# Patient Record
Sex: FEMALE | Race: WHITE | NOT HISPANIC OR LATINO | Employment: FULL TIME | URBAN - METROPOLITAN AREA
[De-identification: names, ages, dates, MRNs, and addresses within clinical notes are randomized per-mention and may not be internally consistent; named-entity substitution may affect disease eponyms.]

---

## 2017-03-24 ENCOUNTER — OFFICE VISIT (OUTPATIENT)
Dept: OPTOMETRY | Facility: CLINIC | Age: 27
End: 2017-03-24
Payer: COMMERCIAL

## 2017-03-24 DIAGNOSIS — H52.13 MYOPIA, BILATERAL: Primary | ICD-10-CM

## 2017-03-24 DIAGNOSIS — Z04.9 DISEASE RULED OUT AFTER EXAMINATION: ICD-10-CM

## 2017-03-24 DIAGNOSIS — Z46.0 FITTING AND ADJUSTMENT OF SPECTACLES AND CONTACT LENSES: Primary | ICD-10-CM

## 2017-03-24 PROCEDURE — 99999 PR PBB SHADOW E&M-EST. PATIENT-LVL II: CPT | Mod: PBBFAC,,, | Performed by: OPTOMETRIST

## 2017-03-24 PROCEDURE — 92004 COMPRE OPH EXAM NEW PT 1/>: CPT | Mod: S$GLB,,, | Performed by: OPTOMETRIST

## 2017-03-24 PROCEDURE — 92015 DETERMINE REFRACTIVE STATE: CPT | Mod: ,,, | Performed by: OPTOMETRIST

## 2017-03-24 PROCEDURE — 99999 PR PBB SHADOW E&M-EST. PATIENT-LVL I: CPT | Mod: PBBFAC,,, | Performed by: OPTOMETRIST

## 2017-03-24 PROCEDURE — 92310 CONTACT LENS FITTING OU: CPT | Mod: ,,, | Performed by: OPTOMETRIST

## 2017-03-24 PROCEDURE — 99211 OFF/OP EST MAY X REQ PHY/QHP: CPT | Mod: PBBFAC | Performed by: OPTOMETRIST

## 2017-03-24 NOTE — PROGRESS NOTES
HPI     Patient's age: 26 y.o.      Approximate date of last eye examination:  3-4 yrs ago  Name of last eye doctor seen:     Pt states that she is here to get exam foer gl;asses and contacts, also   been having trouble with night driving.    Wears glasses? yes     Wears CLs?:  yes           If yes:                Wears full-time or part-time:  Part-time               Sleeps with contact lenses:  no                 Headaches?  no  Eye pain/discomfort?  no                                                                                     Flashes?  no  Floaters?  yes  Diplopia/Double vision?  no    Patient's Ocular History:         Any eye surgeries? no         Any eye injury?  no         Any treatment for eye disease?  no  Family history of eye disease?  no    Significant patient medical history:         1. Diabetes?  no       If yes, IDDM or NIDDM? no   2. HBP?  no                   ! OTC eyedrops currently using:  none   ! Prescription eye meds currently using:  none            Last edited by Court Mojica MA on 3/24/2017 11:41 AM.         Assessment /Plan     For exam results, see Encounter Report.    Myopia, bilateral    Disease ruled out after examination      Rx specs    Dispensed new contact lens trials today   Remove nightly, replace monthly   OK to order if happy     Good overall ocular health, monitor yearly    RTC 1 year

## 2017-04-03 ENCOUNTER — OFFICE VISIT (OUTPATIENT)
Dept: SPORTS MEDICINE | Facility: CLINIC | Age: 27
End: 2017-04-03
Payer: COMMERCIAL

## 2017-04-03 DIAGNOSIS — G89.29 CHRONIC RIGHT SHOULDER PAIN: Primary | ICD-10-CM

## 2017-04-03 DIAGNOSIS — M25.511 CHRONIC RIGHT SHOULDER PAIN: Primary | ICD-10-CM

## 2017-04-03 PROCEDURE — 99999 PR PBB SHADOW E&M-EST. PATIENT-LVL III: CPT | Mod: PBBFAC,,, | Performed by: ORTHOPAEDIC SURGERY

## 2017-04-03 PROCEDURE — 99214 OFFICE O/P EST MOD 30 MIN: CPT | Mod: S$GLB,,, | Performed by: ORTHOPAEDIC SURGERY

## 2017-04-03 NOTE — PROGRESS NOTES
CC right shoulder pain    26 y.o. Female reports that the right shoulder pain right hand dominant.  She present for persistent right shoulder pain and weakness with much improved ROM and pain with PT and injection but persistent night pain and weakness.   She reports pain with ADLS ans weakness. No numbness.    She is a nurse at [employer].    PAST MEDICAL HISTORY:   Past Medical History   Diagnosis Date    Allergy     Anemia      hx of HOSEA on fergon in past    Anxiety     Arthritis      right shoulder    Depression     Fatigue     History of psychiatric care     Keloid cicatrix     Psychiatric problem     Therapy      PAST SURGICAL HISTORY:   Past Surgical History   Procedure Laterality Date    Tonsillectomy      [place] teeth removal  2010     FAMILY HISTORY:   Family History   Problem Relation Age of Onset    Arthritis Mother      back surgery    Fibromyalgia Mother     Hyperlipidemia Father     Arthritis Maternal Grandmother     Heart disease Paternal Grandfather     Amblyopia Neg Hx     Blindness Neg Hx     Cataracts Neg Hx     Glaucoma Neg Hx     Macular degeneration Neg Hx     Retinal detachment Neg Hx     Strabismus Neg Hx     Melanoma Neg Hx      SOCIAL HISTORY:   Social History     Social History    Marital status: Single     Spouse name: N/A    Number of children: N/A    Years of education: N/A     Occupational History    [employer]     Social History Main Topics    Smoking status: Never Smoker    Smokeless tobacco: Never Used    Alcohol use 2.0 oz/week     4 Standard drinks or equivalent per week      Comment: occ    Drug use: No    Sexual activity: Yes     Birth control/ protection: Condom     Other Topics Concern    Are You Pregnant Or Think You May Be? 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    Lives with roommates in apartment in [facility].     "       MEDICATIONS:   Current Outpatient Prescriptions:     buPROPion (WELLBUTRIN XL) 300 MG 24 hr tablet, TAKE ONE TABLET BY MOUTH ONCE DAILY, Disp: 30 tablet, Rfl: 5    escitalopram oxalate (LEXAPRO) 10 MG tablet, Take 1 tablet (10 mg total) by mouth once daily., Disp: 30 tablet, Rfl: 5    meloxicam (MOBIC) 15 MG tablet, Take 1 tablet (15 mg total) by mouth once daily. Take a Prilosec 20 mg tablet (over the counter) once a day every day while taking Mobic, Disp: 60 tablet, Rfl: 2  ALLERGIES:   Allergies   Allergen Reactions    Sulfa (Sulfonamide Antibiotics) Hives and Itching    Sulfamethoxazole-Trimethoprim      Other reaction(s): Itching       VITAL SIGNS:   Visit Vitals    /75    Pulse 66    Ht 5' 4" (1.626 m)    Wt 44.5 kg (98 lb)    BMI 16.82 kg/m2        Review of Systems   Constitution: Negative. Negative for chills, fever and night sweats.   HENT: Negative for congestion and headaches.    Eyes: Negative for blurred vision, left vision loss and right vision loss.   Cardiovascular: Negative for chest pain and syncope.   Respiratory: Negative for cough and shortness of breath.    Endocrine: Negative for polydipsia, polyphagia and polyuria.   Hematologic/Lymphatic: Negative for bleeding problem. Does not bruise/bleed easily.   Skin: Negative for dry skin, itching and rash.   Musculoskeletal: Negative for falls and muscle weakness.   Gastrointestinal: Negative for abdominal pain and bowel incontinence.   Genitourinary: Negative for bladder incontinence and nocturia.   Neurological: Negative for disturbances in coordination, loss of balance and seizures.   Psychiatric/Behavioral: Negative for depression. The patient does not have insomnia.    Allergic/Immunologic: Negative for hives and persistent infections.       PHYSICAL EXAMINATION:  General:  The patient is alert and oriented x 3.  Mood is pleasant.  Observation of ears, eyes and nose reveal no gross abnormalities.  No labored breathing " observed.  Gait is coordinated. Patient can toe walk and heel walk without difficulty.  Cardiovascular: There are no swelling or varicosities present.   Lymphatic: Negative for adenopathy       rightShoulder / Upper Extremity Exam      Very thin female    OBSERVATION:     Swelling  none  Deformity  none   Discoloration  none   Scapular winging none   Scars   none  Atrophy  + supra and infraspinatus wasting    Mild scoliosis    TENDERNESS / CREPITUS (T/C):          T/C      T/C   Clavicle   -/-  SUPRAspinatus    +/-   AC Jt.    +/-  INFRAspinatus  +/-   SC Jt.    -/-  Deltoid    -/-   G. Tuberosity  -/-  LH BICEP groove  +/-   Acromion:  -/-  Midline Neck   -/-   Scapular Spine -/-  Trapezium   -/-   SMA Scapula  -/-  GH jt. line - post  -/-   Scapulothoracic  -/-         ROM: (* = with pain)  Right shoulder   Left shoulder        AROM (PROM)   AROM (PROM)   FE    170° (175°)     170° (175°)     ER at 0°    80°  (85°)    80°  (85°)   ER at 90° ABD  90°  (90°)    90°  (90°)   IR at 90°  ABD   20  (40°)     20  (40°)      IR (spine level)   T10     T10    STRENGTH: (* = with pain) RIGHT SHOULDER  LEFT SHOULDER   SCAPTION   5/5    4/5    IR    5/5    4/5   ER    5/5    4/5   BICEPS   5/5    4/5   Deltoid    5/5    4/5     SIGNS:  Painful side       NEER   +    OCLIFTONS  +   BURKETT   +    SPEEDS  Neg   DROP ARM   neg   BELLY PRESS +   Superior escape none    LIFT-OFF  Neg   X-Body ADD    neg    MOVING VALGUS Neg      STABILITY TESTING    RIGHT SHOULDER   LEFT SHOULDER       Translation    Anterior  up face     up face    Posterior  up face    up face    Sulcus   < 10mm    < 10 mm    Signs    Apprehension   neg      Neg    Relocation   no change     no change    Jerk test  neg     Neg    +scapular dyskinisia    EXTREMITY NEURO-VASCULAR EXAM    Sensation grossly intact to light touch all dermatomal regions.    DTR 2+ Biceps, Triceps, BR and Negative Sangitas sign   Grossly intact motor function at Elbow, Wrist and  Hand   Distal pulses radial and ulnar 2+, brisk cap refill, symmetric.      NECK:  Painless FROM and spinous processes non-tender. Negative Spurlings sign.        1. Shoulder pain, right     Plan:       ASSESSMENT:   Scapular dyskinisia  Rotator cuff tendonitis x greater than 1 year with persistent pain and marked supra and infraspinatus wasting    Plan:    EMG RUE to r/o denervation as this is concerning for suprascapular notch cyst

## 2017-04-03 NOTE — MR AVS SNAPSHOT
[facility]  [address]  [place] [address]  Phone: [phone]                  Angela Lyle   4/3/2017 9:30 AM   Appointment    Description:  Female : 1990   Provider:  Hanna Brown MD   Department:  [facility]                To Do List           Goals (5 Years of Data)              11/28/16    10/13/16    10/11/16    Blood Pressure < 140/90   117/72  117/72  117/72    HDL > 40           LDL CHOLESTEROL < 160           COMPLETED: Maintain calorie intake of at least 1800 calories per day           COMPLETED: Plan meals and snacks:  eat at least 5 times/day             [facility]     [facility] On Call Nurse Care Line -  Assistance  Unless otherwise directed by your provider, please contact [facility], our nurse care line that is available for  assistance.     Registered nurses in the [facility] On Call Center provide: appointment scheduling, clinical advisement, health education, and other advisory services.  Call: [phone] (toll free)               Medications           Message regarding Medications     Verify the changes and/or additions to your medication regime listed below are the same as discussed with your clinician today.  If any of these changes or additions are incorrect, please notify your healthcare provider.             Verify that the below list of medications is an accurate representation of the medications you are currently taking.  If none reported, the list may be blank. If incorrect, please contact your healthcare provider. Carry this list with you in case of emergency.           Current Medications     buPROPion (WELLBUTRIN XL) 150 MG TB24 tablet Take 1 tablet (150 mg total) by mouth once daily.    escitalopram oxalate (LEXAPRO) 20 MG tablet Take 1 tablet (20 mg total) by mouth once daily.           Clinical Reference Information           Allergies as of 4/3/2017     Sulfa (Sulfonamide Antibiotics)     Sulfamethoxazole-trimethoprim      Immunizations Administered on Date of Encounter - 4/3/2017     None      Language Assistance Services     ATTENTION: Language assistance services are available, free of charge. Please call [phone].      ATENCIÓN: Si heber quinn, tiene a camacho disposición servicios gratuitos de asistencia lingüística. Llame al [phone].     [facility] Ý: N?u b?n nói Ti?ng Vi?t, có các d?ch v? h? tr? ngôn ng? mi?n phí dành cho b?n. G?i s? [phone].         [facility] complies with applicable Federal civil rights laws and does not discriminate on the basis of race, color, national origin, age, disability, or sex.

## 2017-04-05 ENCOUNTER — PATIENT MESSAGE (OUTPATIENT)
Dept: SPORTS MEDICINE | Facility: CLINIC | Age: 27
End: 2017-04-05

## 2017-04-24 VITALS — WEIGHT: 102 LBS | BODY MASS INDEX: 17.42 KG/M2 | HEIGHT: 64 IN | HEART RATE: 98 BPM

## 2017-05-01 ENCOUNTER — PROCEDURE VISIT (OUTPATIENT)
Dept: NEUROLOGY | Facility: CLINIC | Age: 27
End: 2017-05-01
Payer: COMMERCIAL

## 2017-05-01 DIAGNOSIS — G89.29 CHRONIC RIGHT SHOULDER PAIN: ICD-10-CM

## 2017-05-01 DIAGNOSIS — M25.511 CHRONIC RIGHT SHOULDER PAIN: ICD-10-CM

## 2017-05-01 PROCEDURE — 95913 NRV CNDJ TEST 13/> STUDIES: CPT | Mod: S$GLB,,, | Performed by: PSYCHIATRY & NEUROLOGY

## 2017-05-01 PROCEDURE — 95886 MUSC TEST DONE W/N TEST COMP: CPT | Mod: S$GLB,,, | Performed by: PSYCHIATRY & NEUROLOGY

## 2017-05-02 ENCOUNTER — PATIENT MESSAGE (OUTPATIENT)
Dept: SPORTS MEDICINE | Facility: CLINIC | Age: 27
End: 2017-05-02

## 2017-05-03 ENCOUNTER — OFFICE VISIT (OUTPATIENT)
Dept: SPORTS MEDICINE | Facility: CLINIC | Age: 27
End: 2017-05-03
Payer: COMMERCIAL

## 2017-05-03 VITALS
HEIGHT: 64 IN | BODY MASS INDEX: 17.93 KG/M2 | HEART RATE: 92 BPM | DIASTOLIC BLOOD PRESSURE: 66 MMHG | WEIGHT: 105 LBS | SYSTOLIC BLOOD PRESSURE: 98 MMHG

## 2017-05-03 DIAGNOSIS — M25.511 CHRONIC RIGHT SHOULDER PAIN: Primary | ICD-10-CM

## 2017-05-03 DIAGNOSIS — G89.29 CHRONIC RIGHT SHOULDER PAIN: Primary | ICD-10-CM

## 2017-05-03 PROCEDURE — 99999 PR PBB SHADOW E&M-EST. PATIENT-LVL III: CPT | Mod: PBBFAC,,, | Performed by: ORTHOPAEDIC SURGERY

## 2017-05-03 PROCEDURE — 99214 OFFICE O/P EST MOD 30 MIN: CPT | Mod: S$GLB,,, | Performed by: ORTHOPAEDIC SURGERY

## 2017-05-03 NOTE — MR AVS SNAPSHOT
[facility]  [address]  [facility] [place] [facility] [address]  Phone: [phone]                  Angela Lyle   5/3/2017 1:00 PM   Appointment    Description:  Female : 1990   Provider:  Hanna Brown MD   Department:  [facility]                To Do List           Future Appointments        Provider Department Dept Phone    5/3/2017 1:00 PM Hanna Brown MD [facility] [phone]      Goals (5 Years of Data)              11/28/16    10/13/16    10/11/16    Blood Pressure < 140/90   117/72  117/72  117/72    HDL > 40           LDL CHOLESTEROL < 160           COMPLETED: Maintain calorie intake of at least 1800 calories per day           COMPLETED: Plan meals and snacks:  eat at least 5 times/day             [facility]     [facility] On Call Nurse Care Line - 24 Assistance  Unless otherwise directed by your provider, please contact [facility], our nurse care line that is available for  assistance.     Registered nurses in the [facility] On Call Center provide: appointment scheduling, clinical advisement, health education, and other advisory services.  Call: [phone] (toll free)               Medications           Message regarding Medications     Verify the changes and/or additions to your medication regime listed below are the same as discussed with your clinician today.  If any of these changes or additions are incorrect, please notify your healthcare provider.             Verify that the below list of medications is an accurate representation of the medications you are currently taking.  If none reported, the list may be blank. If incorrect, please contact your healthcare provider. Carry this list with you in case of emergency.           Current Medications     buPROPion (WELLBUTRIN XL) 150 MG TB24 tablet Take 1 tablet (150 mg total) by mouth once daily.    escitalopram oxalate (LEXAPRO) 20 MG tablet Take 1 tablet (20 mg total) by  mouth once daily.           Clinical Reference Information           Your Vitals Were     Last Period                   03/18/2017           Allergies as of 5/3/2017     Sulfa (Sulfonamide Antibiotics)    Sulfamethoxazole-trimethoprim      Immunizations Administered on Date of Encounter - 5/3/2017     None      Language Assistance Services     ATTENTION: Language assistance services are available, free of charge. Please call [phone].      ATENCIÓN: Si habla español, tiene a camacho disposición servicios gratuitos de asistencia lingüística. Llame al [phone].     CHÚ Ý: N?u b?n nói Ti?ng Vi?t, có các d?ch v? h? tr? ngôn ng? mi?n phí dành cho b?n. G?i s? [phone].         [facility] complies with applicable Federal civil rights laws and does not discriminate on the basis of race, color, national origin, age, disability, or sex.

## 2017-05-03 NOTE — PROGRESS NOTES
CC right shoulder pain    26 y.o. Female reports that the right shoulder pain right hand dominant.  She present for persistent right shoulder pain and weakness with much improved ROM and pain with PT and injection but persistent night pain and weakness.   She reports pain with ADLS ans weakness. No numbness. She states that this started after a Flu shot injection into the right arm.  The patient states that after the injection she was unable to move the shoulder at all.  She has had a total of 2 cortisone injections. After the last injection she reports 50% improvement in pain prior to the injection that has continued since the injection. She still however reports weakness.     She is a nurse at [employer].    PAST MEDICAL HISTORY:   Past Medical History   Diagnosis Date    Allergy     Anemia      hx of HOSEA on fergon in past    Anxiety     Arthritis      right shoulder    Depression     Fatigue     History of psychiatric care     Keloid cicatrix     Psychiatric problem     Therapy      PAST SURGICAL HISTORY:   Past Surgical History   Procedure Laterality Date    Tonsillectomy      [place] teeth removal  2010     FAMILY HISTORY:   Family History   Problem Relation Age of Onset    Arthritis Mother      back surgery    Fibromyalgia Mother     Hyperlipidemia Father     Arthritis Maternal Grandmother     Heart disease Paternal Grandfather     Amblyopia Neg Hx     Blindness Neg Hx     Cataracts Neg Hx     Glaucoma Neg Hx     Macular degeneration Neg Hx     Retinal detachment Neg Hx     Strabismus Neg Hx     Melanoma Neg Hx      SOCIAL HISTORY:   Social History     Social History    Marital status: Single     Spouse name: N/A    Number of children: N/A    Years of education: N/A     Occupational History    [employer]     Social History Main Topics    Smoking status: Never Smoker    Smokeless tobacco: Never Used    Alcohol use 2.0 oz/week     4 Standard drinks or  "equivalent per week      Comment: occ    Drug use: No    Sexual activity: Yes     Birth control/ protection: Condom     Other Topics Concern    Are You Pregnant Or Think You May Be? 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    Lives with roommates in apartment in [place].           MEDICATIONS:   Current Outpatient Prescriptions:     buPROPion (WELLBUTRIN XL) 300 MG 24 hr tablet, TAKE ONE TABLET BY MOUTH ONCE DAILY, Disp: 30 tablet, Rfl: 5    escitalopram oxalate (LEXAPRO) 10 MG tablet, Take 1 tablet (10 mg total) by mouth once daily., Disp: 30 tablet, Rfl: 5    meloxicam (MOBIC) 15 MG tablet, Take 1 tablet (15 mg total) by mouth once daily. Take a Prilosec 20 mg tablet (over the counter) once a day every day while taking Mobic, Disp: 60 tablet, Rfl: 2  ALLERGIES:   Allergies   Allergen Reactions    Sulfa (Sulfonamide Antibiotics) Hives and Itching    Sulfamethoxazole-Trimethoprim      Other reaction(s): Itching       VITAL SIGNS:   Visit Vitals    /75    Pulse 66    Ht 5' 4" (1.626 m)    Wt 44.5 kg (98 lb)    BMI 16.82 kg/m2        Review of Systems   Constitution: Negative. Negative for chills, fever and night sweats.   HENT: Negative for congestion and headaches.    Eyes: Negative for blurred vision, left vision loss and right vision loss.   Cardiovascular: Negative for chest pain and syncope.   Respiratory: Negative for cough and shortness of breath.    Endocrine: Negative for polydipsia, polyphagia and polyuria.   Hematologic/Lymphatic: Negative for bleeding problem. Does not bruise/bleed easily.   Skin: Negative for dry skin, itching and rash.   Musculoskeletal: Negative for falls and muscle weakness.   Gastrointestinal: Negative for abdominal pain and bowel incontinence.   Genitourinary: Negative for bladder incontinence and nocturia.   Neurological: Negative for disturbances in " coordination, loss of balance and seizures.   Psychiatric/Behavioral: Negative for depression. The patient does not have insomnia.    Allergic/Immunologic: Negative for hives and persistent infections.       PHYSICAL EXAMINATION:  General:  The patient is alert and oriented x 3.  Mood is pleasant.  Observation of ears, eyes and nose reveal no gross abnormalities.  No labored breathing observed.  Gait is coordinated. Patient can toe walk and heel walk without difficulty.  Cardiovascular: There are no swelling or varicosities present.   Lymphatic: Negative for adenopathy       rightShoulder / Upper Extremity Exam      Very thin female    OBSERVATION:     Swelling  none  Deformity  none   Discoloration  none   Scapular winging none   Scars   none  Atrophy  + supra and infraspinatus wasting    Mild scoliosis    TENDERNESS / CREPITUS (T/C):          T/C      T/C   Clavicle   -/-  SUPRAspinatus    +/-   AC Jt.    +/-  INFRAspinatus  +/-   SC Jt.    -/-  Deltoid    -/-   G. Tuberosity  -/-  LH BICEP groove  +/-   Acromion:  -/-  Midline Neck   -/-   Scapular Spine -/-  Trapezium   -/-   SMA Scapula  -/-  GH jt. line - post  -/-   Scapulothoracic  -/-         ROM: (* = with pain)  Right shoulder   Left shoulder        AROM (PROM)   AROM (PROM)   FE    170° (175°)     170° (175°)     ER at 0°    80°  (85°)    80°  (85°)   ER at 90° ABD  90°  (90°)    90°  (90°)   IR at 90°  ABD   20  (40°)     20  (40°)      IR (spine level)   T10     T10    STRENGTH: (* = with pain) RIGHT SHOULDER  LEFT SHOULDER   SCAPTION   5/5    4/5    IR    5/5    4/5   ER    5/5    4/5   BICEPS   5/5    4/5   Deltoid    5/5    4/5     SIGNS:  Painful side       NEER   +    OCLIFTONS  +   BURKETT   +    SPEEDS  Neg   DROP ARM   neg   BELLY PRESS +   Superior escape none    LIFT-OFF  Neg   X-Body ADD    neg    MOVING VALGUS Neg      STABILITY TESTING    RIGHT SHOULDER   LEFT SHOULDER       Translation    Anterior  up face     up  face    Posterior  up face    up face    Sulcus   < 10mm    < 10 mm    Signs    Apprehension   neg      Neg    Relocation   no change     no change    Jerk test  neg     Neg    +scapular dyskinisia    EXTREMITY NEURO-VASCULAR EXAM    Sensation grossly intact to light touch all dermatomal regions.    DTR 2+ Biceps, Triceps, BR and Negative Sangitas sign   Grossly intact motor function at Elbow, Wrist and Hand   Distal pulses radial and ulnar 2+, brisk cap refill, symmetric.      NECK:  Painless FROM and spinous processes non-tender. Negative Spurlings sign.      POSITIVE scapular Assist test       1. Shoulder pain, right     Plan:       ASSESSMENT:   Scapular dyskinisia  Rotator cuff tendonitis x greater than 1 year with persistent pain and marked supra and infraspinatus wasting    Plan:  1. Pain control  2. MRI; will call with results; assessing for muscle atrophy compared to previous MRI  3. Will consider shoulder arthroscopy depending on results of MRI.

## 2017-05-08 ENCOUNTER — HOSPITAL ENCOUNTER (OUTPATIENT)
Dept: RADIOLOGY | Facility: OTHER | Age: 27
Discharge: HOME | End: 2017-05-08
Attending: ORTHOPAEDIC SURGERY
Payer: COMMERCIAL

## 2017-05-08 DIAGNOSIS — G89.29 CHRONIC RIGHT SHOULDER PAIN: ICD-10-CM

## 2017-05-08 DIAGNOSIS — M25.511 CHRONIC RIGHT SHOULDER PAIN: ICD-10-CM

## 2017-05-08 PROCEDURE — 73221 MRI JOINT UPR EXTREM W/O DYE: CPT | Mod: 26,RT,, | Performed by: RADIOLOGY

## 2017-05-08 PROCEDURE — 73221 MRI JOINT UPR EXTREM W/O DYE: CPT | Mod: TC,RT

## 2017-05-09 ENCOUNTER — PATIENT MESSAGE (OUTPATIENT)
Dept: SPORTS MEDICINE | Facility: CLINIC | Age: 27
End: 2017-05-09

## 2017-05-12 ENCOUNTER — PATIENT MESSAGE (OUTPATIENT)
Dept: SPORTS MEDICINE | Facility: CLINIC | Age: 27
End: 2017-05-12

## 2017-05-18 ENCOUNTER — TELEPHONE (OUTPATIENT)
Dept: SPORTS MEDICINE | Facility: CLINIC | Age: 27
End: 2017-05-18

## 2017-05-18 NOTE — TELEPHONE ENCOUNTER
Patient was  Called and a message was left for her to call the office concerning her MRI results and her treatment going foward

## 2017-05-22 ENCOUNTER — TELEPHONE (OUTPATIENT)
Dept: SPORTS MEDICINE | Facility: CLINIC | Age: 27
End: 2017-05-22

## 2017-05-22 NOTE — TELEPHONE ENCOUNTER
Patient was called and results of MRI where discusssed with the patient. All of the patients questions where answered to the patients satisfaction.       We reviewed with Angela today, the pathology and natural history of her diagnosis. We have discussed a variety of treatment options including medications, physical therapy and other alternative treatments. I also explained the indications, risks and benefits of surgery. After discussion, Angela decided to proceed with surgery. The decision was made to go forward with     A. Right shoulder arthroscopic suprascapular nerve decompression  B. Right shoulder arthroscopic rotator cuff debridement  C. Right shoulder subpec biceps tenodesis  D. Right shoulder arthroscopic labral debridement      The details of the surgical procedure were explained, including the location of probable incisions and a description of likely hardware and/or grafts to be used.  The patient understands the likely convalescence after surgery.  Also, we have thoroughly discussed the risks, benefits and alternatives to surgery, including, but not limited to, the risk of infection, joint stiffness, blood clot (including DVT and/or pulmonary embolus), neurologic and vascular injury.  It was explained that, if tissue has been repaired or reconstructed, there is a chance of failure, which may require further management.      All of the patient's questions were answered and informed consent was obtained. The patient will contact us if they have any questions or concerns in the interim.

## 2017-05-29 ENCOUNTER — PATIENT MESSAGE (OUTPATIENT)
Dept: ADMINISTRATIVE | Facility: OTHER | Age: 27
End: 2017-05-29

## 2017-05-30 ENCOUNTER — PATIENT MESSAGE (OUTPATIENT)
Dept: INTERNAL MEDICINE | Facility: CLINIC | Age: 27
End: 2017-05-30

## 2017-05-30 DIAGNOSIS — M75.20 BICIPITAL TENOSYNOVITIS, UNSPECIFIED LATERALITY: Primary | ICD-10-CM

## 2017-05-30 DIAGNOSIS — M25.319 SHOULDER INSTABILITY, UNSPECIFIED LATERALITY: ICD-10-CM

## 2017-06-01 ENCOUNTER — HOSPITAL ENCOUNTER (OUTPATIENT)
Dept: PREADMISSION TESTING | Facility: OTHER | Age: 27
Discharge: HOME | End: 2017-06-01
Attending: ORTHOPAEDIC SURGERY
Payer: COMMERCIAL

## 2017-06-01 ENCOUNTER — OFFICE VISIT (OUTPATIENT)
Dept: SPORTS MEDICINE | Facility: CLINIC | Age: 27
End: 2017-06-01
Payer: COMMERCIAL

## 2017-06-01 ENCOUNTER — ANESTHESIA EVENT (OUTPATIENT)
Dept: SURGERY | Facility: OTHER | Age: 27
End: 2017-06-01
Payer: COMMERCIAL

## 2017-06-01 ENCOUNTER — TELEPHONE (OUTPATIENT)
Dept: SPORTS MEDICINE | Facility: CLINIC | Age: 27
End: 2017-06-01

## 2017-06-01 VITALS
WEIGHT: 105 LBS | BODY MASS INDEX: 17.93 KG/M2 | HEIGHT: 64 IN | TEMPERATURE: 98 F | DIASTOLIC BLOOD PRESSURE: 68 MMHG | OXYGEN SATURATION: 100 % | HEART RATE: 71 BPM | SYSTOLIC BLOOD PRESSURE: 103 MMHG

## 2017-06-01 VITALS
BODY MASS INDEX: 17.93 KG/M2 | SYSTOLIC BLOOD PRESSURE: 107 MMHG | HEART RATE: 74 BPM | DIASTOLIC BLOOD PRESSURE: 71 MMHG | HEIGHT: 64 IN | WEIGHT: 105 LBS

## 2017-06-01 DIAGNOSIS — M75.21 BICEPS TENDONITIS, RIGHT: ICD-10-CM

## 2017-06-01 DIAGNOSIS — M25.311 SHOULDER INSTABILITY, RIGHT: Primary | ICD-10-CM

## 2017-06-01 PROCEDURE — 99499 UNLISTED E&M SERVICE: CPT | Mod: S$GLB,,, | Performed by: PHYSICIAN ASSISTANT

## 2017-06-01 PROCEDURE — 99999 PR PBB SHADOW E&M-EST. PATIENT-LVL III: CPT | Mod: PBBFAC,,, | Performed by: PHYSICIAN ASSISTANT

## 2017-06-01 RX ORDER — LIDOCAINE HYDROCHLORIDE 10 MG/ML
1 INJECTION, SOLUTION EPIDURAL; INFILTRATION; INTRACAUDAL; PERINEURAL ONCE
Status: CANCELLED | OUTPATIENT
Start: 2017-06-01 | End: 2017-06-01

## 2017-06-01 RX ORDER — SODIUM CHLORIDE, SODIUM LACTATE, POTASSIUM CHLORIDE, CALCIUM CHLORIDE 600; 310; 30; 20 MG/100ML; MG/100ML; MG/100ML; MG/100ML
INJECTION, SOLUTION INTRAVENOUS CONTINUOUS
Status: CANCELLED | OUTPATIENT
Start: 2017-06-01

## 2017-06-01 RX ORDER — PREGABALIN 25 MG/1
75 CAPSULE ORAL ONCE
Status: CANCELLED | OUTPATIENT
Start: 2017-06-01 | End: 2017-06-01

## 2017-06-01 RX ORDER — TRAMADOL HYDROCHLORIDE 50 MG/1
50 TABLET ORAL
Qty: 40 TABLET | Refills: 0 | Status: SHIPPED | OUTPATIENT
Start: 2017-06-01 | End: 2017-11-20 | Stop reason: SDUPTHER

## 2017-06-01 RX ORDER — OXYCODONE HCL 10 MG/1
10 TABLET, FILM COATED, EXTENDED RELEASE ORAL ONCE
Status: CANCELLED | OUTPATIENT
Start: 2017-06-01 | End: 2017-06-01

## 2017-06-01 RX ORDER — OXYCODONE AND ACETAMINOPHEN 10; 325 MG/1; MG/1
1 TABLET ORAL EVERY 6 HOURS PRN
Qty: 60 TABLET | Refills: 0 | Status: SHIPPED | OUTPATIENT
Start: 2017-06-01 | End: 2017-11-20 | Stop reason: SDUPTHER

## 2017-06-01 RX ORDER — PREGABALIN 75 MG/1
150 CAPSULE ORAL
Status: ACTIVE | OUTPATIENT
Start: 2017-06-01 | End: 2017-06-01

## 2017-06-01 RX ORDER — MIDAZOLAM HYDROCHLORIDE 5 MG/ML
2 INJECTION INTRAMUSCULAR; INTRAVENOUS
Status: CANCELLED | OUTPATIENT
Start: 2017-06-01 | End: 2017-06-01

## 2017-06-01 RX ORDER — PROMETHAZINE HYDROCHLORIDE 25 MG/1
25 TABLET ORAL EVERY 6 HOURS PRN
Qty: 30 TABLET | Refills: 0 | Status: SHIPPED | OUTPATIENT
Start: 2017-06-01 | End: 2017-07-01

## 2017-06-01 RX ORDER — FAMOTIDINE 20 MG/1
20 TABLET, FILM COATED ORAL
Status: CANCELLED | OUTPATIENT
Start: 2017-06-01 | End: 2017-06-01

## 2017-06-01 NOTE — H&P
Angela Lyle  is here for a completion of her perioperative paperwork. she  Is scheduled to undergo A. Right shoulder arthroscopic suprascapular nerve decompression  B. Right shoulder arthroscopic rotator cuff debridement  C. Right shoulder subpec biceps tenodesis  D. Right shoulder arthroscopic labral debridement on 6/6/2017.  She is a healthy individual and does not need clearance for this procedure.     Risks, indications and benefits of the surgical procedure were discussed with the patient. All questions with regard to surgery, rehab, expected return to functional activities, activities of daily living and recreational endeavors were answered to her satisfaction.    Once no other questions were asked, a brief history and physical exam was then performed.      PHYSICAL EXAM:  GEN: A&Ox3, WD WN NAD  HEENT: WNL  CHEST: CTAB, no W/R/R  HEART: RRR, no M/R/G  ABD: Soft, NT ND, BS x4 QUADS  MS; See Epic  NEURO: CN II-XII intact       The surgical consent was then reviewed with the patient, who agreed with all the contents of the consent form and it was signed. she was then given the [facility] surgery packet to bring with her to [facility] for the anesthesia portion of her perioperative paperwork.     PHYSICAL THERAPY:  She was also instructed regarding physical therapy and will begin at [facility].  POST OP CARE:instructions were reviewed including care of the wound and dressing after surgery and when she can shower.     PAIN MANAGEMENT: Angela Lyle was also given her pain management regime, which includes the TENS unit given to her by [employer] medical equipment along with the education required for its use. She was also instructed regarding the Polar ice unit that will be in place after surgery and her postoperative pain medications.     PAIN MEDICATION:  Percocet 10/325mg 1 po q 4-6 hours prn pain  Ultram 50 mg one p.o. q.4-6 hours p.r.n. breakthrough pain,   Phenergan 25 mg one p.o. q.4-6  hours p.r.n. nausea and vomiting.    As there were no other questions to be asked, she was given my business card along with Hanna Brown MD business card if she has any questions or concerns prior to surgery or in the postop period.

## 2017-06-01 NOTE — DISCHARGE INSTRUCTIONS
PRE-ADMIT TESTING -  [phone]    [address]  [facility]        OUTPATIENT SURGERY UNIT - [phone]    Your surgery has been scheduled at [facility]. We are pleased to have the opportunity to serve you. For Further Information please call [phone].    On the day of surgery please report to the Information Desk on the 1st floor.    · CONTACT YOUR PHYSICIAN'S OFFICE THE DAY PRIOR TO YOUR SURGERY TO OBTAIN YOUR ARRIVAL TIME.     · The evening before surgery do not eat anything after 9 p.m. ( this includes hard candy, chewing gum and mints).  You may only have GATORADE, POWERADE AND WATER  from 9 p.m. until you leave your home.   DO NOT DRINK ANY LIQUIDS ON THE WAY TO THE HOSPITAL.      SPECIAL MEDICATION INSTRUCTIONS: TAKE medications checked off by the Anesthesiologist on your Medication List.    Angiogram Patients: Take medications as instructed by your physician, including aspirin.     Surgery Patients:    If you take ASPIRIN - Your PHYSICIAN/SURGEON will need to inform you IF/OR when you need to stop taking aspirin prior to your surgery.     Do Not take any medications containing IBUPROFEN.  Do Not Wear any make-up or dark nail polish   (especially eye make-up) to surgery. If you come to surgery with makeup on you will be required to remove the makeup or nail polish.    Do not shave your surgical area at least 5 days prior to your surgery. The surgical prep will be performed at the hospital according to Infection Control regulations.    Leave all valuables at home.   Do Not wear any jewelry or watches, including any metal in body piercings.  Contact Lens must be removed before surgery. Either do not wear the contact lens or bring a case and solution for storage.  Please bring a container for eyeglasses or dentures as required.  Bring any paperwork your physician has provided, such as consent forms,  history and physicals, doctor's orders, etc.   Bring comfortable clothes  that are loose fitting to wear upon discharge. Take into consideration the type of surgery being performed.  Maintain your diet as advised per your physician the day prior to surgery.      Adequate rest the night before surgery is advised.   Park in the Parking lot behind the hospital or in the [facility] across the street from the parking lot. Parking is complimentary.  If you will be discharged the same day as your procedure, please arrange for a responsible adult to drive you home or to accompany you if traveling by taxi.   YOU WILL NOT BE PERMITTED TO DRIVE OR TO LEAVE THE HOSPITAL ALONE AFTER SURGERY.   It is strongly recommended that you arrange for someone to remain with you for the first 24 hrs following your surgery.       Thank you for your cooperation.  The Staff of [facility].        Bathing Instructions                                                                 Please shower the evening before and morning of your procedure with    ANTIBACTERIAL SOAP. ( DIAL, etc )  Concentrate on the surgical area   for at least 3 minutes and rinse completely. Dry off as usual.   Do not use any deodorant, powder, body lotions, perfume, after shave or    cologne.

## 2017-06-01 NOTE — TELEPHONE ENCOUNTER
I received a fax from [employer]. It was a request for medical records and a signed authorization for TING. Both were faxed to our medical record department by me.    Brittani Parra MA  [facility]

## 2017-06-01 NOTE — ANESTHESIA PREPROCEDURE EVALUATION
06/01/2017  Angela Lyle is a 27 y.o., female.    Anesthesia Evaluation    I have reviewed the Patient Summary Reports.    I have reviewed the Nursing Notes.   I have reviewed the Medications.     Review of Systems  Anesthesia Hx:  No problems with previous Anesthesia  History of prior surgery of interest to airway management or planning: Previous anesthesia: General Tonsils as child with general anesthesia.    Social:  Non-Smoker    Hematology/Oncology:  Hematology Normal   Oncology Normal     EENT/Dental:EENT/Dental Normal   Cardiovascular:   Exercise tolerance: good    Pulmonary:  Pulmonary Normal    Renal/:  Renal/ Normal     Hepatic/GI:  Hepatic/GI Normal    Musculoskeletal:  Musculoskeletal Normal    Neurological:  Neurology Normal    Endocrine:  Endocrine Normal    Dermatological:  Skin Normal        Physical Exam  General:  Well nourished                 Anesthesia Plan  Type of Anesthesia, risks & benefits discussed:  Anesthesia Type:  general  Patient's Preference:   Intra-op Monitoring Plan:   Intra-op Monitoring Plan Comments:   Post Op Pain Control Plan: peripheral nerve block  Post Op Pain Control Plan Comments:   Induction:   IV  Beta Blocker:         Informed Consent: Patient understands risks and agrees with Anesthesia plan.  Questions answered. Anesthesia consent signed with patient.  ASA Score: 1     Day of Surgery Review of History & Physical:    H&P update referred to the surgeon.     Anesthesia Plan Notes: No labs,plan ISB        Ready For Surgery From Anesthesia Perspective.

## 2017-06-02 ENCOUNTER — TELEPHONE (OUTPATIENT)
Dept: INTERNAL MEDICINE | Facility: CLINIC | Age: 27
End: 2017-06-02

## 2017-06-02 DIAGNOSIS — E55.9 VITAMIN D DEFICIENCY: Primary | ICD-10-CM

## 2017-06-02 RX ORDER — ERGOCALCIFEROL 1.25 MG/1
50000 CAPSULE ORAL
Qty: 12 CAPSULE | Refills: 1 | Status: SHIPPED | OUTPATIENT
Start: 2017-06-02 | End: 2017-06-05 | Stop reason: SDUPTHER

## 2017-06-03 ENCOUNTER — PATIENT MESSAGE (OUTPATIENT)
Dept: DERMATOLOGY | Facility: CLINIC | Age: 27
End: 2017-06-03

## 2017-06-03 ENCOUNTER — PATIENT MESSAGE (OUTPATIENT)
Dept: INTERNAL MEDICINE | Facility: CLINIC | Age: 27
End: 2017-06-03

## 2017-06-03 DIAGNOSIS — E55.9 VITAMIN D DEFICIENCY: ICD-10-CM

## 2017-06-03 NOTE — TELEPHONE ENCOUNTER
Message sent to pt via my chart with lab results and updates to plan.     Please schedule vit d lab in 6 months

## 2017-06-05 RX ORDER — ERGOCALCIFEROL 1.25 MG/1
50000 CAPSULE ORAL
Qty: 12 CAPSULE | Refills: 0 | Status: SHIPPED | OUTPATIENT
Start: 2017-06-05 | End: 2017-08-23

## 2017-06-05 NOTE — TELEPHONE ENCOUNTER
Called pt and left kimani stating that lab results have been sent via my [facility] along w/ her updates plan.  Also I left office number on Angela harman to call and schedule Vit D lab in 6 months.

## 2017-06-06 ENCOUNTER — HOSPITAL ENCOUNTER (OUTPATIENT)
Facility: OTHER | Age: 27
Discharge: HOME | End: 2017-06-06
Attending: ORTHOPAEDIC SURGERY | Admitting: ORTHOPAEDIC SURGERY
Payer: COMMERCIAL

## 2017-06-06 ENCOUNTER — ANESTHESIA (OUTPATIENT)
Dept: SURGERY | Facility: OTHER | Age: 27
End: 2017-06-06
Payer: COMMERCIAL

## 2017-06-06 ENCOUNTER — TELEPHONE (OUTPATIENT)
Dept: SPORTS MEDICINE | Facility: CLINIC | Age: 27
End: 2017-06-06

## 2017-06-06 VITALS
BODY MASS INDEX: 17.93 KG/M2 | DIASTOLIC BLOOD PRESSURE: 70 MMHG | RESPIRATION RATE: 16 BRPM | WEIGHT: 105 LBS | OXYGEN SATURATION: 99 % | HEIGHT: 64 IN | HEART RATE: 72 BPM | TEMPERATURE: 98 F | SYSTOLIC BLOOD PRESSURE: 114 MMHG

## 2017-06-06 DIAGNOSIS — Z98.890 STATUS POST ARTHROSCOPY OF SHOULDER: Primary | ICD-10-CM

## 2017-06-06 DIAGNOSIS — M25.311 SHOULDER INSTABILITY, RIGHT: ICD-10-CM

## 2017-06-06 DIAGNOSIS — M75.21 BICEPS TENDONITIS, RIGHT: ICD-10-CM

## 2017-06-06 PROBLEM — M25.319 SHOULDER INSTABILITY: Status: ACTIVE | Noted: 2017-06-06

## 2017-06-06 LAB
B-HCG UR QL: NEGATIVE
CTP QC/QA: YES

## 2017-06-06 PROCEDURE — 36000711: Performed by: ORTHOPAEDIC SURGERY

## 2017-06-06 PROCEDURE — 25000003 PHARM REV CODE 250: Performed by: PHYSICIAN ASSISTANT

## 2017-06-06 PROCEDURE — 25000003 PHARM REV CODE 250: Performed by: ANESTHESIOLOGY

## 2017-06-06 PROCEDURE — 63600175 PHARM REV CODE 636 W HCPCS: Performed by: ORTHOPAEDIC SURGERY

## 2017-06-06 PROCEDURE — 71000015 HC POSTOP RECOV 1ST HR: Performed by: ORTHOPAEDIC SURGERY

## 2017-06-06 PROCEDURE — 27201423 OPTIME MED/SURG SUP & DEVICES STERILE SUPPLY: Performed by: ORTHOPAEDIC SURGERY

## 2017-06-06 PROCEDURE — 63600175 PHARM REV CODE 636 W HCPCS: Performed by: ANESTHESIOLOGY

## 2017-06-06 PROCEDURE — 36000710: Performed by: ORTHOPAEDIC SURGERY

## 2017-06-06 PROCEDURE — 37000008 HC ANESTHESIA 1ST 15 MINUTES: Performed by: ORTHOPAEDIC SURGERY

## 2017-06-06 PROCEDURE — 71000016 HC POSTOP RECOV ADDL HR: Performed by: ORTHOPAEDIC SURGERY

## 2017-06-06 PROCEDURE — 71000033 HC RECOVERY, INTIAL HOUR: Performed by: ORTHOPAEDIC SURGERY

## 2017-06-06 PROCEDURE — 63600175 PHARM REV CODE 636 W HCPCS: Performed by: NURSE ANESTHETIST, CERTIFIED REGISTERED

## 2017-06-06 PROCEDURE — 29823 SHO ARTHRS SRG XTNSV DBRDMT: CPT | Mod: 51,RT,, | Performed by: ORTHOPAEDIC SURGERY

## 2017-06-06 PROCEDURE — 25000003 PHARM REV CODE 250: Performed by: ORTHOPAEDIC SURGERY

## 2017-06-06 PROCEDURE — 29999 UNLISTED PX ARTHROSCOPY: CPT | Mod: ,,, | Performed by: ORTHOPAEDIC SURGERY

## 2017-06-06 PROCEDURE — 23430 REPAIR BICEPS TENDON: CPT | Mod: RT,,, | Performed by: ORTHOPAEDIC SURGERY

## 2017-06-06 PROCEDURE — 29826 SHO ARTHRS SRG DECOMPRESSION: CPT | Mod: RT,,, | Performed by: ORTHOPAEDIC SURGERY

## 2017-06-06 PROCEDURE — 25000003 PHARM REV CODE 250: Performed by: NURSE ANESTHETIST, CERTIFIED REGISTERED

## 2017-06-06 PROCEDURE — C1713 ANCHOR/SCREW BN/BN,TIS/BN: HCPCS | Performed by: ORTHOPAEDIC SURGERY

## 2017-06-06 PROCEDURE — 37000009 HC ANESTHESIA EA ADD 15 MINS: Performed by: ORTHOPAEDIC SURGERY

## 2017-06-06 PROCEDURE — 81025 URINE PREGNANCY TEST: CPT | Performed by: ANESTHESIOLOGY

## 2017-06-06 DEVICE — ANCHOR FORKED TIP 7MM PEEI: Type: IMPLANTABLE DEVICE | Site: SHOULDER | Status: FUNCTIONAL

## 2017-06-06 RX ORDER — OXYCODONE HCL 10 MG/1
10 TABLET, FILM COATED, EXTENDED RELEASE ORAL ONCE
Status: COMPLETED | OUTPATIENT
Start: 2017-06-06 | End: 2017-06-06

## 2017-06-06 RX ORDER — HYDROMORPHONE HYDROCHLORIDE 2 MG/ML
0.4 INJECTION, SOLUTION INTRAMUSCULAR; INTRAVENOUS; SUBCUTANEOUS EVERY 5 MIN PRN
Status: DISCONTINUED | OUTPATIENT
Start: 2017-06-06 | End: 2017-06-07 | Stop reason: HOSPADM

## 2017-06-06 RX ORDER — PROMETHAZINE HYDROCHLORIDE 25 MG/1
25 TABLET ORAL ONCE AS NEEDED
Status: COMPLETED | OUTPATIENT
Start: 2017-06-06 | End: 2017-06-06

## 2017-06-06 RX ORDER — SODIUM CHLORIDE 0.9 % (FLUSH) 0.9 %
3 SYRINGE (ML) INJECTION
Status: DISCONTINUED | OUTPATIENT
Start: 2017-06-06 | End: 2017-06-07 | Stop reason: HOSPADM

## 2017-06-06 RX ORDER — PREGABALIN 75 MG/1
75 CAPSULE ORAL ONCE
Status: DISCONTINUED | OUTPATIENT
Start: 2017-06-06 | End: 2017-06-07 | Stop reason: HOSPADM

## 2017-06-06 RX ORDER — EPINEPHRINE 1 MG/ML
INJECTION, SOLUTION INTRACARDIAC; INTRAMUSCULAR; INTRAVENOUS; SUBCUTANEOUS
Status: DISCONTINUED | OUTPATIENT
Start: 2017-06-06 | End: 2017-06-06 | Stop reason: HOSPADM

## 2017-06-06 RX ORDER — CLINDAMYCIN PHOSPHATE 900 MG/50ML
900 INJECTION, SOLUTION INTRAVENOUS
Status: COMPLETED | OUTPATIENT
Start: 2017-06-06 | End: 2017-06-06

## 2017-06-06 RX ORDER — FENTANYL CITRATE 50 UG/ML
25 INJECTION, SOLUTION INTRAMUSCULAR; INTRAVENOUS EVERY 5 MIN PRN
Status: DISCONTINUED | OUTPATIENT
Start: 2017-06-06 | End: 2017-06-07 | Stop reason: HOSPADM

## 2017-06-06 RX ORDER — PROPOFOL 10 MG/ML
VIAL (ML) INTRAVENOUS
Status: DISCONTINUED | OUTPATIENT
Start: 2017-06-06 | End: 2017-06-06

## 2017-06-06 RX ORDER — NEOSTIGMINE METHYLSULFATE 1 MG/ML
INJECTION, SOLUTION INTRAVENOUS
Status: DISCONTINUED | OUTPATIENT
Start: 2017-06-06 | End: 2017-06-06

## 2017-06-06 RX ORDER — SODIUM CHLORIDE 0.9 % (FLUSH) 0.9 %
3 SYRINGE (ML) INJECTION EVERY 8 HOURS
Status: DISCONTINUED | OUTPATIENT
Start: 2017-06-06 | End: 2017-06-07 | Stop reason: HOSPADM

## 2017-06-06 RX ORDER — ROPIVACAINE HYDROCHLORIDE 5 MG/ML
INJECTION, SOLUTION EPIDURAL; INFILTRATION; PERINEURAL
Status: DISCONTINUED | OUTPATIENT
Start: 2017-06-06 | End: 2017-06-06

## 2017-06-06 RX ORDER — MIDAZOLAM HYDROCHLORIDE 5 MG/ML
2 INJECTION INTRAMUSCULAR; INTRAVENOUS
Status: COMPLETED | OUTPATIENT
Start: 2017-06-06 | End: 2017-06-06

## 2017-06-06 RX ORDER — OXYCODONE HYDROCHLORIDE 5 MG/1
5 TABLET ORAL
Status: DISCONTINUED | OUTPATIENT
Start: 2017-06-06 | End: 2017-06-07 | Stop reason: HOSPADM

## 2017-06-06 RX ORDER — GLYCOPYRROLATE 0.2 MG/ML
INJECTION INTRAMUSCULAR; INTRAVENOUS
Status: DISCONTINUED | OUTPATIENT
Start: 2017-06-06 | End: 2017-06-06

## 2017-06-06 RX ORDER — FENTANYL CITRATE 50 UG/ML
100 INJECTION, SOLUTION INTRAMUSCULAR; INTRAVENOUS EVERY 5 MIN PRN
Status: DISCONTINUED | OUTPATIENT
Start: 2017-06-06 | End: 2017-06-07 | Stop reason: HOSPADM

## 2017-06-06 RX ORDER — ROCURONIUM BROMIDE 10 MG/ML
INJECTION, SOLUTION INTRAVENOUS
Status: DISCONTINUED | OUTPATIENT
Start: 2017-06-06 | End: 2017-06-06

## 2017-06-06 RX ORDER — MIDAZOLAM HYDROCHLORIDE 1 MG/ML
2 INJECTION INTRAMUSCULAR; INTRAVENOUS ONCE
Status: DISCONTINUED | OUTPATIENT
Start: 2017-06-06 | End: 2017-06-07 | Stop reason: HOSPADM

## 2017-06-06 RX ORDER — ONDANSETRON 2 MG/ML
4 INJECTION INTRAMUSCULAR; INTRAVENOUS EVERY 4 HOURS PRN
Status: DISCONTINUED | OUTPATIENT
Start: 2017-06-06 | End: 2017-06-07 | Stop reason: HOSPADM

## 2017-06-06 RX ORDER — SODIUM CHLORIDE, SODIUM LACTATE, POTASSIUM CHLORIDE, CALCIUM CHLORIDE 600; 310; 30; 20 MG/100ML; MG/100ML; MG/100ML; MG/100ML
INJECTION, SOLUTION INTRAVENOUS CONTINUOUS
Status: DISCONTINUED | OUTPATIENT
Start: 2017-06-06 | End: 2017-06-07 | Stop reason: HOSPADM

## 2017-06-06 RX ORDER — FENTANYL CITRATE 50 UG/ML
INJECTION, SOLUTION INTRAMUSCULAR; INTRAVENOUS
Status: DISCONTINUED | OUTPATIENT
Start: 2017-06-06 | End: 2017-06-06

## 2017-06-06 RX ORDER — LIDOCAINE HYDROCHLORIDE 10 MG/ML
1 INJECTION, SOLUTION EPIDURAL; INFILTRATION; INTRACAUDAL; PERINEURAL ONCE
Status: DISCONTINUED | OUTPATIENT
Start: 2017-06-06 | End: 2017-06-07 | Stop reason: HOSPADM

## 2017-06-06 RX ORDER — FAMOTIDINE 20 MG/1
20 TABLET, FILM COATED ORAL
Status: DISPENSED | OUTPATIENT
Start: 2017-06-06 | End: 2017-06-06

## 2017-06-06 RX ORDER — DEXAMETHASONE SODIUM PHOSPHATE 4 MG/ML
INJECTION, SOLUTION INTRA-ARTICULAR; INTRALESIONAL; INTRAMUSCULAR; INTRAVENOUS; SOFT TISSUE
Status: DISCONTINUED | OUTPATIENT
Start: 2017-06-06 | End: 2017-06-06

## 2017-06-06 RX ORDER — LIDOCAINE HCL/PF 100 MG/5ML
SYRINGE (ML) INTRAVENOUS
Status: DISCONTINUED | OUTPATIENT
Start: 2017-06-06 | End: 2017-06-06

## 2017-06-06 RX ORDER — ONDANSETRON 2 MG/ML
INJECTION INTRAMUSCULAR; INTRAVENOUS
Status: DISCONTINUED | OUTPATIENT
Start: 2017-06-06 | End: 2017-06-06

## 2017-06-06 RX ORDER — MEPERIDINE HYDROCHLORIDE 50 MG/ML
12.5 INJECTION INTRAMUSCULAR; INTRAVENOUS; SUBCUTANEOUS ONCE AS NEEDED
Status: DISCONTINUED | OUTPATIENT
Start: 2017-06-06 | End: 2017-06-07 | Stop reason: HOSPADM

## 2017-06-06 RX ADMIN — GLYCOPYRROLATE 0.2 MG: 0.2 INJECTION, SOLUTION INTRAMUSCULAR; INTRAVENOUS at 12:06

## 2017-06-06 RX ADMIN — OXYCODONE HYDROCHLORIDE 5 MG: 5 TABLET ORAL at 05:06

## 2017-06-06 RX ADMIN — MIDAZOLAM 2 MG: 5 INJECTION INTRAMUSCULAR; INTRAVENOUS at 11:06

## 2017-06-06 RX ADMIN — PROMETHAZINE HYDROCHLORIDE 25 MG: 25 TABLET ORAL at 09:06

## 2017-06-06 RX ADMIN — OXYCODONE HYDROCHLORIDE 10 MG: 10 TABLET, FILM COATED, EXTENDED RELEASE ORAL at 09:06

## 2017-06-06 RX ADMIN — NEOSTIGMINE METHYLSULFATE 3 MG: 1 INJECTION INTRAVENOUS at 02:06

## 2017-06-06 RX ADMIN — CARBOXYMETHYLCELLULOSE SODIUM 4 DROP: 2.5 SOLUTION/ DROPS OPHTHALMIC at 12:06

## 2017-06-06 RX ADMIN — ONDANSETRON 4 MG: 2 INJECTION INTRAMUSCULAR; INTRAVENOUS at 01:06

## 2017-06-06 RX ADMIN — FENTANYL CITRATE 100 MCG: 50 INJECTION, SOLUTION INTRAMUSCULAR; INTRAVENOUS at 12:06

## 2017-06-06 RX ADMIN — SODIUM CHLORIDE, SODIUM LACTATE, POTASSIUM CHLORIDE, AND CALCIUM CHLORIDE: 600; 310; 30; 20 INJECTION, SOLUTION INTRAVENOUS at 11:06

## 2017-06-06 RX ADMIN — ONDANSETRON 4 MG: 2 INJECTION INTRAMUSCULAR; INTRAVENOUS at 07:06

## 2017-06-06 RX ADMIN — LIGHT MINERAL OIL, WHITE PETROLATUM 1 TUBE: 150; 850 OINTMENT OPHTHALMIC at 12:06

## 2017-06-06 RX ADMIN — PROPOFOL 200 MG: 10 INJECTION, EMULSION INTRAVENOUS at 12:06

## 2017-06-06 RX ADMIN — GLYCOPYRROLATE 0.4 MG: 0.2 INJECTION, SOLUTION INTRAMUSCULAR; INTRAVENOUS at 02:06

## 2017-06-06 RX ADMIN — FENTANYL CITRATE 100 MCG: 50 INJECTION, SOLUTION INTRAMUSCULAR; INTRAVENOUS at 11:06

## 2017-06-06 RX ADMIN — CLINDAMYCIN PHOSPHATE 900 MG: 18 INJECTION, SOLUTION INTRAVENOUS at 12:06

## 2017-06-06 RX ADMIN — DEXAMETHASONE SODIUM PHOSPHATE 8 MG: 4 INJECTION, SOLUTION INTRAMUSCULAR; INTRAVENOUS at 12:06

## 2017-06-06 RX ADMIN — SODIUM CHLORIDE, SODIUM LACTATE, POTASSIUM CHLORIDE, AND CALCIUM CHLORIDE: 600; 310; 30; 20 INJECTION, SOLUTION INTRAVENOUS at 01:06

## 2017-06-06 RX ADMIN — ROPIVACAINE HYDROCHLORIDE 25 ML: 5 INJECTION, SOLUTION EPIDURAL; INFILTRATION; PERINEURAL at 11:06

## 2017-06-06 RX ADMIN — LIDOCAINE HYDROCHLORIDE 100 MG: 20 INJECTION, SOLUTION INTRAVENOUS at 12:06

## 2017-06-06 RX ADMIN — ROCURONIUM BROMIDE 30 MG: 10 INJECTION, SOLUTION INTRAVENOUS at 12:06

## 2017-06-06 NOTE — DISCHARGE SUMMARY
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]  Bicipital tenosynovitis, unspecified laterality [M75.20]    Post-op Diagnosis:  Post-Op Diagnosis Codes:     * Shoulder instability, unspecified laterality [M25.319]     * Bicipital tenosynovitis, unspecified laterality [M75.20]    Procedure: Procedure(s) (LRB):  ARTHROSCOPY-SHOULDER WITH SUBACROMIAL DECOMPRESSION (Right)  DEBRIDEMENT-SHOULDER-ARTHROSCOPIC (Right)  ARTHROSCOPY-SHOULDER (Right)  OPEN BICEPS SUB PECTORALIS TENODESIS (TENDON FIXATION) (Right)    Anesthesia: General    Description of the findings of the procedure: See Dictation    Findings/Key Components: see op note    Estimated Blood Loss: * No values recorded between 6/6/2017  1:07 PM and 6/6/2017  2:46 PM *         Specimens:   Specimen (12h ago through future)    None          Disposition: Patient tolerated the procedure well and was transferred to PACU in stable condition.      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]  Bicipital tenosynovitis, unspecified laterality [M75.20]    Post-op Diagnosis:  Post-Op Diagnosis Codes:     * Shoulder instability, unspecified laterality [M25.319]     * Bicipital tenosynovitis, unspecified laterality [M75.20]    Procedure: Procedure(s) (LRB):  ARTHROSCOPY-SHOULDER WITH SUBACROMIAL DECOMPRESSION (Right)  DEBRIDEMENT-SHOULDER-ARTHROSCOPIC (Right)  ARTHROSCOPY-SHOULDER (Right)  OPEN BICEPS SUB PECTORALIS TENODESIS (TENDON FIXATION) (Right)    Hospital Course (synopsis of major diagnoses, care, treatment, and services provided during the course of the hospital stay): Patient underwent outpatient shoulder surgery and was transferred to PACU in  stable condition.  In PACU, patient received appropriate post-operative care and discharged home with plans for physical therapy and follow-up with the operative surgeon.    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  Qty: 30 tablet, Refills: 2    Associated Diagnoses: Major depressive disorder, single episode in full remission      ergocalciferol (ERGOCALCIFEROL) 50,000 unit Cap Take 1 capsule (50,000 Units total) by mouth every 7 days.  Qty: 12 capsule, Refills: 0    Associated Diagnoses: Vitamin D deficiency      escitalopram oxalate (LEXAPRO) 20 MG tablet Take 1 tablet (20 mg total) by mouth once daily.  Qty: 30 tablet, Refills: 2    Associated Diagnoses: Major depressive disorder, single episode in full remission      oxycodone-acetaminophen (PERCOCET)  mg per tablet Take 1 tablet by mouth every 6 (six) hours as needed for Pain.  Qty: 60 tablet, Refills: 0      promethazine (PHENERGAN) 25 MG tablet Take 1 tablet (25 mg total) by mouth every 6 (six) hours as needed for Nausea.  Qty: 30 tablet, Refills: 0      tramadol (ULTRAM) 50 mg tablet Take 1 tablet (50 mg total) by mouth every 4 to 6 hours as needed.  Qty: 40 tablet, Refills: 0             Discharge Procedure Orders  Diet general     Other restrictions (specify):   Order Comments: Maintain sling. Activity As per Dr. Brown discharge sheet     Call MD for:  temperature >100.4     Call MD for:  persistent nausea and vomiting or diarrhea     Call MD for:  severe uncontrolled pain     Call MD for:  redness,  tenderness, or signs of infection (pain, swelling, redness, odor or green/yellow discharge around incision site)     Remove dressing in 72 hours       Follow-up Information     Hanna Brown MD.    Specialties:  Sports Medicine, Orthopedic Surgery  Why:  as scheduled preop  Contact information:  [address] [place] [address]  [phone]

## 2017-06-06 NOTE — H&P (VIEW-ONLY)
Angela Lyle  is here for a completion of her perioperative paperwork. she  Is scheduled to undergo A. Right shoulder arthroscopic suprascapular nerve decompression  B. Right shoulder arthroscopic rotator cuff debridement  C. Right shoulder subpec biceps tenodesis  D. Right shoulder arthroscopic labral debridement on 6/6/2017.  She is a healthy individual and does not need clearance for this procedure.     Risks, indications and benefits of the surgical procedure were discussed with the patient. All questions with regard to surgery, rehab, expected return to functional activities, activities of daily living and recreational endeavors were answered to her satisfaction.    Once no other questions were asked, a brief history and physical exam was then performed.      PHYSICAL EXAM:  GEN: A&Ox3, WD WN NAD  HEENT: WNL  CHEST: CTAB, no W/R/R  HEART: RRR, no M/R/G  ABD: Soft, NT ND, BS x4 QUADS  MS; See Epic  NEURO: CN II-XII intact       The surgical consent was then reviewed with the patient, who agreed with all the contents of the consent form and it was signed. she was then given the [facility] surgery packet to bring with her to [facility] for the anesthesia portion of her perioperative paperwork.     PHYSICAL THERAPY:  She was also instructed regarding physical therapy and will begin at [facility].  POST OP CARE:instructions were reviewed including care of the wound and dressing after surgery and when she can shower.     PAIN MANAGEMENT: Angela Lyle was also given her pain management regime, which includes the TENS unit given to her by [employer] medical equipment along with the education required for its use. She was also instructed regarding the Polar ice unit that will be in place after surgery and her postoperative pain medications.     PAIN MEDICATION:  Percocet 10/325mg 1 po q 4-6 hours prn pain  Ultram 50 mg one p.o. q.4-6 hours p.r.n. breakthrough pain,   Phenergan 25 mg one p.o. q.4-6  hours p.r.n. nausea and vomiting.    As there were no other questions to be asked, she was given my business card along with Hanna Brown MD business card if she has any questions or concerns prior to surgery or in the postop period.

## 2017-06-06 NOTE — DISCHARGE INSTRUCTIONS
Anesthesia: After Your Surgery  Youve just had surgery. During surgery, you received medication called anesthesia to keep you comfortable and pain-free. After surgery, you may experience some pain or nausea. This is common. Here are some tips for feeling better and recovering after surgery.    Going home  Your doctor or nurse will show you how to take care of yourself when you go home. He or she will also answer your questions. Have an adult family member or friend drive you home. For the first 24 hours after your surgery:  · Do not drive or use heavy equipment.  · Do not make important decisions or sign legal documents.  · Avoid alcohol.  · Have someone stay with you, if needed. He or she can watch for problems and help keep you safe.  Be sure to keep all follow-up appointments with your doctor. And rest after your procedure for as long as your doctor tells you to.    Coping with pain  If you have pain after surgery, pain medication will help you feel better. Take it as directed, before pain becomes severe. Also, ask your doctor or pharmacist about other ways to control pain, such as with heat, ice, and relaxation. And follow any other instructions your surgeon or nurse gives you.    Tips for taking pain medication  To get the best relief possible, remember these points:  · Pain medications can upset your stomach. Taking them with a little food may help.  · Most pain relievers taken by mouth need at least 20 to 30 minutes to take effect.  · Taking medication on a schedule can help you remember to take it. Try to time your medication so that you can take it before beginning an activity, such as dressing, walking, or sitting down for dinner.  · Constipation is a common side effect of pain medications. Contact your doctor before taking any medications like laxatives or stool softeners to help relieve constipation. Also ask about any dietary restrictions, because drinking lots of fluids and eating foods like fruits  and vegetables that are high in fiber can also help. Remember, dont take laxatives unless your surgeon has prescribed them.  · Mixing alcohol and pain medication can cause dizziness and slow your breathing. It can even be fatal. Dont drink alcohol while taking pain medication.  · Pain medication can slow your reflexes. Dont drive or operate machinery while taking pain medication.  If your health care provider tells you to take acetaminophen to help relieve your pain, ask him or her how much you are supposed to take each day. (Acetaminophen is the generic name for Tylenol and other brand-name pain relievers.) Acetaminophen or other pain relievers may interact with your prescription medicines or other over-the-counter (OTC) drugs. Some prescription medications contain acetaminophen along with other active ingredients. Using both prescription and OTC acetaminophen for pain can cause you to overdose. The FDA recommends that you read the labels on your OTC medications carefully. This will help you to clearly understand the list of active ingredients, dosing instructions, and any warnings. It may also help you avoid taking too much acetaminophen. If you have questions or don't understand the information, ask your pharmacist or health care provider to explain it to you before you take the OTC medication.    Managing nausea  Some people have an upset stomach after surgery. This is often due to anesthesia, pain, pain medications, or the stress of surgery. The following tips will help you manage nausea and get good nutrition as you recover. If you were on a special diet before surgery, ask your doctor if you should follow it during recovery. These tips may help:  · Dont push yourself to eat. Your body will tell you when to eat and how much.  · Start off with clear liquids and soup. They are easier to digest.  · Progress to semi-solid foods (mashed potatoes, applesauce, and gelatin) as you feel ready.  · Slowly move to  solid foods. Dont eat fatty, rich, or spicy foods at first.  · Dont force yourself to have three large meals a day. Instead, eat smaller amounts more often.  · Take pain medications with a small amount of solid food, such as crackers or toast to avoid nausea.      Call your surgeon if  · You still have pain an hour after taking medication (it may not be strong enough).  · You feel too sleepy, dizzy, or groggy (medication may be too strong).  · You have side effects like nausea, vomiting, or skin changes (rash, itching, or hives).   © 3440-2292 [employer]. [address] [place] [address], [place] [address]. All rights reserved. This information is not intended as a substitute for professional medical care. Always follow your healthcare professional's instructions.

## 2017-06-06 NOTE — TRANSFER OF CARE
"Anesthesia Transfer of Care Note    Patient: Angela Lyle    Procedure(s) Performed: Procedure(s) (LRB):  ARTHROSCOPY-SHOULDER WITH SUBACROMIAL DECOMPRESSION (Right)  DEBRIDEMENT-SHOULDER-ARTHROSCOPIC (Right)  ARTHROSCOPY-SHOULDER (Right)  OPEN BICEPS SUB PECTORALIS TENODESIS (TENDON FIXATION) (Right)    Patient location: PACU    Anesthesia Type: general    Transport from OR: Transported from OR on room air with adequate spontaneous ventilation    Post pain: adequate analgesia    Post assessment: no apparent anesthetic complications and tolerated procedure well    Post vital signs: stable    Level of consciousness: awake, alert and oriented    Nausea/Vomiting: no nausea/vomiting    Complications: none    Transfer of care protocol was followed      Last vitals:   Visit Vitals  /67 (BP Location: Left arm, Patient Position: Lying, BP Method: Automatic)   Pulse (!) 112   Temp 36.4 °C (97.6 °F) (Oral)   Resp 20   Ht 5' 4" (1.626 m)   Wt 47.6 kg (105 lb)   LMP 05/31/2017   SpO2 100%   BMI 18.02 kg/m²     "

## 2017-06-06 NOTE — OP NOTE
DATE OF PROCEDURE: 06/06/2017    SURGEON: Hanna Brown M.D.    ASSISTANT: ALPHONSO Blanco MD PGY6    PREOPERATIVE DIAGNOSES:   right  1. Shoulder biceps tendonitis  2. Shoulder suprascapular neuropathy  3. Shoulder partial thickness cuff tear  4. Shoulder synovitis  5. Shoulder SLAP  6. Shoulder bursitis    POSTOPERATIVE DIAGNOSES:   right   1. Shoulder biceps tendonitis  2. Shoulder suprascapular neuropathy  3. Shoulder partial thickness cuff tear  4. Shoulder synovitis  5. Shoulder SLAP  6. Shoulder bursitis    OPERATION:   right   1. Shoulder open subpectoral biceps tenodesis (CPT 15504)  2. Shoulder arthroscopic suprascapular nerve decompression (complex, -22 modifier)  3. Shoulder arthroscopic partial thickness cuff debridement  4. Shoulder subacromial buresectomy  5. Shoulder arthroscopic extensive debridement (anterior, posterior glenohumeral joint, subacromial space) (CPT 74704)    ANESTHESIA: General with interscalene block.     BLOOD LOSS: Minimal.     DRAINS: None.     TOURNIQUET TIME: None.     COMPLICATIONS: None. The patient was moved to the recovery room in stable condition with compartments soft and cap refill less than a second in all digits.     COMPLEX PROCEDURE:  There was an altered surgical field due to the scarring adhesions around the nerve and the bony compression to the area.  There was abnormal anatomy. There was major scarring to the area. Complexity of the service was much greater than the normative procedure.  There was increased time, intensity and technical difficulty of the procedure, severity of the patient's condition and mental effort required.  This was a highly complicated procedure that required advanced arthroscopic skill in order to safely and technically perform it.  Nevertheless the result achieved was excellent.  Shoulder:  Arthroscopic lysis of adhesions:  With the arthroscope in the subacromial space, an extensive lysis of adhesions needed to be performed with lysis of  adhesions around the nerve where there was extensive scarring from the chronic nature of the nerve compression.  The rotator cuff was retracted and there was a massive tear noted.  After the lysis of adhesions, the mobility was restored to the rotator cuff tissue and shoulder.    BRIEF INDICATION OF MEDICAL NECESSITY: The patient is a 27 y.o. year-old female who has history and physical examination findings consistent with the above. Nonoperative versus operative options were discussed. The risks and benefits were discussed with the patient. The patient acknowledged understanding and wished to proceed with operative intervention. Informed consent was obtained prior to the procedure. Reasonable expectations and potential complications were discussed and acknowledged, including but not limited to infection, bleeding, blood clots, (DVT and/or PE), nerve injury, re-tear, instability, continued pain and stiffness. They agreed and   understood and wished to proceed.     EXAMINATION UNDER ANESTHESIA OF THE right SHOULDER: Forward elevation 130 degrees, External rotation at 0-15 degrees, External rotation at 90-70 degrees, Internal rotation at 90-20 degrees. Translation testing: anterior grade 0, posterior grade 0.    EXAMINATION UNDER ANESTHESIA OF THE NONOPERATED left SHOULDER: Forward elevation 175 degrees, External rotation at 0-60 degrees, External rotation at 0-90 degrees, Internal rotation at 90-60 degrees. Translation testing: anterior grade 1, posterior grade 1, sulcus sign grade 1 corrects to 0 on external rotation.     PROCEDURE IN DETAIL: After the correct operative site was marked by the operating surgeon, an interscalene block was administered by the anesthesia team. The patient was then taken to the operating room and placed supine on the operating room table, where the patient underwent general anesthesia by the anesthesia team.  Manipulation was performed gently with a palpable release of adhesions.  ROM  post-manipulation as listed above.  The patient was then rolled into the lateral decubitus position with the operative side up. A well-padded axillary roll, beanbag and pillows were placed. All pressure points were carefully padded and checked. The upper extremities and both lower extremities were placed in comfortable positions and were also well-padded. The left upper extremity was then prepped and draped in the usual sterile fashion.     The Spider arm positioner was implemented with balanced suspension and appropriate landmarks were noted on the skin. A posterior followed by ct-superior portals were created and systematic examination of the joint revealed the following:     There was no evidence of any significant chondral lesions to the glenoid or humeral head.     There was some synovitis in the labrum that was debrided as needed, both anteriorly and posteriorly in the glenohumeral joint .     Biceps was sectioned at level of just above labrum and edges smoothed for later tenodesis..  Attention was then turned to the rotator cuff.  The rotator cuff undersurface was then visualized and debrided as needed using a shaver.      Next, attention was then turned to the  suprascapular nerve decompression.    COMPLEX PROCEDURE:  There was an altered surgical field due to the scarring adhesions around the nerve and the bony compression to the area.  There was abnormal anatomy. There was major scarring to the area. Complexity of the service was much greater than the normative procedure.  There was increased time, intensity and technical difficulty of the procedure, severity of the patient's condition and mental effort required.  This was a highly complicated procedure that required advanced arthroscopic skill in order to safely and technically perform it.  Nevertheless the result achieved was excellent.  Shoulder:  Arthroscopic lysis of adhesions:  With the arthroscope in the subacromial space, an extensive lysis of  "adhesions needed to be performed with lysis of adhesions around the nerve where there was extensive scarring from the chronic nature of the nerve compression.  The rotator cuff was retracted and there was a massive tear noted.  After the lysis of adhesions, the mobility was restored to the rotator cuff tissue and shoulder.    The overlying soft tissue and bursa were  freed up.  Suprascapular nerve was isolated and transverse scapular ligament was  isolated and the suprascapular artery was isolated in the usual fashion.  Care was taken not to damage any neurovascular structures.  Hemostasis was achieved and transverse scapular ligament was visualized and retracted with a Neviaser portal and "G portal." Suprascapular nerve and artery were retracted gently and  transverse scapular ligament was released using arthroscopic scissors.  Stump of the transverse scapular ligament was carefully trimmed with avoidance of any damage to the neurovascular structures.  After the decompression of the suprascapular nerve, the nerve had good mobility and no restriction or tenting.    Attention was then turned to the subacromial space where a significant hypertrophic bursa was encountered.  The bursa itself was thickened and hypertrophic.  A lateral portal was created to assist with the bursectomy.  Bursal side of the rotator cuff was intact.  Subacromial bursectomy was performed in the standard fashion.    Debridement was performed with the shaver and thermal device in the anterior glenohumeral joint, posterior glenohumeral joint and subacromial space.    Attention was then turned to the open subpectoral biceps tenodesis.  A 2.5 cm longitudinal incision was made at the junction of pectoralis tendon extending distally.  Blunt dissection proceeded and the biceps tendon was localized.  The biceps tendon was localized next to the humerus.  Care was taken and gentle retraction was utilized.  The proximal 3 cm of biceps tendon was removed " and the biceps tendon was whipped stitched at the appropriate tension level.    Guidepin was placed and a 7 mm reamer was placed to ream the anterior cortex anteriorly.  Arthrex biceps tenodesis, 7x19.5 mm SwiveLock PEEK screw, was placed with the captured biceps tendon.  It had good purchase and was inserted flush with the humeral cortex.  Fixation was solid and tension was appropriate.  The incision site was then irrigated copiously and gently and the skin was closed using 3-0 Vicryl subcutaneous suture in an interrupted fashion.  The skin was closed using a running 4-0 Monocryl suture, reapproximating the skin edges nicely.         The shoulder was then irrigated and fluid was extravasated using suction. All portals were reapproximated using inverted 4-0 Monocryl suture in the subcutaneous tissue of the portals. Mastisol and Steri-strips were placed with Xeroform, 4x4s, ABD pad, and Medi-pore tape. An Iceman was secured in the shoulder paula. A shoulder paula was secured. The patient was then moved to supine, extubated and taken to the recovery room where the patient arrived in stable condition with the compartments of the arm and forearm soft and cap refill less than a second in all digits.     POST OPERATIVE PLAN: We will follow the arthroscopic biceps tenodesis guidelines. We discussed with the patient's family after surgery. The patient will remain in a sling for 4 weeks. No ROM restrictions.  No lifting > 1 pound x 6 weeks.

## 2017-06-06 NOTE — ANESTHESIA POSTPROCEDURE EVALUATION
"Anesthesia Post Evaluation    Patient: Angela Lyle    Procedure(s) Performed: Procedure(s) (LRB):  ARTHROSCOPY-SHOULDER WITH SUBACROMIAL DECOMPRESSION (Right)  DEBRIDEMENT-SHOULDER-ARTHROSCOPIC (Right)  ARTHROSCOPY-SHOULDER (Right)  OPEN BICEPS SUB PECTORALIS TENODESIS (TENDON FIXATION) (Right)    Final Anesthesia Type: general  Patient location during evaluation: PACU  Patient participation: Yes- Able to Participate  Level of consciousness: awake and alert  Post-procedure vital signs: reviewed and stable  Pain management: adequate  Airway patency: patent  PONV status at discharge: No PONV  Anesthetic complications: no      Cardiovascular status: hemodynamically stable  Respiratory status: unassisted and spontaneous ventilation  Hydration status: euvolemic  Follow-up not needed.        Visit Vitals  /67 (BP Location: Left arm, Patient Position: Lying, BP Method: Automatic)   Pulse 65   Temp 36.4 °C (97.6 °F) (Oral)   Resp 16   Ht 5' 4" (1.626 m)   Wt 47.6 kg (105 lb)   LMP 05/31/2017   SpO2 100%   BMI 18.02 kg/m²       Pain/Priya Score: Pain Assessment Performed: Yes (6/6/2017  3:57 PM)  Presence of Pain: denies (6/6/2017  3:57 PM)  Pain Rating Prior to Med Admin: 3 (6/6/2017  9:00 AM)  Priya Score: 10 (6/6/2017  3:57 PM)      "

## 2017-06-06 NOTE — INTERVAL H&P NOTE
The patient has been examined and the H&P has been reviewed:    I concur with the findings and no changes have occurred since H&P was written.    Anesthesia/Surgery risks, benefits and alternative options discussed and understood by patient/family.          Active Hospital Problems    Diagnosis  POA    Shoulder instability [M25.319]  Yes      Resolved Hospital Problems    Diagnosis Date Resolved POA   No resolved problems to display.

## 2017-06-06 NOTE — ANESTHESIA PROCEDURE NOTES
Peripheral    Patient location during procedure: holding area   Block not for primary anesthetic.  Reason for block: at surgeon's request and post-op pain management   Post-op Pain Location: [facility] pain  Start time: 6/6/2017 11:41 AM  Timeout: 6/6/2017 11:38 AM   End time: 6/6/2017 11:46 AM  Surgery related to: shoulder pain  Staffing  Anesthesiologist: LUIS MORA  Performed: anesthesiologist   Preanesthetic Checklist  Completed: patient identified, site marked, surgical consent, pre-op evaluation, timeout performed, IV checked, risks and benefits discussed and monitors and equipment checked  Peripheral Block  Patient position: supine  Prep: ChloraPrep and site prepped and draped  Patient monitoring: heart rate, cardiac monitor, continuous pulse ox and frequent blood pressure checks  Block type: interscalene  Laterality: right  Injection technique: single shot  Needle  Needle type: Stimuplex   Needle gauge: 22 G  Needle length: 3.5 in  Needle localization: anatomical landmarks, nerve stimulator and ultrasound guidance   -ultrasound image captured on disc.  Assessment  Injection assessment: negative aspiration, negative parasthesia and local visualized surrounding nerve  Paresthesia pain: none  Heart rate change: no  Slow fractionated injection: yes  Medications:  Bolus administered: 25 mL of 0.5 ropivacaine  Epinephrine added: none

## 2017-06-07 NOTE — PLAN OF CARE
Angela Lyle has met all discharge criteria from Phase II. Vital Signs are stable, ambulating  without difficulty. Discharge instructions given, patient verbalized understanding. Discharged from facility via wheelchair in stable condition.

## 2017-06-08 ENCOUNTER — PATIENT MESSAGE (OUTPATIENT)
Dept: SPORTS MEDICINE | Facility: CLINIC | Age: 27
End: 2017-06-08

## 2017-06-09 ENCOUNTER — CLINICAL SUPPORT (OUTPATIENT)
Dept: REHABILITATION | Facility: HOSPITAL | Age: 27
End: 2017-06-09
Attending: ORTHOPAEDIC SURGERY
Payer: COMMERCIAL

## 2017-06-09 DIAGNOSIS — M25.511 ACUTE PAIN OF RIGHT SHOULDER: Primary | ICD-10-CM

## 2017-06-09 PROCEDURE — 97161 PT EVAL LOW COMPLEX 20 MIN: CPT

## 2017-06-09 PROCEDURE — 97110 THERAPEUTIC EXERCISES: CPT

## 2017-06-13 ENCOUNTER — CLINICAL SUPPORT (OUTPATIENT)
Dept: REHABILITATION | Facility: HOSPITAL | Age: 27
End: 2017-06-13
Attending: ORTHOPAEDIC SURGERY
Payer: COMMERCIAL

## 2017-06-13 DIAGNOSIS — M25.511 ACUTE PAIN OF RIGHT SHOULDER: Primary | ICD-10-CM

## 2017-06-13 PROCEDURE — 97110 THERAPEUTIC EXERCISES: CPT

## 2017-06-13 NOTE — PROGRESS NOTES
Physical Therapy Evaluation    Name: Angela Lyle  Clinic Number: [id number]      Diagnosis:   Encounter Diagnosis   Name Primary?    Acute pain of right shoulder Yes     Physician: Hanna Brown MD  Treatment Orders: PT Eval and Treat    Past Medical History:   Diagnosis Date    Allergy     Anemia     hx of HOSEA on fergon in past    Anxiety     Arthritis     right shoulder    Depression     Fatigue     History of psychiatric care     Keloid cicatrix     Psychiatric problem     Therapy      Current Outpatient Prescriptions   Medication Sig    buPROPion (WELLBUTRIN XL) 150 MG TB24 tablet Take 1 tablet (150 mg total) by mouth once daily.    ergocalciferol (ERGOCALCIFEROL) 50,000 unit Cap Take 1 capsule (50,000 Units total) by mouth every 7 days.    escitalopram oxalate (LEXAPRO) 20 MG tablet Take 1 tablet (20 mg total) by mouth once daily.    oxycodone-acetaminophen (PERCOCET)  mg per tablet Take 1 tablet by mouth every 6 (six) hours as needed for Pain.    promethazine (PHENERGAN) 25 MG tablet Take 1 tablet (25 mg total) by mouth every 6 (six) hours as needed for Nausea.    tramadol (ULTRAM) 50 mg tablet Take 1 tablet (50 mg total) by mouth every 4 to 6 hours as needed.     No current facility-administered medications for this visit.      Review of patient's allergies indicates:   Allergen Reactions    Sulfa (sulfonamide antibiotics) Hives and Itching    Sulfamethoxazole-trimethoprim      Other reaction(s): Itching     Precautions: S/P Right Biceps Tenodesis and SAD, SND 6/6/2017    Evaluation Date: 6/9/2017  Visit # authorized: 1/15  Authorization period: 12/31/2017    Subjective     Onset/RAMON: Post-op    Primary concern/ Chief complaints:    Angela is a 27 y.o. female that presents to [facility] secondary to  S/P Right Biceps Tenodesis and SAD, SND 6/6/2017.  Pt states her pain is well controlled since surgery and it is going okay.  She has had shoulder pain for  several years and tried PT and nothing really seemed to help long-term.  Pt uses pain medication and ice as needed to control symptoms. Pt has a decreased ability to perform ADLs, work activities and fitness activities as desired. 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 Pt denies numbness and tingling in R UE since surgery. No cultural, environmental, or spiritual barriers identified to treatment or learning.    Pain Scale: Angela rates pain on a scale of 0-10 to be 6 at worst; 2 currently; 2 at best .    Patient Goals: Return to full ADLs, functional mobility and work without pain or difficulty    Objective     Observation: Patient entered therapy with sling in place.    Posture: Noted anterior, abducted scapula on R UE.    Passive Range of Motion:   Shoulder Right   Flexion 95   Abduction 90   ER at 0 25   ER at 90 Not attempted   IR 45      Active Range of Motion: Not tested 2/2 POD#3.    Strength:  Not tested 2/2 POD#3.    Special Tests:  Not tested 2/2 POD#3.    Joint Mobility: Grossly hypomobile 2/2 post-op.    Palpation: Point tenderness noted surrounding entire joint as expected post-op.    Sensation: Intact.    Flexibility: Moderately restricted pec minor on right UE.    Functional Limitations Reports  Category: Lifting/Carrying  Tool: FOTO Shoulder  7%    PT Evaluation Completed? Yes  Discussed Plan of Care with patient: Yes    TREATMENT:  Angela received therapeutic exercises to develop strength and endurance, flexibility for 25 minutes including:   STM to UT and biceps as tolerated  Pendulums 4-way 2' ea.  Scapular Retraction 5sh x 20  PROM in all directions as tolerated    Pt. Received cold pack x 10 min. To right shoulder. Following treatment.    Instructed pt. regarding: Proper technique with all exercises. Pt demonstrated good understanding of the education provided. Angela demonstrated good return demonstration of activities.    Assessment     This is a 27 y.o. female referred to outpatient  physical therapy and presents with a medical diagnosis of S/P Right Biceps Tenodesis and SAD, SND 6/6/2017.  Patient presents with signs and symptoms consistent with post-op changes.  Patient's chief complaint is pain and difficulty sleeping as well as inability to use UE.  Patient will benefit from skilled physical therapy services, specifically normalized range of motion and flexibility, gross postural and upper extremity strengthening, progressing to functional mobility as tolerated. The following goals were discussed with the patient and patient is in agreement with them as to be addressed in the treatment plan. Patient was given a HEP consisting of exercises listed above. Patient verbally understood the instructions as they were given and demonstrated proper form and technique during therapy. Pt was advised to perform these exercises free of pain, and to stop performing them if pain occurs.     Medical necessity is demonstrated by the following IMPAIRMENTS/PROBLEM LIST:   1)Increase in pain level limiting function   2)Decreased ROM   3)Decreased strength   4)Decreased functional mobility   5)Lack of HEP    GOALS: Short Term Goals: 6 weeks  1.  Report decreased right shoulder pain < / =  4/10  to increase tolerance for ADLs and AROM.  2.  Increase AROM to WNL in all directions for independence with ADLs.   3.  Demonstrate normalized scapular and thoracic posture in order to increase tolerance for ADL and work activities.  4.  Pt to tolerate HEP to improve ROM and independence with ADL's.    Long Term Goals: 12-16 weeks  1.  Report decreased right shoulder pain  < / =  3/10  to increase tolerance for full return to work duties as needed.  2.  Patient will report ability to perform work duties as needed without pain or difficulty.  3.  Increase strength to >/= 4+/5 in all directions right UE to increase tolerance for ADL and work activities.    Plan     Pt will be treated by physical therapy 1-2 times a week for  Pt Education, HEP, therapeutic exercises, neuromuscular re-education, manual therapy, joint mobilizations, modalities prn to achieve established goals. Angela may at times be seen by a PTA as part of the Rehab Team.     Cont PT for 12-16 weeks.     Carlota Mc, PT, DPT        I certify the need for these services furnished under this plan of treatment and while under my care.______________________________ Physician/Referring Practitioner  Date of Signature

## 2017-06-15 ENCOUNTER — CLINICAL SUPPORT (OUTPATIENT)
Dept: REHABILITATION | Facility: HOSPITAL | Age: 27
End: 2017-06-15
Attending: ORTHOPAEDIC SURGERY
Payer: COMMERCIAL

## 2017-06-15 DIAGNOSIS — M25.511 ACUTE PAIN OF RIGHT SHOULDER: Primary | ICD-10-CM

## 2017-06-15 PROCEDURE — 97110 THERAPEUTIC EXERCISES: CPT

## 2017-06-15 NOTE — PROGRESS NOTES
Physical Therapy Visit Note    Name: Angela Lyle  Clinic Number: [id number]      Diagnosis:   Encounter Diagnosis   Name Primary?    Acute pain of right shoulder Yes     Physician: Hanna Brown MD  Treatment Orders: PT Eval and Treat    Past Medical History:   Diagnosis Date    Allergy     Anemia     hx of HOSEA on fergon in past    Anxiety     Arthritis     right shoulder    Depression     Fatigue     History of psychiatric care     Keloid cicatrix     Psychiatric problem     Therapy      Current Outpatient Prescriptions   Medication Sig    buPROPion (WELLBUTRIN XL) 150 MG TB24 tablet Take 1 tablet (150 mg total) by mouth once daily.    ergocalciferol (ERGOCALCIFEROL) 50,000 unit Cap Take 1 capsule (50,000 Units total) by mouth every 7 days.    escitalopram oxalate (LEXAPRO) 20 MG tablet Take 1 tablet (20 mg total) by mouth once daily.    oxycodone-acetaminophen (PERCOCET)  mg per tablet Take 1 tablet by mouth every 6 (six) hours as needed for Pain.    promethazine (PHENERGAN) 25 MG tablet Take 1 tablet (25 mg total) by mouth every 6 (six) hours as needed for Nausea.    tramadol (ULTRAM) 50 mg tablet Take 1 tablet (50 mg total) by mouth every 4 to 6 hours as needed.     No current facility-administered medications for this visit.      Review of patient's allergies indicates:   Allergen Reactions    Sulfa (sulfonamide antibiotics) Hives and Itching    Sulfamethoxazole-trimethoprim      Other reaction(s): Itching     Precautions: S/P Right Biceps Tenodesis and SAD, SND 6/6/2017    Today's Date:  6/13/2017  Evaluation Date: 6/9/2017  Visit # authorized: 2/15  Authorization period: 12/31/2017  Time In:  910  Time Out:  1000    Subjective     Patient states her pain is getting better but she still has difficulty sleeping.    Patient Goals: Return to full ADLs, functional mobility and work without pain or difficulty    Objective     Observation: Patient entered therapy with sling in  place.    Posture: Noted anterior, abducted scapula on R UE.    Passive Range of Motion:   Shoulder Right   Flexion 95   Abduction 90   ER at 0 25   ER at 90 Not attempted   IR 45      Functional Limitations Reports  Category: Lifting/Carrying  Tool: FOTO Shoulder  7%    TREATMENT:  Angela received therapeutic exercises to develop strength and endurance, flexibility for 40 minutes including:   STM to UT and biceps as tolerated  Supine Pec Stretch 3'   Prone Scapular Retraction 5sh x 20  PROM in all directions as tolerated  AAROM w/ Stick Supine Flx/Abd as tolerated (HEP)    Pt. Received cold pack x 10 min. To right shoulder. Following treatment.    Instructed pt. regarding: Proper technique with all exercises. Pt demonstrated good understanding of the education provided. Angela demonstrated good return demonstration of activities.    Assessment     Today's Assessment:  Patient tolerated treatment well and ROM is improving appropriately.  Progress HEP as able.    This is a 27 y.o. female referred to outpatient physical therapy and presents with a medical diagnosis of S/P Right Biceps Tenodesis and SAD, SND 6/6/2017.  Patient presents with signs and symptoms consistent with post-op changes.  Patient's chief complaint is pain and difficulty sleeping as well as inability to use UE.  Patient will benefit from skilled physical therapy services, specifically normalized range of motion and flexibility, gross postural and upper extremity strengthening, progressing to functional mobility as tolerated.     Medical necessity is demonstrated by the following IMPAIRMENTS/PROBLEM LIST:   1)Increase in pain level limiting function   2)Decreased ROM   3)Decreased strength   4)Decreased functional mobility   5)Lack of HEP    GOALS: Short Term Goals: 6 weeks  1.  Report decreased right shoulder pain < / =  4/10  to increase tolerance for ADLs and AROM.  2.  Increase AROM to WNL in all directions for independence with ADLs.   3.   Demonstrate normalized scapular and thoracic posture in order to increase tolerance for ADL and work activities.  4.  Pt to tolerate HEP to improve ROM and independence with ADL's.    Long Term Goals: 12-16 weeks  1.  Report decreased right shoulder pain  < / =  3/10  to increase tolerance for full return to work duties as needed.  2.  Patient will report ability to perform work duties as needed without pain or difficulty.  3.  Increase strength to >/= 4+/5 in all directions right UE to increase tolerance for ADL and work activities.    Plan     Pt will be treated by physical therapy 1-2 times a week for Pt Education, HEP, therapeutic exercises, neuromuscular re-education, manual therapy, joint mobilizations, modalities prn to achieve established goals. Angela may at times be seen by a PTA as part of the Rehab Team.     Cont PT for 12-16 weeks.     Carlota Mc, PT, DPT        I certify the need for these services furnished under this plan of treatment and while under my care.______________________________ Physician/Referring Practitioner  Date of Signature

## 2017-06-15 NOTE — PROGRESS NOTES
Physical Therapy Visit Note    Name: Angela Lyle  Clinic Number: [id number]      Diagnosis:   Encounter Diagnosis   Name Primary?    Acute pain of right shoulder Yes     Physician: Hanna Brown MD  Treatment Orders: PT Eval and Treat    Past Medical History:   Diagnosis Date    Allergy     Anemia     hx of HOSEA on fergon in past    Anxiety     Arthritis     right shoulder    Depression     Fatigue     History of psychiatric care     Keloid cicatrix     Psychiatric problem     Therapy      Current Outpatient Prescriptions   Medication Sig    buPROPion (WELLBUTRIN XL) 150 MG TB24 tablet Take 1 tablet (150 mg total) by mouth once daily.    ergocalciferol (ERGOCALCIFEROL) 50,000 unit Cap Take 1 capsule (50,000 Units total) by mouth every 7 days.    escitalopram oxalate (LEXAPRO) 20 MG tablet Take 1 tablet (20 mg total) by mouth once daily.    oxycodone-acetaminophen (PERCOCET)  mg per tablet Take 1 tablet by mouth every 6 (six) hours as needed for Pain.    promethazine (PHENERGAN) 25 MG tablet Take 1 tablet (25 mg total) by mouth every 6 (six) hours as needed for Nausea.    tramadol (ULTRAM) 50 mg tablet Take 1 tablet (50 mg total) by mouth every 4 to 6 hours as needed.     No current facility-administered medications for this visit.      Review of patient's allergies indicates:   Allergen Reactions    Sulfa (sulfonamide antibiotics) Hives and Itching    Sulfamethoxazole-trimethoprim      Other reaction(s): Itching     Precautions: S/P Right Biceps Tenodesis and SAD, SND 6/6/2017    Today's Date:  6/15/2017  Evaluation Date: 6/9/2017  Visit # authorized: 3/15  Authorization period: 12/31/2017  Time In:  1005  Time Out:  1055    Subjective     Patient states her shoulder is feeling better.  Her chief complaint is sleep.    Patient Goals: Return to full ADLs, functional mobility and work without pain or difficulty    Objective     Observation: Patient entered therapy with sling in  place.    Posture: Noted anterior, abducted scapula on R UE.    Passive Range of Motion:   Shoulder Right   Flexion 95   Abduction 90   ER at 0 25   ER at 90 Not attempted   IR 45      Functional Limitations Reports  Category: Lifting/Carrying  Tool: FOTO Shoulder  7%    TREATMENT:  Angela received therapeutic exercises to develop strength and endurance, flexibility for 40 minutes including:   MHP propped 90 deg 10'  PROM in all directions as tolerated (much improved today)  Pulleys 3-way 3' ea.    Pt. Received cold pack x 10 min. To right shoulder. Following treatment.    Instructed pt. regarding: Proper technique with all exercises. Pt demonstrated good understanding of the education provided. Angela demonstrated good return demonstration of activities.    Assessment     Today's Assessment:  Patient's ROM was near end range today.  She is progressing well.  Begin isometrics next visit.    This is a 27 y.o. female referred to outpatient physical therapy and presents with a medical diagnosis of S/P Right Biceps Tenodesis and SAD, SND 6/6/2017.  Patient presents with signs and symptoms consistent with post-op changes.  Patient's chief complaint is pain and difficulty sleeping as well as inability to use UE.  Patient will benefit from skilled physical therapy services, specifically normalized range of motion and flexibility, gross postural and upper extremity strengthening, progressing to functional mobility as tolerated.     Medical necessity is demonstrated by the following IMPAIRMENTS/PROBLEM LIST:   1)Increase in pain level limiting function   2)Decreased ROM   3)Decreased strength   4)Decreased functional mobility   5)Lack of HEP    GOALS: Short Term Goals: 6 weeks  1.  Report decreased right shoulder pain < / =  4/10  to increase tolerance for ADLs and AROM.  2.  Increase AROM to WNL in all directions for independence with ADLs.   3.  Demonstrate normalized scapular and thoracic posture in order to increase  tolerance for ADL and work activities.  4.  Pt to tolerate HEP to improve ROM and independence with ADL's.    Long Term Goals: 12-16 weeks  1.  Report decreased right shoulder pain  < / =  3/10  to increase tolerance for full return to work duties as needed.  2.  Patient will report ability to perform work duties as needed without pain or difficulty.  3.  Increase strength to >/= 4+/5 in all directions right UE to increase tolerance for ADL and work activities.    Plan     Pt will be treated by physical therapy 1-2 times a week for Pt Education, HEP, therapeutic exercises, neuromuscular re-education, manual therapy, joint mobilizations, modalities prn to achieve established goals. Angela may at times be seen by a PTA as part of the Rehab Team.     Cont PT for 12-16 weeks.     Carlota Mc, PT, DPT        I certify the need for these services furnished under this plan of treatment and while under my care.______________________________ Physician/Referring Practitioner  Date of Signature

## 2017-06-18 ENCOUNTER — PATIENT MESSAGE (OUTPATIENT)
Dept: OPTOMETRY | Facility: CLINIC | Age: 27
End: 2017-06-18

## 2017-06-19 ENCOUNTER — OFFICE VISIT (OUTPATIENT)
Dept: SPORTS MEDICINE | Facility: CLINIC | Age: 27
End: 2017-06-19
Payer: COMMERCIAL

## 2017-06-19 VITALS
DIASTOLIC BLOOD PRESSURE: 74 MMHG | HEIGHT: 64 IN | WEIGHT: 105 LBS | HEART RATE: 92 BPM | BODY MASS INDEX: 17.93 KG/M2 | SYSTOLIC BLOOD PRESSURE: 118 MMHG

## 2017-06-19 DIAGNOSIS — M25.511 PAIN IN JOINT OF RIGHT SHOULDER: Primary | ICD-10-CM

## 2017-06-19 PROCEDURE — 99999 PR PBB SHADOW E&M-EST. PATIENT-LVL III: CPT | Mod: PBBFAC,,, | Performed by: ORTHOPAEDIC SURGERY

## 2017-06-19 PROCEDURE — 99024 POSTOP FOLLOW-UP VISIT: CPT | Mod: S$GLB,,, | Performed by: ORTHOPAEDIC SURGERY

## 2017-06-19 NOTE — PROGRESS NOTES
CC right shoulder pain    HISTORY OF PRESENT ILLNESS:   Pt is here today for first post-operative followup of his shoulder arthroscopy.  he is doing well.  We have reviewed his findings and discussed plan of care and future treatment options.      DATE OF PROCEDURE: 06/06/2017  OPERATION:   right   1. Shoulder open subpectoral biceps tenodesis (CPT 77441)  2. Shoulder arthroscopic suprascapular nerve decompression (complex, -22 modifier)  3. Shoulder arthroscopic partial thickness cuff debridement  4. Shoulder subacromial buresectomy  5. Shoulder arthroscopic extensive debridement (anterior, posterior glenohumeral joint, subacromial space) (CPT 44954)                                                                                PHYSICAL EXAMINATION:     Incision sites healed well  No evidence of any erythema, infection or induration  elbow Range of motion full   Minimal effusion  2+ DP pulse  No swelling                                                                               ASSESSMENT:                                                                                                                                               1. Status post above, doing well.                                                                                                                               PLAN:                                                                                                                                                     1. PT  2. Emphasized scapular function.  3. I have discussed return to activity in detail.  4. he will see us back in 6 weeks.                                      5. All questions were answered and he should contact us if he  has any questions or concerns in the interim.

## 2017-06-20 ENCOUNTER — CLINICAL SUPPORT (OUTPATIENT)
Dept: REHABILITATION | Facility: HOSPITAL | Age: 27
End: 2017-06-20
Attending: ORTHOPAEDIC SURGERY
Payer: COMMERCIAL

## 2017-06-20 DIAGNOSIS — M25.511 ACUTE PAIN OF RIGHT SHOULDER: Primary | ICD-10-CM

## 2017-06-20 PROCEDURE — 97110 THERAPEUTIC EXERCISES: CPT

## 2017-06-20 NOTE — PROGRESS NOTES
Physical Therapy Visit Note    Name: Angela Lyle  Clinic Number: [id number]      Diagnosis:   Encounter Diagnosis   Name Primary?    Acute pain of right shoulder Yes     Physician: Hanna Brown MD  Treatment Orders: PT Eval and Treat    Past Medical History:   Diagnosis Date    Allergy     Anemia     hx of HOSEA on fergon in past    Anxiety     Arthritis     right shoulder    Depression     Fatigue     History of psychiatric care     Keloid cicatrix     Psychiatric problem     Therapy      Current Outpatient Prescriptions   Medication Sig    buPROPion (WELLBUTRIN XL) 150 MG TB24 tablet Take 1 tablet (150 mg total) by mouth once daily.    ergocalciferol (ERGOCALCIFEROL) 50,000 unit Cap Take 1 capsule (50,000 Units total) by mouth every 7 days.    escitalopram oxalate (LEXAPRO) 20 MG tablet Take 1 tablet (20 mg total) by mouth once daily.    oxycodone-acetaminophen (PERCOCET)  mg per tablet Take 1 tablet by mouth every 6 (six) hours as needed for Pain.    promethazine (PHENERGAN) 25 MG tablet Take 1 tablet (25 mg total) by mouth every 6 (six) hours as needed for Nausea.    tramadol (ULTRAM) 50 mg tablet Take 1 tablet (50 mg total) by mouth every 4 to 6 hours as needed.     No current facility-administered medications for this visit.      Review of patient's allergies indicates:   Allergen Reactions    Sulfa (sulfonamide antibiotics) Hives and Itching    Sulfamethoxazole-trimethoprim      Other reaction(s): Itching     Precautions: S/P Right Biceps Tenodesis and SAD, SND 6/6/2017    Today's Date:  6/20/2017  Evaluation Date: 6/9/2017  Visit # authorized: 4/15  Authorization period: 12/31/2017  Time In:  1410  Time Out:  1500    Subjective     Patient states her shoulder is feeling okay.    Patient Goals: Return to full ADLs, functional mobility and work without pain or difficulty    Objective     Observation: Patient entered therapy with sling in place.    Posture: Noted anterior,  abducted scapula on R UE.    Passive Range of Motion:   Shoulder Right   Flexion 95   Abduction 90   ER at 0 25   ER at 90 Not attempted   IR 45      Functional Limitations Reports  Category: Lifting/Carrying  Tool: FOTO Shoulder  7%    TREATMENT:  Angela received therapeutic exercises to develop strength and endurance, flexibility for 40 minutes including:   MHP propped 90 deg 10'  PROM in all directions as tolerated (much improved today)  Isometrics 6-way 5sh x 15 ea.    Pulleys 3-way 3' ea.    Pt. Received cold pack x 10 min. To right shoulder. Following treatment.    Instructed pt. regarding: Proper technique with all exercises. Pt demonstrated good understanding of the education provided. Angela demonstrated good return demonstration of activities.    Assessment     Today's Assessment:  Patient's ROM was near end range today.  She is progressing well.  Add more AROM next visit.    This is a 27 y.o. female referred to outpatient physical therapy and presents with a medical diagnosis of S/P Right Biceps Tenodesis and SAD, SND 6/6/2017.  Patient presents with signs and symptoms consistent with post-op changes.  Patient's chief complaint is pain and difficulty sleeping as well as inability to use UE.  Patient will benefit from skilled physical therapy services, specifically normalized range of motion and flexibility, gross postural and upper extremity strengthening, progressing to functional mobility as tolerated.     Medical necessity is demonstrated by the following IMPAIRMENTS/PROBLEM LIST:   1)Increase in pain level limiting function   2)Decreased ROM   3)Decreased strength   4)Decreased functional mobility   5)Lack of HEP    GOALS: Short Term Goals: 6 weeks  1.  Report decreased right shoulder pain < / =  4/10  to increase tolerance for ADLs and AROM.  2.  Increase AROM to WNL in all directions for independence with ADLs.   3.  Demonstrate normalized scapular and thoracic posture in order to increase tolerance  for ADL and work activities.  4.  Pt to tolerate HEP to improve ROM and independence with ADL's.    Long Term Goals: 12-16 weeks  1.  Report decreased right shoulder pain  < / =  3/10  to increase tolerance for full return to work duties as needed.  2.  Patient will report ability to perform work duties as needed without pain or difficulty.  3.  Increase strength to >/= 4+/5 in all directions right UE to increase tolerance for ADL and work activities.    Plan     Pt will be treated by physical therapy 1-2 times a week for Pt Education, HEP, therapeutic exercises, neuromuscular re-education, manual therapy, joint mobilizations, modalities prn to achieve established goals. Angela may at times be seen by a PTA as part of the Rehab Team.     Cont PT for 12-16 weeks.     Carlota Mc, PT, DPT        I certify the need for these services furnished under this plan of treatment and while under my care.______________________________ Physician/Referring Practitioner  Date of Signature

## 2017-06-22 ENCOUNTER — CLINICAL SUPPORT (OUTPATIENT)
Dept: REHABILITATION | Facility: HOSPITAL | Age: 27
End: 2017-06-22
Attending: ORTHOPAEDIC SURGERY
Payer: COMMERCIAL

## 2017-06-22 DIAGNOSIS — M25.511 ACUTE PAIN OF RIGHT SHOULDER: Primary | ICD-10-CM

## 2017-06-22 PROCEDURE — 97110 THERAPEUTIC EXERCISES: CPT

## 2017-06-22 NOTE — PROGRESS NOTES
Physical Therapy Visit Note    Name: Angela Lyle  Clinic Number: [id number]      Diagnosis:   Encounter Diagnosis   Name Primary?    Acute pain of right shoulder Yes     Physician: Hanna Brown MD  Treatment Orders: PT Eval and Treat    Past Medical History:   Diagnosis Date    Allergy     Anemia     hx of HOSEA on fergon in past    Anxiety     Arthritis     right shoulder    Depression     Fatigue     History of psychiatric care     Keloid cicatrix     Psychiatric problem     Therapy      Current Outpatient Prescriptions   Medication Sig    buPROPion (WELLBUTRIN XL) 150 MG TB24 tablet Take 1 tablet (150 mg total) by mouth once daily.    ergocalciferol (ERGOCALCIFEROL) 50,000 unit Cap Take 1 capsule (50,000 Units total) by mouth every 7 days.    escitalopram oxalate (LEXAPRO) 20 MG tablet Take 1 tablet (20 mg total) by mouth once daily.    oxycodone-acetaminophen (PERCOCET)  mg per tablet Take 1 tablet by mouth every 6 (six) hours as needed for Pain.    promethazine (PHENERGAN) 25 MG tablet Take 1 tablet (25 mg total) by mouth every 6 (six) hours as needed for Nausea.    tramadol (ULTRAM) 50 mg tablet Take 1 tablet (50 mg total) by mouth every 4 to 6 hours as needed.     No current facility-administered medications for this visit.      Review of patient's allergies indicates:   Allergen Reactions    Sulfa (sulfonamide antibiotics) Hives and Itching    Sulfamethoxazole-trimethoprim      Other reaction(s): Itching     Precautions: S/P Right Biceps Tenodesis and SAD, SND 6/6/2017    Today's Date:  6/22/2017  Evaluation Date: 6/9/2017  Visit # authorized: 5/15  Authorization period: 12/31/2017  Time In:  1500  Time Out:  1600    Subjective     Patient states she is pretty sore lately but thinks it is because she has been trying to use her arm more and more.    Patient Goals: Return to full ADLs, functional mobility and work without pain or difficulty    Objective     Observation: Patient  entered therapy with sling in place.    Posture: Noted anterior, abducted scapula on R UE.    Passive Range of Motion:   Shoulder Right   Flexion 95   Abduction 90   ER at 0 25   ER at 90 Not attempted   IR 45      Functional Limitations Reports  Category: Lifting/Carrying  Tool: FOTO Shoulder  7%    TREATMENT:  Angela received therapeutic exercises to develop strength and endurance, flexibility for 50 minutes including:   MHP propped 90 deg 10'  PROM in all directions as tolerated (much improved today)  Pulleys 3-way 3' ea.  Supine Serratus Punches 20x  Supine Flx Rhythmic Stab to fatigue   Prone Scap Retraction w/ Ext 5sh x 20    Pt. Received cold pack x 10 min. To right shoulder. Following treatment.    Instructed pt. regarding: Proper technique with all exercises. Pt demonstrated good understanding of the education provided. Angela demonstrated good return demonstration of activities.    Assessment     Today's Assessment:  Patient's ROM was near end range today.  She is progressing well.  She was very fatigued with exercises and progressed HEP.    This is a 27 y.o. female referred to outpatient physical therapy and presents with a medical diagnosis of S/P Right Biceps Tenodesis and SAD, SND 6/6/2017.  Patient presents with signs and symptoms consistent with post-op changes.  Patient's chief complaint is pain and difficulty sleeping as well as inability to use UE.  Patient will benefit from skilled physical therapy services, specifically normalized range of motion and flexibility, gross postural and upper extremity strengthening, progressing to functional mobility as tolerated.     Medical necessity is demonstrated by the following IMPAIRMENTS/PROBLEM LIST:   1)Increase in pain level limiting function   2)Decreased ROM   3)Decreased strength   4)Decreased functional mobility   5)Lack of HEP    GOALS: Short Term Goals: 6 weeks  1.  Report decreased right shoulder pain < / =  4/10  to increase tolerance for ADLs  and AROM.  2.  Increase AROM to WNL in all directions for independence with ADLs.   3.  Demonstrate normalized scapular and thoracic posture in order to increase tolerance for ADL and work activities.  4.  Pt to tolerate HEP to improve ROM and independence with ADL's.    Long Term Goals: 12-16 weeks  1.  Report decreased right shoulder pain  < / =  3/10  to increase tolerance for full return to work duties as needed.  2.  Patient will report ability to perform work duties as needed without pain or difficulty.  3.  Increase strength to >/= 4+/5 in all directions right UE to increase tolerance for ADL and work activities.    Plan     Pt will be treated by physical therapy 1-2 times a week for Pt Education, HEP, therapeutic exercises, neuromuscular re-education, manual therapy, joint mobilizations, modalities prn to achieve established goals. Angela may at times be seen by a PTA as part of the Rehab Team.     Cont PT for 12-16 weeks.     Carlota Mc, PT, DPT        I certify the need for these services furnished under this plan of treatment and while under my care.______________________________ Physician/Referring Practitioner  Date of Signature

## 2017-06-27 ENCOUNTER — CLINICAL SUPPORT (OUTPATIENT)
Dept: REHABILITATION | Facility: HOSPITAL | Age: 27
End: 2017-06-27
Attending: ORTHOPAEDIC SURGERY
Payer: COMMERCIAL

## 2017-06-27 DIAGNOSIS — M25.511 ACUTE PAIN OF RIGHT SHOULDER: Primary | ICD-10-CM

## 2017-06-27 PROCEDURE — 97110 THERAPEUTIC EXERCISES: CPT

## 2017-06-27 NOTE — PROGRESS NOTES
"Physical Therapy Visit Note    Name: Angela Lyle  Clinic Number: [id number]      Diagnosis:   No diagnosis found.  Physician: Hanna Brown MD  Treatment Orders: PT Eval and Treat    Past Medical History:   Diagnosis Date    Allergy     Anemia     hx of HOSEA on fergon in past    Anxiety     Arthritis     right shoulder    Depression     Fatigue     History of psychiatric care     Keloid cicatrix     Psychiatric problem     Therapy      Current Outpatient Prescriptions   Medication Sig    buPROPion (WELLBUTRIN XL) 150 MG TB24 tablet Take 1 tablet (150 mg total) by mouth once daily.    ergocalciferol (ERGOCALCIFEROL) 50,000 unit Cap Take 1 capsule (50,000 Units total) by mouth every 7 days.    escitalopram oxalate (LEXAPRO) 20 MG tablet Take 1 tablet (20 mg total) by mouth once daily.    oxycodone-acetaminophen (PERCOCET)  mg per tablet Take 1 tablet by mouth every 6 (six) hours as needed for Pain.    promethazine (PHENERGAN) 25 MG tablet Take 1 tablet (25 mg total) by mouth every 6 (six) hours as needed for Nausea.    tramadol (ULTRAM) 50 mg tablet Take 1 tablet (50 mg total) by mouth every 4 to 6 hours as needed.     No current facility-administered medications for this visit.      Review of patient's allergies indicates:   Allergen Reactions    Sulfa (sulfonamide antibiotics) Hives and Itching    Sulfamethoxazole-trimethoprim      Other reaction(s): Itching     Precautions: S/P Right Biceps Tenodesis and SAD, SND 6/6/2017    Today's Date:  6/22/2017  Evaluation Date: 6/9/2017  Visit # authorized: 6/15  Authorization period: 12/31/2017  Time In:  4:07  Time Out:  5:05    Subjective     Pt reports w/ "2/10 stretching pn in front of R shld".  Pt mentioned feeling a pop and having some pn posteriorly in same shld last night.      Patient Goals: Return to full ADLs, functional mobility and work without pain or difficulty    Objective     Observation: Patient entered therapy with sling in " place.    Posture: Noted anterior, abducted scapula on R UE.     Functional Limitations Reports  Category: Lifting/Carrying  Tool: FOTO Shoulder  35%  6/27/17    TREATMENT:  Angela received therapeutic exercises to develop strength and endurance, flexibility for 45 minutes including:   MHP propped 90 deg 10'  PROM in all directions as tolerated (much improved today)  Pulleys 3-way 3' ea. ( slight posterior shld pn)  Sleeper stretch 5 x 30 sec  Supine Serratus Punches 30x Np (2* posterior pn)  Supine Flx Rhythmic Stab to fatigue NP ( 2* increased posterior pn in flexion)  Prone Scap Retraction w/ Ext 5sh x 20  Seated scap retraction 30 x 5 sec hold  Pt. Received cold pack x 10 min. To right shoulder. Following treatment.    Instructed pt. regarding: Proper technique with all exercises. Pt demonstrated good understanding of the education provided. Angela demonstrated good return demonstration of activities.    Assessment     Pt leisa tx well.  Pn level remained same prior to and after tx session.  Pt displayed increased good effort during therex w/ VCs for technique.  Therex not advanced 2* increased pn.  Pt edu on and instructed to cont HEP as leisa and add sleeper stretch.  Cont to progress as leisa.      This is a 27 y.o. female referred to outpatient physical therapy and presents with a medical diagnosis of S/P Right Biceps Tenodesis and SAD, SND 6/6/2017.  Patient presents with signs and symptoms consistent with post-op changes.  Patient's chief complaint is pain and difficulty sleeping as well as inability to use UE.  Patient will benefit from skilled physical therapy services, specifically normalized range of motion and flexibility, gross postural and upper extremity strengthening, progressing to functional mobility as tolerated.     Medical necessity is demonstrated by the following IMPAIRMENTS/PROBLEM LIST:   1)Increase in pain level limiting function   2)Decreased ROM   3)Decreased strength   4)Decreased  functional mobility   5)Lack of HEP    GOALS: Short Term Goals: 6 weeks  1.  Report decreased right shoulder pain < / =  4/10  to increase tolerance for ADLs and AROM.  2.  Increase AROM to WNL in all directions for independence with ADLs.   3.  Demonstrate normalized scapular and thoracic posture in order to increase tolerance for ADL and work activities.  4.  Pt to tolerate HEP to improve ROM and independence with ADL's.    Long Term Goals: 12-16 weeks  1.  Report decreased right shoulder pain  < / =  3/10  to increase tolerance for full return to work duties as needed.  2.  Patient will report ability to perform work duties as needed without pain or difficulty.  3.  Increase strength to >/= 4+/5 in all directions right UE to increase tolerance for ADL and work activities.    Plan     Pt will be treated by physical therapy 1-2 times a week for Pt Education, HEP, therapeutic exercises, neuromuscular re-education, manual therapy, joint mobilizations, modalities prn to achieve established goals. Angela may at times be seen by a PTA as part of the Rehab Team.     Cont PT for 12-16 weeks.     Junior Lynne, RAMIREZ

## 2017-06-29 ENCOUNTER — CLINICAL SUPPORT (OUTPATIENT)
Dept: REHABILITATION | Facility: HOSPITAL | Age: 27
End: 2017-06-29
Attending: ORTHOPAEDIC SURGERY
Payer: COMMERCIAL

## 2017-06-29 DIAGNOSIS — M25.511 ACUTE PAIN OF RIGHT SHOULDER: Primary | ICD-10-CM

## 2017-06-29 PROCEDURE — 97110 THERAPEUTIC EXERCISES: CPT

## 2017-06-29 NOTE — PROGRESS NOTES
Physical Therapy Visit Note    Name: Angela Lyle  Clinic Number: [id number]      Diagnosis:   Encounter Diagnosis   Name Primary?    Acute pain of right shoulder Yes     Physician: Hanna Brown MD  Treatment Orders: PT Eval and Treat    Past Medical History:   Diagnosis Date    Allergy     Anemia     hx of HOSEA on fergon in past    Anxiety     Arthritis     right shoulder    Depression     Fatigue     History of psychiatric care     Keloid cicatrix     Psychiatric problem     Therapy      Current Outpatient Prescriptions   Medication Sig    buPROPion (WELLBUTRIN XL) 150 MG TB24 tablet Take 1 tablet (150 mg total) by mouth once daily.    ergocalciferol (ERGOCALCIFEROL) 50,000 unit Cap Take 1 capsule (50,000 Units total) by mouth every 7 days.    escitalopram oxalate (LEXAPRO) 20 MG tablet Take 1 tablet (20 mg total) by mouth once daily.    oxycodone-acetaminophen (PERCOCET)  mg per tablet Take 1 tablet by mouth every 6 (six) hours as needed for Pain.    promethazine (PHENERGAN) 25 MG tablet Take 1 tablet (25 mg total) by mouth every 6 (six) hours as needed for Nausea.    tramadol (ULTRAM) 50 mg tablet Take 1 tablet (50 mg total) by mouth every 4 to 6 hours as needed.     No current facility-administered medications for this visit.      Review of patient's allergies indicates:   Allergen Reactions    Sulfa (sulfonamide antibiotics) Hives and Itching    Sulfamethoxazole-trimethoprim      Other reaction(s): Itching     Precautions: S/P Right Biceps Tenodesis and SAD, SND 6/6/2017    Today's Date:  6/29/2017  Evaluation Date: 6/9/2017  Visit # authorized: 7/15  Authorization period: 12/31/2017  Time In:  1700  Time Out:  1800    Subjective     Patient states she has been feeling okay.  The front of her shoulder was really throbbing yesterday but the back has felt better.    Patient Goals: Return to full ADLs, functional mobility and work without pain or difficulty    Objective      Observation: Patient entered therapy with sling in place.    Posture: Noted anterior, abducted scapula on R UE.     Functional Limitations Reports  Category: Lifting/Carrying  Tool: FOTO Shoulder  35%  6/27/17    TREATMENT:  Angela received therapeutic exercises to develop strength and endurance, flexibility for 45 minutes including:   MHP propped 90 deg 10'  PROM in all directions as tolerated (much improved today)  Pulleys 3-way 3' ea.   Supine Serratus Punches 30x  90 Flx ABCs 2x  90 Abd ABCs 2x (very fatigued)  Sidelying ER 10x2 (very fatigued)  Sleeper stretch 5 x 30 sec     Supine Flx Rhythmic Stab to fatigue NP ( 2* increased posterior pn in flexion)  Prone Scap Retraction w/ Ext 5sh x 20  Seated scap retraction 30 x 5 sec hold  Pt. Received cold pack x 10 min. To right shoulder. Following treatment.    Instructed pt. regarding: Proper technique with all exercises. Pt demonstrated good understanding of the education provided. Angela demonstrated good return demonstration of activities.    Assessment     Patient fatigued with new exercises but no pain throughout.  Progressing AROM as able.    This is a 27 y.o. female referred to outpatient physical therapy and presents with a medical diagnosis of S/P Right Biceps Tenodesis and SAD, SND 6/6/2017.  Patient presents with signs and symptoms consistent with post-op changes.  Patient's chief complaint is pain and difficulty sleeping as well as inability to use UE.  Patient will benefit from skilled physical therapy services, specifically normalized range of motion and flexibility, gross postural and upper extremity strengthening, progressing to functional mobility as tolerated.     Medical necessity is demonstrated by the following IMPAIRMENTS/PROBLEM LIST:   1)Increase in pain level limiting function   2)Decreased ROM   3)Decreased strength   4)Decreased functional mobility   5)Lack of HEP    GOALS: Short Term Goals: 6 weeks  1.  Report decreased right shoulder  pain < / =  4/10  to increase tolerance for ADLs and AROM.  2.  Increase AROM to WNL in all directions for independence with ADLs.   3.  Demonstrate normalized scapular and thoracic posture in order to increase tolerance for ADL and work activities.  4.  Pt to tolerate HEP to improve ROM and independence with ADL's.    Long Term Goals: 12-16 weeks  1.  Report decreased right shoulder pain  < / =  3/10  to increase tolerance for full return to work duties as needed.  2.  Patient will report ability to perform work duties as needed without pain or difficulty.  3.  Increase strength to >/= 4+/5 in all directions right UE to increase tolerance for ADL and work activities.    Plan     Pt will be treated by physical therapy 1-2 times a week for Pt Education, HEP, therapeutic exercises, neuromuscular re-education, manual therapy, joint mobilizations, modalities prn to achieve established goals. Angela may at times be seen by a PTA as part of the Rehab Team.     Cont PT for 12-16 weeks.     Carlota Mc, PT, DPT, SCS

## 2017-07-03 ENCOUNTER — CLINICAL SUPPORT (OUTPATIENT)
Dept: REHABILITATION | Facility: HOSPITAL | Age: 27
End: 2017-07-03
Attending: ORTHOPAEDIC SURGERY
Payer: COMMERCIAL

## 2017-07-03 DIAGNOSIS — M25.511 ACUTE PAIN OF RIGHT SHOULDER: Primary | ICD-10-CM

## 2017-07-03 PROCEDURE — 97110 THERAPEUTIC EXERCISES: CPT

## 2017-07-03 NOTE — PROGRESS NOTES
Physical Therapy Visit Note    Name: Angela Lyle  Clinic Number: [id number]      Diagnosis:   Encounter Diagnosis   Name Primary?    Acute pain of right shoulder Yes     Physician: Hanna Brown MD  Treatment Orders: PT Eval and Treat    Past Medical History:   Diagnosis Date    Allergy     Anemia     hx of HOSEA on fergon in past    Anxiety     Arthritis     right shoulder    Depression     Fatigue     History of psychiatric care     Keloid cicatrix     Psychiatric problem     Therapy      Current Outpatient Prescriptions   Medication Sig    buPROPion (WELLBUTRIN XL) 150 MG TB24 tablet Take 1 tablet (150 mg total) by mouth once daily.    ergocalciferol (ERGOCALCIFEROL) 50,000 unit Cap Take 1 capsule (50,000 Units total) by mouth every 7 days.    escitalopram oxalate (LEXAPRO) 20 MG tablet Take 1 tablet (20 mg total) by mouth once daily.    oxycodone-acetaminophen (PERCOCET)  mg per tablet Take 1 tablet by mouth every 6 (six) hours as needed for Pain.    tramadol (ULTRAM) 50 mg tablet Take 1 tablet (50 mg total) by mouth every 4 to 6 hours as needed.     No current facility-administered medications for this visit.      Review of patient's allergies indicates:   Allergen Reactions    Sulfa (sulfonamide antibiotics) Hives and Itching    Sulfamethoxazole-trimethoprim      Other reaction(s): Itching     Precautions: S/P Right Biceps Tenodesis and SAD, SND 6/6/2017    Today's Date:  7/3/2017  Evaluation Date: 6/9/2017  Visit # authorized: 8/15  Authorization period: 12/31/2017  Time In:  1600  Time Out:  1700    Subjective     Patient states she is feeling better in general.    Patient Goals: Return to full ADLs, functional mobility and work without pain or difficulty    Objective     Observation: Patient entered therapy with sling in place.    Posture: Noted anterior, abducted scapula on R UE.     Functional Limitations Reports  Category: Lifting/Carrying  Tool: FOTO Shoulder  35%   6/27/17    TREATMENT:  Angela received therapeutic exercises to develop strength and endurance, flexibility for 50 minutes including:   UBE L3 4'/4' for AROM and motor control  PROM in all directions as tolerated (much improved today)  Pulleys 3-way 3' ea.   Supine Serratus Punches 20x2 1#  Sidelying ER to fatigue x 3  Flx Rhythmic Stab to fatigue x 3    Not today:  90 Flx ABCs 2x  90 Abd ABCs 2x (very fatigued)  Sleeper stretch 5 x 30 sec    Pt. Received cold pack x 10 min. To right shoulder. Following treatment.    Instructed pt. regarding: Proper technique with all exercises. Pt demonstrated good understanding of the education provided. Angela demonstrated good return demonstration of activities.    Assessment     Patient was very fatigued with today's treatment but had no increased pain throughout.  Progress scap exercises next visit.    This is a 27 y.o. female referred to outpatient physical therapy and presents with a medical diagnosis of S/P Right Biceps Tenodesis and SAD, SND 6/6/2017.  Patient presents with signs and symptoms consistent with post-op changes.  Patient's chief complaint is pain and difficulty sleeping as well as inability to use UE.  Patient will benefit from skilled physical therapy services, specifically normalized range of motion and flexibility, gross postural and upper extremity strengthening, progressing to functional mobility as tolerated.     Medical necessity is demonstrated by the following IMPAIRMENTS/PROBLEM LIST:   1)Increase in pain level limiting function   2)Decreased ROM   3)Decreased strength   4)Decreased functional mobility   5)Lack of HEP    GOALS: Short Term Goals: 6 weeks  1.  Report decreased right shoulder pain < / =  4/10  to increase tolerance for ADLs and AROM.  2.  Increase AROM to WNL in all directions for independence with ADLs.   3.  Demonstrate normalized scapular and thoracic posture in order to increase tolerance for ADL and work activities.  4.  Pt to  tolerate HEP to improve ROM and independence with ADL's.    Long Term Goals: 12-16 weeks  1.  Report decreased right shoulder pain  < / =  3/10  to increase tolerance for full return to work duties as needed.  2.  Patient will report ability to perform work duties as needed without pain or difficulty.  3.  Increase strength to >/= 4+/5 in all directions right UE to increase tolerance for ADL and work activities.    Plan     Pt will be treated by physical therapy 1-2 times a week for Pt Education, HEP, therapeutic exercises, neuromuscular re-education, manual therapy, joint mobilizations, modalities prn to achieve established goals. Angela may at times be seen by a PTA as part of the Rehab Team.     Cont PT for 12-16 weeks.     Carlota cM, PT, DPT, SCS

## 2017-07-06 ENCOUNTER — CLINICAL SUPPORT (OUTPATIENT)
Dept: REHABILITATION | Facility: HOSPITAL | Age: 27
End: 2017-07-06
Attending: ORTHOPAEDIC SURGERY
Payer: COMMERCIAL

## 2017-07-06 DIAGNOSIS — M25.511 ACUTE PAIN OF RIGHT SHOULDER: Primary | ICD-10-CM

## 2017-07-06 PROCEDURE — 97110 THERAPEUTIC EXERCISES: CPT

## 2017-07-06 NOTE — PROGRESS NOTES
Physical Therapy Visit Note    Name: Angela Lyle  Clinic Number: [id number]      Diagnosis:   Encounter Diagnosis   Name Primary?    Acute pain of right shoulder Yes     Physician: Hanna Brown MD  Treatment Orders: PT Eval and Treat    Past Medical History:   Diagnosis Date    Allergy     Anemia     hx of HOSEA on fergon in past    Anxiety     Arthritis     right shoulder    Depression     Fatigue     History of psychiatric care     Keloid cicatrix     Psychiatric problem     Therapy      Current Outpatient Prescriptions   Medication Sig    buPROPion (WELLBUTRIN XL) 150 MG TB24 tablet Take 1 tablet (150 mg total) by mouth once daily.    ergocalciferol (ERGOCALCIFEROL) 50,000 unit Cap Take 1 capsule (50,000 Units total) by mouth every 7 days.    escitalopram oxalate (LEXAPRO) 20 MG tablet Take 1 tablet (20 mg total) by mouth once daily.    oxycodone-acetaminophen (PERCOCET)  mg per tablet Take 1 tablet by mouth every 6 (six) hours as needed for Pain.    tramadol (ULTRAM) 50 mg tablet Take 1 tablet (50 mg total) by mouth every 4 to 6 hours as needed.     No current facility-administered medications for this visit.      Review of patient's allergies indicates:   Allergen Reactions    Sulfa (sulfonamide antibiotics) Hives and Itching    Sulfamethoxazole-trimethoprim      Other reaction(s): Itching     Precautions: S/P Right Biceps Tenodesis and SAD, SND 6/6/2017    Today's Date:  7/6/2017  Evaluation Date: 6/9/2017  Visit # authorized: 9/15  Authorization period: 12/31/2017  Time In:  1550  Time Out:  1700    Subjective     Patient states she is feeling okay.  She has not been too sore.    Patient Goals: Return to full ADLs, functional mobility and work without pain or difficulty    Objective     Observation: Patient entered therapy with sling in place.    Posture: Noted anterior, abducted scapula on R UE.     Functional Limitations Reports  Category: Lifting/Carrying  Tool: FOTO Shoulder   35%  6/27/17    TREATMENT:  Angela received therapeutic exercises to develop strength and endurance, flexibility for 60 minutes including:   UBE L3 4'/4' for AROM and motor control  PROM in all directions as tolerated (much improved today)  MR Flx/Ext 10x  MR PNF D2 Flx/Ext to fatigue  MR Biceps/Triceps 20x  Pulleys 3-way 3' ea.   SB Flx/Abd 3' ea.  Flx Wallslides to fatigue x 2  OTB Rowing 20x  OTB Ext 20x    Not today:  Supine Serratus Punches 20x2 1#  Sidelying ER to fatigue x 3  Flx Rhythmic Stab to fatigue x 3  90 Flx ABCs 2x  90 Abd ABCs 2x (very fatigued)  Sleeper stretch 5 x 30 sec    Pt. Received cold pack x 10 min. To right shoulder. Following treatment.    Instructed pt. regarding: Proper technique with all exercises. Pt demonstrated good understanding of the education provided. Angela demonstrated good return demonstration of activities.    Assessment     Today's Assessment:  Patient did very well with new exercises and just complains of supraspinatus discomfort with exercises above 90.  Progress as tolerated.    This is a 27 y.o. female referred to outpatient physical therapy and presents with a medical diagnosis of S/P Right Biceps Tenodesis and SAD, SND 6/6/2017.  Patient presents with signs and symptoms consistent with post-op changes.  Patient's chief complaint is pain and difficulty sleeping as well as inability to use UE.  Patient will benefit from skilled physical therapy services, specifically normalized range of motion and flexibility, gross postural and upper extremity strengthening, progressing to functional mobility as tolerated.     Medical necessity is demonstrated by the following IMPAIRMENTS/PROBLEM LIST:   1)Increase in pain level limiting function   2)Decreased ROM   3)Decreased strength   4)Decreased functional mobility   5)Lack of HEP    GOALS: Short Term Goals: 6 weeks  1.  Report decreased right shoulder pain < / =  4/10  to increase tolerance for ADLs and AROM.  2.  Increase AROM  to WNL in all directions for independence with ADLs.   3.  Demonstrate normalized scapular and thoracic posture in order to increase tolerance for ADL and work activities.  4.  Pt to tolerate HEP to improve ROM and independence with ADL's.    Long Term Goals: 12-16 weeks  1.  Report decreased right shoulder pain  < / =  3/10  to increase tolerance for full return to work duties as needed.  2.  Patient will report ability to perform work duties as needed without pain or difficulty.  3.  Increase strength to >/= 4+/5 in all directions right UE to increase tolerance for ADL and work activities.    Plan     Pt will be treated by physical therapy 1-2 times a week for Pt Education, HEP, therapeutic exercises, neuromuscular re-education, manual therapy, joint mobilizations, modalities prn to achieve established goals. Angela may at times be seen by a PTA as part of the Rehab Team.     Cont PT for 12-16 weeks.     Carlota Mc, PT, DPT, SCS

## 2017-07-11 ENCOUNTER — CLINICAL SUPPORT (OUTPATIENT)
Dept: REHABILITATION | Facility: HOSPITAL | Age: 27
End: 2017-07-11
Attending: ORTHOPAEDIC SURGERY
Payer: COMMERCIAL

## 2017-07-11 PROCEDURE — 97110 THERAPEUTIC EXERCISES: CPT

## 2017-07-11 NOTE — PROGRESS NOTES
Physical Therapy Visit Note    Name: Angela Lyle  Clinic Number: [id number]      Diagnosis:   No diagnosis found.  Physician: Hanna Brown MD  Treatment Orders: PT Eval and Treat    Past Medical History:   Diagnosis Date    Allergy     Anemia     hx of HOSEA on fergon in past    Anxiety     Arthritis     right shoulder    Depression     Fatigue     History of psychiatric care     Keloid cicatrix     Psychiatric problem     Therapy      Current Outpatient Prescriptions   Medication Sig    buPROPion (WELLBUTRIN XL) 150 MG TB24 tablet Take 1 tablet (150 mg total) by mouth once daily.    ergocalciferol (ERGOCALCIFEROL) 50,000 unit Cap Take 1 capsule (50,000 Units total) by mouth every 7 days.    escitalopram oxalate (LEXAPRO) 20 MG tablet Take 1 tablet (20 mg total) by mouth once daily.    oxycodone-acetaminophen (PERCOCET)  mg per tablet Take 1 tablet by mouth every 6 (six) hours as needed for Pain.    tramadol (ULTRAM) 50 mg tablet Take 1 tablet (50 mg total) by mouth every 4 to 6 hours as needed.     No current facility-administered medications for this visit.      Review of patient's allergies indicates:   Allergen Reactions    Sulfa (sulfonamide antibiotics) Hives and Itching    Sulfamethoxazole-trimethoprim      Other reaction(s): Itching     Precautions: S/P Right Biceps Tenodesis and SAD, SND 6/6/2017    Today's Date:  7/11/2017  Evaluation Date: 6/9/2017  Visit # authorized: 10/15  Authorization period: 12/31/2017  Time In:  1500  Time Out:  1600    Subjective     Patient states she is feeling okay and seems to be getting more motion.    Patient Goals: Return to full ADLs, functional mobility and work without pain or difficulty    Objective     Observation: Patient entered therapy with sling in place.    Posture: Noted anterior, abducted scapula on R UE.     Functional Limitations Reports  Category: Lifting/Carrying  Tool: FOTO Shoulder  35%  6/27/17    TREATMENT:  Angela received  therapeutic exercises to develop strength and endurance, flexibility for 50 minutes including:   UBE L3 4'/4' for AROM and motor control  PROM in all directions as tolerated (much improved today)  MR Flx/Ext 10x  MR PNF D2 Flx/Ext to fatigue  MR Biceps/Triceps 20x  Pulleys 3-way 3' ea.   SB Flx/Abd 3' ea.  Flx Wallslides to fatigue x 2  OTB Rowing 20x  OTB Ext 20x    Not today:  Supine Serratus Punches 20x2 1#  Sidelying ER to fatigue x 3  Flx Rhythmic Stab to fatigue x 3  90 Flx ABCs 2x  90 Abd ABCs 2x (very fatigued)  Sleeper stretch 5 x 30 sec    Pt. Received cold pack x 10 min. To right shoulder. Following treatment.    Instructed pt. regarding: Proper technique with all exercises. Pt demonstrated good understanding of the education provided. Angela demonstrated good return demonstration of activities.    Assessment     Today's Assessment:  Patient is progressing quite well and only has minimal pain throughout exercises; mostly eccentric lowering with supraspinatus.    This is a 27 y.o. female referred to outpatient physical therapy and presents with a medical diagnosis of S/P Right Biceps Tenodesis and SAD, SND 6/6/2017.  Patient presents with signs and symptoms consistent with post-op changes.  Patient's chief complaint is pain and difficulty sleeping as well as inability to use UE.  Patient will benefit from skilled physical therapy services, specifically normalized range of motion and flexibility, gross postural and upper extremity strengthening, progressing to functional mobility as tolerated.     Medical necessity is demonstrated by the following IMPAIRMENTS/PROBLEM LIST:   1)Increase in pain level limiting function   2)Decreased ROM   3)Decreased strength   4)Decreased functional mobility   5)Lack of HEP    GOALS: Short Term Goals: 6 weeks  1.  Report decreased right shoulder pain < / =  4/10  to increase tolerance for ADLs and AROM.  2.  Increase AROM to WNL in all directions for independence with ADLs.    3.  Demonstrate normalized scapular and thoracic posture in order to increase tolerance for ADL and work activities.  4.  Pt to tolerate HEP to improve ROM and independence with ADL's.    Long Term Goals: 12-16 weeks  1.  Report decreased right shoulder pain  < / =  3/10  to increase tolerance for full return to work duties as needed.  2.  Patient will report ability to perform work duties as needed without pain or difficulty.  3.  Increase strength to >/= 4+/5 in all directions right UE to increase tolerance for ADL and work activities.    Plan     Pt will be treated by physical therapy 1-2 times a week for Pt Education, HEP, therapeutic exercises, neuromuscular re-education, manual therapy, joint mobilizations, modalities prn to achieve established goals. Angela may at times be seen by a PTA as part of the Rehab Team.     Cont PT for 12-16 weeks.     Carlota Mc, PT, DPT, SCS

## 2017-07-14 ENCOUNTER — CLINICAL SUPPORT (OUTPATIENT)
Dept: REHABILITATION | Facility: HOSPITAL | Age: 27
End: 2017-07-14
Attending: ORTHOPAEDIC SURGERY
Payer: COMMERCIAL

## 2017-07-14 DIAGNOSIS — M25.511 ACUTE PAIN OF RIGHT SHOULDER: Primary | ICD-10-CM

## 2017-07-14 PROCEDURE — 97110 THERAPEUTIC EXERCISES: CPT

## 2017-07-18 ENCOUNTER — CLINICAL SUPPORT (OUTPATIENT)
Dept: REHABILITATION | Facility: HOSPITAL | Age: 27
End: 2017-07-18
Attending: ORTHOPAEDIC SURGERY
Payer: COMMERCIAL

## 2017-07-18 DIAGNOSIS — M25.511 ACUTE PAIN OF RIGHT SHOULDER: Primary | ICD-10-CM

## 2017-07-18 PROCEDURE — 97110 THERAPEUTIC EXERCISES: CPT

## 2017-07-18 NOTE — PROGRESS NOTES
Physical Therapy Visit Note    Name: Angela Lyle  Clinic Number: [id number]      Diagnosis:   No diagnosis found.  Physician: Hanna Brown MD  Treatment Orders: PT Eval and Treat    Past Medical History:   Diagnosis Date    Allergy     Anemia     hx of HOSEA on fergon in past    Anxiety     Arthritis     right shoulder    Depression     Fatigue     History of psychiatric care     Keloid cicatrix     Psychiatric problem     Therapy      Current Outpatient Prescriptions   Medication Sig    buPROPion (WELLBUTRIN XL) 150 MG TB24 tablet Take 1 tablet (150 mg total) by mouth once daily.    ergocalciferol (ERGOCALCIFEROL) 50,000 unit Cap Take 1 capsule (50,000 Units total) by mouth every 7 days.    escitalopram oxalate (LEXAPRO) 20 MG tablet Take 1 tablet (20 mg total) by mouth once daily.    oxycodone-acetaminophen (PERCOCET)  mg per tablet Take 1 tablet by mouth every 6 (six) hours as needed for Pain.    tramadol (ULTRAM) 50 mg tablet Take 1 tablet (50 mg total) by mouth every 4 to 6 hours as needed.     No current facility-administered medications for this visit.      Review of patient's allergies indicates:   Allergen Reactions    Sulfa (sulfonamide antibiotics) Hives and Itching    Sulfamethoxazole-trimethoprim      Other reaction(s): Itching     Precautions: S/P Right Biceps Tenodesis and SAD, SND 6/6/2017    Today's Date:  7/11/2017  Evaluation Date: 6/9/2017  Visit # authorized: 12/15  Authorization period: 12/31/2017  Time In:  4:04  Time Out:  5:05    Subjective     Patient states she is feeling okay and seems to be getting more motion.    Patient Goals: Return to full ADLs, functional mobility and work without pain or difficulty    Objective     Observation: Patient entered therapy with sling in place.    Posture: Noted anterior, abducted scapula on R UE.     Functional Limitations Reports  Category: Lifting/Carrying  Tool: FOTO Shoulder  35%  6/27/17    TREATMENT:  Angela received  therapeutic exercises to develop strength and endurance, flexibility for 45 minutes including:     UBE L3 4'/4' for AROM and motor control  PROM in all directions as tolerated (much improved today)  MR Flx/Ext 3 x 10  MR PNF D2 Flx/Ext 3 x 10  MR Biceps/Triceps 3 x 10  Pulleys 3-way 3' ea.   SB Flx/Abd 3' ea.  Flx Wallslides to fatigue x 2  OTB Rowing 20x  OTB Ext 20x    Not today:  Supine Serratus Punches 20x2 1#  Sidelying ER to fatigue x 3  Flx Rhythmic Stab to fatigue x 3  90 Flx ABCs 2x  90 Abd ABCs 2x (very fatigued)  Sleeper stretch 5 x 30 sec    Pt. Received cold pack x 10 min. To right shoulder. Following treatment.    Instructed pt. regarding: Proper technique with all exercises. Pt demonstrated good understanding of the education provided. Angela demonstrated good return demonstration of activities.    Assessment     Pt leisa tx well w/ no c/o pn.  Pt displayed increased muscular endurance during therex w/ VCs for technique.  Pt edu on and instructed to cont HEP.  Cont to progress as leisa.      This is a 27 y.o. female referred to outpatient physical therapy and presents with a medical diagnosis of S/P Right Biceps Tenodesis and SAD, SND 6/6/2017.  Patient presents with signs and symptoms consistent with post-op changes.  Patient's chief complaint is pain and difficulty sleeping as well as inability to use UE.  Patient will benefit from skilled physical therapy services, specifically normalized range of motion and flexibility, gross postural and upper extremity strengthening, progressing to functional mobility as tolerated.     Medical necessity is demonstrated by the following IMPAIRMENTS/PROBLEM LIST:   1)Increase in pain level limiting function   2)Decreased ROM   3)Decreased strength   4)Decreased functional mobility   5)Lack of HEP    GOALS: Short Term Goals: 6 weeks  1.  Report decreased right shoulder pain < / =  4/10  to increase tolerance for ADLs and AROM.  2.  Increase AROM to WNL in all  directions for independence with ADLs.   3.  Demonstrate normalized scapular and thoracic posture in order to increase tolerance for ADL and work activities.  4.  Pt to tolerate HEP to improve ROM and independence with ADL's.    Long Term Goals: 12-16 weeks  1.  Report decreased right shoulder pain  < / =  3/10  to increase tolerance for full return to work duties as needed.  2.  Patient will report ability to perform work duties as needed without pain or difficulty.  3.  Increase strength to >/= 4+/5 in all directions right UE to increase tolerance for ADL and work activities.    Plan     Pt will be treated by physical therapy 1-2 times a week for Pt Education, HEP, therapeutic exercises, neuromuscular re-education, manual therapy, joint mobilizations, modalities prn to achieve established goals. Angela may at times be seen by a PTA as part of the Rehab Team.     Cont PT for 12-16 weeks.     Junior Lynne, PTA

## 2017-07-19 NOTE — PROGRESS NOTES
Physical Therapy Visit Note    Name: Angela Lyle  Clinic Number: [id number]      Diagnosis:   Encounter Diagnosis   Name Primary?    Acute pain of right shoulder Yes     Physician: Hanna Brown MD  Treatment Orders: PT Eval and Treat    Past Medical History:   Diagnosis Date    Allergy     Anemia     hx of HOSEA on fergon in past    Anxiety     Arthritis     right shoulder    Depression     Fatigue     History of psychiatric care     Keloid cicatrix     Psychiatric problem     Therapy      Current Outpatient Prescriptions   Medication Sig    buPROPion (WELLBUTRIN XL) 150 MG TB24 tablet Take 1 tablet (150 mg total) by mouth once daily.    ergocalciferol (ERGOCALCIFEROL) 50,000 unit Cap Take 1 capsule (50,000 Units total) by mouth every 7 days.    escitalopram oxalate (LEXAPRO) 20 MG tablet Take 1 tablet (20 mg total) by mouth once daily.    oxycodone-acetaminophen (PERCOCET)  mg per tablet Take 1 tablet by mouth every 6 (six) hours as needed for Pain.    tramadol (ULTRAM) 50 mg tablet Take 1 tablet (50 mg total) by mouth every 4 to 6 hours as needed.     No current facility-administered medications for this visit.      Review of patient's allergies indicates:   Allergen Reactions    Sulfa (sulfonamide antibiotics) Hives and Itching    Sulfamethoxazole-trimethoprim      Other reaction(s): Itching     Precautions: S/P Right Biceps Tenodesis and SAD, SND 6/6/2017    Today's Date:  7/14/2017  Evaluation Date: 6/9/2017  Visit # authorized: 11/15  Authorization period: 12/31/2017  Time In:  1600  Time Out:  1700    Subjective     Patient states her shoulder is feeling fine.    Patient Goals: Return to full ADLs, functional mobility and work without pain or difficulty    Objective     Observation: Patient entered therapy with sling in place.    Posture: Noted anterior, abducted scapula on R UE.     Functional Limitations Reports  Category: Lifting/Carrying  Tool: FOTO Shoulder  35%   6/27/17    TREATMENT:  Angela received therapeutic exercises to develop strength and endurance, flexibility for 50 minutes including:   UBE L3 4'/4' for AROM and motor control  PROM in all directions as tolerated (much improved today)  Pulleys 3-way 3' ea.   SB Flx/Abd Rolls on Table 3' ea.  Flx Wallslides to fatigue x 2  OTB Rowing 20x  OTB Ext 20x    Not today:  MR Flx/Ext 10x  MR PNF D2 Flx/Ext to fatigue  MR Biceps/Triceps 20x  Supine Serratus Punches 20x2 1#  Sidelying ER to fatigue x 3  Flx Rhythmic Stab to fatigue x 3  90 Flx ABCs 2x  90 Abd ABCs 2x (very fatigued)  Sleeper stretch 5 x 30 sec    Pt. Received cold pack x 10 min. To right shoulder. Following treatment.    Instructed pt. regarding: Proper technique with all exercises. Pt demonstrated good understanding of the education provided. Angela demonstrated good return demonstration of activities.    Assessment     Today's Assessment:  Patient fatigued and only has discomfort with eccentric lowering abduction >flx.    This is a 27 y.o. female referred to outpatient physical therapy and presents with a medical diagnosis of S/P Right Biceps Tenodesis and SAD, SND 6/6/2017.  Patient presents with signs and symptoms consistent with post-op changes.  Patient's chief complaint is pain and difficulty sleeping as well as inability to use UE.  Patient will benefit from skilled physical therapy services, specifically normalized range of motion and flexibility, gross postural and upper extremity strengthening, progressing to functional mobility as tolerated.     Medical necessity is demonstrated by the following IMPAIRMENTS/PROBLEM LIST:   1)Increase in pain level limiting function   2)Decreased ROM   3)Decreased strength   4)Decreased functional mobility   5)Lack of HEP    GOALS: Short Term Goals: 6 weeks  1.  Report decreased right shoulder pain < / =  4/10  to increase tolerance for ADLs and AROM.  2.  Increase AROM to WNL in all directions for independence with  ADLs.   3.  Demonstrate normalized scapular and thoracic posture in order to increase tolerance for ADL and work activities.  4.  Pt to tolerate HEP to improve ROM and independence with ADL's.    Long Term Goals: 12-16 weeks  1.  Report decreased right shoulder pain  < / =  3/10  to increase tolerance for full return to work duties as needed.  2.  Patient will report ability to perform work duties as needed without pain or difficulty.  3.  Increase strength to >/= 4+/5 in all directions right UE to increase tolerance for ADL and work activities.    Plan     Pt will be treated by physical therapy 1-2 times a week for Pt Education, HEP, therapeutic exercises, neuromuscular re-education, manual therapy, joint mobilizations, modalities prn to achieve established goals. Angela may at times be seen by a PTA as part of the Rehab Team.     Cont PT for 12-16 weeks.     Carlota Mc, PT, DPT, SCS

## 2017-07-21 ENCOUNTER — CLINICAL SUPPORT (OUTPATIENT)
Dept: REHABILITATION | Facility: HOSPITAL | Age: 27
End: 2017-07-21
Attending: ORTHOPAEDIC SURGERY
Payer: COMMERCIAL

## 2017-07-21 DIAGNOSIS — M25.511 ACUTE PAIN OF RIGHT SHOULDER: Primary | ICD-10-CM

## 2017-07-21 PROCEDURE — 97140 MANUAL THERAPY 1/> REGIONS: CPT

## 2017-07-21 PROCEDURE — 97110 THERAPEUTIC EXERCISES: CPT

## 2017-07-21 NOTE — PROGRESS NOTES
Physical Therapy Visit Note    Name: Angela Lyle  Clinic Number: [id number]      Diagnosis:   Encounter Diagnosis   Name Primary?    Acute pain of right shoulder Yes     Physician: Hanna Brown MD  Treatment Orders: PT Eval and Treat    Past Medical History:   Diagnosis Date    Allergy     Anemia     hx of HOSEA on fergon in past    Anxiety     Arthritis     right shoulder    Depression     Fatigue     History of psychiatric care     Keloid cicatrix     Psychiatric problem     Therapy      Current Outpatient Prescriptions   Medication Sig    buPROPion (WELLBUTRIN XL) 150 MG TB24 tablet Take 1 tablet (150 mg total) by mouth once daily.    ergocalciferol (ERGOCALCIFEROL) 50,000 unit Cap Take 1 capsule (50,000 Units total) by mouth every 7 days.    escitalopram oxalate (LEXAPRO) 20 MG tablet Take 1 tablet (20 mg total) by mouth once daily.    oxycodone-acetaminophen (PERCOCET)  mg per tablet Take 1 tablet by mouth every 6 (six) hours as needed for Pain.    tramadol (ULTRAM) 50 mg tablet Take 1 tablet (50 mg total) by mouth every 4 to 6 hours as needed.     No current facility-administered medications for this visit.      Review of patient's allergies indicates:   Allergen Reactions    Sulfa (sulfonamide antibiotics) Hives and Itching    Sulfamethoxazole-trimethoprim      Other reaction(s): Itching     Precautions: S/P Right Biceps Tenodesis and SAD, SND 6/6/2017    Today's Date:  7/21/2017  Evaluation Date: 6/9/2017  Visit # authorized: 12/15  Authorization period: 12/31/2017  Time In:  1610  Time Out:  1710    Subjective     Patient states she is sore in the 1st rib area.    Patient Goals: Return to full ADLs, functional mobility and work without pain or difficulty    Objective     Observation: Patient entered therapy with sling in place.    Posture: Noted anterior, abducted scapula on R UE.     Functional Limitations Reports  Category: Lifting/Carrying  Tool: FOTO Shoulder  35%   6/27/17    TREATMENT:  Angela received therapeutic exercises to develop strength and endurance, flexibility for 40 minutes including:   UBE L3 4'/4' for AROM and motor control  PROM in all directions as tolerated (much improved today)  Supine Snow Endeavor 20x  Prone Snow Endeavor 10x2 (mid/low trap fatigue)    Manual Therapy for increased joint, soft tissue mobilization and decreased pain for 15 minutes including:  STM and trigger point release right scalenes, distal levator and UT  1st Rib mobilization (significant release noted, relief afterwards)    Not today:  MR Flx/Ext 3 x 10  MR PNF D2 Flx/Ext 3 x 10  MR Biceps/Triceps 3 x 10  Pulleys 3-way 3' ea.   SB Flx/Abd 3' ea.  Flx Wallslides to fatigue x 2  OTB Rowing 20x  OTB Ext 20x  Supine Serratus Punches 20x2 1#  Sidelying ER to fatigue x 3  Flx Rhythmic Stab to fatigue x 3  90 Flx ABCs 2x  90 Abd ABCs 2x (very fatigued)  Sleeper stretch 5 x 30 sec    Pt. Received cold pack x 10 min. To right shoulder. Following treatment.    Instructed pt. regarding: Proper technique with all exercises. Pt demonstrated good understanding of the education provided. Angela demonstrated good return demonstration of activities.    Assessment     Today's Assessment:  Patient felt much better after today's treatment.  Demonstrated significantly improved AROM today but continues to have discomfort.    This is a 27 y.o. female referred to outpatient physical therapy and presents with a medical diagnosis of S/P Right Biceps Tenodesis and SAD, SND 6/6/2017.  Patient presents with signs and symptoms consistent with post-op changes.  Patient's chief complaint is pain and difficulty sleeping as well as inability to use UE.  Patient will benefit from skilled physical therapy services, specifically normalized range of motion and flexibility, gross postural and upper extremity strengthening, progressing to functional mobility as tolerated.     Medical necessity is demonstrated by the following  IMPAIRMENTS/PROBLEM LIST:   1)Increase in pain level limiting function   2)Decreased ROM   3)Decreased strength   4)Decreased functional mobility   5)Lack of HEP    GOALS: Short Term Goals: 6 weeks  1.  Report decreased right shoulder pain < / =  4/10  to increase tolerance for ADLs and AROM.  2.  Increase AROM to WNL in all directions for independence with ADLs.   3.  Demonstrate normalized scapular and thoracic posture in order to increase tolerance for ADL and work activities.  4.  Pt to tolerate HEP to improve ROM and independence with ADL's.    Long Term Goals: 12-16 weeks  1.  Report decreased right shoulder pain  < / =  3/10  to increase tolerance for full return to work duties as needed.  2.  Patient will report ability to perform work duties as needed without pain or difficulty.  3.  Increase strength to >/= 4+/5 in all directions right UE to increase tolerance for ADL and work activities.    Plan     Pt will be treated by physical therapy 1-2 times a week for Pt Education, HEP, therapeutic exercises, neuromuscular re-education, manual therapy, joint mobilizations, modalities prn to achieve established goals. Angela may at times be seen by a PTA as part of the Rehab Team.     Cont PT for 12-16 weeks.     Carlota Mc, PT, DPT, SCS

## 2017-07-24 ENCOUNTER — OFFICE VISIT (OUTPATIENT)
Dept: SPORTS MEDICINE | Facility: CLINIC | Age: 27
End: 2017-07-24
Payer: COMMERCIAL

## 2017-07-24 ENCOUNTER — CLINICAL SUPPORT (OUTPATIENT)
Dept: REHABILITATION | Facility: HOSPITAL | Age: 27
End: 2017-07-24
Attending: ORTHOPAEDIC SURGERY
Payer: COMMERCIAL

## 2017-07-24 VITALS
SYSTOLIC BLOOD PRESSURE: 102 MMHG | HEIGHT: 64 IN | DIASTOLIC BLOOD PRESSURE: 66 MMHG | WEIGHT: 104 LBS | HEART RATE: 94 BPM | BODY MASS INDEX: 17.75 KG/M2

## 2017-07-24 DIAGNOSIS — M25.511 PAIN IN JOINT OF RIGHT SHOULDER: Primary | ICD-10-CM

## 2017-07-24 DIAGNOSIS — M25.511 ACUTE PAIN OF RIGHT SHOULDER: Primary | ICD-10-CM

## 2017-07-24 PROCEDURE — 97110 THERAPEUTIC EXERCISES: CPT

## 2017-07-24 PROCEDURE — 99024 POSTOP FOLLOW-UP VISIT: CPT | Mod: S$GLB,,, | Performed by: ORTHOPAEDIC SURGERY

## 2017-07-24 PROCEDURE — 99999 PR PBB SHADOW E&M-EST. PATIENT-LVL III: CPT | Mod: PBBFAC,,, | Performed by: ORTHOPAEDIC SURGERY

## 2017-07-24 NOTE — PROGRESS NOTES
Physical Therapy Visit Note    Name: Angela Lyle  Clinic Number: [id number]      Diagnosis:   Encounter Diagnosis   Name Primary?    Acute pain of right shoulder Yes     Physician: Hanna Brown MD  Treatment Orders: PT Eval and Treat    Past Medical History:   Diagnosis Date    Allergy     Anemia     hx of HOSEA on fergon in past    Anxiety     Arthritis     right shoulder    Depression     Fatigue     History of psychiatric care     Keloid cicatrix     Psychiatric problem     Therapy      Current Outpatient Prescriptions   Medication Sig    buPROPion (WELLBUTRIN XL) 150 MG TB24 tablet Take 1 tablet (150 mg total) by mouth once daily.    ergocalciferol (ERGOCALCIFEROL) 50,000 unit Cap Take 1 capsule (50,000 Units total) by mouth every 7 days.    escitalopram oxalate (LEXAPRO) 20 MG tablet Take 1 tablet (20 mg total) by mouth once daily.    oxycodone-acetaminophen (PERCOCET)  mg per tablet Take 1 tablet by mouth every 6 (six) hours as needed for Pain.    tramadol (ULTRAM) 50 mg tablet Take 1 tablet (50 mg total) by mouth every 4 to 6 hours as needed.     No current facility-administered medications for this visit.      Review of patient's allergies indicates:   Allergen Reactions    Sulfa (sulfonamide antibiotics) Hives and Itching    Sulfamethoxazole-trimethoprim      Other reaction(s): Itching     Precautions: S/P Right Biceps Tenodesis and SAD, SND 6/6/2017    Today's Date:  7/24/2017  Evaluation Date: 6/9/2017  Visit # authorized: 14/15  Authorization period: 12/31/2017  Time In:  1610  Time Out:  1700    Subjective     Patient states she saw MD who said everything is going well and progress as able.    Patient Goals: Return to full ADLs, functional mobility and work without pain or difficulty    Objective     Observation: Patient entered therapy with sling in place.    Posture: Noted anterior, abducted scapula on R UE.     Functional Limitations Reports  Category:  Lifting/Carrying  Tool: FOTO Shoulder  35%  6/27/17    TREATMENT:  Angela received therapeutic exercises to develop strength and endurance, flexibility for 50 minutes including:   UBE L3 4'/4' for AROM and motor control  PROM in all directions as tolerated (much improved today)  Supine Snow [place] 20x  JESUS Serratus Presses 5sh x 20  Prone Ext 5sh x 20  Prone Snow [place] 10x2 (mid/low trap fatigue)    Manual Therapy for increased joint, soft tissue mobilization and decreased pain for 0 minutes including:  STM and trigger point release right scalenes, distal levator and UT  1st Rib mobilization (significant release noted, relief afterwards)    Not today:  MR Flx/Ext 3 x 10  MR PNF D2 Flx/Ext 3 x 10  MR Biceps/Triceps 3 x 10  Pulleys 3-way 3' ea.   SB Flx/Abd 3' ea.  Flx Wallslides to fatigue x 2  OTB Rowing 20x  OTB Ext 20x  Supine Serratus Punches 20x2 1#  Sidelying ER to fatigue x 3  Flx Rhythmic Stab to fatigue x 3  90 Flx ABCs 2x  90 Abd ABCs 2x (very fatigued)  Sleeper stretch 5 x 30 sec    Pt. Received cold pack x 10 min. To right shoulder. Following treatment.    Instructed pt. regarding: Proper technique with all exercises. Pt demonstrated good understanding of the education provided. Angela demonstrated good return demonstration of activities.    Assessment     Today's Assessment:  Patient was very fatigued with today's treatment but had no increased pain throughout.  Progress as tolerated.    This is a 27 y.o. female referred to outpatient physical therapy and presents with a medical diagnosis of S/P Right Biceps Tenodesis and SAD, SND 6/6/2017.  Patient presents with signs and symptoms consistent with post-op changes.  Patient's chief complaint is pain and difficulty sleeping as well as inability to use UE.  Patient will benefit from skilled physical therapy services, specifically normalized range of motion and flexibility, gross postural and upper extremity strengthening, progressing to functional mobility  as tolerated.     Medical necessity is demonstrated by the following IMPAIRMENTS/PROBLEM LIST:   1)Increase in pain level limiting function   2)Decreased ROM   3)Decreased strength   4)Decreased functional mobility   5)Lack of HEP    GOALS: Short Term Goals: 6 weeks  1.  Report decreased right shoulder pain < / =  4/10  to increase tolerance for ADLs and AROM.  2.  Increase AROM to WNL in all directions for independence with ADLs.   3.  Demonstrate normalized scapular and thoracic posture in order to increase tolerance for ADL and work activities.  4.  Pt to tolerate HEP to improve ROM and independence with ADL's.    Long Term Goals: 12-16 weeks  1.  Report decreased right shoulder pain  < / =  3/10  to increase tolerance for full return to work duties as needed.  2.  Patient will report ability to perform work duties as needed without pain or difficulty.  3.  Increase strength to >/= 4+/5 in all directions right UE to increase tolerance for ADL and work activities.    Plan     Pt will be treated by physical therapy 1-2 times a week for Pt Education, HEP, therapeutic exercises, neuromuscular re-education, manual therapy, joint mobilizations, modalities prn to achieve established goals. Angela may at times be seen by a PTA as part of the Rehab Team.     Cont PT for 12-16 weeks.     Carlota Mc, PT, DPT, SCS

## 2017-07-24 NOTE — PROGRESS NOTES
CC right shoulder pain    HISTORY OF PRESENT ILLNESS:   Pt is here today for second post-operative followup of his shoulder arthroscopy.  He is doing well.  We have reviewed his findings and discussed plan of care and future treatment options.  Today pt is 3/10 pain; overall is progressing with PT well and slowly improving.      DATE OF PROCEDURE: 06/06/2017  OPERATION:   right   1. Shoulder open subpectoral biceps tenodesis (CPT 09690)  2. Shoulder arthroscopic suprascapular nerve decompression (complex, -22 modifier)  3. Shoulder arthroscopic partial thickness cuff debridement  4. Shoulder subacromial buresectomy  5. Shoulder arthroscopic extensive debridement (anterior, posterior glenohumeral joint, subacromial space) (CPT 81411)                                                                                PHYSICAL EXAMINATION:     Incision sites healed well  No evidence of any erythema, infection or induration  elbow Range of motion full   Minimal effusion  2+ DP pulse  No swelling                                                                               ASSESSMENT:                                                                                                                                               1. Status post above, doing well.                                                                                                                               PLAN:                                                                                                                                                     1. PT  2. Emphasized scapular function.  3. I have discussed return to activity in detail.  4. he will see us back in 5 weeks.                                      5. All questions were answered and he should contact us if he  has any questions or concerns in the interim.

## 2017-07-27 ENCOUNTER — CLINICAL SUPPORT (OUTPATIENT)
Dept: REHABILITATION | Facility: HOSPITAL | Age: 27
End: 2017-07-27
Attending: ORTHOPAEDIC SURGERY
Payer: COMMERCIAL

## 2017-07-27 DIAGNOSIS — M25.511 ACUTE PAIN OF RIGHT SHOULDER: Primary | ICD-10-CM

## 2017-07-27 PROCEDURE — 97110 THERAPEUTIC EXERCISES: CPT

## 2017-07-27 NOTE — PROGRESS NOTES
Physical Therapy Visit Note    Name: Angela Lyle  Clinic Number: [id number]      Diagnosis:   Encounter Diagnosis   Name Primary?    Acute pain of right shoulder Yes     Physician: Hanna Brown MD  Treatment Orders: PT Eval and Treat    Past Medical History:   Diagnosis Date    Allergy     Anemia     hx of HOSEA on fergon in past    Anxiety     Arthritis     right shoulder    Depression     Fatigue     History of psychiatric care     Keloid cicatrix     Psychiatric problem     Therapy      Current Outpatient Prescriptions   Medication Sig    buPROPion (WELLBUTRIN XL) 150 MG TB24 tablet Take 1 tablet (150 mg total) by mouth once daily.    ergocalciferol (ERGOCALCIFEROL) 50,000 unit Cap Take 1 capsule (50,000 Units total) by mouth every 7 days.    escitalopram oxalate (LEXAPRO) 20 MG tablet Take 1 tablet (20 mg total) by mouth once daily.    oxycodone-acetaminophen (PERCOCET)  mg per tablet Take 1 tablet by mouth every 6 (six) hours as needed for Pain.    tramadol (ULTRAM) 50 mg tablet Take 1 tablet (50 mg total) by mouth every 4 to 6 hours as needed.     No current facility-administered medications for this visit.      Review of patient's allergies indicates:   Allergen Reactions    Sulfa (sulfonamide antibiotics) Hives and Itching    Sulfamethoxazole-trimethoprim      Other reaction(s): Itching     Precautions: S/P Right Biceps Tenodesis and SAD, SND 6/6/2017    Today's Date:  7/24/2017  Evaluation Date: 6/9/2017  Visit # authorized: 15/15  Authorization period: 12/31/2017  Time In:  4:19  Time Out:  5:03    Subjective     Pt reports w/ mild pn in R shld.      Patient Goals: Return to full ADLs, functional mobility and work without pain or difficulty    Objective     Observation: Patient entered therapy with sling in place.    Posture: Noted anterior, abducted scapula on R UE.     Functional Limitations Reports  Category: Lifting/Carrying  Tool: FOTO Shoulder  35%   6/27/17    TREATMENT:  Angela received therapeutic exercises to develop strength and endurance, flexibility for 30 minutes including:   UBE L3 3'/3' for AROM and motor control  PROM in all directions as tolerated (much improved today)  Supine Snow [place] 30x  JESUS Serratus Presses 5sh x 30 (increased pn around anterior clavicle)  Supine serratus punch w/ dowel 20 x w/ 3 sec hold  Prone Ext 5sh x 30  Prone [place] 10x3 (mid/low trap fatigue)    Manual Therapy for increased joint, soft tissue mobilization and decreased pain for 0 minutes including:  STM and trigger point release right scalenes, distal levator and UT  1st Rib mobilization (significant release noted, relief afterwards)    Not today:  MR Flx/Ext 3 x 10  MR PNF D2 Flx/Ext 3 x 10  MR Biceps/Triceps 3 x 10  Pulleys 3-way 3' ea.   SB Flx/Abd 3' ea.  Flx Wallslides to fatigue x 2  OTB Rowing 20x  OTB Ext 20x  Supine Serratus Punches 20x2 1#  Sidelying ER to fatigue x 3  Flx Rhythmic Stab to fatigue x 3  90 Flx ABCs 2x  90 Abd ABCs 2x (very fatigued)  Sleeper stretch 5 x 30 sec    Pt. Received cold pack x 10 min. To right shoulder. Following treatment.    Instructed pt. regarding: Proper technique with all exercises. Pt demonstrated good understanding of the education provided. Angela demonstrated good return demonstration of activities.    Assessment     Pt leisa tx well w/ no increase in pn.  Pt displayed increased endurance during therex w/ VCs for technique.  Pt edu on and instructed to cont HEP.  Cont to progress as leisa.      This is a 27 y.o. female referred to outpatient physical therapy and presents with a medical diagnosis of S/P Right Biceps Tenodesis and SAD, SND 6/6/2017.  Patient presents with signs and symptoms consistent with post-op changes.  Patient's chief complaint is pain and difficulty sleeping as well as inability to use UE.  Patient will benefit from skilled physical therapy services, specifically normalized range of motion and  flexibility, gross postural and upper extremity strengthening, progressing to functional mobility as tolerated.     Medical necessity is demonstrated by the following IMPAIRMENTS/PROBLEM LIST:   1)Increase in pain level limiting function   2)Decreased ROM   3)Decreased strength   4)Decreased functional mobility   5)Lack of HEP    GOALS: Short Term Goals: 6 weeks  1.  Report decreased right shoulder pain < / =  4/10  to increase tolerance for ADLs and AROM.  2.  Increase AROM to WNL in all directions for independence with ADLs.   3.  Demonstrate normalized scapular and thoracic posture in order to increase tolerance for ADL and work activities.  4.  Pt to tolerate HEP to improve ROM and independence with ADL's.    Long Term Goals: 12-16 weeks  1.  Report decreased right shoulder pain  < / =  3/10  to increase tolerance for full return to work duties as needed.  2.  Patient will report ability to perform work duties as needed without pain or difficulty.  3.  Increase strength to >/= 4+/5 in all directions right UE to increase tolerance for ADL and work activities.    Plan     Pt will be treated by physical therapy 1-2 times a week for Pt Education, HEP, therapeutic exercises, neuromuscular re-education, manual therapy, joint mobilizations, modalities prn to achieve established goals. Angela may at times be seen by a PTA as part of the Rehab Team.     Cont PT for 12-16 weeks.     Junior Lynne PTA

## 2017-08-09 ENCOUNTER — CLINICAL SUPPORT (OUTPATIENT)
Dept: REHABILITATION | Facility: HOSPITAL | Age: 27
End: 2017-08-09
Attending: ORTHOPAEDIC SURGERY
Payer: COMMERCIAL

## 2017-08-09 DIAGNOSIS — M25.511 ACUTE PAIN OF RIGHT SHOULDER: Primary | ICD-10-CM

## 2017-08-09 PROCEDURE — 97140 MANUAL THERAPY 1/> REGIONS: CPT

## 2017-08-09 PROCEDURE — 97110 THERAPEUTIC EXERCISES: CPT

## 2017-08-09 NOTE — PROGRESS NOTES
Physical Therapy Visit Note    Name: Angela Lyle  Clinic Number: [id number]      Diagnosis:   Encounter Diagnosis   Name Primary?    Acute pain of right shoulder Yes     Physician: Hanna Brown MD  Treatment Orders: PT Eval and Treat    Past Medical History:   Diagnosis Date    Allergy     Anemia     hx of HOSEA on fergon in past    Anxiety     Arthritis     right shoulder    Depression     Fatigue     History of psychiatric care     Keloid cicatrix     Psychiatric problem     Therapy      Current Outpatient Prescriptions   Medication Sig    buPROPion (WELLBUTRIN XL) 150 MG TB24 tablet Take 1 tablet (150 mg total) by mouth once daily.    ergocalciferol (ERGOCALCIFEROL) 50,000 unit Cap Take 1 capsule (50,000 Units total) by mouth every 7 days.    escitalopram oxalate (LEXAPRO) 20 MG tablet Take 1 tablet (20 mg total) by mouth once daily.    oxycodone-acetaminophen (PERCOCET)  mg per tablet Take 1 tablet by mouth every 6 (six) hours as needed for Pain.    tramadol (ULTRAM) 50 mg tablet Take 1 tablet (50 mg total) by mouth every 4 to 6 hours as needed.     No current facility-administered medications for this visit.      Review of patient's allergies indicates:   Allergen Reactions    Sulfa (sulfonamide antibiotics) Hives and Itching    Sulfamethoxazole-trimethoprim      Other reaction(s): Itching     Precautions: S/P Right Biceps Tenodesis and SAD, SND 6/6/2017    Today's Date:  8/9/2017  Evaluation Date: 6/9/2017  Visit # authorized: 1/15  Authorization period: 12/31/2017  Time In:  1400  Time Out:  1500    Subjective     Patient states her shoulder has actually been feeling fine but her SC joint is really bothering her so she hasn't been doing as many of her exercises.    Patient Goals: Return to full ADLs, functional mobility and work without pain or difficulty    Objective     Observation: Patient entered therapy with sling in place.    Posture: Noted anterior, abducted scapula on R  UE.     Functional Limitations Reports  Category: Lifting/Carrying  Tool: FOTO Shoulder  35%  6/27/17    TREATMENT:  Angela received therapeutic exercises to develop strength and endurance, flexibility for 40 minutes including:   UBE L5 4'/4' for AROM and motor control  PROM full in all directions  Kinesiotape Star for SC unloading (precautions, removal, education)  OTB Rowing/Ext/Low A 20x2 ea.  OTB IR/ER Walkouts 10x2 ea. (ER was very fatigued)    Manual Therapy for increased joint, soft tissue mobilization and decreased pain for 10 minutes including:  STM and trigger point release right scalenes, distal levator and UT  1st Rib mobilization    Not today:  Supine Snow [place] 30x  JESUS Serratus Presses 5sh x 30 (increased pn around anterior clavicle)  Supine serratus punch w/ dowel 20 x w/ 3 sec hold  Prone Ext 5sh x 30  Prone [place] 10x3 (mid/low trap fatigue)  MR Flx/Ext 3 x 10  MR PNF D2 Flx/Ext 3 x 10  MR Biceps/Triceps 3 x 10  Pulleys 3-way 3' ea.   SB Flx/Abd 3' ea.  Flx Wallslides to fatigue x 2  OTB Rowing 20x  OTB Ext 20x  Supine Serratus Punches 20x2 1#  Sidelying ER to fatigue x 3  Flx Rhythmic Stab to fatigue x 3  90 Flx ABCs 2x  90 Abd ABCs 2x (very fatigued)  Sleeper stretch 5 x 30 sec    Pt. Received cold pack x 10 min. To right shoulder. Following treatment.    Instructed pt. regarding: Proper technique with all exercises. Pt demonstrated good understanding of the education provided. Angela demonstrated good return demonstration of activities.    Assessment     Today's Assessment:  Patient tolerated well with no increased pain throughout.  Progress as able.    This is a 27 y.o. female referred to outpatient physical therapy and presents with a medical diagnosis of S/P Right Biceps Tenodesis and SAD, SND 6/6/2017.  Patient presents with signs and symptoms consistent with post-op changes.  Patient's chief complaint is pain and difficulty sleeping as well as inability to use UE.  Patient will  benefit from skilled physical therapy services, specifically normalized range of motion and flexibility, gross postural and upper extremity strengthening, progressing to functional mobility as tolerated.     Medical necessity is demonstrated by the following IMPAIRMENTS/PROBLEM LIST:   1)Increase in pain level limiting function   2)Decreased ROM   3)Decreased strength   4)Decreased functional mobility   5)Lack of HEP    GOALS: Short Term Goals: 6 weeks  1.  Report decreased right shoulder pain < / =  4/10  to increase tolerance for ADLs and AROM.  2.  Increase AROM to WNL in all directions for independence with ADLs.   3.  Demonstrate normalized scapular and thoracic posture in order to increase tolerance for ADL and work activities.  4.  Pt to tolerate HEP to improve ROM and independence with ADL's.    Long Term Goals: 12-16 weeks  1.  Report decreased right shoulder pain  < / =  3/10  to increase tolerance for full return to work duties as needed.  2.  Patient will report ability to perform work duties as needed without pain or difficulty.  3.  Increase strength to >/= 4+/5 in all directions right UE to increase tolerance for ADL and work activities.    Plan     Pt will be treated by physical therapy 1-2 times a week for Pt Education, HEP, therapeutic exercises, neuromuscular re-education, manual therapy, joint mobilizations, modalities prn to achieve established goals. Angela may at times be seen by a PTA as part of the Rehab Team.     Cont PT for 12-16 weeks.     Carlota Mc, PT, DPT, SCS

## 2017-08-11 ENCOUNTER — CLINICAL SUPPORT (OUTPATIENT)
Dept: REHABILITATION | Facility: HOSPITAL | Age: 27
End: 2017-08-11
Attending: ORTHOPAEDIC SURGERY
Payer: COMMERCIAL

## 2017-08-11 DIAGNOSIS — M25.511 ACUTE PAIN OF RIGHT SHOULDER: Primary | ICD-10-CM

## 2017-08-11 PROCEDURE — 97110 THERAPEUTIC EXERCISES: CPT

## 2017-08-11 NOTE — PROGRESS NOTES
Physical Therapy Visit Note    Name: Angela Lyle  Clinic Number: [id number]      Diagnosis:   Encounter Diagnosis   Name Primary?    Acute pain of right shoulder Yes     Physician: Hanna Brown MD  Treatment Orders: PT Eval and Treat    Past Medical History:   Diagnosis Date    Allergy     Anemia     hx of HOSEA on fergon in past    Anxiety     Arthritis     right shoulder    Depression     Fatigue     History of psychiatric care     Keloid cicatrix     Psychiatric problem     Therapy      Current Outpatient Prescriptions   Medication Sig    buPROPion (WELLBUTRIN XL) 150 MG TB24 tablet Take 1 tablet (150 mg total) by mouth once daily.    ergocalciferol (ERGOCALCIFEROL) 50,000 unit Cap Take 1 capsule (50,000 Units total) by mouth every 7 days.    escitalopram oxalate (LEXAPRO) 20 MG tablet Take 1 tablet (20 mg total) by mouth once daily.    oxycodone-acetaminophen (PERCOCET)  mg per tablet Take 1 tablet by mouth every 6 (six) hours as needed for Pain.    tramadol (ULTRAM) 50 mg tablet Take 1 tablet (50 mg total) by mouth every 4 to 6 hours as needed.     No current facility-administered medications for this visit.      Review of patient's allergies indicates:   Allergen Reactions    Sulfa (sulfonamide antibiotics) Hives and Itching    Sulfamethoxazole-trimethoprim      Other reaction(s): Itching     Precautions: S/P Right Biceps Tenodesis and SAD, SND 6/6/2017    Today's Date:  8/11/2017  Evaluation Date: 6/9/2017  Visit # authorized: 2/15  Authorization period: 12/31/2017  Time In:  1410  Time Out:  1500    Subjective     Patient states her SC joint has been better but is generally just achy all the time.    Patient Goals: Return to full ADLs, functional mobility and work without pain or difficulty    Objective     Observation: Patient entered therapy with sling in place.    Posture: Noted anterior, abducted scapula on R UE.     Functional Limitations Reports  Category:  Lifting/Carrying  Tool: FOTO Shoulder  35%  6/27/17    TREATMENT:  Angela received therapeutic exercises to develop strength and endurance, flexibility for 40 minutes including:   UBE L5 4'/4' for AROM and motor control  Prone Ext/Low A/H. Abd 5sh x 20 ea.  OTB IR/ER Walkouts 10x2 ea.  JESUS Serratus Presses 5sh x 30    Manual Therapy for increased joint, soft tissue mobilization and decreased pain for 0 minutes including:  STM and trigger point release right scalenes, distal levator and UT  1st Rib mobilization (significant release noted, relief afterwards)    Not today:  Supine [place] 30x  Supine serratus punch w/ dowel 20 x w/ 3 sec hold  Prone Ext 5sh x 30  Prone [place] 10x3 (mid/low trap fatigue)  MR Flx/Ext 3 x 10  MR PNF D2 Flx/Ext 3 x 10  MR Biceps/Triceps 3 x 10  Pulleys 3-way 3' ea.   SB Flx/Abd 3' ea.  Flx Wallslides to fatigue x 2  OTB Rowing 20x  OTB Ext 20x  Supine Serratus Punches 20x2 1#  Sidelying ER to fatigue x 3  Flx Rhythmic Stab to fatigue x 3  90 Flx ABCs 2x  90 Abd ABCs 2x (very fatigued)  Sleeper stretch 5 x 30 sec    Pt. Received cold pack x 10 min. To right shoulder. Following treatment.    Instructed pt. regarding: Proper technique with all exercises. Pt demonstrated good understanding of the education provided. Angela demonstrated good return demonstration of activities.    Assessment     Today's Assessment:  Patient was very fatigued with today's treatment.  Had increased SC pain at end but improved with ice.    This is a 27 y.o. female referred to outpatient physical therapy and presents with a medical diagnosis of S/P Right Biceps Tenodesis and SAD, SND 6/6/2017.  Patient presents with signs and symptoms consistent with post-op changes.  Patient's chief complaint is pain and difficulty sleeping as well as inability to use UE.  Patient will benefit from skilled physical therapy services, specifically normalized range of motion and flexibility, gross postural and upper extremity  strengthening, progressing to functional mobility as tolerated.     Medical necessity is demonstrated by the following IMPAIRMENTS/PROBLEM LIST:   1)Increase in pain level limiting function   2)Decreased ROM   3)Decreased strength   4)Decreased functional mobility   5)Lack of HEP    GOALS: Short Term Goals: 6 weeks  1.  Report decreased right shoulder pain < / =  4/10  to increase tolerance for ADLs and AROM.  2.  Increase AROM to WNL in all directions for independence with ADLs.   3.  Demonstrate normalized scapular and thoracic posture in order to increase tolerance for ADL and work activities.  4.  Pt to tolerate HEP to improve ROM and independence with ADL's.    Long Term Goals: 12-16 weeks  1.  Report decreased right shoulder pain  < / =  3/10  to increase tolerance for full return to work duties as needed.  2.  Patient will report ability to perform work duties as needed without pain or difficulty.  3.  Increase strength to >/= 4+/5 in all directions right UE to increase tolerance for ADL and work activities.    Plan     Pt will be treated by physical therapy 1-2 times a week for Pt Education, HEP, therapeutic exercises, neuromuscular re-education, manual therapy, joint mobilizations, modalities prn to achieve established goals. Angela may at times be seen by a PTA as part of the Rehab Team.     Cont PT for 12-16 weeks.     Carlota Mc, PT, DPT, SCS

## 2017-08-15 LAB — RAPID GROUP A STREP (OHS): POSITIVE

## 2017-08-18 ENCOUNTER — CLINICAL SUPPORT (OUTPATIENT)
Dept: REHABILITATION | Facility: HOSPITAL | Age: 27
End: 2017-08-18
Attending: ORTHOPAEDIC SURGERY
Payer: COMMERCIAL

## 2017-08-18 DIAGNOSIS — M25.511 ACUTE PAIN OF RIGHT SHOULDER: Primary | ICD-10-CM

## 2017-08-18 PROCEDURE — 97110 THERAPEUTIC EXERCISES: CPT

## 2017-08-18 NOTE — PROGRESS NOTES
Physical Therapy Visit Note    Name: Angela Lyle  Clinic Number: [id number]      Diagnosis:   No diagnosis found.  Physician: Hanna Brown MD  Treatment Orders: PT Eval and Treat    Past Medical History:   Diagnosis Date    Allergy     Anemia     hx of HOSEA on fergon in past    Anxiety     Arthritis     right shoulder    Depression     Fatigue     History of psychiatric care     Keloid cicatrix     Psychiatric problem     Therapy      Current Outpatient Prescriptions   Medication Sig    buPROPion (WELLBUTRIN XL) 150 MG TB24 tablet Take 1 tablet (150 mg total) by mouth once daily.    ergocalciferol (ERGOCALCIFEROL) 50,000 unit Cap Take 1 capsule (50,000 Units total) by mouth every 7 days.    escitalopram oxalate (LEXAPRO) 20 MG tablet Take 1 tablet (20 mg total) by mouth once daily.    oxycodone-acetaminophen (PERCOCET)  mg per tablet Take 1 tablet by mouth every 6 (six) hours as needed for Pain.    tramadol (ULTRAM) 50 mg tablet Take 1 tablet (50 mg total) by mouth every 4 to 6 hours as needed.     No current facility-administered medications for this visit.      Review of patient's allergies indicates:   Allergen Reactions    Sulfa (sulfonamide antibiotics) Hives and Itching    Sulfamethoxazole-trimethoprim      Other reaction(s): Itching     Precautions: S/P Right Biceps Tenodesis and SAD, SND 6/6/2017    Today's Date:  8/11/2017  Evaluation Date: 6/9/2017  Visit # authorized: 19  Authorization period: 12/31/2017  Time In: 2:14  Time Out:  3:15    Subjective     Pt reports feeling better overall but still a little sick .  Pn level in R shld is 0/10.    Patient Goals: Return to full ADLs, functional mobility and work without pain or difficulty    Objective     Observation: Patient entered therapy with sling in place.    Posture: Noted anterior, abducted scapula on R UE.     Functional Limitations Reports  Category: Lifting/Carrying  Tool: FOTO Shoulder  35%   6/27/17    TREATMENT:  Angela received therapeutic exercises to develop strength and endurance, flexibility for 45 minutes including:   UBE L5 4'/4' for AROM and motor control  Prone Ext/Low A/H. Abd 5sh x 30 ea.  OTB IR/ER Walkouts 2 x 15 ea.  JESUS Serratus Presses 5sh x 30    Manual Therapy for increased joint, soft tissue mobilization and decreased pain for 0 minutes including:  STM and trigger point release right scalenes, distal levator and UT  1st Rib mobilization (significant release noted, relief afterwards)    Not today:  Supine [place] 30x  Supine serratus punch w/ dowel 20 x w/ 3 sec hold  Prone Ext 5sh x 30  Prone [place] 10x3 (mid/low trap fatigue)  MR Flx/Ext 3 x 10  MR PNF D2 Flx/Ext 3 x 10  MR Biceps/Triceps 3 x 10  Pulleys 3-way 3' ea.   SB Flx/Abd 3' ea.  Flx Wallslides to fatigue x 2  OTB Rowing 20x  OTB Ext 20x  Supine Serratus Punches 20x2 1#  Sidelying ER to fatigue x 3  Flx Rhythmic Stab to fatigue x 3  90 Flx ABCs 2x  90 Abd ABCs 2x (very fatigued)  Sleeper stretch 5 x 30 sec    Pt. Received cold pack x 10 min. To right shoulder. Following treatment.    Instructed pt. regarding: Proper technique with all exercises. Pt demonstrated good understanding of the education provided. Angela demonstrated good return demonstration of activities.    Assessment     Pt leisa tx well w/ no c/o pn.  Pt displayed increased during therex w/ technique.  Therex intensity not increased 2* pt not feeling well.  Pt edu on and instructed to cont HEP.  Cont to progress as leisa.     This is a 27 y.o. female referred to outpatient physical therapy and presents with a medical diagnosis of S/P Right Biceps Tenodesis and SAD, SND 6/6/2017.  Patient presents with signs and symptoms consistent with post-op changes.  Patient's chief complaint is pain and difficulty sleeping as well as inability to use UE.  Patient will benefit from skilled physical therapy services, specifically normalized range of motion and  flexibility, gross postural and upper extremity strengthening, progressing to functional mobility as tolerated.     Medical necessity is demonstrated by the following IMPAIRMENTS/PROBLEM LIST:   1)Increase in pain level limiting function   2)Decreased ROM   3)Decreased strength   4)Decreased functional mobility   5)Lack of HEP    GOALS: Short Term Goals: 6 weeks  1.  Report decreased right shoulder pain < / =  4/10  to increase tolerance for ADLs and AROM.  2.  Increase AROM to WNL in all directions for independence with ADLs.   3.  Demonstrate normalized scapular and thoracic posture in order to increase tolerance for ADL and work activities.  4.  Pt to tolerate HEP to improve ROM and independence with ADL's.    Long Term Goals: 12-16 weeks  1.  Report decreased right shoulder pain  < / =  3/10  to increase tolerance for full return to work duties as needed.  2.  Patient will report ability to perform work duties as needed without pain or difficulty.  3.  Increase strength to >/= 4+/5 in all directions right UE to increase tolerance for ADL and work activities.    Plan     Pt will be treated by physical therapy 1-2 times a week for Pt Education, HEP, therapeutic exercises, neuromuscular re-education, manual therapy, joint mobilizations, modalities prn to achieve established goals. Angela may at times be seen by a PTA as part of the Rehab Team.     Cont PT for 12-16 weeks.     Junior Lynne PTA

## 2017-08-20 ENCOUNTER — PATIENT MESSAGE (OUTPATIENT)
Dept: SPORTS MEDICINE | Facility: CLINIC | Age: 27
End: 2017-08-20

## 2017-08-21 ENCOUNTER — CLINICAL SUPPORT (OUTPATIENT)
Dept: REHABILITATION | Facility: HOSPITAL | Age: 27
End: 2017-08-21
Attending: ORTHOPAEDIC SURGERY
Payer: COMMERCIAL

## 2017-08-21 DIAGNOSIS — M25.511 ACUTE PAIN OF RIGHT SHOULDER: Primary | ICD-10-CM

## 2017-08-21 PROCEDURE — 97140 MANUAL THERAPY 1/> REGIONS: CPT

## 2017-08-21 PROCEDURE — 97110 THERAPEUTIC EXERCISES: CPT

## 2017-08-21 NOTE — PROGRESS NOTES
Physical Therapy Visit Note    Name: Angela Lyle  Clinic Number: [id number]      Diagnosis:   Encounter Diagnosis   Name Primary?    Acute pain of right shoulder Yes     Physician: Hanna Brown MD  Treatment Orders: PT Eval and Treat    Past Medical History:   Diagnosis Date    Allergy     Anemia     hx of HOSEA on fergon in past    Anxiety     Arthritis     right shoulder    Depression     Fatigue     History of psychiatric care     Keloid cicatrix     Psychiatric problem     Therapy      Current Outpatient Prescriptions   Medication Sig    buPROPion (WELLBUTRIN XL) 150 MG TB24 tablet Take 1 tablet (150 mg total) by mouth once daily.    ergocalciferol (ERGOCALCIFEROL) 50,000 unit Cap Take 1 capsule (50,000 Units total) by mouth every 7 days.    escitalopram oxalate (LEXAPRO) 20 MG tablet Take 1 tablet (20 mg total) by mouth once daily.    oxycodone-acetaminophen (PERCOCET)  mg per tablet Take 1 tablet by mouth every 6 (six) hours as needed for Pain.    tramadol (ULTRAM) 50 mg tablet Take 1 tablet (50 mg total) by mouth every 4 to 6 hours as needed.     No current facility-administered medications for this visit.      Review of patient's allergies indicates:   Allergen Reactions    Sulfa (sulfonamide antibiotics) Hives and Itching    Sulfamethoxazole-trimethoprim      Other reaction(s): Itching     Precautions: S/P Right Biceps Tenodesis and SAD, SND 6/6/2017    Today's Date:  8/21/2017  Evaluation Date: 6/9/2017  Visit # authorized: 4/15  Authorization period: 12/31/2017  Time In: 1405  Time Out:  1505    Subjective     Patient states since being sick she has actually been having less pain but thinks it is probably because she has not been doing as much with her arm.  She is going back to work next week.    Patient Goals: Return to full ADLs, functional mobility and work without pain or difficulty    Objective     Observation: Patient entered therapy with sling in place.    Posture:  Noted anterior, abducted scapula on R UE.     Functional Limitations Reports  Category: Lifting/Carrying  Tool: FOTO Shoulder  35%  6/27/17    TREATMENT:  Angela received therapeutic exercises to develop strength and endurance, flexibility for 45 minutes including:   UBE L5 4'/4' for AROM and motor control  Prone Shoulder Ext/Low A/H.Abd/Y/I 1# 20x2, 10x3 (Y, I)    Not today:  Prone Ext/Low A/H. Abd 5sh x 30 ea.  OTB IR/ER Walkouts 2 x 15 ea.  JESUS Serratus Presses 5sh x 30    Manual Therapy for increased joint, soft tissue mobilization and decreased pain for 10 minutes including:  STM and trigger point release right scalenes, distal levator and UT  1st Rib mobilization (significant release noted, relief afterwards)    Not today:  Supine Snow [place] 30x  Supine serratus punch w/ dowel 20 x w/ 3 sec hold  Prone Ext 5sh x 30  Prone [place] 10x3 (mid/low trap fatigue)  MR Flx/Ext 3 x 10  MR PNF D2 Flx/Ext 3 x 10  MR Biceps/Triceps 3 x 10  Pulleys 3-way 3' ea.   SB Flx/Abd 3' ea.  Flx Wallslides to fatigue x 2  OTB Rowing 20x  OTB Ext 20x  Supine Serratus Punches 20x2 1#  Sidelying ER to fatigue x 3  Flx Rhythmic Stab to fatigue x 3  90 Flx ABCs 2x  90 Abd ABCs 2x (very fatigued)  Sleeper stretch 5 x 30 sec    Pt. Received cold pack x 10 min. To right shoulder. Following treatment.    Instructed pt. regarding: Proper technique with all exercises. Pt demonstrated good understanding of the education provided. Angela demonstrated good return demonstration of activities.    Assessment     Pt leisa tx well w/ no c/o pn.  Pt displayed increased during therex w/ technique.  Therex intensity not increased 2* pt not feeling well.  Pt edu on and instructed to cont HEP.  Cont to progress as leisa.     This is a 27 y.o. female referred to outpatient physical therapy and presents with a medical diagnosis of S/P Right Biceps Tenodesis and SAD, SND 6/6/2017.  Patient presents with signs and symptoms consistent with post-op changes.   Patient's chief complaint is pain and difficulty sleeping as well as inability to use UE.  Patient will benefit from skilled physical therapy services, specifically normalized range of motion and flexibility, gross postural and upper extremity strengthening, progressing to functional mobility as tolerated.     Medical necessity is demonstrated by the following IMPAIRMENTS/PROBLEM LIST:   1)Increase in pain level limiting function   2)Decreased ROM   3)Decreased strength   4)Decreased functional mobility   5)Lack of HEP    GOALS: Short Term Goals: 6 weeks  1.  Report decreased right shoulder pain < / =  4/10  to increase tolerance for ADLs and AROM.  2.  Increase AROM to WNL in all directions for independence with ADLs.   3.  Demonstrate normalized scapular and thoracic posture in order to increase tolerance for ADL and work activities.  4.  Pt to tolerate HEP to improve ROM and independence with ADL's.    Long Term Goals: 12-16 weeks  1.  Report decreased right shoulder pain  < / =  3/10  to increase tolerance for full return to work duties as needed.  2.  Patient will report ability to perform work duties as needed without pain or difficulty.  3.  Increase strength to >/= 4+/5 in all directions right UE to increase tolerance for ADL and work activities.    Plan     Pt will be treated by physical therapy 1-2 times a week for Pt Education, HEP, therapeutic exercises, neuromuscular re-education, manual therapy, joint mobilizations, modalities prn to achieve established goals. Angela may at times be seen by a PTA as part of the Rehab Team.     Cont PT for 12-16 weeks.     Carlota Mc, PT, DPT, SCS

## 2017-08-23 ENCOUNTER — OFFICE VISIT (OUTPATIENT)
Dept: SPORTS MEDICINE | Facility: CLINIC | Age: 27
End: 2017-08-23
Payer: COMMERCIAL

## 2017-08-23 VITALS
DIASTOLIC BLOOD PRESSURE: 77 MMHG | HEIGHT: 64 IN | WEIGHT: 104 LBS | BODY MASS INDEX: 17.75 KG/M2 | SYSTOLIC BLOOD PRESSURE: 105 MMHG | HEART RATE: 95 BPM

## 2017-08-23 DIAGNOSIS — M25.511 PAIN IN JOINT OF RIGHT SHOULDER: Primary | ICD-10-CM

## 2017-08-23 PROCEDURE — 99999 PR PBB SHADOW E&M-EST. PATIENT-LVL III: CPT | Mod: PBBFAC,,, | Performed by: ORTHOPAEDIC SURGERY

## 2017-08-23 PROCEDURE — 99024 POSTOP FOLLOW-UP VISIT: CPT | Mod: S$GLB,,, | Performed by: ORTHOPAEDIC SURGERY

## 2017-08-23 NOTE — PROGRESS NOTES
CC right shoulder pain    HISTORY OF PRESENT ILLNESS:   Pt is here today for followup of his shoulder arthroscopy.  He is doing well.  We have reviewed his findings and discussed plan of care and future treatment options.  Today pt is 3/10 pain; overall is progressing with PT well and slowly improving.      Had SC pain as a kid    80% better vs preop, no pain 0/10, strength improving    DATE OF PROCEDURE: 06/06/2017  OPERATION:   right   1. Shoulder open subpectoral biceps tenodesis (CPT 27898)  2. Shoulder arthroscopic suprascapular nerve decompression (complex, -22 modifier)  3. Shoulder arthroscopic partial thickness cuff debridement  4. Shoulder subacromial buresectomy  5. Shoulder arthroscopic extensive debridement (anterior, posterior glenohumeral joint, subacromial space) (CPT 50234)                                                                                PHYSICAL EXAMINATION:     Incision sites healed well  No evidence of any erythema, infection or induration  elbow Range of motion full   Minimal effusion  2+ DP pulse  No swelling  Mild SC joint ttp, good stability    ER0 85  IR t10  5/5 strength at 0 and 30                                                                                 ASSESSMENT:                                                                                                                                               1. Status post above, doing well.                                                                                                                               PLAN:                                                                                                                                                     1. PT  2. Emphasized scapular function.  3. I have discussed return to activity in detail.  4. she will see us back in 12 weeks.                                      5. All questions were answered and he should contact us if he  has any questions or  concerns in the interim.

## 2017-08-24 ENCOUNTER — CLINICAL SUPPORT (OUTPATIENT)
Dept: REHABILITATION | Facility: HOSPITAL | Age: 27
End: 2017-08-24
Attending: ORTHOPAEDIC SURGERY
Payer: COMMERCIAL

## 2017-08-24 DIAGNOSIS — M25.511 ACUTE PAIN OF RIGHT SHOULDER: Primary | ICD-10-CM

## 2017-08-24 PROCEDURE — 97110 THERAPEUTIC EXERCISES: CPT

## 2017-08-29 ENCOUNTER — TELEPHONE (OUTPATIENT)
Dept: SPORTS MEDICINE | Facility: CLINIC | Age: 27
End: 2017-08-29

## 2017-08-29 DIAGNOSIS — M25.519 SHOULDER PAIN, UNSPECIFIED CHRONICITY, UNSPECIFIED LATERALITY: Primary | ICD-10-CM

## 2017-08-30 NOTE — PROGRESS NOTES
Physical Therapy Visit Note    Name: Angela Lyle  Clinic Number: [id number]      Diagnosis:   Encounter Diagnosis   Name Primary?    Acute pain of right shoulder Yes     Physician: Hanna Brown MD  Treatment Orders: PT Eval and Treat    Past Medical History:   Diagnosis Date    Allergy     Anemia     hx of HOSEA on fergon in past    Anxiety     Arthritis     right shoulder    Depression     Fatigue     History of psychiatric care     Keloid cicatrix     Psychiatric problem     Therapy      Current Outpatient Prescriptions   Medication Sig    buPROPion (WELLBUTRIN XL) 150 MG TB24 tablet Take 1 tablet (150 mg total) by mouth once daily.    escitalopram oxalate (LEXAPRO) 20 MG tablet Take 1 tablet (20 mg total) by mouth once daily.    oxycodone-acetaminophen (PERCOCET)  mg per tablet Take 1 tablet by mouth every 6 (six) hours as needed for Pain.    tramadol (ULTRAM) 50 mg tablet Take 1 tablet (50 mg total) by mouth every 4 to 6 hours as needed.     No current facility-administered medications for this visit.      Review of patient's allergies indicates:   Allergen Reactions    Sulfa (sulfonamide antibiotics) Hives and Itching    Sulfamethoxazole-trimethoprim      Other reaction(s): Itching     Precautions: S/P Right Biceps Tenodesis and SAD, SND 6/6/2017    Today's Date:  8/24/2017  Evaluation Date: 6/9/2017  Visit # authorized: 5/15  Authorization period: 12/31/2017  Time In: 140  Time Out:  1500    Subjective     Patient states she has been feeling pretty good.  She really feels like she has turned the corner in her rehab and is starting to feel better.    Patient Goals: Return to full ADLs, functional mobility and work without pain or difficulty    Objective     Observation: Patient entered therapy with sling in place.    Posture: Noted anterior, abducted scapula on R UE.     Functional Limitations Reports  Category: Lifting/Carrying  Tool: FOTO Shoulder  35%   6/27/17    TREATMENT:  Angela received therapeutic exercises to develop strength and endurance, flexibility for 40 minutes including:   UBE L5 4'/4' for AROM and motor control/OTB  YTB/OTB PNF 4-way as tolerated (very fatigued) 10x2 ea.  OTB Serratus Wall Walking 10x2  Flx/Scap/Abd 10x2 ea. To fatigue (1# to 0# as needed)    Not today:  Prone Shoulder Ext/Low A/H.Abd/Y/I 1# 20x2, 10x3 (Y, I)  Prone Ext/Low A/H. Abd 5sh x 30 ea.  OTB IR/ER Walkouts 2 x 15 ea.  JESUS Serratus Presses 5sh x 30    Manual Therapy for increased joint, soft tissue mobilization and decreased pain for 10 minutes including:  STM and trigger point release right scalenes, distal levator and UT    Not today:  Supine Snow [place] 30x  Supine serratus punch w/ dowel 20 x w/ 3 sec hold  Prone Ext 5sh x 30  Prone Snow [place] 10x3 (mid/low trap fatigue)  MR Flx/Ext 3 x 10  MR PNF D2 Flx/Ext 3 x 10  MR Biceps/Triceps 3 x 10  Pulleys 3-way 3' ea.   SB Flx/Abd 3' ea.  Flx Wallslides to fatigue x 2  OTB Rowing 20x  OTB Ext 20x  Supine Serratus Punches 20x2 1#  Sidelying ER to fatigue x 3  Flx Rhythmic Stab to fatigue x 3  90 Flx ABCs 2x  90 Abd ABCs 2x (very fatigued)  Sleeper stretch 5 x 30 sec    Pt. Received cold pack x 10 min. To right shoulder. Following treatment.    Instructed pt. regarding: Proper technique with all exercises. Pt demonstrated good understanding of the education provided. Angela demonstrated good return demonstration of activities.    Assessment     Today's Assessment:  Patient is progressing well and discussed that we will decrease to once per week now since she is back at work.    This is a 27 y.o. female referred to outpatient physical therapy and presents with a medical diagnosis of S/P Right Biceps Tenodesis and SAD, SND 6/6/2017.  Patient presents with signs and symptoms consistent with post-op changes.  Patient's chief complaint is pain and difficulty sleeping as well as inability to use UE.  Patient will benefit from skilled  physical therapy services, specifically normalized range of motion and flexibility, gross postural and upper extremity strengthening, progressing to functional mobility as tolerated.     Medical necessity is demonstrated by the following IMPAIRMENTS/PROBLEM LIST:   1)Increase in pain level limiting function   2)Decreased ROM   3)Decreased strength   4)Decreased functional mobility   5)Lack of HEP    GOALS: Short Term Goals: 6 weeks  1.  Report decreased right shoulder pain < / =  4/10  to increase tolerance for ADLs and AROM.  2.  Increase AROM to WNL in all directions for independence with ADLs.   3.  Demonstrate normalized scapular and thoracic posture in order to increase tolerance for ADL and work activities.  4.  Pt to tolerate HEP to improve ROM and independence with ADL's.    Long Term Goals: 12-16 weeks  1.  Report decreased right shoulder pain  < / =  3/10  to increase tolerance for full return to work duties as needed.  2.  Patient will report ability to perform work duties as needed without pain or difficulty.  3.  Increase strength to >/= 4+/5 in all directions right UE to increase tolerance for ADL and work activities.    Plan     Pt will be treated by physical therapy 1-2 times a week for Pt Education, HEP, therapeutic exercises, neuromuscular re-education, manual therapy, joint mobilizations, modalities prn to achieve established goals. Angela may at times be seen by a PTA as part of the Rehab Team.     Cont PT for 12-16 weeks.     Carlota Mc, PT, DPT, SCS

## 2017-09-06 ENCOUNTER — CLINICAL SUPPORT (OUTPATIENT)
Dept: REHABILITATION | Facility: HOSPITAL | Age: 27
End: 2017-09-06
Attending: ORTHOPAEDIC SURGERY
Payer: COMMERCIAL

## 2017-09-06 DIAGNOSIS — M25.511 ACUTE PAIN OF RIGHT SHOULDER: Primary | ICD-10-CM

## 2017-09-06 PROCEDURE — 97140 MANUAL THERAPY 1/> REGIONS: CPT

## 2017-09-06 PROCEDURE — 97110 THERAPEUTIC EXERCISES: CPT

## 2017-09-06 NOTE — PROGRESS NOTES
Physical Therapy Visit Note    Name: Angela Lyle  Clinic Number: [id number]      Diagnosis:   Encounter Diagnosis   Name Primary?    Acute pain of right shoulder Yes     Physician: Hanna Brown MD  Treatment Orders: PT Eval and Treat    Past Medical History:   Diagnosis Date    Allergy     Anemia     hx of HOSEA on fergon in past    Anxiety     Arthritis     right shoulder    Depression     Fatigue     History of psychiatric care     Keloid cicatrix     Psychiatric problem     Therapy      Current Outpatient Prescriptions   Medication Sig    buPROPion (WELLBUTRIN XL) 150 MG TB24 tablet Take 1 tablet (150 mg total) by mouth once daily.    escitalopram oxalate (LEXAPRO) 20 MG tablet Take 1 tablet (20 mg total) by mouth once daily.    oxycodone-acetaminophen (PERCOCET)  mg per tablet Take 1 tablet by mouth every 6 (six) hours as needed for Pain.    tramadol (ULTRAM) 50 mg tablet Take 1 tablet (50 mg total) by mouth every 4 to 6 hours as needed.     No current facility-administered medications for this visit.      Review of patient's allergies indicates:   Allergen Reactions    Sulfa (sulfonamide antibiotics) Hives and Itching    Sulfamethoxazole-trimethoprim      Other reaction(s): Itching     Precautions: S/P Right Biceps Tenodesis and SAD, SND 6/6/2017    Today's Date:  9/6/2017  Evaluation Date: 6/9/2017  Visit # authorized: 6/15  Authorization period: 12/31/2017  Time In: 1415  Time Out:  1515    Subjective     Patient states she has not been able to do her exercises very much     Patient Goals: Return to full ADLs, functional mobility and work without pain or difficulty    Objective     Observation: Patient entered therapy with sling in place.    Posture: Noted anterior, abducted scapula on R UE.     Functional Limitations Reports  Category: Lifting/Carrying  Tool: FOTO Shoulder  35%  6/27/17    TREATMENT:  Angela received therapeutic exercises to develop strength and endurance,  flexibility for 35 minutes including:   UBE L5 4'/4' for AROM and motor control  Supine on 1/2 Foam Roll Pec Stretch w/ Cervical rotation 5'  Seated Levator, UT, scalene, platysma stretching  Wall [place] 10x3 (popping in SC joint and scapulothoracic)  Supine 3# Serratus Presses 10x3B    Not today:  YTB/OTB PNF 4-way as tolerated (very fatigued) 10x2 ea.  OTB Serratus Wall Walking 10x2  Flx/Scap/Abd 10x2 ea. To fatigue (1# to 0# as needed)  Prone Shoulder Ext/Low A/H.Abd/Y/I 1# 20x2, 10x3 (Y, I)  Prone Ext/Low A/H. Abd 5sh x 30 ea.  OTB IR/ER Walkouts 2 x 15 ea.  JESUS Serratus Presses 5sh x 30    Manual Therapy for increased joint, soft tissue mobilization and decreased pain for 25 minutes including:  STM and trigger point release right scalenes, distal levator and UT    Not today:  Supine Snow [place] 30x  Supine serratus punch w/ dowel 20 x w/ 3 sec hold  Prone Ext 5sh x 30  Prone Snow [place] 10x3 (mid/low trap fatigue)  MR Flx/Ext 3 x 10  MR PNF D2 Flx/Ext 3 x 10  MR Biceps/Triceps 3 x 10  Pulleys 3-way 3' ea.   SB Flx/Abd 3' ea.  Flx Wallslides to fatigue x 2  OTB Rowing 20x  OTB Ext 20x  Supine Serratus Punches 20x2 1#  Sidelying ER to fatigue x 3  Flx Rhythmic Stab to fatigue x 3  90 Flx ABCs 2x  90 Abd ABCs 2x (very fatigued)  Sleeper stretch 5 x 30 sec    MHP x 10' to right UT area.    Instructed pt. regarding: Proper technique with all exercises. Pt demonstrated good understanding of the education provided. Angela demonstrated good return demonstration of activities.    Assessment     Today's Assessment:  Patient felt better after today's treatment.  Progress as able.    This is a 27 y.o. female referred to outpatient physical therapy and presents with a medical diagnosis of S/P Right Biceps Tenodesis and SAD, SND 6/6/2017.  Patient presents with signs and symptoms consistent with post-op changes.  Patient's chief complaint is pain and difficulty sleeping as well as inability to use UE.  Patient will benefit  from skilled physical therapy services, specifically normalized range of motion and flexibility, gross postural and upper extremity strengthening, progressing to functional mobility as tolerated.     Medical necessity is demonstrated by the following IMPAIRMENTS/PROBLEM LIST:   1)Increase in pain level limiting function   2)Decreased ROM   3)Decreased strength   4)Decreased functional mobility   5)Lack of HEP    GOALS: Short Term Goals: 6 weeks  1.  Report decreased right shoulder pain < / =  4/10  to increase tolerance for ADLs and AROM.  2.  Increase AROM to WNL in all directions for independence with ADLs.   3.  Demonstrate normalized scapular and thoracic posture in order to increase tolerance for ADL and work activities.  4.  Pt to tolerate HEP to improve ROM and independence with ADL's.    Long Term Goals: 12-16 weeks  1.  Report decreased right shoulder pain  < / =  3/10  to increase tolerance for full return to work duties as needed.  2.  Patient will report ability to perform work duties as needed without pain or difficulty.  3.  Increase strength to >/= 4+/5 in all directions right UE to increase tolerance for ADL and work activities.    Plan     Pt will be treated by physical therapy 1-2 times a week for Pt Education, HEP, therapeutic exercises, neuromuscular re-education, manual therapy, joint mobilizations, modalities prn to achieve established goals. Angela may at times be seen by a PTA as part of the Rehab Team.     Cont PT for 12-16 weeks.     Carlota Mc, PT, DPT, SCS

## 2017-09-08 ENCOUNTER — PATIENT MESSAGE (OUTPATIENT)
Dept: INTERNAL MEDICINE | Facility: CLINIC | Age: 27
End: 2017-09-08

## 2017-09-08 NOTE — TELEPHONE ENCOUNTER
Yes, she is due for vit d lab - please schedule  rec she start 1000u daily of otc vit d to maintain her levels

## 2017-09-11 ENCOUNTER — LAB VISIT (OUTPATIENT)
Dept: LAB | Facility: OTHER | Age: 27
End: 2017-09-11
Attending: INTERNAL MEDICINE
Payer: COMMERCIAL

## 2017-09-11 DIAGNOSIS — E55.9 VITAMIN D DEFICIENCY: ICD-10-CM

## 2017-09-11 LAB — 25(OH)D3+25(OH)D2 SERPL-MCNC: 27 NG/ML

## 2017-09-11 PROCEDURE — 82306 VITAMIN D 25 HYDROXY: CPT

## 2017-09-11 PROCEDURE — 36415 COLL VENOUS BLD VENIPUNCTURE: CPT

## 2017-09-11 RX ORDER — CHOLECALCIFEROL (VITAMIN D3) 125 MCG
2000 TABLET ORAL DAILY
COMMUNITY
Start: 2017-09-11

## 2017-09-12 ENCOUNTER — CLINICAL SUPPORT (OUTPATIENT)
Dept: REHABILITATION | Facility: HOSPITAL | Age: 27
End: 2017-09-12
Attending: ORTHOPAEDIC SURGERY
Payer: COMMERCIAL

## 2017-09-12 ENCOUNTER — HOSPITAL ENCOUNTER (OUTPATIENT)
Dept: RADIOLOGY | Facility: HOSPITAL | Age: 27
Discharge: HOME | End: 2017-09-12
Attending: ORTHOPAEDIC SURGERY
Payer: COMMERCIAL

## 2017-09-12 DIAGNOSIS — M25.511 ACUTE PAIN OF RIGHT SHOULDER: Primary | ICD-10-CM

## 2017-09-12 DIAGNOSIS — M25.519 SHOULDER PAIN, UNSPECIFIED CHRONICITY, UNSPECIFIED LATERALITY: ICD-10-CM

## 2017-09-12 PROCEDURE — 73030 X-RAY EXAM OF SHOULDER: CPT | Mod: 26,RT,, | Performed by: RADIOLOGY

## 2017-09-12 PROCEDURE — 73030 X-RAY EXAM OF SHOULDER: CPT | Mod: TC,RT

## 2017-09-12 PROCEDURE — 97110 THERAPEUTIC EXERCISES: CPT

## 2017-09-12 NOTE — PROGRESS NOTES
Physical Therapy Visit Note    Name: Angela Lyle  Clinic Number: [id number]      Diagnosis:   Encounter Diagnosis   Name Primary?    Acute pain of right shoulder Yes     Physician: Hanna Brown MD  Treatment Orders: PT Eval and Treat    Past Medical History:   Diagnosis Date    Allergy     Anemia     hx of HOSEA on fergon in past    Anxiety     Arthritis     right shoulder    Depression     Fatigue     History of psychiatric care     Keloid cicatrix     Psychiatric problem     Therapy      Current Outpatient Prescriptions   Medication Sig    buPROPion (WELLBUTRIN XL) 150 MG TB24 tablet Take 1 tablet (150 mg total) by mouth once daily.    ergocalciferol, vitamin D2, 2,000 unit Tab Take 2,000 Units by mouth once daily.    escitalopram oxalate (LEXAPRO) 20 MG tablet Take 1 tablet (20 mg total) by mouth once daily.    oxycodone-acetaminophen (PERCOCET)  mg per tablet Take 1 tablet by mouth every 6 (six) hours as needed for Pain.    tramadol (ULTRAM) 50 mg tablet Take 1 tablet (50 mg total) by mouth every 4 to 6 hours as needed.     No current facility-administered medications for this visit.      Review of patient's allergies indicates:   Allergen Reactions    Sulfa (sulfonamide antibiotics) Hives and Itching    Sulfamethoxazole-trimethoprim      Other reaction(s): Itching     Precautions: S/P Right Biceps Tenodesis and SAD, SND 6/6/2017    Today's Date:  9/6/2017  Evaluation Date: 6/9/2017  Visit # authorized: 8/15  Authorization period: 12/31/2017  Time In: 2:15  Time Out:  3:05    Subjective     Pt reports w/ some discomfort and tightness in R upper trap.      Patient Goals: Return to full ADLs, functional mobility and work without pain or difficulty    Objective     Observation: Patient entered therapy with sling in place.    Posture: Noted anterior, abducted scapula on R UE.     Functional Limitations Reports  Category: Lifting/Carrying  Tool: FOTO Shoulder  35%   6/27/17    TREATMENT:  Angela received therapeutic exercises to develop strength and endurance, flexibility for 20 minutes including:   Pt arrived 15 min late to tx today.     UBE L5 3'/3' for AROM and motor control  Supine on 1/2 Foam Roll Pec Stretch w/ Cervical rotation 5'  Seated Levator, UT, scalene, platysma stretching  Wall [place] 10x3 (popping in SC joint and scapulothoracic) NP  Supine 3# Serratus Presses 10x3B  Cp x 10 min     Not today:  YTB/OTB PNF 4-way as tolerated (very fatigued) 10x2 ea.  OTB Serratus Wall Walking 10x2  Flx/Scap/Abd 10x2 ea. To fatigue (1# to 0# as needed)  Prone Shoulder Ext/Low A/H.Abd/Y/I 1# 20x2, 10x3 (Y, I)  Prone Ext/Low A/H. Abd 5sh x 30 ea.  OTB IR/ER Walkouts 2 x 15 ea.  JESUS Serratus Presses 5sh x 30    Manual Therapy for increased joint, soft tissue mobilization and decreased pain for 25 minutes including:  STM and trigger point release right scalenes, distal levator and UT    Not today:  Supine [place] 30x  Supine serratus punch w/ dowel 20 x w/ 3 sec hold  Prone Ext 5sh x 30  Prone [place] 10x3 (mid/low trap fatigue)  MR Flx/Ext 3 x 10  MR PNF D2 Flx/Ext 3 x 10  MR Biceps/Triceps 3 x 10  Pulleys 3-way 3' ea.   SB Flx/Abd 3' ea.  Flx Wallslides to fatigue x 2  OTB Rowing 20x  OTB Ext 20x  Supine Serratus Punches 20x2 1#  Sidelying ER to fatigue x 3  Flx Rhythmic Stab to fatigue x 3  90 Flx ABCs 2x  90 Abd ABCs 2x (very fatigued)  Sleeper stretch 5 x 30 sec    MHP x 10' to right UT area. NP    Instructed pt. regarding: Proper technique with all exercises. Pt demonstrated good understanding of the education provided. Angela demonstrated good return demonstration of activities.    Assessment     Pt leisa tx well.  Pn level decreased after tx session.  Pt displayed good effort during therex w/ Vcs for technique.  Pt edu on and isntructed to cot HEP.  Cont to progress as leisa.     This is a 27 y.o. female referred to outpatient physical therapy and presents with a medical  diagnosis of S/P Right Biceps Tenodesis and SAD, SND 6/6/2017.  Patient presents with signs and symptoms consistent with post-op changes.  Patient's chief complaint is pain and difficulty sleeping as well as inability to use UE.  Patient will benefit from skilled physical therapy services, specifically normalized range of motion and flexibility, gross postural and upper extremity strengthening, progressing to functional mobility as tolerated.     Medical necessity is demonstrated by the following IMPAIRMENTS/PROBLEM LIST:   1)Increase in pain level limiting function   2)Decreased ROM   3)Decreased strength   4)Decreased functional mobility   5)Lack of HEP    GOALS: Short Term Goals: 6 weeks  1.  Report decreased right shoulder pain < / =  4/10  to increase tolerance for ADLs and AROM.  2.  Increase AROM to WNL in all directions for independence with ADLs.   3.  Demonstrate normalized scapular and thoracic posture in order to increase tolerance for ADL and work activities.  4.  Pt to tolerate HEP to improve ROM and independence with ADL's.    Long Term Goals: 12-16 weeks  1.  Report decreased right shoulder pain  < / =  3/10  to increase tolerance for full return to work duties as needed.  2.  Patient will report ability to perform work duties as needed without pain or difficulty.  3.  Increase strength to >/= 4+/5 in all directions right UE to increase tolerance for ADL and work activities.    Plan     Pt will be treated by physical therapy 1-2 times a week for Pt Education, HEP, therapeutic exercises, neuromuscular re-education, manual therapy, joint mobilizations, modalities prn to achieve established goals. Angela may at times be seen by a PTA as part of the Rehab Team.     Cont PT for 12-16 weeks.     Junior Lynne PTA

## 2017-09-18 ENCOUNTER — PATIENT MESSAGE (OUTPATIENT)
Dept: SPORTS MEDICINE | Facility: CLINIC | Age: 27
End: 2017-09-18

## 2017-09-21 ENCOUNTER — CLINICAL SUPPORT (OUTPATIENT)
Dept: REHABILITATION | Facility: HOSPITAL | Age: 27
End: 2017-09-21
Attending: ORTHOPAEDIC SURGERY
Payer: COMMERCIAL

## 2017-09-21 DIAGNOSIS — M25.511 ACUTE PAIN OF RIGHT SHOULDER: Primary | ICD-10-CM

## 2017-09-21 PROCEDURE — 97140 MANUAL THERAPY 1/> REGIONS: CPT

## 2017-09-21 PROCEDURE — 97110 THERAPEUTIC EXERCISES: CPT

## 2017-09-28 ENCOUNTER — PATIENT MESSAGE (OUTPATIENT)
Dept: SPORTS MEDICINE | Facility: CLINIC | Age: 27
End: 2017-09-28

## 2017-10-02 NOTE — PROGRESS NOTES
Physical Therapy Visit Note    Name: Angela Lyle  Clinic Number: [id number]      Diagnosis:   Encounter Diagnosis   Name Primary?    Acute pain of right shoulder Yes     Physician: Hanna Brown MD  Treatment Orders: PT Eval and Treat    Past Medical History:   Diagnosis Date    Allergy     Anemia     hx of HOSEA on fergon in past    Anxiety     Arthritis     right shoulder    Depression     Fatigue     History of psychiatric care     Keloid cicatrix     Psychiatric problem     Therapy      Current Outpatient Prescriptions   Medication Sig    buPROPion (WELLBUTRIN XL) 150 MG TB24 tablet Take 1 tablet (150 mg total) by mouth once daily.    ergocalciferol, vitamin D2, 2,000 unit Tab Take 2,000 Units by mouth once daily.    escitalopram oxalate (LEXAPRO) 20 MG tablet Take 1 tablet (20 mg total) by mouth once daily.    oxycodone-acetaminophen (PERCOCET)  mg per tablet Take 1 tablet by mouth every 6 (six) hours as needed for Pain.    tramadol (ULTRAM) 50 mg tablet Take 1 tablet (50 mg total) by mouth every 4 to 6 hours as needed.     No current facility-administered medications for this visit.      Review of patient's allergies indicates:   Allergen Reactions    Sulfa (sulfonamide antibiotics) Hives and Itching    Sulfamethoxazole-trimethoprim      Other reaction(s): Itching     Precautions: S/P Right Biceps Tenodesis and SAD, SND 6/6/2017    Today's Date:  9/21/2017  Evaluation Date: 6/9/2017  Visit # authorized: 9/15  Authorization period: 12/31/2017  Time In: 1400  Time Out:  1500    Subjective     Patient states in general she is feeling okay but still having a lot of tension and tenderness over 1st rib.    Patient Goals: Return to full ADLs, functional mobility and work without pain or difficulty    Objective     Observation: Patient entered therapy with sling in place.    Posture: Noted anterior, abducted scapula on R UE.     Functional Limitations Reports  Category:  Lifting/Carrying  Tool: FOTO Shoulder  35%  6/27/17    TREATMENT:  Angela received therapeutic exercises to develop strength and endurance, flexibility for 20 minutes including:   Pt arrived 15 min late to tx today.   UBE L5 3'/3' for AROM and motor control    Not today:  Supine on 1/2 Foam Roll Pec Stretch w/ Cervical rotation 5'  Seated Levator, UT, scalene, platysma stretching  Wall [place] 10x3 (popping in SC joint and scapulothoracic) NP  Supine 3# Serratus Presses 10x3B  Cp x 10 min   YTB/OTB PNF 4-way as tolerated (very fatigued) 10x2 ea.  OTB Serratus Wall Walking 10x2  Flx/Scap/Abd 10x2 ea. To fatigue (1# to 0# as needed)  Prone Shoulder Ext/Low A/H.Abd/Y/I 1# 20x2, 10x3 (Y, I)  Prone Ext/Low A/H. Abd 5sh x 30 ea.  OTB IR/ER Walkouts 2 x 15 ea.  JESUS Serratus Presses 5sh x 30    Manual Therapy for increased joint, soft tissue mobilization and decreased pain for 25 minutes including:  STM and trigger point release right scalenes, distal levator and UT  Dry needling performed by Ariel Loo, PT, DPT to scalenes and UE, distal levator    Not today:  Supine [place] 30x  Supine serratus punch w/ dowel 20 x w/ 3 sec hold  Prone Ext 5sh x 30  Prone [place] 10x3 (mid/low trap fatigue)  MR Flx/Ext 3 x 10  MR PNF D2 Flx/Ext 3 x 10  MR Biceps/Triceps 3 x 10  Pulleys 3-way 3' ea.   SB Flx/Abd 3' ea.  Flx Wallslides to fatigue x 2  OTB Rowing 20x  OTB Ext 20x  Supine Serratus Punches 20x2 1#  Sidelying ER to fatigue x 3  Flx Rhythmic Stab to fatigue x 3  90 Flx ABCs 2x  90 Abd ABCs 2x (very fatigued)  Sleeper stretch 5 x 30 sec    MHP x 10' to right UT area. NP    Instructed pt. regarding: Proper technique with all exercises. Pt demonstrated good understanding of the education provided. Angela demonstrated good return demonstration of activities.    Assessment     Today's Assessment:  Patient felt a little better after today's treatment.  Progress as able.    This is a 27 y.o. female referred to outpatient physical  therapy and presents with a medical diagnosis of S/P Right Biceps Tenodesis and SAD, SND 6/6/2017.  Patient presents with signs and symptoms consistent with post-op changes.  Patient's chief complaint is pain and difficulty sleeping as well as inability to use UE.  Patient will benefit from skilled physical therapy services, specifically normalized range of motion and flexibility, gross postural and upper extremity strengthening, progressing to functional mobility as tolerated.     Medical necessity is demonstrated by the following IMPAIRMENTS/PROBLEM LIST:   1)Increase in pain level limiting function   2)Decreased ROM   3)Decreased strength   4)Decreased functional mobility   5)Lack of HEP    GOALS: Short Term Goals: 6 weeks  1.  Report decreased right shoulder pain < / =  4/10  to increase tolerance for ADLs and AROM.  2.  Increase AROM to WNL in all directions for independence with ADLs.   3.  Demonstrate normalized scapular and thoracic posture in order to increase tolerance for ADL and work activities.  4.  Pt to tolerate HEP to improve ROM and independence with ADL's.    Long Term Goals: 12-16 weeks  1.  Report decreased right shoulder pain  < / =  3/10  to increase tolerance for full return to work duties as needed.  2.  Patient will report ability to perform work duties as needed without pain or difficulty.  3.  Increase strength to >/= 4+/5 in all directions right UE to increase tolerance for ADL and work activities.    Plan     Pt will be treated by physical therapy 1-2 times a week for Pt Education, HEP, therapeutic exercises, neuromuscular re-education, manual therapy, joint mobilizations, modalities prn to achieve established goals. Angela may at times be seen by a PTA as part of the Rehab Team.     Cont PT for 12-16 weeks.     Carlota Mc, PT, DPT, SCS

## 2017-10-13 ENCOUNTER — TELEPHONE (OUTPATIENT)
Dept: SPORTS MEDICINE | Facility: CLINIC | Age: 27
End: 2017-10-13

## 2017-10-13 NOTE — TELEPHONE ENCOUNTER
Clinicals faxed to Oliva at [facility] at [phone] and 10 additional visits requested

## 2017-10-13 NOTE — TELEPHONE ENCOUNTER
----- Message from Britni Massey MA sent at 10/12/2017  4:34 PM CDT -----  Contact: Diana ARAIZA      ----- Message -----  From: Puja Lee  Sent: 10/12/2017  12:33 PM  To: Stephanie Ferreira Staff    Oliva received an order for pt to continue PT but she is requesting clinical notes and a more specific detail order for how many visits and what the rehab is for. [phone]

## 2017-11-20 ENCOUNTER — OFFICE VISIT (OUTPATIENT)
Dept: SPORTS MEDICINE | Facility: CLINIC | Age: 27
End: 2017-11-20
Payer: COMMERCIAL

## 2017-11-20 VITALS
WEIGHT: 104 LBS | DIASTOLIC BLOOD PRESSURE: 70 MMHG | BODY MASS INDEX: 17.75 KG/M2 | SYSTOLIC BLOOD PRESSURE: 105 MMHG | HEIGHT: 64 IN | HEART RATE: 81 BPM

## 2017-11-20 DIAGNOSIS — M25.511 ACUTE PAIN OF RIGHT SHOULDER: Primary | ICD-10-CM

## 2017-11-20 PROCEDURE — 99212 OFFICE O/P EST SF 10 MIN: CPT | Mod: S$GLB,,, | Performed by: ORTHOPAEDIC SURGERY

## 2017-11-20 PROCEDURE — 99999 PR PBB SHADOW E&M-EST. PATIENT-LVL III: CPT | Mod: PBBFAC,,, | Performed by: ORTHOPAEDIC SURGERY

## 2017-11-20 NOTE — PROGRESS NOTES
CC right shoulder pain    HISTORY OF PRESENT ILLNESS:   Pt is here today for followup of his shoulder arthroscopy.  He is doing well.  We have reviewed his findings and discussed plan of care and future treatment options.  Today pt is 3/10 pain; overall is progressing with PT well and slowly improving.      Had SC pain as a kid    90% better vs preop, no pain 0/10, strength good but improving    DATE OF PROCEDURE: 06/06/2017  OPERATION:   right   1. Shoulder open subpectoral biceps tenodesis (CPT 20387)  2. Shoulder arthroscopic suprascapular nerve decompression (complex, -22 modifier)  3. Shoulder arthroscopic partial thickness cuff debridement  4. Shoulder subacromial buresectomy  5. Shoulder arthroscopic extensive debridement (anterior, posterior glenohumeral joint, subacromial space) (CPT 96160)                        Review of Systems   Constitution: Negative. Negative for chills, fever and night sweats.   HENT: Negative for congestion and headaches.    Eyes: Negative for blurred vision, left vision loss and right vision loss.   Cardiovascular: Negative for chest pain and syncope.   Respiratory: Negative for cough and shortness of breath.    Endocrine: Negative for polydipsia, polyphagia and polyuria.   Hematologic/Lymphatic: Negative for bleeding problem. Does not bruise/bleed easily.   Skin: Negative for dry skin, itching and rash.   Musculoskeletal: Negative for falls and muscle weakness.   Gastrointestinal: Negative for abdominal pain and bowel incontinence.   Genitourinary: Negative for bladder incontinence and nocturia.   Neurological: Negative for disturbances in coordination, loss of balance and seizures.   Psychiatric/Behavioral: Negative for depression. The patient does not have insomnia.    Allergic/Immunologic: Negative for hives and persistent infections.     PAST MEDICAL HISTORY:   Past Medical History:   Diagnosis Date    Allergy     Anemia     hx of HOSEA on fergon in past    Anxiety      Arthritis     right shoulder    Depression     Fatigue     History of psychiatric care     Keloid cicatrix     Psychiatric problem     Therapy      PAST SURGICAL HISTORY:   Past Surgical History:   Procedure Laterality Date    TONSILLECTOMY      wisdom teeth removal  2010     FAMILY HISTORY:   Family History   Problem Relation Age of Onset    Arthritis Mother      back surgery    Fibromyalgia Mother     Hyperlipidemia Father     Arthritis Maternal Grandmother     Heart disease Paternal Grandfather     Amblyopia Neg Hx     Blindness Neg Hx     Cataracts Neg Hx     Glaucoma Neg Hx     Macular degeneration Neg Hx     Retinal detachment Neg Hx     Strabismus Neg Hx     Melanoma Neg Hx      SOCIAL HISTORY:   Social History     Social History    Marital status: Single     Spouse name: N/A    Number of children: N/A    Years of education: N/A     Occupational History    Labor & Delivery RN      Social History Main Topics    Smoking status: Never Smoker    Smokeless tobacco: Not on file    Alcohol use Not on file    Drug use: Unknown    Sexual activity: Not on file     Other Topics Concern    Are You Pregnant Or Think You May Be? No    Breast-Feeding No     Social History Narrative        Works as L&D RN at [employer]    Lives with roommates in apartment in [place].           MEDICATIONS:   Current Outpatient Prescriptions:     buPROPion (WELLBUTRIN XL) 150 MG TB24 tablet, Take 1 tablet (150 mg total) by mouth once daily., Disp: 30 tablet, Rfl: 2    ergocalciferol, vitamin D2, 2,000 unit Tab, Take 2,000 Units by mouth once daily., Disp: , Rfl:     escitalopram oxalate (LEXAPRO) 20 MG tablet, Take 1 tablet (20 mg total) by mouth once daily., Disp: 30 tablet, Rfl: 2  ALLERGIES:   Review of patient's allergies indicates:   Allergen Reactions    Sulfa (sulfonamide antibiotics) Hives and Itching    Sulfamethoxazole-trimethoprim      Other reaction(s): Itching       VITAL SIGNS: /70   Pulse  "81   Ht 5' 4" (1.626 m)   Wt 47.2 kg (104 lb)   BMI 17.85 kg/m²                                                                PHYSICAL EXAMINATION:     Incision sites healed well  No evidence of any erythema, infection or induration  elbow Range of motion full   Minimal effusion  2+ DP pulse  No swelling  Mild SC joint ttp, good stability    ER0 85, 5-/5  IR t10  5/5 strength at 0 and 30                                                                                 ASSESSMENT:                                                                                                                                               1. Status post above, doing well.                                                                                                                               PLAN:                                                                                                                                                     1. PT  2. Emphasized scapular function.  3. I have discussed return to activity in detail.  4. she will see us back in 6 months.                                      5. All questions were answered and he should contact us if he  has any questions or concerns in the interim.                                                               "

## 2017-11-27 ENCOUNTER — PATIENT OUTREACH (OUTPATIENT)
Dept: INTERNAL MEDICINE | Facility: CLINIC | Age: 27
End: 2017-11-27

## 2017-11-27 NOTE — PROGRESS NOTES
[facility] is committed to your overall health.  To help you get the most out of each of your visits, we will review your information to make sure you are up to date on all of your recommended tests and/or procedures.       Your PCP  Leah Shields MD   found that you may be due for:       Health Maintenance Due   Topic Date Due    Influenza Vaccine  08/01/2017             If you have had any of the above done at another facility, please bring the records or information with you so that your record at [facility] will be complete.  If you would like to schedule any of these, please contact me.     If you are currently taking medication, please bring it with you to your appointment for review.     Also, if you have any type of Advanced Directives, please bring them with you to your office visit so we may scan them into your chart.     Thank you for Choosing [facility] for your healthcare needs.      Additional Information  If you have questions, you can email [email or web address] or call [phone]  to talk to our [facility] staff. Remember, [facility] is NOT to be used for urgent needs. For medical emergencies, dial 911.

## 2017-11-30 ENCOUNTER — OFFICE VISIT (OUTPATIENT)
Dept: OBSTETRICS AND GYNECOLOGY | Facility: CLINIC | Age: 27
End: 2017-11-30
Payer: COMMERCIAL

## 2017-11-30 VITALS
BODY MASS INDEX: 17.72 KG/M2 | SYSTOLIC BLOOD PRESSURE: 102 MMHG | DIASTOLIC BLOOD PRESSURE: 72 MMHG | HEIGHT: 64 IN | WEIGHT: 103.81 LBS

## 2017-11-30 DIAGNOSIS — Z01.419 WOMEN'S ANNUAL ROUTINE GYNECOLOGICAL EXAMINATION: Primary | ICD-10-CM

## 2017-11-30 DIAGNOSIS — R19.5 LOOSE STOOLS: ICD-10-CM

## 2017-11-30 DIAGNOSIS — R11.0 NAUSEA: ICD-10-CM

## 2017-11-30 PROCEDURE — 99999 PR PBB SHADOW E&M-EST. PATIENT-LVL III: CPT | Mod: PBBFAC,,, | Performed by: NURSE PRACTITIONER

## 2017-11-30 PROCEDURE — 99395 PREV VISIT EST AGE 18-39: CPT | Mod: S$GLB,,, | Performed by: NURSE PRACTITIONER

## 2017-11-30 RX ORDER — DIPHENOXYLATE HYDROCHLORIDE AND ATROPINE SULFATE 2.5; .025 MG/1; MG/1
1 TABLET ORAL 4 TIMES DAILY PRN
Qty: 30 TABLET | Refills: 1 | Status: SHIPPED | OUTPATIENT
Start: 2017-11-30 | End: 2018-11-30

## 2017-11-30 RX ORDER — ONDANSETRON 4 MG/1
4 TABLET, ORALLY DISINTEGRATING ORAL
Qty: 30 TABLET | Refills: 1 | Status: SHIPPED | OUTPATIENT
Start: 2017-11-30 | End: 2020-05-25

## 2017-11-30 NOTE — PROGRESS NOTES
CC: Annual  HPI: Pt is a 27 y.o.  female who presents for routine annual exam. She is not using any contraception. She does not want STD screening. Reports she was diagnosed with mild PCOS in 2003.  Reports 32 day cycles.  Reports this past year she had 3 cycles with severe nausea, vomiting, loose stools, and cold sweats.  Denies any known triggers. Denies family h/o breast, cervical, ovarian, or endometrial cancer.          ROS:  GENERAL: Feeling well overall. Denies fever or chills.   SKIN: Denies rash or lesions.   HEAD: Denies head injury or headache.   NODES: Denies enlarged lymph nodes.   CHEST: Denies chest pain or shortness of breath.   CARDIOVASCULAR: Denies palpitations or left sided chest pain.   ABDOMEN: No abdominal pain, constipation, diarrhea, nausea, vomiting or rectal bleeding.   URINARY: No dysuria, hematuria, or burning on urination.  REPRODUCTIVE: See HPI.   BREASTS: Denies pain, lumps, or nipple discharge.   HEMATOLOGIC: No easy bruisability or excessive bleeding.   MUSCULOSKELETAL: Denies joint pain or swelling.   NEUROLOGIC: Denies syncope or weakness.   PSYCHIATRIC: Denies depression, anxiety or mood swings.    PE:   APPEARANCE: Well nourished, well developed, White female in no acute distress.  NODES: no cervical, supraclavicular, or inguinal lymphadenopathy  BREASTS: Symmetrical, no skin changes or visible lesions. No palpable masses, nipple discharge or adenopathy bilaterally.  ABDOMEN: Soft. No tenderness or masses. No distention. No hernias palpated. No CVA tenderness.  VULVA: No lesions. Normal external female genitalia.  URETHRAL MEATUS: Normal size and location, no lesions, no prolapse.  URETHRA: No masses, tenderness, or prolapse.  VAGINA: Moist. No lesions or lacerations noted. No abnormal discharge present. No odor present.   CERVIX: No lesions or discharge. No cervical motion tenderness.   UTERUS: Normal size, regular shape, mobile, non-tender.  ADNEXA: No tenderness. No fullness  or masses palpated in the adnexal regions.   ANUS PERINEUM: Normal.      Diagnosis:  1. Women's annual routine gynecological examination    2. Nausea    3. Loose stools        Plan:   Pap not indicated- last 10/2016 WNL  Zofran and Lomotil PRN   Discussed f/u with PCP re- loose stools.  Discussed possible etiologies including IBS  Discussed trial of  OCPs to treat symptoms- declines at present.    Orders Placed This Encounter    ondansetron (ZOFRAN-ODT) 4 MG TbDL    diphenoxylate-atropine 2.5-0.025 mg (LOMOTIL) 2.5-0.025 mg per tablet       Patient was counseled today on the new ACS guidelines for cervical cytology screening as well as the current recommendations for breast cancer screening. She was counseled to follow up with her PCP for other routine health maintenance. Counseling session lasted approximately 10 minutes, and all her questions were answered.    Follow-up with me in 1 year for routine exam    Mindi Isabel, [id number]-C  ]

## 2017-12-08 ENCOUNTER — OFFICE VISIT (OUTPATIENT)
Dept: INTERNAL MEDICINE | Facility: CLINIC | Age: 27
End: 2017-12-08
Attending: INTERNAL MEDICINE
Payer: COMMERCIAL

## 2017-12-08 VITALS
HEIGHT: 64 IN | HEART RATE: 71 BPM | WEIGHT: 105.38 LBS | SYSTOLIC BLOOD PRESSURE: 108 MMHG | DIASTOLIC BLOOD PRESSURE: 60 MMHG | BODY MASS INDEX: 17.99 KG/M2

## 2017-12-08 DIAGNOSIS — E28.2 PCOS (POLYCYSTIC OVARIAN SYNDROME): ICD-10-CM

## 2017-12-08 DIAGNOSIS — Z00.00 ANNUAL PHYSICAL EXAM: Primary | ICD-10-CM

## 2017-12-08 DIAGNOSIS — E55.9 VITAMIN D DEFICIENCY: ICD-10-CM

## 2017-12-08 PROCEDURE — 99395 PREV VISIT EST AGE 18-39: CPT | Mod: S$GLB,,, | Performed by: INTERNAL MEDICINE

## 2017-12-08 PROCEDURE — 99999 PR PBB SHADOW E&M-EST. PATIENT-LVL III: CPT | Mod: PBBFAC,,, | Performed by: INTERNAL MEDICINE

## 2017-12-08 NOTE — PROGRESS NOTES
"Subjective:   Patient ID: Angela Lyle is a 27 y.o. female  Chief complaint:   Chief Complaint   Patient presents with    Annual Exam       HPI  Pt here for annual exam  Hx of vit d - intermittently taking otc vit d - is planning on using a pillbox to help with taking more consistently.   Intermittent exercise, working night shift.   Still with fatigue. Seeing a therapist outside [facility] - feels that mood is good. When does sleep typically sleeps through the night. Never told snores. Never wakes up gasping for air.     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 Another episode awoke her from sleep. Last episode was 2 months ago. No depression.     Will have periods of white toes when very cold - white to red and then normal. No joint swelling or redness. This is stable and has been occurring over past few years. Wanted to mention this while here. No family hx of autoimmune disorders.     Review of Systems    Objective:  Vitals:    12/08/17 0753   BP: 108/60   Pulse: 71   Weight: 47.8 kg (105 lb 6.1 oz)   Height: 5' 4" (1.626 m)     Body mass index is 18.09 kg/m².    Physical Exam   Constitutional: She is oriented to person, place, and time. She appears well-developed and well-nourished.   HENT:   Head: Normocephalic and atraumatic.   Right Ear: External ear normal.   Left Ear: External ear normal.   Nose: Nose normal.   Mouth/Throat: Oropharynx is clear and moist. No oropharyngeal exudate.   No carotid bruits   Eyes: Conjunctivae and EOM are normal.   Neck: Neck supple. No thyromegaly present.   Cardiovascular: Normal rate, regular rhythm, normal heart sounds and intact distal pulses.    Pulmonary/Chest: Effort normal and breath sounds normal.   Abdominal: Soft. Bowel sounds are normal.   Musculoskeletal: She exhibits no edema or tenderness.   Lymphadenopathy:     She has no cervical adenopathy. "   Neurological: She is alert and oriented to person, place, and time.   Skin: Skin is warm and dry. Capillary refill takes less than 2 seconds.   Psychiatric: Her behavior is normal. Thought content normal.   Vitals reviewed.      Assessment:  1. Annual physical exam    2. Vitamin D deficiency    3. PCOS (polycystic ovarian syndrome)        Plan:  Angela was seen today for annual exam.    Diagnoses and all orders for this visit:    Annual physical exam  -     Vitamin D; Future  -     CBC auto differential; Future  -     Comprehensive metabolic panel; Future  -     TSH; Future  -     Lipid panel; Future  Recommend daily sunscreen, cardiovascular exercise min 30 min 5 days per week. Seatbelts routinely.    Vitamin D deficiency: check level, cont otc daily D suppl  -     Vitamin D; Future    PCOS (polycystic ovarian syndrome): followed by gyn  -     Hemoglobin A1c; Future    Suspect occ GI symptoms are due to period - may have component of IBS. Having normal BM in bt and episodes last < 1 day about 3 days per year.   rec keep log of sx, diet, stressors if this occurs in future to see if can identify any specific triggers other than period. If so then can work to reduce this. If worsens she understands to let me know and will consider further eval.     Health Maintenance   Topic Date Due    Pap Smear  10/13/2019    TETANUS VACCINE  05/22/2025    Influenza Vaccine  Completed    Lipid Panel  Completed

## 2017-12-12 ENCOUNTER — PATIENT MESSAGE (OUTPATIENT)
Dept: INTERNAL MEDICINE | Facility: CLINIC | Age: 27
End: 2017-12-12

## 2017-12-12 ENCOUNTER — TELEPHONE (OUTPATIENT)
Dept: INTERNAL MEDICINE | Facility: CLINIC | Age: 27
End: 2017-12-12

## 2017-12-12 DIAGNOSIS — R53.83 FATIGUE, UNSPECIFIED TYPE: Primary | ICD-10-CM

## 2017-12-12 NOTE — TELEPHONE ENCOUNTER
Called pt and left vm stating that lab orders has been linked to lab appt on 12/14/2017 and also stated that  rec that she call her insurance company prior to her going to her lab appt to make sure they will cover her labs   Left office number for pt to call w/ any questions

## 2017-12-12 NOTE — TELEPHONE ENCOUNTER
This pt seems to be asking for additional lab work to be done with the labs that are already ordered. Please advise and authorize

## 2017-12-12 NOTE — TELEPHONE ENCOUNTER
Reviewed pt message - ordered labs but please notify pt that it may not be covered by insurance for fatigue work up - rec she contact insurance company prior to lab appt

## 2017-12-13 ENCOUNTER — PATIENT MESSAGE (OUTPATIENT)
Dept: INTERNAL MEDICINE | Facility: CLINIC | Age: 27
End: 2017-12-13

## 2017-12-14 ENCOUNTER — PATIENT MESSAGE (OUTPATIENT)
Dept: INTERNAL MEDICINE | Facility: CLINIC | Age: 27
End: 2017-12-14

## 2017-12-14 NOTE — TELEPHONE ENCOUNTER
Please notify pt that I called the number given but was unable to reach anyone to review the icd codes provided.   rec she contact insurance company and inquire if icd code fatigue (R53.83) is approved by insurance for labs below. If not then please unlink free t4, thyroid peroxidase ab and thyroglobulin ab screen to scheduled labs unless pt wants to pay out of pocket    Have low suspicion that these will be abnormal   rec for now check TSH and other annual labs currently scheduled

## 2017-12-21 ENCOUNTER — LAB VISIT (OUTPATIENT)
Dept: LAB | Facility: OTHER | Age: 27
End: 2017-12-21
Attending: INTERNAL MEDICINE
Payer: COMMERCIAL

## 2017-12-21 DIAGNOSIS — Z00.00 ANNUAL PHYSICAL EXAM: ICD-10-CM

## 2017-12-21 DIAGNOSIS — R53.83 FATIGUE, UNSPECIFIED TYPE: ICD-10-CM

## 2017-12-21 DIAGNOSIS — E55.9 VITAMIN D DEFICIENCY: ICD-10-CM

## 2017-12-21 DIAGNOSIS — E28.2 PCOS (POLYCYSTIC OVARIAN SYNDROME): ICD-10-CM

## 2017-12-21 LAB
25(OH)D3+25(OH)D2 SERPL-MCNC: 24 NG/ML
ALBUMIN SERPL BCP-MCNC: 4.1 G/DL
ALP SERPL-CCNC: 59 U/L
ALT SERPL W/O P-5'-P-CCNC: 10 U/L
ANION GAP SERPL CALC-SCNC: 8 MMOL/L
AST SERPL-CCNC: 17 U/L
BASOPHILS # BLD AUTO: 0.01 K/UL
BASOPHILS NFR BLD: 0.1 %
BILIRUB SERPL-MCNC: 1.8 MG/DL
BUN SERPL-MCNC: 8 MG/DL
CALCIUM SERPL-MCNC: 9.1 MG/DL
CHLORIDE SERPL-SCNC: 106 MMOL/L
CHOLEST SERPL-MCNC: 141 MG/DL
CHOLEST/HDLC SERPL: 2.4 {RATIO}
CO2 SERPL-SCNC: 26 MMOL/L
CREAT SERPL-MCNC: 0.8 MG/DL
DIFFERENTIAL METHOD: ABNORMAL
EOSINOPHIL # BLD AUTO: 0.1 K/UL
EOSINOPHIL NFR BLD: 0.7 %
ERYTHROCYTE [DISTWIDTH] IN BLOOD BY AUTOMATED COUNT: 12.6 %
EST. GFR  (AFRICAN AMERICAN): >60 ML/MIN/1.73 M^2
EST. GFR  (NON AFRICAN AMERICAN): >60 ML/MIN/1.73 M^2
ESTIMATED AVG GLUCOSE: 91 MG/DL
GLUCOSE SERPL-MCNC: 86 MG/DL
HBA1C MFR BLD HPLC: 4.8 %
HCT VFR BLD AUTO: 35.9 %
HDLC SERPL-MCNC: 60 MG/DL
HDLC SERPL: 42.6 %
HGB BLD-MCNC: 12 G/DL
LDLC SERPL CALC-MCNC: 72.6 MG/DL
LYMPHOCYTES # BLD AUTO: 3.3 K/UL
LYMPHOCYTES NFR BLD: 46.4 %
MCH RBC QN AUTO: 29.6 PG
MCHC RBC AUTO-ENTMCNC: 33.4 G/DL
MCV RBC AUTO: 89 FL
MONOCYTES # BLD AUTO: 0.6 K/UL
MONOCYTES NFR BLD: 8.6 %
NEUTROPHILS # BLD AUTO: 3.1 K/UL
NEUTROPHILS NFR BLD: 44.1 %
NONHDLC SERPL-MCNC: 81 MG/DL
PLATELET # BLD AUTO: 213 K/UL
PMV BLD AUTO: 10 FL
POTASSIUM SERPL-SCNC: 3.6 MMOL/L
PROT SERPL-MCNC: 6.9 G/DL
RBC # BLD AUTO: 4.05 M/UL
SODIUM SERPL-SCNC: 140 MMOL/L
T4 FREE SERPL-MCNC: 1.04 NG/DL
THYROGLOB AB SERPL IA-ACNC: <4 IU/ML
THYROPEROXIDASE IGG SERPL-ACNC: <6 IU/ML
TRIGL SERPL-MCNC: 42 MG/DL
TSH SERPL DL<=0.005 MIU/L-ACNC: 3.29 UIU/ML
WBC # BLD AUTO: 7.11 K/UL

## 2017-12-21 PROCEDURE — 80061 LIPID PANEL: CPT

## 2017-12-21 PROCEDURE — 80053 COMPREHEN METABOLIC PANEL: CPT

## 2017-12-21 PROCEDURE — 86800 THYROGLOBULIN ANTIBODY: CPT

## 2017-12-21 PROCEDURE — 84443 ASSAY THYROID STIM HORMONE: CPT

## 2017-12-21 PROCEDURE — 84439 ASSAY OF FREE THYROXINE: CPT

## 2017-12-21 PROCEDURE — 83036 HEMOGLOBIN GLYCOSYLATED A1C: CPT

## 2017-12-21 PROCEDURE — 86376 MICROSOMAL ANTIBODY EACH: CPT

## 2017-12-21 PROCEDURE — 85025 COMPLETE CBC W/AUTO DIFF WBC: CPT

## 2017-12-21 PROCEDURE — 82306 VITAMIN D 25 HYDROXY: CPT

## 2017-12-21 PROCEDURE — 36415 COLL VENOUS BLD VENIPUNCTURE: CPT

## 2017-12-22 ENCOUNTER — TELEPHONE (OUTPATIENT)
Dept: INTERNAL MEDICINE | Facility: CLINIC | Age: 27
End: 2017-12-22

## 2017-12-22 DIAGNOSIS — D64.9 ANEMIA, UNSPECIFIED TYPE: Primary | ICD-10-CM

## 2017-12-22 DIAGNOSIS — E80.6 HYPERBILIRUBINEMIA: ICD-10-CM

## 2017-12-23 NOTE — TELEPHONE ENCOUNTER
Message sent to pt via my chart with lab results and updates to plan.   Please schedule lft's, LDH and ferritin/tibc in 1 week

## 2018-01-08 ENCOUNTER — LAB VISIT (OUTPATIENT)
Dept: LAB | Facility: OTHER | Age: 28
End: 2018-01-08
Attending: INTERNAL MEDICINE
Payer: COMMERCIAL

## 2018-01-08 DIAGNOSIS — E80.6 HYPERBILIRUBINEMIA: ICD-10-CM

## 2018-01-08 DIAGNOSIS — D64.9 ANEMIA, UNSPECIFIED TYPE: ICD-10-CM

## 2018-01-08 LAB
ALBUMIN SERPL BCP-MCNC: 4.1 G/DL
ALP SERPL-CCNC: 62 U/L
ALT SERPL W/O P-5'-P-CCNC: 15 U/L
AST SERPL-CCNC: 18 U/L
BILIRUB DIRECT SERPL-MCNC: 0.5 MG/DL
BILIRUB SERPL-MCNC: 1.4 MG/DL
FERRITIN SERPL-MCNC: 58 NG/ML
IRON SERPL-MCNC: 135 UG/DL
LDH SERPL L TO P-CCNC: 130 U/L
PROT SERPL-MCNC: 7 G/DL
SATURATED IRON: 37 %
TOTAL IRON BINDING CAPACITY: 361 UG/DL
TRANSFERRIN SERPL-MCNC: 244 MG/DL

## 2018-01-08 PROCEDURE — 82728 ASSAY OF FERRITIN: CPT

## 2018-01-08 PROCEDURE — 36415 COLL VENOUS BLD VENIPUNCTURE: CPT

## 2018-01-08 PROCEDURE — 83540 ASSAY OF IRON: CPT

## 2018-01-08 PROCEDURE — 80076 HEPATIC FUNCTION PANEL: CPT

## 2018-01-08 PROCEDURE — 83615 LACTATE (LD) (LDH) ENZYME: CPT

## 2018-01-09 ENCOUNTER — TELEPHONE (OUTPATIENT)
Dept: SPORTS MEDICINE | Facility: CLINIC | Age: 28
End: 2018-01-09

## 2018-01-15 ENCOUNTER — PATIENT MESSAGE (OUTPATIENT)
Dept: SPORTS MEDICINE | Facility: CLINIC | Age: 28
End: 2018-01-15

## 2018-01-29 ENCOUNTER — PATIENT MESSAGE (OUTPATIENT)
Dept: SPORTS MEDICINE | Facility: CLINIC | Age: 28
End: 2018-01-29

## 2018-01-29 ENCOUNTER — HOSPITAL ENCOUNTER (OUTPATIENT)
Dept: RADIOLOGY | Facility: HOSPITAL | Age: 28
Discharge: HOME | End: 2018-01-29
Attending: ORTHOPAEDIC SURGERY
Payer: COMMERCIAL

## 2018-01-29 ENCOUNTER — OFFICE VISIT (OUTPATIENT)
Dept: SPORTS MEDICINE | Facility: CLINIC | Age: 28
End: 2018-01-29
Payer: COMMERCIAL

## 2018-01-29 VITALS
WEIGHT: 105 LBS | DIASTOLIC BLOOD PRESSURE: 79 MMHG | BODY MASS INDEX: 17.93 KG/M2 | SYSTOLIC BLOOD PRESSURE: 120 MMHG | HEIGHT: 64 IN | HEART RATE: 68 BPM

## 2018-01-29 DIAGNOSIS — M25.552 LEFT HIP PAIN: ICD-10-CM

## 2018-01-29 DIAGNOSIS — M25.552 LEFT HIP PAIN: Primary | ICD-10-CM

## 2018-01-29 DIAGNOSIS — M25.552 PAIN IN JOINT INVOLVING LEFT PELVIC REGION AND THIGH: ICD-10-CM

## 2018-01-29 PROCEDURE — 99214 OFFICE O/P EST MOD 30 MIN: CPT | Mod: S$GLB,,, | Performed by: ORTHOPAEDIC SURGERY

## 2018-01-29 PROCEDURE — 73503 X-RAY EXAM HIP UNI 4/> VIEWS: CPT | Mod: 26,LT,, | Performed by: RADIOLOGY

## 2018-01-29 PROCEDURE — 99999 PR PBB SHADOW E&M-EST. PATIENT-LVL III: CPT | Mod: PBBFAC,,, | Performed by: ORTHOPAEDIC SURGERY

## 2018-01-29 PROCEDURE — 73503 X-RAY EXAM HIP UNI 4/> VIEWS: CPT | Mod: TC,PO,LT

## 2018-01-29 NOTE — PROGRESS NOTES
CC: left hip pain    HPI:   Angela Lyle is a pleasant 27 y.o. patient who reports to clinic with left hip pain. No trauma, no mech sxs/instabilty.    Today the patient rates pain at a 4/10 on visual analog scale.      Affecting ADLs and exercising    running activity worst    X 6 weeks, started to run more    Review of Systems   Constitution: Negative. Negative for chills, fever and night sweats.   HENT: Negative for congestion and headaches.    Eyes: Negative for blurred vision, left vision loss and right vision loss.   Cardiovascular: Negative for chest pain and syncope.   Respiratory: Negative for cough and shortness of breath.    Endocrine: Negative for polydipsia, polyphagia and polyuria.   Hematologic/Lymphatic: Negative for bleeding problem. Does not bruise/bleed easily.   Skin: Negative for dry skin, itching and rash.   Musculoskeletal: Negative for falls and muscle weakness.   Gastrointestinal: Negative for abdominal pain and bowel incontinence.   Genitourinary: Negative for bladder incontinence and nocturia.   Neurological: Negative for disturbances in coordination, loss of balance and seizures.   Psychiatric/Behavioral: Negative for depression. The patient does not have insomnia.    Allergic/Immunologic: Negative for hives and persistent infections.     PAST MEDICAL HISTORY:   Past Medical History:   Diagnosis Date    Allergy     Anemia     hx of HOSEA on fergon in past    Anxiety     Arthritis     right shoulder    Depression     Fatigue     History of psychiatric care     Keloid cicatrix     PCOS (polycystic ovarian syndrome)     Psychiatric problem     Therapy      PAST SURGICAL HISTORY:   Past Surgical History:   Procedure Laterality Date    SHOULDER SURGERY Right 06/2017    TONSILLECTOMY      wisdom teeth removal  2010     FAMILY HISTORY:   Family History   Problem Relation Age of Onset    Arthritis Mother      back surgery    Fibromyalgia Mother     Hyperlipidemia Father   "   Arthritis Maternal Grandmother     Heart disease Paternal Grandfather     Amblyopia Neg Hx     Blindness Neg Hx     Cataracts Neg Hx     Glaucoma Neg Hx     Macular degeneration Neg Hx     Retinal detachment Neg Hx     Strabismus Neg Hx     Melanoma Neg Hx      SOCIAL HISTORY:   Social History     Social History    Marital status: Single     Spouse name: N/A    Number of children: N/A    Years of education: N/A     Occupational History    Labor & Delivery RN      Social History Main Topics    Smoking status: Never Smoker    Smokeless tobacco: Never Used    Alcohol use Yes    Drug use: No    Sexual activity: Yes     Partners: Female     Birth control/ protection: None     Other Topics Concern    Are You Pregnant Or Think You May Be? No    Breast-Feeding No     Social History Narrative        Works as L&D RN at [employer]    Lives with roommates in apartment in [place] [facility] [facility].           MEDICATIONS:   Current Outpatient Prescriptions:     ergocalciferol, vitamin D2, 2,000 unit Tab, Take 2,000 Units by mouth once daily., Disp: , Rfl:     diphenoxylate-atropine 2.5-0.025 mg (LOMOTIL) 2.5-0.025 mg per tablet, Take 1 tablet by mouth 4 (four) times daily as needed for Diarrhea., Disp: 30 tablet, Rfl: 1    ondansetron (ZOFRAN-ODT) 4 MG TbDL, Take 1 tablet (4 mg total) by mouth every 6 to 8 hours as needed., Disp: 30 tablet, Rfl: 1  ALLERGIES:   Review of patient's allergies indicates:   Allergen Reactions    Sulfa (sulfonamide antibiotics) Hives and Itching    Sulfamethoxazole-trimethoprim      Other reaction(s): Itching       VITAL SIGNS: /79   Pulse 68   Ht 5' 4" (1.626 m)   Wt 47.6 kg (105 lb)   BMI 18.02 kg/m²        PHYSICAL EXAM /  HIP  PHYSICAL EXAMINATION  General:  The patient is alert and oriented x 3.  Mood is pleasant.  Observation of ears, eyes and nose reveal no gross abnormalities.  HEENT: NCAT, sclera nonicteric  Lungs: Respirations are equal and unlabored..    left HIP EXAMINATION "     OBSERVATION / INSPECTION  Gait:   Nonantalgic   Alignment:  Neutral   Scars:   None   Muscle atrophy: None   Effusion:  None   Warmth:  None   Discoloration:   None   Leg lengths:   Equal   Pelvis:   Level     TENDERNESS / CREPITUS (T/C):      T / C  Trochanteric bursa   - / -  Piriformis    - / -  SI joint    - / -  Psoas tendon   - / -  Rectus insertion  - / -  Adductor insertion  - / -  Pubic symphysis  - / -    ROM: (* = pain)    Flexion:    120 degrees  External rotation: 40 degrees  Internal rotation with axial load: 30 degrees  Internal rotation without axial load: 40 degrees  Abduction:  45 degrees  Adduction:   20 degrees    SPECIAL TESTS:  Pain w/ forced internal rotation (FADIR): +   Pain w/ forced external rotation (TREVON): Absent   Circumduction test:    -  Stinchfield test:    Negative   Log roll:      Negative   Snapping hip (internal):   Negative   Sit-up pain:     Negative   Resisted sit-up pain:    Negative   Resisted sit-up with adductor contraction pain:  Negative   Step-down test:    +  Trendelenburg test:    Negative  Bridge test     +     EXTREMITY NEURO-VASCULAR EXAMINATION:   Sensation:  Grossly intact to light touch all dermatomal regions.   Motor Function:  Fully intact motor function at hip, knee, foot and ankle    DTRs;  quadriceps and  achilles 2+.  No clonus and downgoing Babinski.    Vascular status:  DP and PT pulses 2+, brisk capillary refill, symmetric.    Skin:  intact, compartments soft.    OTHER FINDINGS:      XRAYS:  CE angle: 34  Crossover: -  CAM: -  Joint space narrowing: none    ASSESSMENT:    1. left hip abd/core weakness  2. Possible labral tear    PLAN:  1. PT for left hip abd/core strengthing  2. NSAIDS prn  3. All questions were answered, pt will contact us for questions or concerns in the interim.  4. Recheck in 2 months

## 2018-02-05 ENCOUNTER — CLINICAL SUPPORT (OUTPATIENT)
Dept: REHABILITATION | Facility: HOSPITAL | Age: 28
End: 2018-02-05
Attending: ORTHOPAEDIC SURGERY
Payer: COMMERCIAL

## 2018-02-05 DIAGNOSIS — M25.552 LEFT HIP PAIN: ICD-10-CM

## 2018-02-05 PROCEDURE — 97110 THERAPEUTIC EXERCISES: CPT

## 2018-02-05 PROCEDURE — 97161 PT EVAL LOW COMPLEX 20 MIN: CPT

## 2018-02-05 NOTE — PROGRESS NOTES
Physical Therapy Evaluation    Name: Angela Lyle  Clinic Number: [id number]      Medical Diagnosis:   Encounter Diagnosis   Name Primary?    Left hip pain      PT Diagnosis: Left hip pain  Physician: Hanna Brown MD  Treatment Orders: PT Eval and Treat    Past Medical History:   Diagnosis Date    Allergy     Anemia     hx of HOSEA on fergon in past    Anxiety     Arthritis     right shoulder    Depression     Fatigue     History of psychiatric care     Keloid cicatrix     PCOS (polycystic ovarian syndrome)     Psychiatric problem     Therapy      Current Outpatient Prescriptions   Medication Sig    diphenoxylate-atropine 2.5-0.025 mg (LOMOTIL) 2.5-0.025 mg per tablet Take 1 tablet by mouth 4 (four) times daily as needed for Diarrhea.    ergocalciferol, vitamin D2, 2,000 unit Tab Take 2,000 Units by mouth once daily.    ondansetron (ZOFRAN-ODT) 4 MG TbDL Take 1 tablet (4 mg total) by mouth every 6 to 8 hours as needed.     No current facility-administered medications for this visit.      Review of patient's allergies indicates:   Allergen Reactions    Sulfa (sulfonamide antibiotics) Hives and Itching    Sulfamethoxazole-trimethoprim      Other reaction(s): Itching       Precautions: None given    Evaluation Date: 2/5/2018  Visit # authorized: 1 of 30  Authorization period: 12/31/2018    Time In: 214pm   Time Out:: 312pm    Subjective   Primary concern/ Chief complaints:    Angela is a 27 y.o. female that presents to [facility] with primary complaints of left hip and groin pain that began about 4 weeks ago, possibly due to beginning a running program.  The patient states that she feels pain in her groin with pivoting, sitting butterfly or  style, rolling in bed, getting in and out of bed.  She states pain in groin is more intense than buttocks pain.  She states that she is no longer performing any regular exercise.  She has found no relief from symptoms other than  avoiding provocative activity.  No cultural, environmental, or spiritual barriers identified to treatment or learning.       Pain Scale: Angela rates pain on a scale of 0-10 to be 8 at worst; 3 currently; 0 at best .    Aggravates: sitting butterfly, getting in and out of bed, getting in and out of car, pivoting  Relieves: Rest     Patient Goals: Return to all regular activity without pain      Objective     Observation: patient arrives with no obvious gait deviations, iliac crest height symmetrical, PSIS and ASIS symmetrical     Hip Range of Motion:   Right Passive Left Passive   Flexion 114 degrees 92 degrees, painful   Abduction 58 degrees 48 degrees, painful   Ext. Rotation 67 degrees 34 degrees, painful   Int. Rotation 43 degrees 27 degrees, painful       Lower Extremity Strength  Right LE  Left LE    Quadriceps: 5/5 Quadriceps: 5/5   Hamstrings: 5/5 Hamstrings: 5/5   Iliopsoas: 5/5 Iliopsoas: 4+/5   Hip extension:  4+/5 Hip extension: 4/5   PGM: 4/5 PGM: 4-/5   Hip ER: 4+/5 Hip ER: 4/5   Hip IR: 5/5 Hip IR: 4/5     Special Tests:   FADER - positive left  Scour - neg left  TREVON - neg left    Lumbar Spine:  Slight left deviation with forward bending, slight left concavity in static stance    Functional Limitations Reports - G Codes  Category: mobility  Tool: FOTO Hip   Modifier  Impairment Limitation Restriction    CH  0 % impaired, limited or restricted    CI  @ least 1% but less than 20% impaired, limited or restricted    CJ  @ least 20%<40% impaired, limited or restricted    CK  @ least 40%<60% impaired, limited or restricted    CL  @ least 60% <80% impaired, limited or restricted    CM  @ least 80%<100% impaired limited or restricted    CN  100% impaired, limited or restricted     2/5/18 FOTO Score: 51  Current/:  CK  Goal/: CJ  Discharge/:      TREATMENT:  Angela received therapeutic exercises to address deficits in left hip mobility, strength for 24 minutes including:   Bridge with  adduction  Clamshell  Prone hip extension  Side planks    Angela  received the following manual therapy techniques to address deficits in ROM and joint mobility x 0 min:      Instructed pt. regarding: Proper technique with all exercises. Pt demonstrated good understanding of the education provided. Angela demonstrated good return demonstration of activities.    Pt/family was provided educational information, including: role of PT, goals for PT, scheduling - pt verbalized understanding. Discussed insurance limitations with pt.     Pt has no cultural, educational or language barriers to learning provided.    Assessment     This is a 27 y.o. female referred to outpatient physical therapy and presents with a medical diagnosis of left hip pain.  Patient presents with objective deficits in left hip P/AROM, left hip strength that limits the patient's ability to perform bed mobility, getting in and out of car, pivoting.  Patient will benefit from skilled physical therapy services to address these deficits and progress to premorbid functional level.  These deficits and goals were discussed with the patient and patient is in agreement with them as to be addressed in the treatment plan. Patient was given a HEP consisting of exercises listed above. Patient verbally understood the instructions as they were given and demonstrated proper form and technique during therapy. Pt was advised to perform these exercises free of pain, and to stop performing them if pain occurs.     Medical necessity is demonstrated by the following IMPAIRMENTS/PROMBLEM LIST:   1)Increase in pain level limiting function   2)Pain with pivoting   3)Pain with turning in bed   4)Pain with sitting in butterfly   5)Lack of HEP    GOALS: Short Term Goals:  4 weeks  1. Increase strength to 4/5 in bilateral hip abductors to increase tolerance for ADL and work activities.  3. Increase hip flexion PROM to 105 degrees of left hip without pain to increase ease with bed  mobility, sitting on the floor.    Long Term Goals: 8 weeks  1.Patient goal: To return to walking and jogging for exercise  2.Increase strength to 4+/5 in  Bilateral PGM  to increase tolerance for pivoting and sit to stand.  3.Patient will demonstrate 110 degrees of PROM left hip flexion and no pain with change of direction with walking to increase ease with daily activities, sitting.    History  Co-morbidities and personal factors that may impact the plan of care Examination  Body Structures and Functions, activity limitations and participation restrictions that may impact the plan of care    Clinical Presentation   Co-morbidities:   low BMI        Personal Factors:   None Body Regions:   lower extremities    Body Systems:    gait            Participation Restrictions:   None     Activity limitations:   Learning and applying knowledge  no deficits    General Tasks and Commands  no deficits    Communication  no deficits    Mobility  transitional movements    Self care  no deficits    Domestic Life  no deficits    Interactions/Relationships  no deficits    Life Areas  no deficits    Community and Social Life  no deficits         stable and uncomplicated                      low   low  low Decision Making/ Complexity Score:  low         Plan     Pt will be treated by physical therapy 2-3 times a week for Pt Education, HEP, therapeutic exercises, neuromuscular re-education, joint mobilizations, modalities prn to achieve established goals. Angela may at times be seen by a PTA as part of the Rehab Team.     Cont PT for  8 weeks.     rCisti Montesinos, PT, DPT    I certify the need for these services furnished under this plan of treatment and while under my care.______________________________ Physician/Referring Practitioner  Date of Signature

## 2018-02-14 ENCOUNTER — CLINICAL SUPPORT (OUTPATIENT)
Dept: REHABILITATION | Facility: HOSPITAL | Age: 28
End: 2018-02-14
Attending: ORTHOPAEDIC SURGERY
Payer: COMMERCIAL

## 2018-02-14 DIAGNOSIS — M25.552 LEFT HIP PAIN: ICD-10-CM

## 2018-02-14 PROCEDURE — 97110 THERAPEUTIC EXERCISES: CPT

## 2018-02-14 PROCEDURE — 97140 MANUAL THERAPY 1/> REGIONS: CPT

## 2018-02-14 NOTE — PROGRESS NOTES
"Physical Therapy Note    Name: Angela Lyle  Clinic Number: [id number]      Medical Diagnosis:   Encounter Diagnosis   Name Primary?    Left hip pain      PT Diagnosis: Left hip pain  Physician: Hanna Brown MD  Treatment Orders: PT Eval and Treat    Past Medical History:   Diagnosis Date    Allergy     Anemia     hx of HOSEA on fergon in past    Anxiety     Arthritis     right shoulder    Depression     Fatigue     History of psychiatric care     Keloid cicatrix     PCOS (polycystic ovarian syndrome)     Psychiatric problem     Therapy      Current Outpatient Prescriptions   Medication Sig    diphenoxylate-atropine 2.5-0.025 mg (LOMOTIL) 2.5-0.025 mg per tablet Take 1 tablet by mouth 4 (four) times daily as needed for Diarrhea.    ergocalciferol, vitamin D2, 2,000 unit Tab Take 2,000 Units by mouth once daily.    ondansetron (ZOFRAN-ODT) 4 MG TbDL Take 1 tablet (4 mg total) by mouth every 6 to 8 hours as needed.     No current facility-administered medications for this visit.      Review of patient's allergies indicates:   Allergen Reactions    Sulfa (sulfonamide antibiotics) Hives and Itching    Sulfamethoxazole-trimethoprim      Other reaction(s): Itching       Precautions: None given    Evaluation Date: 2/5/2018  Visit # authorized: 2 of 30  Authorization period: 12/31/2018    Time In: 1103am  Time Out:: 1200pm    Subjective     Patient states that she is feeling slight decrease in intensity and frequency of pain since beginning Pt, and pain doesn't surprise her anymore    Aggravates: sitting butterfly, getting in and out of bed, getting in and out of car, pivoting  Relieves: Rest     Patient Goals: Return to all regular activity without pain      Objective     TREATMENT:  Angela received therapeutic exercises to address deficits in left hip mobility, strength for 39 minutes including:   Bicycle - 10'  Sb DKTC - 30x  SB bridge - 2 x 10, 3"  Sidelying hip abduction circles - 5 x " "8  Hamstrings stretch active at 90/90 - 5 x 15"  Airex 3 way kicks - 10x ea  Shuttle SLP - 2 x 10, 1.5c  Paloff press - 2 x 10, green tube    Angela received neuromuscular re-education exercises to address deficits in left hip mobility, strength x 0-minutes including:    Angela  received the following manual therapy techniques to address deficits in ROM and joint mobility x 0 min including:   Soft tissue mobilization in sidelying of left piriformis and gluteus medius  Lumbar  Gr II-III  Lateral distraction of left hip with mobilization belt     Instructed pt. regarding: Proper technique with all exercises. Pt demonstrated good understanding of the education provided. Angela demonstrated good return demonstration of activities.    Pt/family was provided educational information, including: role of PT, goals for PT, scheduling - pt verbalized understanding. Discussed insurance limitations with pt.     Pt has no cultural, educational or language barriers to learning provided.    Assessment     The patient has improved active and passive hip flexion ROM since initial visit and decreased pain with palpation of anterior hip    Medical necessity is demonstrated by the following IMPAIRMENTS/PROMBLEM LIST:   1)Increase in pain level limiting function   2)Pain with pivoting   3)Pain with turning in bed   4)Pain with sitting in butterfly   5)Lack of HEP    GOALS: Short Term Goals:  4 weeks  1. Increase strength to 4/5 in bilateral hip abductors to increase tolerance for ADL and work activities.  3. Increase hip flexion PROM to 105 degrees of left hip without pain to increase ease with bed mobility, sitting on the floor.    Long Term Goals: 8 weeks  1.Patient goal: To return to walking and jogging for exercise  2.Increase strength to 4+/5 in  Bilateral PGM  to increase tolerance for pivoting and sit to stand.  3.Patient will demonstrate 110 degrees of PROM left hip flexion and no pain with change of direction with walking to " increase ease with daily activities, sitting.      Plan     Progress hip and core strengthening, proprioceptive exercises    Cristi Montesinos PT, DPT

## 2018-02-16 ENCOUNTER — CLINICAL SUPPORT (OUTPATIENT)
Dept: REHABILITATION | Facility: HOSPITAL | Age: 28
End: 2018-02-16
Attending: ORTHOPAEDIC SURGERY
Payer: COMMERCIAL

## 2018-02-16 DIAGNOSIS — M25.552 LEFT HIP PAIN: ICD-10-CM

## 2018-02-16 PROCEDURE — 97110 THERAPEUTIC EXERCISES: CPT

## 2018-02-16 PROCEDURE — 97140 MANUAL THERAPY 1/> REGIONS: CPT

## 2018-02-16 NOTE — PROGRESS NOTES
"Physical Therapy Note    Name: Angela Lyle  Clinic Number: [id number]      Medical Diagnosis:   Encounter Diagnosis   Name Primary?    Left hip pain      PT Diagnosis: Left hip pain  Physician: Hanna Brown MD  Treatment Orders: PT Eval and Treat    Past Medical History:   Diagnosis Date    Allergy     Anemia     hx of HOSEA on fergon in past    Anxiety     Arthritis     right shoulder    Depression     Fatigue     History of psychiatric care     Keloid cicatrix     PCOS (polycystic ovarian syndrome)     Psychiatric problem     Therapy      Current Outpatient Prescriptions   Medication Sig    diphenoxylate-atropine 2.5-0.025 mg (LOMOTIL) 2.5-0.025 mg per tablet Take 1 tablet by mouth 4 (four) times daily as needed for Diarrhea.    ergocalciferol, vitamin D2, 2,000 unit Tab Take 2,000 Units by mouth once daily.    ondansetron (ZOFRAN-ODT) 4 MG TbDL Take 1 tablet (4 mg total) by mouth every 6 to 8 hours as needed.     No current facility-administered medications for this visit.      Review of patient's allergies indicates:   Allergen Reactions    Sulfa (sulfonamide antibiotics) Hives and Itching    Sulfamethoxazole-trimethoprim      Other reaction(s): Itching       Precautions: None given    Evaluation Date: 2/5/2018  Visit # authorized: 3 of 30  Authorization period: 12/31/2018    Time In: 214pm  Time Out:: 328pm    Subjective     Patient states that she is feeling lateral hip pain that is present with standing and walking but intensity of pain is decreased overall    Aggravates: sitting butterfly, getting in and out of bed, getting in and out of car, pivoting  Relieves: Rest     Patient Goals: Return to all regular activity without pain      Objective     TREATMENT:  Angela received therapeutic exercises to address deficits in left hip mobility, strength for 42 minutes including:   Bicycle - 10'  Sb DKTC - 30x  SB bridge - 2 x 10, 3"  Side plank with hip abduction - 3 x 10  ITB stretch supine " "- 5 x 20"  Shuttle hip ext - 2 x 10, red  Shuttle SLP - 2 x 10, 1.5c  Paloff press - 2 x 10, green tube    Angela received neuromuscular re-education exercises to address deficits in left hip mobility, strength x 0-minutes including:    Angela  received the following manual therapy techniques to address deficits in ROM and joint mobility x 19 min including:   Soft tissue mobilization in sidelying of left piriformis and gluteus medius  Lumbar  Gr II-III  Lateral distraction of left hip with mobilization belt     Instructed pt. regarding: Proper technique with all exercises. Pt demonstrated good understanding of the education provided. Angela demonstrated good return demonstration of activities.    Pt/family was provided educational information, including: role of PT, goals for PT, scheduling - pt verbalized understanding. Discussed insurance limitations with pt.     Pt has no cultural, educational or language barriers to learning provided.    Assessment     The patient demonstrates decreased pain with passive hip flexion and improved hip Er strength    Medical necessity is demonstrated by the following IMPAIRMENTS/PROMBLEM LIST:   1)Increase in pain level limiting function   2)Pain with pivoting   3)Pain with turning in bed   4)Pain with sitting in butterfly   5)Lack of HEP    GOALS: Short Term Goals:  4 weeks  1. Increase strength to 4/5 in bilateral hip abductors to increase tolerance for ADL and work activities.  3. Increase hip flexion PROM to 105 degrees of left hip without pain to increase ease with bed mobility, sitting on the floor.    Long Term Goals: 8 weeks  1.Patient goal: To return to walking and jogging for exercise  2.Increase strength to 4+/5 in  Bilateral PGM  to increase tolerance for pivoting and sit to stand.  3.Patient will demonstrate 110 degrees of PROM left hip flexion and no pain with change of direction with walking to increase ease with daily activities, sitting.      Plan     Progress " hip and core strengthening, proprioceptive exercises    Cristiashlee Montesinos, PT, DPT

## 2018-02-19 ENCOUNTER — CLINICAL SUPPORT (OUTPATIENT)
Dept: REHABILITATION | Facility: HOSPITAL | Age: 28
End: 2018-02-19
Attending: ORTHOPAEDIC SURGERY
Payer: COMMERCIAL

## 2018-02-19 DIAGNOSIS — M25.552 LEFT HIP PAIN: ICD-10-CM

## 2018-02-19 PROCEDURE — 97110 THERAPEUTIC EXERCISES: CPT

## 2018-02-19 PROCEDURE — 97140 MANUAL THERAPY 1/> REGIONS: CPT

## 2018-02-19 NOTE — PROGRESS NOTES
"Physical Therapy Note    Name: Angela Lyle  Clinic Number: [id number]      Medical Diagnosis:   No diagnosis found.  PT Diagnosis: Left hip pain  Physician: Hanna Brown MD  Treatment Orders: PT Eval and Treat    Past Medical History:   Diagnosis Date    Allergy     Anemia     hx of HOSEA on fergon in past    Anxiety     Arthritis     right shoulder    Depression     Fatigue     History of psychiatric care     Keloid cicatrix     PCOS (polycystic ovarian syndrome)     Psychiatric problem     Therapy      Current Outpatient Prescriptions   Medication Sig    diphenoxylate-atropine 2.5-0.025 mg (LOMOTIL) 2.5-0.025 mg per tablet Take 1 tablet by mouth 4 (four) times daily as needed for Diarrhea.    ergocalciferol, vitamin D2, 2,000 unit Tab Take 2,000 Units by mouth once daily.    ondansetron (ZOFRAN-ODT) 4 MG TbDL Take 1 tablet (4 mg total) by mouth every 6 to 8 hours as needed.     No current facility-administered medications for this visit.      Review of patient's allergies indicates:   Allergen Reactions    Sulfa (sulfonamide antibiotics) Hives and Itching    Sulfamethoxazole-trimethoprim      Other reaction(s): Itching       Precautions: None given    Evaluation Date: 2/5/2018  Visit # authorized: 4 of 30  Authorization period: 12/31/2018    Time In: 201pm  Time Out:: 305pm    Subjective     Patient states that she had increased frequency of discomfort over the weekend that was almost constant soreness in her hip    Aggravates: sitting butterfly, getting in and out of bed, getting in and out of car, pivoting  Relieves: Rest     Patient Goals: Return to all regular activity without pain      Objective     TREATMENT:  Angela received therapeutic exercises to address deficits in left hip mobility, strength for 42 minutes including:   Sb DKTC - 30x  SB bridge - 2 x 10, 3"  Side plank with hip abduction - 3 x 10  ITB stretch supine - 5 x 20"  Prone hip extension - 2 x 10  Bandwalk - 1 lap, " green  Shuttle SLP - 2 x 10, 1.5c  Paloff press - 2 x 10, green tube    Angela received neuromuscular re-education exercises to address deficits in left hip mobility, strength x 0-minutes including:    Angela  received the following manual therapy techniques to address deficits in ROM and joint mobility x 19 min including:   Soft tissue mobilization in sidelying of left piriformis and gluteus medius  Lumbar  Gr II-III  Lateral distraction of left hip with mobilization belt     Instructed pt. regarding: Proper technique with all exercises. Pt demonstrated good understanding of the education provided. Angela demonstrated good return demonstration of activities.    Pt/family was provided educational information, including: role of PT, goals for PT, scheduling - pt verbalized understanding. Discussed insurance limitations with pt.     Pt has no cultural, educational or language barriers to learning provided.    Assessment     The patient had no reproduction of pain with intervention, possibly reacted to increased hip flexion stretch last visit    Medical necessity is demonstrated by the following IMPAIRMENTS/PROMBLEM LIST:   1)Increase in pain level limiting function   2)Pain with pivoting   3)Pain with turning in bed   4)Pain with sitting in butterfly   5)Lack of HEP    GOALS: Short Term Goals:  4 weeks  1. Increase strength to 4/5 in bilateral hip abductors to increase tolerance for ADL and work activities.  3. Increase hip flexion PROM to 105 degrees of left hip without pain to increase ease with bed mobility, sitting on the floor.    Long Term Goals: 8 weeks  1.Patient goal: To return to walking and jogging for exercise  2.Increase strength to 4+/5 in  Bilateral PGM  to increase tolerance for pivoting and sit to stand.  3.Patient will demonstrate 110 degrees of PROM left hip flexion and no pain with change of direction with walking to increase ease with daily activities, sitting.      Plan     Progress hip and  core strengthening, proprioceptive exercises    Cristiashlee Montesinos, PT, DPT

## 2018-02-22 ENCOUNTER — CLINICAL SUPPORT (OUTPATIENT)
Dept: REHABILITATION | Facility: HOSPITAL | Age: 28
End: 2018-02-22
Attending: ORTHOPAEDIC SURGERY
Payer: COMMERCIAL

## 2018-02-22 DIAGNOSIS — M25.552 LEFT HIP PAIN: ICD-10-CM

## 2018-02-22 PROCEDURE — 97110 THERAPEUTIC EXERCISES: CPT

## 2018-02-22 PROCEDURE — 97140 MANUAL THERAPY 1/> REGIONS: CPT

## 2018-02-26 ENCOUNTER — CLINICAL SUPPORT (OUTPATIENT)
Dept: REHABILITATION | Facility: HOSPITAL | Age: 28
End: 2018-02-26
Attending: ORTHOPAEDIC SURGERY
Payer: COMMERCIAL

## 2018-02-26 DIAGNOSIS — M25.552 LEFT HIP PAIN: ICD-10-CM

## 2018-02-26 PROCEDURE — 97140 MANUAL THERAPY 1/> REGIONS: CPT

## 2018-02-26 PROCEDURE — 97110 THERAPEUTIC EXERCISES: CPT

## 2018-02-26 NOTE — PROGRESS NOTES
"Physical Therapy Note    Name: Angela Lyle  Clinic Number: [id number]      Medical Diagnosis:   No diagnosis found.  PT Diagnosis: Left hip pain  Physician: Hanna Brown MD  Treatment Orders: PT Eval and Treat    Past Medical History:   Diagnosis Date    Allergy     Anemia     hx of HOSEA on fergon in past    Anxiety     Arthritis     right shoulder    Depression     Fatigue     History of psychiatric care     Keloid cicatrix     PCOS (polycystic ovarian syndrome)     Psychiatric problem     Therapy      Current Outpatient Prescriptions   Medication Sig    diphenoxylate-atropine 2.5-0.025 mg (LOMOTIL) 2.5-0.025 mg per tablet Take 1 tablet by mouth 4 (four) times daily as needed for Diarrhea.    ergocalciferol, vitamin D2, 2,000 unit Tab Take 2,000 Units by mouth once daily.    ondansetron (ZOFRAN-ODT) 4 MG TbDL Take 1 tablet (4 mg total) by mouth every 6 to 8 hours as needed.     No current facility-administered medications for this visit.      Review of patient's allergies indicates:   Allergen Reactions    Sulfa (sulfonamide antibiotics) Hives and Itching    Sulfamethoxazole-trimethoprim      Other reaction(s): Itching       Precautions: None given    Evaluation Date: 2/5/2018  Visit # authorized: 5 of 30  Authorization period: 12/31/2018    Time In: 800am  Time Out:: 900am    Subjective     Patient states that she continues to have left hip pain that is described as sharp and in the groin region.  Symptoms are inconsistent at this time.   Aggravates: sitting butterfly, getting in and out of bed, getting in and out of car, pivoting  Relieves: Rest     Patient Goals: Return to all regular activity without pain      Objective     TREATMENT:  Angela received therapeutic exercises to address deficits in left hip mobility, strength for 42 minutes including:   SB bridge - 2 x 10, 3"  Prone hip extension - 2 x 10  Bandwalk - 2 laps, green band, side step and backward walk at 45 degree angle  Shuttle " SLP - 2 x 10, 1.5c  Paloff press - 2 x 10, green tube  Squats with UE dowel assist and manual resist bilat abduct 2x5  Isometric hip adduction in multiple angles 45sec x 4      Angela  received the following manual therapy techniques to address deficits in ROM and joint mobility x 19 min including:   Soft tissue mobilization in sidelying of left piriformis and gluteus medius  Lumbar  Gr II-III  Lateral distraction of left hip with mobilization belt   HVLA lumbar spine bilateral directions    Instructed pt. regarding: Proper technique with all exercises. Pt demonstrated good understanding of the education provided. Angela demonstrated good return demonstration of activities.    Pt/family was provided educational information, including: role of PT, goals for PT, scheduling - pt verbalized understanding. Discussed insurance limitations with pt.     Pt has no cultural, educational or language barriers to learning provided.    Assessment     The patient tolerated exercises well.  Noted a immediate decrease in pain with squat assisted exercises to promote proper technique.  Implemented isometric adduction for release and decrease of pain.  Pt presented with immediate improvement in motion in the TREVON position.      Medical necessity is demonstrated by the following IMPAIRMENTS/PROMBLEM LIST:   1)Increase in pain level limiting function   2)Pain with pivoting   3)Pain with turning in bed   4)Pain with sitting in butterfly   5)Lack of HEP    GOALS: Short Term Goals:  4 weeks  1. Increase strength to 4/5 in bilateral hip abductors to increase tolerance for ADL and work activities.  3. Increase hip flexion PROM to 105 degrees of left hip without pain to increase ease with bed mobility, sitting on the floor.    Long Term Goals: 8 weeks  1.Patient goal: To return to walking and jogging for exercise  2.Increase strength to 4+/5 in  Bilateral PGM  to increase tolerance for pivoting and sit to stand.  3.Patient will  demonstrate 110 degrees of PROM left hip flexion and no pain with change of direction with walking to increase ease with daily activities, sitting.      Plan     Progress hip and core strengthening, proprioceptive exercises    Randy Ferraro DPT

## 2018-02-26 NOTE — PROGRESS NOTES
"Physical Therapy Note    Name: Angela Lyle  Clinic Number: [id number]      Medical Diagnosis:   Encounter Diagnosis   Name Primary?    Left hip pain      PT Diagnosis: Left hip pain  Physician: Hanna Brown MD  Treatment Orders: PT Eval and Treat    Past Medical History:   Diagnosis Date    Allergy     Anemia     hx of HOSEA on fergon in past    Anxiety     Arthritis     right shoulder    Depression     Fatigue     History of psychiatric care     Keloid cicatrix     PCOS (polycystic ovarian syndrome)     Psychiatric problem     Therapy      Current Outpatient Prescriptions   Medication Sig    diphenoxylate-atropine 2.5-0.025 mg (LOMOTIL) 2.5-0.025 mg per tablet Take 1 tablet by mouth 4 (four) times daily as needed for Diarrhea.    ergocalciferol, vitamin D2, 2,000 unit Tab Take 2,000 Units by mouth once daily.    ondansetron (ZOFRAN-ODT) 4 MG TbDL Take 1 tablet (4 mg total) by mouth every 6 to 8 hours as needed.     No current facility-administered medications for this visit.      Review of patient's allergies indicates:   Allergen Reactions    Sulfa (sulfonamide antibiotics) Hives and Itching    Sulfamethoxazole-trimethoprim      Other reaction(s): Itching       Precautions: None given    Evaluation Date: 2/5/2018  Visit # authorized: 5 of 30  Authorization period: 12/31/2018    Time In: 209pm  Time Out:: 309pm    Subjective     The patient states that she feels back to about where she was last week in terms of pain frequency.  She is still having pain with quick movements, but feeling better in and out of her car.    Aggravates: sitting butterfly, getting in and out of bed, getting in and out of car, pivoting  Relieves: Rest     Patient Goals: Return to all regular activity without pain      Objective     TREATMENT:  Angela received therapeutic exercises to address deficits in left hip mobility, strength for 42 minutes including:   Sb DKTC - 30x  SB bridge - 2 x 10, 3"  BKFO - 2 x 10 each " "side  Side plank with hip abduction - 3 x 10  ITB stretch supine - 5 x 20"  Bandwalk - 1 lap, green  Shuttle SLP - 2 x 10, 1.5c  Paloff press - 2 x 10, green tube    Angela received neuromuscular re-education exercises to address deficits in left hip mobility, strength x 0-minutes including:    Angela  received the following manual therapy techniques to address deficits in ROM and joint mobility x 19 min including:   Soft tissue mobilization in sidelying of left piriformis and gluteus medius  Lumbar  Gr II-III  Lateral distraction of left hip with mobilization belt  Inferior joint mobilizations of left hip in supine with hip and knee flexion GR II-III    Instructed pt. regarding: Proper technique with all exercises. Pt demonstrated good understanding of the education provided. Angela demonstrated good return demonstration of activities.    Pt/family was provided educational information, including: role of PT, goals for PT, scheduling - pt verbalized understanding. Discussed insurance limitations with pt.     Pt has no cultural, educational or language barriers to learning provided.    Assessment     The patient had improved hip flexion AROM and hip abduction AROM after inferior joint mobs and decreasd pain with pivoting    Medical necessity is demonstrated by the following IMPAIRMENTS/PROMBLEM LIST:   1)Increase in pain level limiting function   2)Pain with pivoting   3)Pain with turning in bed   4)Pain with sitting in butterfly   5)Lack of HEP    GOALS: Short Term Goals:  4 weeks  1. Increase strength to 4/5 in bilateral hip abductors to increase tolerance for ADL and work activities.  3. Increase hip flexion PROM to 105 degrees of left hip without pain to increase ease with bed mobility, sitting on the floor.    Long Term Goals: 8 weeks  1.Patient goal: To return to walking and jogging for exercise  2.Increase strength to 4+/5 in  Bilateral PGM  to increase tolerance for pivoting and sit to stand.  3.Patient " will demonstrate 110 degrees of PROM left hip flexion and no pain with change of direction with walking to increase ease with daily activities, sitting.      Plan     Progress hip and core strengthening, proprioceptive exercises    Cristi Montesinos PT, DPT

## 2018-03-02 ENCOUNTER — CLINICAL SUPPORT (OUTPATIENT)
Dept: REHABILITATION | Facility: HOSPITAL | Age: 28
End: 2018-03-02
Attending: ORTHOPAEDIC SURGERY
Payer: COMMERCIAL

## 2018-03-02 DIAGNOSIS — M25.552 LEFT HIP PAIN: Primary | ICD-10-CM

## 2018-03-02 PROCEDURE — 97110 THERAPEUTIC EXERCISES: CPT

## 2018-03-02 NOTE — PROGRESS NOTES
"Physical Therapy Note    Name: Angela Lyle  Clinic Number: [id number]      Medical Diagnosis:   No diagnosis found.  PT Diagnosis: Left hip pain  Physician: Hanna Brown MD  Treatment Orders: PT Eval and Treat    Past Medical History:   Diagnosis Date    Allergy     Anemia     hx of HOSEA on fergon in past    Anxiety     Arthritis     right shoulder    Depression     Fatigue     History of psychiatric care     Keloid cicatrix     PCOS (polycystic ovarian syndrome)     Psychiatric problem     Therapy      Current Outpatient Prescriptions   Medication Sig    diphenoxylate-atropine 2.5-0.025 mg (LOMOTIL) 2.5-0.025 mg per tablet Take 1 tablet by mouth 4 (four) times daily as needed for Diarrhea.    ergocalciferol, vitamin D2, 2,000 unit Tab Take 2,000 Units by mouth once daily.    ondansetron (ZOFRAN-ODT) 4 MG TbDL Take 1 tablet (4 mg total) by mouth every 6 to 8 hours as needed.     No current facility-administered medications for this visit.      Review of patient's allergies indicates:   Allergen Reactions    Sulfa (sulfonamide antibiotics) Hives and Itching    Sulfamethoxazole-trimethoprim      Other reaction(s): Itching       Precautions: None given    Evaluation Date: 2/5/2018  Visit # authorized: 6 of 30  Authorization period: 12/31/2018    Time In: 8:20  Time Out:: 9:10    Subjective     Pt reports w/ minimal pn in L hip when performing certain movements.       Objective     TREATMENT:  Angela received therapeutic exercises to address deficits in left hip mobility, strength for 50 minutes including:     TA activation 30 x 3 sec hold  SB bridge - 3 x 10, 3"  Prone hip extension - 3 x 10  Bandwalk - 2 laps, green band, side step and backward walk at 45 degree angle  Shuttle SLP - 2 x 15 1.5c  Paloff press - 3 x 10, green tube   Squats w/ gtb 2 x 10  Isometric hip adduction in multiple angles 45sec x 4 np  Supine ball sq 30 x 3 sec hold   Supine hip abd w/ pilates ring 30 x 3 sec hold "       Angela  received the following manual therapy techniques to address deficits in ROM and joint mobility x 0 min including:   Soft tissue mobilization in sidelying of left piriformis and gluteus medius  Lumbar  Gr II-III  Lateral distraction of left hip with mobilization belt   HVLA lumbar spine bilateral directions    Instructed pt. regarding: Proper technique with all exercises. Pt demonstrated good understanding of the education provided. Angela demonstrated good return demonstration of activities.    Pt/family was provided educational information, including: role of PT, goals for PT, scheduling - pt verbalized understanding. Discussed insurance limitations with pt.     Pt has no cultural, educational or language barriers to learning provided.    Assessment     Pt leisa tx well w/ no increase in pn.  Pt demonstrated increased endurance during therex w/ VCs for technique.  Pt edu on and instructed to cont HEP.  Cont to progress as leisa.       Medical necessity is demonstrated by the following IMPAIRMENTS/PROMBLEM LIST:   1)Increase in pain level limiting function   2)Pain with pivoting   3)Pain with turning in bed   4)Pain with sitting in butterfly   5)Lack of HEP    GOALS: Short Term Goals:  4 weeks  1. Increase strength to 4/5 in bilateral hip abductors to increase tolerance for ADL and work activities.  3. Increase hip flexion PROM to 105 degrees of left hip without pain to increase ease with bed mobility, sitting on the floor.    Long Term Goals: 8 weeks  1.Patient goal: To return to walking and jogging for exercise  2.Increase strength to 4+/5 in  Bilateral PGM  to increase tolerance for pivoting and sit to stand.  3.Patient will demonstrate 110 degrees of PROM left hip flexion and no pain with change of direction with walking to increase ease with daily activities, sitting.      Plan     Progress hip and core strengthening, proprioceptive exercises    Junior Lynne PTA

## 2018-03-06 ENCOUNTER — CLINICAL SUPPORT (OUTPATIENT)
Dept: REHABILITATION | Facility: HOSPITAL | Age: 28
End: 2018-03-06
Attending: ORTHOPAEDIC SURGERY
Payer: COMMERCIAL

## 2018-03-06 DIAGNOSIS — M25.552 LEFT HIP PAIN: ICD-10-CM

## 2018-03-06 PROCEDURE — 97110 THERAPEUTIC EXERCISES: CPT

## 2018-03-06 PROCEDURE — 97140 MANUAL THERAPY 1/> REGIONS: CPT

## 2018-03-06 NOTE — PROGRESS NOTES
"Physical Therapy Note    Name: Angela Lyle  Clinic Number: [id number]      Medical Diagnosis:   Encounter Diagnosis   Name Primary?    Left hip pain      PT Diagnosis: Left hip pain  Physician: Hanna Brown MD  Treatment Orders: PT Eval and Treat    Past Medical History:   Diagnosis Date    Allergy     Anemia     hx of HOSEA on fergon in past    Anxiety     Arthritis     right shoulder    Depression     Fatigue     History of psychiatric care     Keloid cicatrix     PCOS (polycystic ovarian syndrome)     Psychiatric problem     Therapy      Current Outpatient Prescriptions   Medication Sig    diphenoxylate-atropine 2.5-0.025 mg (LOMOTIL) 2.5-0.025 mg per tablet Take 1 tablet by mouth 4 (four) times daily as needed for Diarrhea.    ergocalciferol, vitamin D2, 2,000 unit Tab Take 2,000 Units by mouth once daily.    ondansetron (ZOFRAN-ODT) 4 MG TbDL Take 1 tablet (4 mg total) by mouth every 6 to 8 hours as needed.     No current facility-administered medications for this visit.      Review of patient's allergies indicates:   Allergen Reactions    Sulfa (sulfonamide antibiotics) Hives and Itching    Sulfamethoxazole-trimethoprim      Other reaction(s): Itching       Precautions: None given    Evaluation Date: 2/5/2018  Visit # authorized: 8 of 30  Authorization period: 12/31/2018    Time In: 103pm  Time Out:: 216pm    Subjective     The patient states that she is feeling like her pain is back to baseline but is mostly very low intensity    Aggravates: sitting butterfly, getting in and out of bed, getting in and out of car, pivoting  Relieves: Rest     Patient Goals: Return to all regular activity without pain      Objective     TREATMENT:  Angela received therapeutic exercises to address deficits in left hip mobility, strength for 42 minutes including:   Prone hip ext - 3 x 10  Prone cobra - 15 x 3"  Bird dogs - 2 x 10  BKFO - 2 x 10 each side  Side plank with hip abduction - 3 x 10  ITB stretch " "supine - 5 x 20"  SL squat - 2 x 10  Shuttle SLP - 2 x 10, 1.5c  Paloff press - 2 x 10, green tube    Angela received neuromuscular re-education exercises to address deficits in left hip mobility, strength x 0-minutes including:    Angela  received the following manual therapy techniques to address deficits in ROM and joint mobility x 19 min including:   Soft tissue mobilization in sidelying of left piriformis and gluteus medius  Lumbar  Gr II-III  Lateral distraction of left hip with mobilization belt  Inferior joint mobilizations of left hip in supine with hip and knee flexion GR II-III    Instructed pt. regarding: Proper technique with all exercises. Pt demonstrated good understanding of the education provided. Angela demonstrated good return demonstration of activities.    Pt/family was provided educational information, including: role of PT, goals for PT, scheduling - pt verbalized understanding. Discussed insurance limitations with pt.     Pt has no cultural, educational or language barriers to learning provided.    Assessment     The patient demonstrates decreased pain with passive hip flexion, but increased pain with hip rotation after mobilization    Medical necessity is demonstrated by the following IMPAIRMENTS/PROMBLEM LIST:   1)Increase in pain level limiting function   2)Pain with pivoting   3)Pain with turning in bed   4)Pain with sitting in butterfly   5)Lack of HEP    GOALS: Short Term Goals:  4 weeks  1. Increase strength to 4/5 in bilateral hip abductors to increase tolerance for ADL and work activities.  3. Increase hip flexion PROM to 105 degrees of left hip without pain to increase ease with bed mobility, sitting on the floor.    Long Term Goals: 8 weeks  1.Patient goal: To return to walking and jogging for exercise  2.Increase strength to 4+/5 in  Bilateral PGM  to increase tolerance for pivoting and sit to stand.  3.Patient will demonstrate 110 degrees of PROM left hip flexion and no pain " with change of direction with walking to increase ease with daily activities, sitting.      Plan     Progress hip and core strengthening, proprioceptive exercises    Cristi Montesinos PT, DPT

## 2018-03-08 ENCOUNTER — CLINICAL SUPPORT (OUTPATIENT)
Dept: REHABILITATION | Facility: HOSPITAL | Age: 28
End: 2018-03-08
Attending: ORTHOPAEDIC SURGERY
Payer: COMMERCIAL

## 2018-03-08 DIAGNOSIS — M25.552 LEFT HIP PAIN: ICD-10-CM

## 2018-03-08 PROCEDURE — 97110 THERAPEUTIC EXERCISES: CPT

## 2018-03-08 PROCEDURE — 97140 MANUAL THERAPY 1/> REGIONS: CPT

## 2018-03-08 NOTE — PROGRESS NOTES
"Physical Therapy Note    Name: Angela Lyle  Clinic Number: [id number]      Medical Diagnosis:   Encounter Diagnosis   Name Primary?    Left hip pain      PT Diagnosis: Left hip pain  Physician: Hanna Brown MD  Treatment Orders: PT Eval and Treat    Past Medical History:   Diagnosis Date    Allergy     Anemia     hx of HOSEA on fergon in past    Anxiety     Arthritis     right shoulder    Depression     Fatigue     History of psychiatric care     Keloid cicatrix     PCOS (polycystic ovarian syndrome)     Psychiatric problem     Therapy      Current Outpatient Prescriptions   Medication Sig    diphenoxylate-atropine 2.5-0.025 mg (LOMOTIL) 2.5-0.025 mg per tablet Take 1 tablet by mouth 4 (four) times daily as needed for Diarrhea.    ergocalciferol, vitamin D2, 2,000 unit Tab Take 2,000 Units by mouth once daily.    ondansetron (ZOFRAN-ODT) 4 MG TbDL Take 1 tablet (4 mg total) by mouth every 6 to 8 hours as needed.     No current facility-administered medications for this visit.      Review of patient's allergies indicates:   Allergen Reactions    Sulfa (sulfonamide antibiotics) Hives and Itching    Sulfamethoxazole-trimethoprim      Other reaction(s): Itching       Precautions: None given    Evaluation Date: 2/5/2018  Visit # authorized: 9 of 30  Authorization period: 12/31/2018    Time In: 103pm  Time Out:: 216pm    Subjective     The patient states that she is noticing that her pain is a little better over the last couple of days and she is moving a little better    Aggravates: sitting butterfly, getting in and out of bed, getting in and out of car, pivoting  Relieves: Rest     Patient Goals: Return to all regular activity without pain      Objective     TREATMENT:  Angela received therapeutic exercises to address deficits in left hip mobility, strength for 42 minutes including:   Prone hip ext - 3 x 10  Prone cobra - 15 x 3"  Bird dogs - 2 x 10  BKFO - 2 x 10 each side, green  Side plank with " "hip abduction - 3 x 10  ITB stretch supine - 5 x 20"  SL squat - 2 x 10  Fwd lunge - 20x  Paloff press - 2 x 10, green tube  BOSU squat - 3 x 20"    Angela received neuromuscular re-education exercises to address deficits in left hip mobility, strength x 0-minutes including:    Angela  received the following manual therapy techniques to address deficits in ROM and joint mobility x 19 min including:   Soft tissue mobilization in sidelying of left piriformis and gluteus medius  Lumbar  Gr II-III  Lateral distraction of left hip with mobilization belt  Inferior joint mobilizations of left hip in supine with hip and knee flexion GR II-III    Instructed pt. regarding: Proper technique with all exercises. Pt demonstrated good understanding of the education provided. Angela demonstrated good return demonstration of activities.    Pt/family was provided educational information, including: role of PT, goals for PT, scheduling - pt verbalized understanding. Discussed insurance limitations with pt.     Pt has no cultural, educational or language barriers to learning provided.    Assessment     The patient demonstrates decreased discomfort with passive hip motion and is able to perform increased intensity of exercises    Medical necessity is demonstrated by the following IMPAIRMENTS/PROMBLEM LIST:   1)Increase in pain level limiting function   2)Pain with pivoting   3)Pain with turning in bed   4)Pain with sitting in butterfly   5)Lack of HEP    GOALS: Short Term Goals:  4 weeks  1. Increase strength to 4/5 in bilateral hip abductors to increase tolerance for ADL and work activities.  3. Increase hip flexion PROM to 105 degrees of left hip without pain to increase ease with bed mobility, sitting on the floor.    Long Term Goals: 8 weeks  1.Patient goal: To return to walking and jogging for exercise  2.Increase strength to 4+/5 in  Bilateral PGM  to increase tolerance for pivoting and sit to stand.  3.Patient will " demonstrate 110 degrees of PROM left hip flexion and no pain with change of direction with walking to increase ease with daily activities, sitting.      Plan     Progress hip and core strengthening, proprioceptive exercises    Cristi Montesinos PT, DPT

## 2018-03-12 ENCOUNTER — PATIENT MESSAGE (OUTPATIENT)
Dept: INTERNAL MEDICINE | Facility: CLINIC | Age: 28
End: 2018-03-12

## 2018-03-12 NOTE — TELEPHONE ENCOUNTER
rec pt make appt to be eval for her symptoms  If she develops lightheadedness, sob, sweatiness or cp is severe then rec she go to ER is this occurs prior to clinic appt    Please arrange

## 2018-03-12 NOTE — TELEPHONE ENCOUNTER
Called pt and left an full detailed vm stating PCP rec and further advice   Left office number for pt to call and schedule   Also sent pt message via my chart

## 2018-03-13 ENCOUNTER — OFFICE VISIT (OUTPATIENT)
Dept: INTERNAL MEDICINE | Facility: CLINIC | Age: 28
End: 2018-03-13
Attending: INTERNAL MEDICINE
Payer: COMMERCIAL

## 2018-03-13 ENCOUNTER — HOSPITAL ENCOUNTER (OUTPATIENT)
Dept: RADIOLOGY | Facility: OTHER | Age: 28
Discharge: HOME | End: 2018-03-13
Attending: INTERNAL MEDICINE
Payer: COMMERCIAL

## 2018-03-13 ENCOUNTER — TELEPHONE (OUTPATIENT)
Dept: INTERNAL MEDICINE | Facility: CLINIC | Age: 28
End: 2018-03-13

## 2018-03-13 ENCOUNTER — CLINICAL SUPPORT (OUTPATIENT)
Dept: REHABILITATION | Facility: HOSPITAL | Age: 28
End: 2018-03-13
Attending: ORTHOPAEDIC SURGERY
Payer: COMMERCIAL

## 2018-03-13 VITALS
HEIGHT: 64 IN | DIASTOLIC BLOOD PRESSURE: 72 MMHG | WEIGHT: 102.94 LBS | BODY MASS INDEX: 17.57 KG/M2 | HEART RATE: 102 BPM | OXYGEN SATURATION: 99 % | SYSTOLIC BLOOD PRESSURE: 112 MMHG

## 2018-03-13 DIAGNOSIS — R07.9 CHEST PAIN, UNSPECIFIED TYPE: Primary | ICD-10-CM

## 2018-03-13 DIAGNOSIS — R07.9 CHEST PAIN, UNSPECIFIED TYPE: ICD-10-CM

## 2018-03-13 DIAGNOSIS — M25.552 LEFT HIP PAIN: ICD-10-CM

## 2018-03-13 PROCEDURE — 93010 ELECTROCARDIOGRAM REPORT: CPT | Mod: S$GLB,,, | Performed by: INTERNAL MEDICINE

## 2018-03-13 PROCEDURE — 71046 X-RAY EXAM CHEST 2 VIEWS: CPT | Mod: TC,FY

## 2018-03-13 PROCEDURE — 93005 ELECTROCARDIOGRAM TRACING: CPT | Mod: S$GLB,,, | Performed by: INTERNAL MEDICINE

## 2018-03-13 PROCEDURE — 97140 MANUAL THERAPY 1/> REGIONS: CPT

## 2018-03-13 PROCEDURE — 97110 THERAPEUTIC EXERCISES: CPT

## 2018-03-13 PROCEDURE — 71046 X-RAY EXAM CHEST 2 VIEWS: CPT | Mod: 26,,, | Performed by: RADIOLOGY

## 2018-03-13 PROCEDURE — 99999 PR PBB SHADOW E&M-EST. PATIENT-LVL III: CPT | Mod: PBBFAC,,, | Performed by: INTERNAL MEDICINE

## 2018-03-13 PROCEDURE — 99214 OFFICE O/P EST MOD 30 MIN: CPT | Mod: S$GLB,,, | Performed by: INTERNAL MEDICINE

## 2018-03-13 NOTE — TELEPHONE ENCOUNTER
Message sent to pt via my chart with lab results and updates to plan.   Please schedule echo in 2 weeks

## 2018-03-13 NOTE — PROGRESS NOTES
"Physical Therapy Note    Name: Angela Lyle  Clinic Number: [id number]      Medical Diagnosis:   Encounter Diagnosis   Name Primary?    Left hip pain      PT Diagnosis: Left hip pain  Physician: Hanna Brown MD  Treatment Orders: PT Eval and Treat    Past Medical History:   Diagnosis Date    Allergy     Anemia     hx of HOSEA on fergon in past    Anxiety     Arthritis     right shoulder    Depression     Fatigue     History of psychiatric care     Keloid cicatrix     PCOS (polycystic ovarian syndrome)     Psychiatric problem     Therapy      Current Outpatient Prescriptions   Medication Sig    diphenoxylate-atropine 2.5-0.025 mg (LOMOTIL) 2.5-0.025 mg per tablet Take 1 tablet by mouth 4 (four) times daily as needed for Diarrhea.    ergocalciferol, vitamin D2, 2,000 unit Tab Take 2,000 Units by mouth once daily.    ondansetron (ZOFRAN-ODT) 4 MG TbDL Take 1 tablet (4 mg total) by mouth every 6 to 8 hours as needed.     No current facility-administered medications for this visit.      Review of patient's allergies indicates:   Allergen Reactions    Sulfa (sulfonamide antibiotics) Hives and Itching    Sulfamethoxazole-trimethoprim      Other reaction(s): Itching       Precautions: None given    Evaluation Date: 2/5/2018  Visit # authorized: 10 of 30  Authorization period: 12/31/2018    Time In: 1011am  Time Out:: 1126am    Subjective     The patient states that her pain is catching her less often during the day, but it is still limiting bed mobility    Aggravates: sitting butterfly, getting in and out of bed, getting in and out of car, pivoting  Relieves: Rest     Patient Goals: Return to all regular activity without pain      Objective     TREATMENT:  Angela received therapeutic exercises to address deficits in left hip mobility, strength for 42 minutes including:   Prone hip ext - 3 x 10  Prone cobra - 15 x 3"  Bird dogs - 2 x 10  BKFO - 2 x 10 each side, green  Side plank with hip abduction - 3 " "x 10  SL squat - 2 x 10  Fwd lunge - 20x  Step out and press - 2 x 10, 7  BOSU squat - 3 x 20"    Angela received neuromuscular re-education exercises to address deficits in left hip mobility, strength x 0-minutes including:    Angela  received the following manual therapy techniques to address deficits in ROM and joint mobility x 19 min including:   Soft tissue mobilization in sidelying of left piriformis and gluteus medius  Lumbar  Gr II-III  Lateral distraction of left hip with mobilization belt  Inferior joint mobilizations of left hip in supine with hip and knee flexion GR II-III    Instructed pt. regarding: Proper technique with all exercises. Pt demonstrated good understanding of the education provided. Angela demonstrated good return demonstration of activities.    Pt/family was provided educational information, including: role of PT, goals for PT, scheduling - pt verbalized understanding. Discussed insurance limitations with pt.     Pt has no cultural, educational or language barriers to learning provided.    Assessment     The patient has improved valgus control of affected LE and decreased pain with trunk rotation.  She also has increased hip ROM after inferior glides of hip.    Medical necessity is demonstrated by the following IMPAIRMENTS/PROMBLEM LIST:   1)Increase in pain level limiting function   2)Pain with pivoting   3)Pain with turning in bed   4)Pain with sitting in butterfly   5)Lack of HEP    GOALS: Short Term Goals:  4 weeks  1. Increase strength to 4/5 in bilateral hip abductors to increase tolerance for ADL and work activities.  3. Increase hip flexion PROM to 105 degrees of left hip without pain to increase ease with bed mobility, sitting on the floor.    Long Term Goals: 8 weeks  1.Patient goal: To return to walking and jogging for exercise  2.Increase strength to 4+/5 in  Bilateral PGM  to increase tolerance for pivoting and sit to stand.  3.Patient will demonstrate 110 degrees of " PROM left hip flexion and no pain with change of direction with walking to increase ease with daily activities, sitting.      Plan     Progress hip and core strengthening, proprioceptive exercises    Cristiashlee Montesinos PT, DPT

## 2018-03-13 NOTE — PROGRESS NOTES
"Subjective:   Patient ID: Angela Lyle is a 27 y.o. female  Chief complaint:   Chief Complaint   Patient presents with    Chest Pain       HPI  Pt here for same day appt for episodic chest pain    Reports first episode was when first started taking wellbutrin in 2011. Reports occurred when has SE and with insomnia. Then resolved after a few weeks.   cp at left ant chest wall and substernal - achy and pressure in nature  Intermittent from 20min to 3 hours. Will occur at rest.  Will resolve on own and then return.     In past when gets anxious gets chest pain and this resolves after stressor resolved.   Reports first time it has lasted this 1.5 weeks - episodes in past have lasted a few days to 1 week  Has intermittent episodes occurring daily since onset with maybe one day of no symptoms.   Not triggered by exertion.   No pain with palpation or pushing on chest wall.   At times feels sob.   No palpitations.   No nausea with pain.   No trauma, falls, inciting event.   Only new stressor is this week started day shift x 1 day  No sob when flat.   No LE edema.   No cough, fevers or chills.   No recent med changes.   No GERD symptoms, no abd pain.   No unexplained wt changes.   No dysphagia with food or pills  No alleviating or worsening factors.   Hx of right shoulder surgery - no pain with this     No OCPs, no hx of blood clots  No recent travel or prolonged immobility   3 cups coffee per week only when working a nigh shift     Currently feeling it in same area.  Currently with chest tightness and pressure.      Not getting reg exercise   Attending PT for hip pain twice per week  - using elliptical x 10 minutes - reports "hard" after 1 minute with this and nausea with this    Reviewed clinic note with pt today from 2015    Review of Systems    Objective:  Vitals:    03/13/18 1331   BP: 112/72   Pulse: 102   SpO2: 99%   Weight: 46.7 kg (102 lb 15.3 oz)   Height: 5' 4" (1.626 m)     Body mass index is 17.67 " kg/m².    Physical Exam   Constitutional: She is oriented to person, place, and time. She appears well-developed and well-nourished. No distress.   Talking in complete sentences, no oral cyanosis    HENT:   Head: Normocephalic and atraumatic.   Eyes: Conjunctivae and EOM are normal.   Neck: Normal range of motion. Neck supple.   Cardiovascular: Normal rate, regular rhythm, normal heart sounds and intact distal pulses.  Exam reveals no friction rub.    Pulmonary/Chest: Effort normal and breath sounds normal. No respiratory distress. She has no wheezes. She has no rales. She exhibits no tenderness.   Abdominal: Soft. Normal appearance and bowel sounds are normal.   Musculoskeletal: She exhibits no edema or tenderness.   Neurological: She is alert and oriented to person, place, and time. She has normal strength. Gait normal.   Skin: Skin is warm, dry and intact. Capillary refill takes less than 2 seconds. She is not diaphoretic. No cyanosis. Nails show no clubbing.   Psychiatric: She has a normal mood and affect. Her speech is normal and behavior is normal. Cognition and memory are normal.   Vitals reviewed.    Assessment:  1. Chest pain, unspecified type        Plan:  Angela was seen today for chest pain.    Diagnoses and all orders for this visit:    Chest pain, unspecified type  -     X-Ray Chest PA And Lateral; Future  -     IN OFFICE EKG 12-LEAD (to Muse)  -     D dimer, quantitative; Future    Other orders  -     ranitidine (ZANTAC) 75 MG tablet; Take 1 tablet (75 mg total) by mouth 2 (two) times daily.    Suspect MSK vs anxiety - EKG reviewed by me in clinic today - stable compared to prior no st segment changes or t wave changes. Rate dec to 69  Check ddimer and cxr today  If those are normal then start trial of zantac otc x 2 weeks and avoid caffeine, spicy and acidic foods  ER and rtc prompts given     If all neg consider echo and cards eval  Cont stress mgmt techniques     Health Maintenance   Topic Date Due     Pap Smear  10/13/2019    TETANUS VACCINE  05/22/2025    Influenza Vaccine  Completed    Lipid Panel  Completed

## 2018-03-14 ENCOUNTER — PATIENT MESSAGE (OUTPATIENT)
Dept: INTERNAL MEDICINE | Facility: CLINIC | Age: 28
End: 2018-03-14

## 2018-03-16 ENCOUNTER — CLINICAL SUPPORT (OUTPATIENT)
Dept: REHABILITATION | Facility: HOSPITAL | Age: 28
End: 2018-03-16
Attending: ORTHOPAEDIC SURGERY
Payer: COMMERCIAL

## 2018-03-16 DIAGNOSIS — M25.552 LEFT HIP PAIN: ICD-10-CM

## 2018-03-16 PROCEDURE — 97140 MANUAL THERAPY 1/> REGIONS: CPT

## 2018-03-16 PROCEDURE — 97110 THERAPEUTIC EXERCISES: CPT

## 2018-03-16 NOTE — PROGRESS NOTES
"Physical Therapy Note    Name: Angela Lyle  Clinic Number: [id number]      Medical Diagnosis:   Encounter Diagnosis   Name Primary?    Left hip pain      PT Diagnosis: Left hip pain  Physician: Hanna Brown MD  Treatment Orders: PT Eval and Treat    Past Medical History:   Diagnosis Date    Allergy     Anemia     hx of HOSEA on fergon in past    Anxiety     Arthritis     right shoulder    Depression     Fatigue     History of psychiatric care     Keloid cicatrix     PCOS (polycystic ovarian syndrome)     Psychiatric problem     Therapy      Current Outpatient Prescriptions   Medication Sig    diphenoxylate-atropine 2.5-0.025 mg (LOMOTIL) 2.5-0.025 mg per tablet Take 1 tablet by mouth 4 (four) times daily as needed for Diarrhea.    ergocalciferol, vitamin D2, 2,000 unit Tab Take 2,000 Units by mouth once daily.    ondansetron (ZOFRAN-ODT) 4 MG TbDL Take 1 tablet (4 mg total) by mouth every 6 to 8 hours as needed.    ranitidine (ZANTAC) 75 MG tablet Take 1 tablet (75 mg total) by mouth 2 (two) times daily.     No current facility-administered medications for this visit.      Review of patient's allergies indicates:   Allergen Reactions    Sulfa (sulfonamide antibiotics) Hives and Itching    Sulfamethoxazole-trimethoprim      Other reaction(s): Itching       Precautions: None given    Evaluation Date: 2/5/2018  Visit # authorized: 11 of 30  Authorization period: 12/31/2018    Time In: 1107am  Time Out:: 1216pm    Subjective     The patient states that she is feeling about the same, still avoiding quick movements    Aggravates: sitting butterfly, getting in and out of bed, getting in and out of car, pivoting  Relieves: Rest     Patient Goals: Return to all regular activity without pain      Objective     TREATMENT:  Angela received therapeutic exercises to address deficits in left hip mobility, strength for 42 minutes including:   Prone hip ext - 3 x 10  Prone cobra - 15 x 3"  Bird dogs - 2 x " "10  BKFO - 2 x 10 each side, green  Side plank with hip abduction - 3 x 10  SL squat - 2 x 10  Fwd lunge - 20x  Step out and press - 2 x 10, 7  BOSU squat - 3 x 20"    Angela received neuromuscular re-education exercises to address deficits in left hip mobility, strength x 0-minutes including:    Angela  received the following manual therapy techniques to address deficits in ROM and joint mobility x 19 min including:   Soft tissue mobilization in sidelying of left piriformis and gluteus medius  Lumbar  Gr II-III  Lateral distraction of left hip with mobilization belt  Inferior joint mobilizations of left hip in supine with hip and knee flexion GR II-III    Instructed pt. regarding: Proper technique with all exercises. Pt demonstrated good understanding of the education provided. Angela demonstrated good return demonstration of activities.    Pt/family was provided educational information, including: role of PT, goals for PT, scheduling - pt verbalized understanding. Discussed insurance limitations with pt.     Pt has no cultural, educational or language barriers to learning provided.    Assessment     The patient demonstrates decreased pain with hip rotation on the stool    Medical necessity is demonstrated by the following IMPAIRMENTS/PROMBLEM LIST:   1)Increase in pain level limiting function   2)Pain with pivoting   3)Pain with turning in bed   4)Pain with sitting in butterfly   5)Lack of HEP    GOALS: Short Term Goals:  4 weeks  1. Increase strength to 4/5 in bilateral hip abductors to increase tolerance for ADL and work activities.  3. Increase hip flexion PROM to 105 degrees of left hip without pain to increase ease with bed mobility, sitting on the floor.    Long Term Goals: 8 weeks  1.Patient goal: To return to walking and jogging for exercise  2.Increase strength to 4+/5 in  Bilateral PGM  to increase tolerance for pivoting and sit to stand.  3.Patient will demonstrate 110 degrees of PROM left hip " flexion and no pain with change of direction with walking to increase ease with daily activities, sitting.      Plan     Progress hip and core strengthening, proprioceptive exercises    Cristi Montesinos PT, DPT

## 2018-03-21 ENCOUNTER — CLINICAL SUPPORT (OUTPATIENT)
Dept: REHABILITATION | Facility: HOSPITAL | Age: 28
End: 2018-03-21
Attending: ORTHOPAEDIC SURGERY
Payer: COMMERCIAL

## 2018-03-21 DIAGNOSIS — M25.552 LEFT HIP PAIN: ICD-10-CM

## 2018-03-21 PROCEDURE — 97110 THERAPEUTIC EXERCISES: CPT

## 2018-03-21 PROCEDURE — 97140 MANUAL THERAPY 1/> REGIONS: CPT

## 2018-03-21 NOTE — PROGRESS NOTES
"Physical Therapy Note    Name: Angela Lyle  Clinic Number: [id number]      Medical Diagnosis:   Encounter Diagnosis   Name Primary?    Left hip pain      PT Diagnosis: Left hip pain  Physician: Hanna Brown MD  Treatment Orders: PT Eval and Treat    Past Medical History:   Diagnosis Date    Allergy     Anemia     hx of HOSEA on fergon in past    Anxiety     Arthritis     right shoulder    Depression     Fatigue     History of psychiatric care     Keloid cicatrix     PCOS (polycystic ovarian syndrome)     Psychiatric problem     Therapy      Current Outpatient Prescriptions   Medication Sig    diphenoxylate-atropine 2.5-0.025 mg (LOMOTIL) 2.5-0.025 mg per tablet Take 1 tablet by mouth 4 (four) times daily as needed for Diarrhea.    ergocalciferol, vitamin D2, 2,000 unit Tab Take 2,000 Units by mouth once daily.    ondansetron (ZOFRAN-ODT) 4 MG TbDL Take 1 tablet (4 mg total) by mouth every 6 to 8 hours as needed.    ranitidine (ZANTAC) 75 MG tablet Take 1 tablet (75 mg total) by mouth 2 (two) times daily.     No current facility-administered medications for this visit.      Review of patient's allergies indicates:   Allergen Reactions    Sulfa (sulfonamide antibiotics) Hives and Itching    Sulfamethoxazole-trimethoprim      Other reaction(s): Itching       Precautions: None given    Evaluation Date: 2/5/2018  Visit # authorized: 12 of 30  Authorization period: 12/31/2018    Time In: 1011am  Time Out:: 1126am    Subjective     The patient states that she is feeling about the same, her pain is very low level but present    Aggravates: sitting butterfly, getting in and out of bed, getting in and out of car, pivoting  Relieves: Rest     Patient Goals: Return to all regular activity without pain      Objective     TREATMENT:  Angela received therapeutic exercises to address deficits in left hip mobility, strength for 44 minutes including:   Prone hip ext - 3 x 10  Planks - 3 x 30"  Bird dogs - 2 x " "10  BKFO - 2 x 10 each side, green  Side plank with hip abduction - 3 x 10  SL squat - 2 x 10  Tabletop ball press - 3 x 10  Step out and press - 2 x 10, 7  BOSU squat - 3 x 20"    Angela received neuromuscular re-education exercises to address deficits in left hip mobility, strength x 0-minutes including:    Angela  received the following manual therapy techniques to address deficits in ROM and joint mobility x 19 min including:   Soft tissue mobilization in sidelying of left piriformis and gluteus medius  Lumbar  Gr II-III  Lateral distraction of left hip with mobilization belt  Inferior joint mobilizations of left hip in supine with hip and knee flexion GR II-III    Instructed pt. regarding: Proper technique with all exercises. Pt demonstrated good understanding of the education provided. Angela demonstrated good return demonstration of activities.    Pt/family was provided educational information, including: role of PT, goals for PT, scheduling - pt verbalized understanding. Discussed insurance limitations with pt.     Pt has no cultural, educational or language barriers to learning provided.    Assessment     The patient demonstrates improved hip flexion AROM without pain, but hip rotation still reproducing pain in groin    Medical necessity is demonstrated by the following IMPAIRMENTS/PROMBLEM LIST:   1)Increase in pain level limiting function   2)Pain with pivoting   3)Pain with turning in bed   4)Pain with sitting in butterfly   5)Lack of HEP    GOALS: Short Term Goals:  4 weeks  1. Increase strength to 4/5 in bilateral hip abductors to increase tolerance for ADL and work activities.  3. Increase hip flexion PROM to 105 degrees of left hip without pain to increase ease with bed mobility, sitting on the floor.    Long Term Goals: 8 weeks  1.Patient goal: To return to walking and jogging for exercise  2.Increase strength to 4+/5 in  Bilateral PGM  to increase tolerance for pivoting and sit to " stand.  3.Patient will demonstrate 110 degrees of PROM left hip flexion and no pain with change of direction with walking to increase ease with daily activities, sitting.      Plan     Progress hip and core strengthening, proprioceptive exercises    Cristi Montesinos PT, DPT

## 2018-03-23 ENCOUNTER — CLINICAL SUPPORT (OUTPATIENT)
Dept: REHABILITATION | Facility: HOSPITAL | Age: 28
End: 2018-03-23
Attending: ORTHOPAEDIC SURGERY
Payer: COMMERCIAL

## 2018-03-23 DIAGNOSIS — M25.552 LEFT HIP PAIN: ICD-10-CM

## 2018-03-23 PROCEDURE — 97110 THERAPEUTIC EXERCISES: CPT

## 2018-03-23 PROCEDURE — 97140 MANUAL THERAPY 1/> REGIONS: CPT

## 2018-03-23 NOTE — PROGRESS NOTES
"Physical Therapy Note    Name: Angela Lyle  Clinic Number: [id number]      Medical Diagnosis:   Encounter Diagnosis   Name Primary?    Left hip pain      PT Diagnosis: Left hip pain  Physician: Hanna Brown MD  Treatment Orders: PT Eval and Treat    Past Medical History:   Diagnosis Date    Allergy     Anemia     hx of HOSEA on fergon in past    Anxiety     Arthritis     right shoulder    Depression     Fatigue     History of psychiatric care     Keloid cicatrix     PCOS (polycystic ovarian syndrome)     Psychiatric problem     Therapy      Current Outpatient Prescriptions   Medication Sig    diphenoxylate-atropine 2.5-0.025 mg (LOMOTIL) 2.5-0.025 mg per tablet Take 1 tablet by mouth 4 (four) times daily as needed for Diarrhea.    ergocalciferol, vitamin D2, 2,000 unit Tab Take 2,000 Units by mouth once daily.    ondansetron (ZOFRAN-ODT) 4 MG TbDL Take 1 tablet (4 mg total) by mouth every 6 to 8 hours as needed.    ranitidine (ZANTAC) 75 MG tablet Take 1 tablet (75 mg total) by mouth 2 (two) times daily.     No current facility-administered medications for this visit.      Review of patient's allergies indicates:   Allergen Reactions    Sulfa (sulfonamide antibiotics) Hives and Itching    Sulfamethoxazole-trimethoprim      Other reaction(s): Itching       Precautions: None given    Evaluation Date: 2/5/2018  Visit # authorized: 13 of 30  Authorization period: 12/31/2018    Time In: 811am  Time Out:: 924am    Subjective     The patient states that she is feeling like her pain still hasnt changed but she is feeling stronger    Patient Goals: Return to all regular activity without pain      Objective     TREATMENT:  Angela received therapeutic exercises to address deficits in left hip mobility, strength for 47 minutes including:   Step up with lateral band deviation - 3 x 10, 6"  Planks - 3 x 30"  Foam roll - ITB, 15x  Lat lunge - 2 x 10  Side plank with hip abduction - 3 x 10  SL squat - 2 x " 10  Tabletop ball press - 3 x 10    Angela received neuromuscular re-education exercises to address deficits in left hip mobility, strength x 0-minutes including:    Angela  received the following manual therapy techniques to address deficits in ROM and joint mobility x 12 min including:   Lateral distraction of left hip with mobilization belt  Inferior joint mobilizations of left hip in supine with hip and knee flexion GR II-III    Instructed pt. regarding: Proper technique with all exercises. Pt demonstrated good understanding of the education provided. Angela demonstrated good return demonstration of activities.    Pt/family was provided educational information, including: role of PT, goals for PT, scheduling - pt verbalized understanding. Discussed insurance limitations with pt.     Pt has no cultural, educational or language barriers to learning provided.    Assessment     The patient demonstrates improved strength for lunging without pain    Medical necessity is demonstrated by the following IMPAIRMENTS/PROMBLEM LIST:   1)Increase in pain level limiting function   2)Pain with pivoting   3)Pain with turning in bed   4)Pain with sitting in butterfly   5)Lack of HEP    GOALS: Short Term Goals:  4 weeks  1. Increase strength to 4/5 in bilateral hip abductors to increase tolerance for ADL and work activities.  3. Increase hip flexion PROM to 105 degrees of left hip without pain to increase ease with bed mobility, sitting on the floor.    Long Term Goals: 8 weeks  1.Patient goal: To return to walking and jogging for exercise  2.Increase strength to 4+/5 in  Bilateral PGM  to increase tolerance for pivoting and sit to stand.  3.Patient will demonstrate 110 degrees of PROM left hip flexion and no pain with change of direction with walking to increase ease with daily activities, sitting.      Plan     Progress hip and core strengthening, proprioceptive exercises    Cristi Montesinos, PT, DPT

## 2018-03-27 ENCOUNTER — CLINICAL SUPPORT (OUTPATIENT)
Dept: REHABILITATION | Facility: HOSPITAL | Age: 28
End: 2018-03-27
Attending: ORTHOPAEDIC SURGERY
Payer: COMMERCIAL

## 2018-03-27 DIAGNOSIS — M25.552 LEFT HIP PAIN: ICD-10-CM

## 2018-03-27 PROCEDURE — 97140 MANUAL THERAPY 1/> REGIONS: CPT

## 2018-03-27 PROCEDURE — 97110 THERAPEUTIC EXERCISES: CPT

## 2018-03-27 NOTE — PROGRESS NOTES
"Physical Therapy Note    Name: Angela Lyle  Clinic Number: [id number]      Medical Diagnosis:   Encounter Diagnosis   Name Primary?    Left hip pain      PT Diagnosis: Left hip pain  Physician: Hanna Brown MD  Treatment Orders: PT Eval and Treat    Past Medical History:   Diagnosis Date    Allergy     Anemia     hx of HOSEA on fergon in past    Anxiety     Arthritis     right shoulder    Depression     Fatigue     History of psychiatric care     Keloid cicatrix     PCOS (polycystic ovarian syndrome)     Psychiatric problem     Therapy      Current Outpatient Prescriptions   Medication Sig    diphenoxylate-atropine 2.5-0.025 mg (LOMOTIL) 2.5-0.025 mg per tablet Take 1 tablet by mouth 4 (four) times daily as needed for Diarrhea.    ergocalciferol, vitamin D2, 2,000 unit Tab Take 2,000 Units by mouth once daily.    ondansetron (ZOFRAN-ODT) 4 MG TbDL Take 1 tablet (4 mg total) by mouth every 6 to 8 hours as needed.    ranitidine (ZANTAC) 75 MG tablet Take 1 tablet (75 mg total) by mouth 2 (two) times daily.     No current facility-administered medications for this visit.      Review of patient's allergies indicates:   Allergen Reactions    Sulfa (sulfonamide antibiotics) Hives and Itching    Sulfamethoxazole-trimethoprim      Other reaction(s): Itching       Precautions: None given    Evaluation Date: 2/5/2018  Visit # authorized: 14 of 30  Authorization period: 12/31/2018    Time In: 1011am  Time Out:1119am    Subjective     The patient states that she is doing the same activity and still feeling soreness daily    Patient Goals: Return to all regular activity without pain      Objective     TREATMENT:  Angela received therapeutic exercises to address deficits in left hip mobility, strength for 44 minutes including:   Prone hip rotation - 2 x 10  Step up with lateral band deviation - 3 x 10, 6"  Planks - 3 x 30"  Foam roll - ITB, 15x  Lat lunge - 2 x 10  Side plank with hip abduction - 3 x " 10  SL squat - 2 x 10  Tabletop ball press - 3 x 10    Angela received neuromuscular re-education exercises to address deficits in left hip mobility, strength x 0-minutes including:    Angela  received the following manual therapy techniques to address deficits in ROM and joint mobility x 12 min including:   Lateral distraction of left hip with mobilization belt  Inferior joint mobilizations of left hip in supine with hip and knee flexion GR II-III    Instructed pt. regarding: Proper technique with all exercises. Pt demonstrated good understanding of the education provided. Angela demonstrated good return demonstration of activities.    Pt/family was provided educational information, including: role of PT, goals for PT, scheduling - pt verbalized understanding. Discussed insurance limitations with pt.     Pt has no cultural, educational or language barriers to learning provided.    Assessment     The patient demonstrates increased pain with loading of the joint, decreased rotation of hip in quadruped    Medical necessity is demonstrated by the following IMPAIRMENTS/PROMBLEM LIST:   1)Increase in pain level limiting function   2)Pain with pivoting   3)Pain with turning in bed   4)Pain with sitting in butterfly   5)Lack of HEP    GOALS: Short Term Goals:  4 weeks  1. Increase strength to 4/5 in bilateral hip abductors to increase tolerance for ADL and work activities.  3. Increase hip flexion PROM to 105 degrees of left hip without pain to increase ease with bed mobility, sitting on the floor.    Long Term Goals: 8 weeks  1.Patient goal: To return to walking and jogging for exercise  2.Increase strength to 4+/5 in  Bilateral PGM  to increase tolerance for pivoting and sit to stand.  3.Patient will demonstrate 110 degrees of PROM left hip flexion and no pain with change of direction with walking to increase ease with daily activities, sitting.      Plan     Progress hip and core strengthening, proprioceptive  exercises    Cristi Montesinos, PT, DPT

## 2018-03-28 ENCOUNTER — HOSPITAL ENCOUNTER (OUTPATIENT)
Dept: CARDIOLOGY | Facility: CLINIC | Age: 28
Discharge: HOME | End: 2018-03-28
Attending: INTERNAL MEDICINE
Payer: COMMERCIAL

## 2018-03-28 DIAGNOSIS — R07.9 CHEST PAIN, UNSPECIFIED TYPE: ICD-10-CM

## 2018-03-28 LAB
DIASTOLIC DYSFUNCTION: NO
ESTIMATED PA SYSTOLIC PRESSURE: 19.97
RETIRED EF AND QEF - SEE NOTES: 55 (ref 55–65)
TRICUSPID VALVE REGURGITATION: NORMAL

## 2018-03-28 PROCEDURE — 93306 TTE W/DOPPLER COMPLETE: CPT | Mod: S$GLB,,, | Performed by: INTERNAL MEDICINE

## 2018-04-02 ENCOUNTER — CLINICAL SUPPORT (OUTPATIENT)
Dept: REHABILITATION | Facility: HOSPITAL | Age: 28
End: 2018-04-02
Attending: ORTHOPAEDIC SURGERY
Payer: COMMERCIAL

## 2018-04-02 DIAGNOSIS — M25.552 LEFT HIP PAIN: ICD-10-CM

## 2018-04-02 PROCEDURE — 97110 THERAPEUTIC EXERCISES: CPT

## 2018-04-02 PROCEDURE — 97140 MANUAL THERAPY 1/> REGIONS: CPT

## 2018-04-02 NOTE — PROGRESS NOTES
"Physical Therapy Note    Name: Angela Lyle  Clinic Number: [id number]      Medical Diagnosis:   Encounter Diagnosis   Name Primary?    Left hip pain      PT Diagnosis: Left hip pain  Physician: Hanna Brown MD  Treatment Orders: PT Eval and Treat    Past Medical History:   Diagnosis Date    Allergy     Anemia     hx of HOSEA on fergon in past    Anxiety     Arthritis     right shoulder    Depression     Fatigue     History of psychiatric care     Keloid cicatrix     PCOS (polycystic ovarian syndrome)     Psychiatric problem     Therapy      Current Outpatient Prescriptions   Medication Sig    diphenoxylate-atropine 2.5-0.025 mg (LOMOTIL) 2.5-0.025 mg per tablet Take 1 tablet by mouth 4 (four) times daily as needed for Diarrhea.    ergocalciferol, vitamin D2, 2,000 unit Tab Take 2,000 Units by mouth once daily.    ondansetron (ZOFRAN-ODT) 4 MG TbDL Take 1 tablet (4 mg total) by mouth every 6 to 8 hours as needed.    ranitidine (ZANTAC) 75 MG tablet Take 1 tablet (75 mg total) by mouth 2 (two) times daily.     No current facility-administered medications for this visit.      Review of patient's allergies indicates:   Allergen Reactions    Sulfa (sulfonamide antibiotics) Hives and Itching    Sulfamethoxazole-trimethoprim      Other reaction(s): Itching       Precautions: None given    Evaluation Date: 2/5/2018  Visit # authorized: 15 of 30  Authorization period: 12/31/2018    Time In: 911am  Time Out: 1021am    Subjective     The patient states that yesterday, she had discomfort with getting in and out of bed again    Patient Goals: Return to all regular activity without pain      Objective     TREATMENT:  Angela received therapeutic exercises to address deficits in left hip mobility, strength for 44 minutes including:   Prone hip rotation - 2 x 10  Step up with lateral band deviation - 3 x 10, 6"  Planks - 3 x 30"  Foam roll - ITB, 15x  Lat lunge - 2 x 10  Side plank with hip abduction - 3 x " 10  SL squat - 2 x 10  Tabletop ball press - 3 x 10    Angela received neuromuscular re-education exercises to address deficits in left hip mobility, strength x 0-minutes including:    Angela  received the following manual therapy techniques to address deficits in ROM and joint mobility x 12 min including:   Lateral distraction of left hip with mobilization belt  Inferior joint mobilizations of left hip in supine with hip and knee flexion GR II-III    Instructed pt. regarding: Proper technique with all exercises. Pt demonstrated good understanding of the education provided. Angela demonstrated good return demonstration of activities.    Pt/family was provided educational information, including: role of PT, goals for PT, scheduling - pt verbalized understanding. Discussed insurance limitations with pt.     Pt has no cultural, educational or language barriers to learning provided.    Assessment     The patient is appropriate for f/u with Dr. Brown due to little change in symptoms    Medical necessity is demonstrated by the following IMPAIRMENTS/PROMBLEM LIST:   1)Increase in pain level limiting function   2)Pain with pivoting   3)Pain with turning in bed   4)Pain with sitting in butterfly   5)Lack of HEP    GOALS: Short Term Goals:  4 weeks  1. Increase strength to 4/5 in bilateral hip abductors to increase tolerance for ADL and work activities.  3. Increase hip flexion PROM to 105 degrees of left hip without pain to increase ease with bed mobility, sitting on the floor.    Long Term Goals: 8 weeks  1.Patient goal: To return to walking and jogging for exercise  2.Increase strength to 4+/5 in  Bilateral PGM  to increase tolerance for pivoting and sit to stand.  3.Patient will demonstrate 110 degrees of PROM left hip flexion and no pain with change of direction with walking to increase ease with daily activities, sitting.      Plan     Progress hip and core strengthening, proprioceptive exercises    Cristi Montesinos, PT,  DPT

## 2018-04-09 ENCOUNTER — OFFICE VISIT (OUTPATIENT)
Dept: SPORTS MEDICINE | Facility: CLINIC | Age: 28
End: 2018-04-09
Payer: COMMERCIAL

## 2018-04-09 VITALS
DIASTOLIC BLOOD PRESSURE: 78 MMHG | HEART RATE: 90 BPM | SYSTOLIC BLOOD PRESSURE: 111 MMHG | BODY MASS INDEX: 17.93 KG/M2 | WEIGHT: 105 LBS | HEIGHT: 64 IN

## 2018-04-09 DIAGNOSIS — M25.552 LEFT HIP PAIN: Primary | ICD-10-CM

## 2018-04-09 PROCEDURE — 99999 PR PBB SHADOW E&M-EST. PATIENT-LVL III: CPT | Mod: PBBFAC,,, | Performed by: ORTHOPAEDIC SURGERY

## 2018-04-09 PROCEDURE — 99214 OFFICE O/P EST MOD 30 MIN: CPT | Mod: S$GLB,,, | Performed by: ORTHOPAEDIC SURGERY

## 2018-04-09 NOTE — PROGRESS NOTES
CC: left hip pain    HPI:   Angela Lyle is a pleasant 27 y.o. patient who reports to clinic with left hip pain. No trauma, no mech sxs/instabilty. Pt has been in PT at [place] for 2 months for left hip. Locates pain in groin.    Today the patient rates pain at a 4/10 on visual analog scale.      Affecting ADLs and exercising    running activity worst    X 6 weeks, started to run more    Review of Systems   Constitution: Negative. Negative for chills, fever and night sweats.   HENT: Negative for congestion and headaches.    Eyes: Negative for blurred vision, left vision loss and right vision loss.   Cardiovascular: Negative for chest pain and syncope.   Respiratory: Negative for cough and shortness of breath.    Endocrine: Negative for polydipsia, polyphagia and polyuria.   Hematologic/Lymphatic: Negative for bleeding problem. Does not bruise/bleed easily.   Skin: Negative for dry skin, itching and rash.   Musculoskeletal: Negative for falls and muscle weakness.   Gastrointestinal: Negative for abdominal pain and bowel incontinence.   Genitourinary: Negative for bladder incontinence and nocturia.   Neurological: Negative for disturbances in coordination, loss of balance and seizures.   Psychiatric/Behavioral: Negative for depression. The patient does not have insomnia.    Allergic/Immunologic: Negative for hives and persistent infections.     PAST MEDICAL HISTORY:   Past Medical History:   Diagnosis Date    Allergy     Anemia     hx of HOSEA on fergon in past    Anxiety     Arthritis     right shoulder    Depression     Fatigue     History of psychiatric care     Keloid cicatrix     PCOS (polycystic ovarian syndrome)     Psychiatric problem     Therapy      PAST SURGICAL HISTORY:   Past Surgical History:   Procedure Laterality Date    SHOULDER SURGERY Right 06/2017    TONSILLECTOMY      wisdom teeth removal  2010     FAMILY HISTORY:   Family History   Problem Relation Age of Onset    Arthritis  "Mother      back surgery    Fibromyalgia Mother     Hyperlipidemia Father     Arthritis Maternal Grandmother     Heart disease Paternal Grandfather     Amblyopia Neg Hx     Blindness Neg Hx     Cataracts Neg Hx     Glaucoma Neg Hx     Macular degeneration Neg Hx     Retinal detachment Neg Hx     Strabismus Neg Hx     Melanoma Neg Hx      SOCIAL HISTORY:   Social History     Social History    Marital status: Single     Spouse name: N/A    Number of children: N/A    Years of education: N/A     Occupational History    Labor & Delivery RN      Social History Main Topics    Smoking status: Never Smoker    Smokeless tobacco: Never Used    Alcohol use Yes    Drug use: No    Sexual activity: Yes     Partners: Female     Birth control/ protection: None     Other Topics Concern    Are You Pregnant Or Think You May Be? No    Breast-Feeding No     Social History Narrative        Works as L&D RN at [employer]    Lives with roommates in apartment in [facility].           MEDICATIONS:   Current Outpatient Prescriptions:     diphenoxylate-atropine 2.5-0.025 mg (LOMOTIL) 2.5-0.025 mg per tablet, Take 1 tablet by mouth 4 (four) times daily as needed for Diarrhea., Disp: 30 tablet, Rfl: 1    ergocalciferol, vitamin D2, 2,000 unit Tab, Take 2,000 Units by mouth once daily., Disp: , Rfl:     ondansetron (ZOFRAN-ODT) 4 MG TbDL, Take 1 tablet (4 mg total) by mouth every 6 to 8 hours as needed., Disp: 30 tablet, Rfl: 1    ranitidine (ZANTAC) 75 MG tablet, Take 1 tablet (75 mg total) by mouth 2 (two) times daily., Disp: 60 tablet, Rfl: 0  ALLERGIES:   Review of patient's allergies indicates:   Allergen Reactions    Sulfa (sulfonamide antibiotics) Hives and Itching    Sulfamethoxazole-trimethoprim      Other reaction(s): Itching       VITAL SIGNS: /78   Pulse 90   Ht 5' 4" (1.626 m)   Wt 47.6 kg (105 lb)   BMI 18.02 kg/m²        PHYSICAL EXAM /  HIP  PHYSICAL EXAMINATION  General:  The patient is alert and " oriented x 3.  Mood is pleasant.  Observation of ears, eyes and nose reveal no gross abnormalities.  HEENT: NCAT, sclera nonicteric  Lungs: Respirations are equal and unlabored..    left HIP EXAMINATION     OBSERVATION / INSPECTION  Gait:   Nonantalgic   Alignment:  Neutral   Scars:   None   Muscle atrophy: None   Effusion:  None   Warmth:  None   Discoloration:   None   Leg lengths:   Equal   Pelvis:   Level     TENDERNESS / CREPITUS (T/C):      T / C  Trochanteric bursa   - / -  Piriformis    - / -  SI joint    - / -  Psoas tendon   - / -  Rectus insertion  - / -  Adductor insertion  - / -  Pubic symphysis  - / -    ROM: (* = pain)    Flexion:    115 degrees  External rotation: 40 degrees  Internal rotation with axial load: 30 degrees  Internal rotation without axial load: 40 degrees  Abduction:  45 degrees  Adduction:   20 degrees    SPECIAL TESTS:  Pain w/ forced internal rotation (FADIR): +   Pain w/ forced external rotation (TREVON): Absent   Circumduction test:    -  Stincfield test:    Negative   Log roll:      Negative   Snapping hip (internal):   Negative   Sit-up pain:     Negative   Resisted sit-up pain:    Negative   Resisted sit-up with adductor contraction pain:  Negative   Step-down test:    +  Trendelenburg test:    Negative  Bridge test     +     EXTREMITY NEURO-VASCULAR EXAMINATION:   Sensation:  Grossly intact to light touch all dermatomal regions.   Motor Function:  Fully intact motor function at hip, knee, foot and ankle    DTRs;  quadriceps and  achilles 2+.  No clonus and downgoing Babinski.    Vascular status:  DP and PT pulses 2+, brisk capillary refill, symmetric.    Skin:  intact, compartments soft.    OTHER FINDINGS:      XRAYS:  CE angle: 34  Crossover: -  CAM: -  Joint space narrowing: none    ASSESSMENT:    1. left hip abd/core weakness  2. Possible labral tear    PLAN:    Left hip MRI, based on results plan to proceed with injection to follow    left hip labral tear, chondrolabral  dysfunction    Anesthetic & cortisone injection to left hip with Dr. David Hernandez  Call me day after injection with diagnostic relief %    Non-op vs. Operative intervention risks and benefits explained in detail  All questions were answered, pt will contact us for questions or concerns in the interim.

## 2018-04-10 ENCOUNTER — TELEPHONE (OUTPATIENT)
Dept: RADIOLOGY | Facility: HOSPITAL | Age: 28
End: 2018-04-10

## 2018-04-11 ENCOUNTER — HOSPITAL ENCOUNTER (OUTPATIENT)
Dept: RADIOLOGY | Facility: HOSPITAL | Age: 28
Discharge: HOME | End: 2018-04-11
Attending: ORTHOPAEDIC SURGERY
Payer: COMMERCIAL

## 2018-04-11 ENCOUNTER — TELEPHONE (OUTPATIENT)
Dept: SPORTS MEDICINE | Facility: CLINIC | Age: 28
End: 2018-04-11

## 2018-04-11 DIAGNOSIS — M25.552 LEFT HIP PAIN: ICD-10-CM

## 2018-04-11 PROCEDURE — 73722 MRI JOINT OF LWR EXTR W/DYE: CPT | Mod: 26,LT,, | Performed by: RADIOLOGY

## 2018-04-11 PROCEDURE — 27093 INJECTION FOR HIP X-RAY: CPT | Mod: 26,,, | Performed by: RADIOLOGY

## 2018-04-11 PROCEDURE — 25500020 PHARM REV CODE 255: Performed by: ORTHOPAEDIC SURGERY

## 2018-04-11 PROCEDURE — 25000003 PHARM REV CODE 250: Performed by: ORTHOPAEDIC SURGERY

## 2018-04-11 PROCEDURE — 73722 MRI JOINT OF LWR EXTR W/DYE: CPT | Mod: TC,LT

## 2018-04-11 PROCEDURE — 73525 CONTRAST X-RAY OF HIP: CPT | Mod: 26,,, | Performed by: RADIOLOGY

## 2018-04-11 PROCEDURE — 27093 INJECTION FOR HIP X-RAY: CPT | Mod: TC

## 2018-04-11 RX ORDER — BUPIVACAINE HYDROCHLORIDE 2.5 MG/ML
5 INJECTION, SOLUTION EPIDURAL; INFILTRATION; INTRACAUDAL ONCE
Status: COMPLETED | OUTPATIENT
Start: 2018-04-11 | End: 2018-04-11

## 2018-04-11 RX ORDER — LIDOCAINE HYDROCHLORIDE 10 MG/ML
0.2 INJECTION, SOLUTION EPIDURAL; INFILTRATION; INTRACAUDAL; PERINEURAL ONCE
Status: COMPLETED | OUTPATIENT
Start: 2018-04-11 | End: 2018-04-11

## 2018-04-11 RX ORDER — GADOBUTROL 604.72 MG/ML
1 INJECTION INTRAVENOUS
Status: COMPLETED | OUTPATIENT
Start: 2018-04-11 | End: 2018-04-11

## 2018-04-11 RX ADMIN — GADOBUTROL 1 ML: 604.72 INJECTION INTRAVENOUS at 03:04

## 2018-04-11 RX ADMIN — IOHEXOL 5 ML: 300 INJECTION, SOLUTION INTRAVENOUS at 03:04

## 2018-04-11 RX ADMIN — BUPIVACAINE HYDROCHLORIDE 12.5 MG: 2.5 INJECTION, SOLUTION EPIDURAL; INFILTRATION; INTRACAUDAL; PERINEURAL at 03:04

## 2018-04-11 RX ADMIN — LIDOCAINE HYDROCHLORIDE 2 MG: 10 INJECTION, SOLUTION EPIDURAL; INFILTRATION; INTRACAUDAL; PERINEURAL at 03:04

## 2018-04-11 NOTE — TELEPHONE ENCOUNTER
----- Message from Ana Lawson sent at 4/11/2018  4:30 PM CDT -----  Contact: self   Pt is calling to speak with the nurse to find out results of her MRA results.      Thank you!

## 2018-04-12 ENCOUNTER — PATIENT MESSAGE (OUTPATIENT)
Dept: SPORTS MEDICINE | Facility: CLINIC | Age: 28
End: 2018-04-12

## 2018-04-12 ENCOUNTER — TELEPHONE (OUTPATIENT)
Dept: SPORTS MEDICINE | Facility: CLINIC | Age: 28
End: 2018-04-12

## 2018-04-12 DIAGNOSIS — M25.552 LEFT HIP PAIN: Primary | ICD-10-CM

## 2018-04-12 NOTE — TELEPHONE ENCOUNTER
Called pt to discuss results of MRI:  Narrative     EXAMINATION:  MRI HIP WITH CONTRAST LEFT    CLINICAL HISTORY:  hip pain;  Pain in left hip    TECHNIQUE:  Multisequence, multiplanar MR imaging of the hip performed post intra-articular contrast.    COMPARISON:  Prior radiograph    FINDINGS:  Labrum: Small anterior superior labral tear present with full-thickness component series 11 image 8..  No paralabral cyst    Cartilage: Cartilage/joint space is maintained.    Bone: No marrow edema.  Benign cystic lesion within the posterior acetabulum.  Normal femoral head-neck junction present.    Tendons: Hip adductor, hamstring and iliopsoas tendons are unremarkable.    Miscellaneous: Ligamentum teres is unremarkable.  Limited evaluation of pelvic soft tissues is unremarkable.    IMPRESSION  Small full-thickness anterior superior labral tear.  No paralabral cyst.       Plan:    Anesthetic & cortisone injection to left hip with Dr. David Hernandez  Call me day after injection with diagnostic relief %    Non-op vs. Operative intervention risks and benefits explained in detail  All questions were answered, pt will contact us for questions or concerns in the interim. If pt receives good relief from injection plan to proceed:      ASSESSMENT:    Left Hip labral tear.  I have reviewed the MRI .she  has tearing in the labrum.     she would benefit from hip arthroscopy, given the above.     PLAN: We have discussed the surgery and recovery of arthroscopic hip surgery. she understands that there may be limited weightbearing up to several weeks (usually 3 weeks) after surgery depending on procedures that are performed at the time of surgery.    The spectrum of treatment options were discussed with the patient, including nonoperative and operative options.  After thorough discussion, the patient has elected to undergo surgical treatment to include:    left   a. Hip arthroscopic labral repair versus debridement   b. Hip arthroscopic  femoral neck osteoplasty   c. Hip arthroscopic acetabuloplasty   d. Hip arthroscopic partial synovectomy/debridement   e. Hip arthroscopic capsular closure   f.  Hip arthroscopic-assisted Bio-D arthrocentesis       The details of the surgical procedure were explained, including the location of probable incisions and a description of likely hardware and/or grafts to be used.  The patient understands the likely convalescence after surgery.  Also, we have thoroughly discussed the risks, benefits and alternatives to surgery, including, but not limited to, the risk of infection, joint stiffness, skin injury to perineum, heterotopic ossification, arthritis, blood clot (including DVT and/or pulmonary embolus), neurologic and vascular injury.  It was explained that, if tissue has been repaired or reconstructed, there is a chance of failure, which may require further management.      Heterotopic ossification is a known complication of hip arthroscopy and Naprosyn significantly decreases this risk.  According to She in the Journal of Orthopedic Traumatology 2010, the rate of heterotopic ossification for cases with Naprosyn for prophylaxis was 0% and without Naprosyn for prophylaxis was 33%.  Dawson at the International Society of Hip Arthroscopy Annual Meeting in 2012 showed similar results with an incidence of heterotopic ossification in patients that did receive Naprosyn prophylaxis was 4.5% versus versus 23.6% in patients who did not receive Naproxen twice a day for three weeks.  Lukas's study in the American Journal of Sports Medicine in 2012 showed the addition of Indocin to the medication protocol (in addition to Naprosyn) decreased the rate of heterotopic ossification from 8.3% to 1.8%.

## 2018-04-16 ENCOUNTER — PATIENT MESSAGE (OUTPATIENT)
Dept: SPORTS MEDICINE | Facility: CLINIC | Age: 28
End: 2018-04-16

## 2018-04-16 ENCOUNTER — TELEPHONE (OUTPATIENT)
Dept: PAIN MEDICINE | Facility: CLINIC | Age: 28
End: 2018-04-16

## 2018-04-16 NOTE — TELEPHONE ENCOUNTER
Left voice message asking for a return call to schedule for Left Hip injection under Fluoro with Dr. Hernandez. Referred by Dr. Stout.

## 2018-05-07 ENCOUNTER — SURGERY (OUTPATIENT)
Age: 28
End: 2018-05-07

## 2018-05-07 ENCOUNTER — HOSPITAL ENCOUNTER (OUTPATIENT)
Facility: OTHER | Age: 28
Discharge: HOME | End: 2018-05-07
Attending: ANESTHESIOLOGY | Admitting: ANESTHESIOLOGY
Payer: COMMERCIAL

## 2018-05-07 VITALS
SYSTOLIC BLOOD PRESSURE: 114 MMHG | WEIGHT: 105 LBS | OXYGEN SATURATION: 100 % | DIASTOLIC BLOOD PRESSURE: 65 MMHG | HEART RATE: 65 BPM | RESPIRATION RATE: 18 BRPM | TEMPERATURE: 98 F | HEIGHT: 64 IN | BODY MASS INDEX: 17.93 KG/M2

## 2018-05-07 DIAGNOSIS — G89.29 CHRONIC PAIN: ICD-10-CM

## 2018-05-07 DIAGNOSIS — M25.552 LEFT HIP PAIN: Primary | ICD-10-CM

## 2018-05-07 PROCEDURE — 77002 NEEDLE LOCALIZATION BY XRAY: CPT | Mod: 26,,, | Performed by: ANESTHESIOLOGY

## 2018-05-07 PROCEDURE — 20610 DRAIN/INJ JOINT/BURSA W/O US: CPT | Mod: LT,,, | Performed by: ANESTHESIOLOGY

## 2018-05-07 PROCEDURE — 20610 DRAIN/INJ JOINT/BURSA W/O US: CPT | Performed by: ANESTHESIOLOGY

## 2018-05-07 PROCEDURE — 25000003 PHARM REV CODE 250: Performed by: ANESTHESIOLOGY

## 2018-05-07 PROCEDURE — 25500020 PHARM REV CODE 255: Performed by: ANESTHESIOLOGY

## 2018-05-07 PROCEDURE — 63600175 PHARM REV CODE 636 W HCPCS: Performed by: ANESTHESIOLOGY

## 2018-05-07 PROCEDURE — 77002 NEEDLE LOCALIZATION BY XRAY: CPT | Performed by: ANESTHESIOLOGY

## 2018-05-07 RX ORDER — TRIAMCINOLONE ACETONIDE 40 MG/ML
INJECTION, SUSPENSION INTRA-ARTICULAR; INTRAMUSCULAR
Status: DISCONTINUED | OUTPATIENT
Start: 2018-05-07 | End: 2018-05-07 | Stop reason: HOSPADM

## 2018-05-07 RX ORDER — BUPIVACAINE HYDROCHLORIDE 2.5 MG/ML
INJECTION, SOLUTION EPIDURAL; INFILTRATION; INTRACAUDAL
Status: DISCONTINUED | OUTPATIENT
Start: 2018-05-07 | End: 2018-05-07 | Stop reason: HOSPADM

## 2018-05-07 RX ORDER — LIDOCAINE HYDROCHLORIDE 10 MG/ML
INJECTION INFILTRATION; PERINEURAL
Status: DISCONTINUED | OUTPATIENT
Start: 2018-05-07 | End: 2018-05-07 | Stop reason: HOSPADM

## 2018-05-07 RX ORDER — SODIUM CHLORIDE 9 MG/ML
500 INJECTION, SOLUTION INTRAVENOUS CONTINUOUS
Status: DISCONTINUED | OUTPATIENT
Start: 2018-05-07 | End: 2018-05-07 | Stop reason: HOSPADM

## 2018-05-07 RX ADMIN — BUPIVACAINE HYDROCHLORIDE 10 ML: 2.5 INJECTION, SOLUTION EPIDURAL; INFILTRATION; INTRACAUDAL; PERINEURAL at 08:05

## 2018-05-07 RX ADMIN — IOHEXOL 5 ML: 300 INJECTION, SOLUTION INTRAVENOUS at 08:05

## 2018-05-07 RX ADMIN — LIDOCAINE HYDROCHLORIDE 10 ML: 10 INJECTION, SOLUTION INFILTRATION; PERINEURAL at 08:05

## 2018-05-07 RX ADMIN — TRIAMCINOLONE ACETONIDE 40 MG: 40 INJECTION, SUSPENSION INTRA-ARTICULAR; INTRAMUSCULAR at 08:05

## 2018-05-07 NOTE — DISCHARGE INSTRUCTIONS
Home Care Instructions Pain Management:    1. DIET:   You may resume your normal diet today.   2. BATHING:   You may shower with luke warm water.  3. DRESSING:   You may remove your bandage today.   4. ACTIVITY LEVEL:   You may resume your normal activities 24 hrs after your procedure.  5. MEDICATIONS:   You may resume your normal medications today.   6. SPECIAL INSTRUCTIONS:   No heat to the injection site for 24 hrs including, bath or shower, heating pad, moist heat, or hot tubs.    Use ice pack to injection site for any pain or discomfort.  Apply ice packs for 20 minute intervals as needed.   If you have received any sedatives by mouth today you may not drive for 12 hours.    If you have received any sedation through your IV, you may not drive for 24 hrs.     PLEASE CALL YOUR DOCTOR IF:  1. Redness or swelling around the injection site.  2. Fever of 101 degrees  3. Drainage (pus) from the injection site.  4. For any continuous bleeding (some dried blood over the incision is normal.)    FOR EMERGENCIES:   If any unusual problems or difficulties occur during clinic hours, call [phone] or [phone].     Thank you for allowing us to care for you today. You may receive a survey about the care we provided. Your feedback is valuable and helps us provide excellent care throughout the community.

## 2018-05-07 NOTE — OP NOTE
27- Hip Injection/Arthrogram  ANTERIOR APPROACH  Time-out taken to identify patient and procedure side prior to starting the procedure.   I attest that I have reviewed the patient's home medications prior to the procedure and no contraindication have been identified. I  re-evaluated the patient after the patient was positioned for the procedure in the procedure room immediately before the procedural time-out. The vital signs are current and represent the current state of the patient which has not significantly changed since the preprocedure assessment.                                                                                                                         Date of Service: 05/07/2018    PCP: Leah Shields MD                                                                                 PROCEDURE:  Left hip joint injection under fluoroscopy.    REASON FOR PROCEDURE: Acute hip pain, left [M25.552]     1. Left hip pain    2. Chronic pain      POSTOP DIAGNOSIS: Acute hip pain, left [M25.552]  1. Left hip pain    2. Chronic pain        PHYSICIAN: Daniel Hernandez MD  ASSISTANTS: Ariel Chacon MD                                                                                            ANESTHESIA:  Xylocaine 1% 9ml with Sodium Bicarbonate 1ml. 3ml per site.                                                                                                              MEDICATIONS INJECTED:  Kenalog 20mg, bupivacaine 0.25% 2 mL (per side), and sterile saline 2ml (per side)                                                                                                                                     ESTIMATED BLOOD LOSS:  None.                                                                                                                              COMPLICATIONS:  None.     SEDATION: None                                                                                                                                     TECHNIQUE:  With the patient lying in the supine position the the femoral neck identified under fluoroscopy.  The area was prepped and draped in the usual       sterile fashion.  Local anesthetic was used, given by raising a wheal and    going down to the hub of the 27-gauge needle.  A 3-1/2 inch 22-gauge         needle was introduced under fluoroscopy until the tip reached the lateral aspect of the femoral neck.  When the tip of the needle was thought   to be in appropriate position, contrast was injected to confirm proper placement.  Medication was then injected slowly.  The patient tolerated the procedure well.                                                                                                                     PAIN BEFORE THE PROCEDURE: 6/10.                                                                                                                         PAIN AFTER THE PROCEDURE:  0/10.                                                                                                                          The patient was monitored for 20-30 minutes after the procedure and was      given post procedure and discharge instructions to follow at home.  The      patient is to follow-up with me in two weeks by calling.   The patient was discharged in a stable condtion.

## 2018-05-21 ENCOUNTER — OFFICE VISIT (OUTPATIENT)
Dept: SPORTS MEDICINE | Facility: CLINIC | Age: 28
End: 2018-05-21
Payer: COMMERCIAL

## 2018-05-21 VITALS
WEIGHT: 105 LBS | HEIGHT: 64 IN | SYSTOLIC BLOOD PRESSURE: 100 MMHG | HEART RATE: 72 BPM | DIASTOLIC BLOOD PRESSURE: 69 MMHG | BODY MASS INDEX: 17.93 KG/M2

## 2018-05-21 DIAGNOSIS — M25.552 PAIN IN JOINT INVOLVING LEFT PELVIC REGION AND THIGH: Primary | ICD-10-CM

## 2018-05-21 PROCEDURE — 3008F BODY MASS INDEX DOCD: CPT | Mod: CPTII,S$GLB,, | Performed by: ORTHOPAEDIC SURGERY

## 2018-05-21 PROCEDURE — 99999 PR PBB SHADOW E&M-EST. PATIENT-LVL III: CPT | Mod: PBBFAC,,, | Performed by: ORTHOPAEDIC SURGERY

## 2018-05-21 PROCEDURE — 99214 OFFICE O/P EST MOD 30 MIN: CPT | Mod: S$GLB,,, | Performed by: ORTHOPAEDIC SURGERY

## 2018-05-21 NOTE — PROGRESS NOTES
CC: left hip pain    HPI:   Angela Lyle is a pleasant 28 y.o. patient who reports to clinic with left hip pain. No trauma, no mech sxs/instabilty. Locates pain in groin.    Today the patient rates pain at a 0/10 on visual analog scale. With aggravating movements 3/10     Affecting ADLs and exercising    Pain began in 12/2017, started to run more doing the couch to 5K program  running was the most painful activity but she has discontinued running    She notes pain with internal and external rotation     She did physical therpay for 2 months   She notes some improvement between completing PT and getting the injection     On 5/7/2018 intra-articular injection with Dr. Hernandez  She notes no immediate relief from the injection and maybe on day 9 or 10 she notes 20% relief     Patient notes 40% improvement overall in her hip from when she was initially seen     Review of Systems   Constitution: Negative. Negative for chills, fever and night sweats.   HENT: Negative for congestion and headaches.    Eyes: Negative for blurred vision, left vision loss and right vision loss.   Cardiovascular: Negative for chest pain and syncope.   Respiratory: Negative for cough and shortness of breath.    Endocrine: Negative for polydipsia, polyphagia and polyuria.   Hematologic/Lymphatic: Negative for bleeding problem. Does not bruise/bleed easily.   Skin: Negative for dry skin, itching and rash.   Musculoskeletal: Negative for falls and muscle weakness.   Gastrointestinal: Negative for abdominal pain and bowel incontinence.   Genitourinary: Negative for bladder incontinence and nocturia.   Neurological: Negative for disturbances in coordination, loss of balance and seizures.   Psychiatric/Behavioral: Negative for depression. The patient does not have insomnia.    Allergic/Immunologic: Negative for hives and persistent infections.     PAST MEDICAL HISTORY:   Past Medical History:   Diagnosis Date    Allergy     Anemia     hx of  HOSEA on fergon in past    Anxiety     Arthritis     right shoulder    Depression     Fatigue     History of psychiatric care     Keloid cicatrix     PCOS (polycystic ovarian syndrome)     Psychiatric problem     Therapy      PAST SURGICAL HISTORY:   Past Surgical History:   Procedure Laterality Date    SHOULDER SURGERY Right 06/2017    TONSILLECTOMY      wisdom teeth removal  2010     FAMILY HISTORY:   Family History   Problem Relation Age of Onset    Arthritis Mother         back surgery    Fibromyalgia Mother     Hyperlipidemia Father     Arthritis Maternal Grandmother     Heart disease Paternal Grandfather     Amblyopia Neg Hx     Blindness Neg Hx     Cataracts Neg Hx     Glaucoma Neg Hx     Macular degeneration Neg Hx     Retinal detachment Neg Hx     Strabismus Neg Hx     Melanoma Neg Hx      SOCIAL HISTORY:   Social History     Social History    Marital status: Single     Spouse name: N/A    Number of children: N/A    Years of education: N/A     Occupational History    Labor & Delivery RN      Social History Main Topics    Smoking status: Never Smoker    Smokeless tobacco: Never Used    Alcohol use Yes    Drug use: No    Sexual activity: Yes     Partners: Female     Birth control/ protection: None     Other Topics Concern    Are You Pregnant Or Think You May Be? No    Breast-Feeding No     Social History Narrative        Works as L&D RN at [employer]    Lives with roommates in apartment in [place].           MEDICATIONS:   Current Outpatient Prescriptions:     diphenoxylate-atropine 2.5-0.025 mg (LOMOTIL) 2.5-0.025 mg per tablet, Take 1 tablet by mouth 4 (four) times daily as needed for Diarrhea., Disp: 30 tablet, Rfl: 1    ergocalciferol, vitamin D2, 2,000 unit Tab, Take 2,000 Units by mouth once daily., Disp: , Rfl:     ondansetron (ZOFRAN-ODT) 4 MG TbDL, Take 1 tablet (4 mg total) by mouth every 6 to 8 hours as needed., Disp: 30 tablet, Rfl: 1    ranitidine (ZANTAC) 75 MG  "tablet, Take 1 tablet (75 mg total) by mouth 2 (two) times daily., Disp: 60 tablet, Rfl: 0  ALLERGIES:   Review of patient's allergies indicates:   Allergen Reactions    Sulfa (sulfonamide antibiotics) Hives and Itching    Sulfamethoxazole-trimethoprim      Other reaction(s): Itching       VITAL SIGNS: /69   Pulse 72   Ht 5' 4" (1.626 m)   Wt 47.6 kg (105 lb)   BMI 18.02 kg/m²        PHYSICAL EXAM /  HIP  PHYSICAL EXAMINATION  General:  The patient is alert and oriented x 3.  Mood is pleasant.  Observation of ears, eyes and nose reveal no gross abnormalities.  HEENT: NCAT, sclera nonicteric  Lungs: Respirations are equal and unlabored..    left HIP EXAMINATION     OBSERVATION / INSPECTION  Gait:   Nonantalgic   Alignment:  Neutral   Scars:   None   Muscle atrophy: None   Effusion:  None   Warmth:  None   Discoloration:   None   Leg lengths:   Equal   Pelvis:    Level     TENDERNESS / CREPITUS (T/C):      T / C  Trochanteric bursa   - / -  Piriformis    - / -  SI joint    - / -  Psoas tendon   - / -  Rectus insertion  - / -  Adductor insertion  - / -  Pubic symphysis  - / -    ROM: (* = pain)    Flexion:    125 degrees  External rotation: 50 degrees  Internal rotation with axial load: 35 degrees  Internal rotation without axial load: 40 degrees  Abduction:  80 degrees  Adduction:   20 degrees    SPECIAL TESTS:  Pain w/ forced internal rotation (FADIR): +   Pain w/ forced external rotation (TREVON): +   Circumduction test:    - for pain, + for snapping  StiCount includes the [facility] test:    Negative   Log roll:      Negative   Snapping hip (internal):   Negative   Sit-up pain:     Negative   Resisted sit-up pain:    Negative   Resisted sit-up with adductor contraction pain:  Negative   Step-down test:    +  Trendelenburg test:    Negative  Bridge test     Negative      EXTREMITY NEURO-VASCULAR EXAMINATION:   Sensation:  Grossly intact to light touch all dermatomal regions.   Motor Function:  Fully intact motor function at " hip, knee, foot and ankle    DTRs;  quadriceps and  achilles 2+.  No clonus and downgoing Babinski.    Vascular status:  DP and PT pulses 2+, brisk capillary refill, symmetric.    Skin:  intact, compartments soft.    OTHER FINDINGS:      XRAYS:  CE angle: 34  Crossover: -  CAM: -  Joint space narrowing: none    MRI:   Small full thickness anterior superior labral tear     ASSESSMENT:    Left Hip labral tear.      PLAN:     Cont HEP for now    Will proceed conservatively, no scope for now

## 2018-07-17 ENCOUNTER — PATIENT MESSAGE (OUTPATIENT)
Dept: SPORTS MEDICINE | Facility: CLINIC | Age: 28
End: 2018-07-17

## 2018-07-17 ENCOUNTER — TELEPHONE (OUTPATIENT)
Dept: SPORTS MEDICINE | Facility: CLINIC | Age: 28
End: 2018-07-17

## 2018-07-17 DIAGNOSIS — M25.511 RIGHT SHOULDER PAIN, UNSPECIFIED CHRONICITY: Primary | ICD-10-CM

## 2018-07-31 ENCOUNTER — CLINICAL SUPPORT (OUTPATIENT)
Dept: REHABILITATION | Facility: HOSPITAL | Age: 28
End: 2018-07-31
Attending: ORTHOPAEDIC SURGERY
Payer: COMMERCIAL

## 2018-07-31 DIAGNOSIS — M54.2 CERVICAL PAIN: ICD-10-CM

## 2018-07-31 DIAGNOSIS — M25.511 ACUTE PAIN OF RIGHT SHOULDER: Primary | ICD-10-CM

## 2018-07-31 PROCEDURE — 97161 PT EVAL LOW COMPLEX 20 MIN: CPT

## 2018-07-31 PROCEDURE — 97140 MANUAL THERAPY 1/> REGIONS: CPT

## 2018-07-31 PROCEDURE — 97110 THERAPEUTIC EXERCISES: CPT

## 2018-07-31 NOTE — PLAN OF CARE
[facility]  Physical Therapy Initial Evaluation    Name: Angela Lyle  Clinic Number: [id number]    Therapy Diagnosis:   Encounter Diagnoses   Name Primary?    Acute pain of right shoulder Yes    Cervical pain      Physician: Rolly Lockett MD    Physician Orders: PT Eval and Treat  Medical Diagnosis: M25.511 (ICD-10-CM) - Right shoulder pain, unspecified chronicity  Evaluation Date: 7/31/2018  Authorization Period Expiration: 12/31/2018  Plan of Care Certification Period: 9/25/2018  Visit # / Visits authorized: 1/15    Time In: 1310  Time Out: 1400  Total Billable Time: 50 minutes    Precautions: N/A    Subjective   Date of onset: A couple months ago; gradually  History of current condition - Angela reports: she has not kept up with her exercises post-op, so she knows she has gotten weaker.  Now she just has pain along her neck and into her collar bone with occasional SC popping when it is at its worst.  She has difficulty with reaching and lifting things at work.  She has started pilates activities and thinks the planks are what exacerbated the problem.    Past Medical History:   Diagnosis Date    Allergy     Anemia     hx of HOSEA on fergon in past    Anxiety     Arthritis     right shoulder    Depression     Fatigue     History of psychiatric care     Keloid cicatrix     PCOS (polycystic ovarian syndrome)     Psychiatric problem     Therapy      Angela Lyle  has a past surgical history that includes Tonsillectomy; wisdom teeth removal (2010); and Shoulder surgery (Right, 06/2017).    Angela has a current medication list which includes the following prescription(s): diphenoxylate-atropine 2.5-0.025 mg, ergocalciferol (vitamin d2), ondansetron, and ranitidine.    Review of patient's allergies indicates:   Allergen Reactions    Sulfa (sulfonamide antibiotics) Hives and Itching    Sulfamethoxazole-trimethoprim      Other reaction(s): Itching     "    Imaging, none new    Prior Therapy: Post-op biceps tenodesis and suprascapular nerve decompression  Social History: lives with their family  Occupation: L&D Nurse at [employer]  Prior Level of Function: Fully independent with ADLs, functional mobility and work activities  Current Level of Function: Pain with ADLs, functional mobility and work activities    Pain:  Current 2/10, worst 6/10, best 0/10   Location: right lower cervical, clavicle and proximal shoulder   Description: Throbbing and Deep    Pts goals: Decreased pain with daily and work activities    Objective     Observation: Patient demonstrates forward head posture with bilaterally abducted scapulae.    Passive Range of Motion: WNL cervical and shoulder.     Active Range of Motion: WNL cervical and shoulder.  Patient reports occasional difficulty with left rotation when pain is worst.    Strength:  Shoulder Right Left   Flexion 4/5 4+/5   Abduction 4/5 4+/5   ER 3+/5 4/5   IR 4/5 4/5     Special Tests:  AC Joint Right   1st Rib Springing (+) provocation but no distal symptoms     Joint Mobility: Unilateral PA distal cervical, proximal thoracic mildly hypomobile.    Palpation: Point tenderness with reproduction of symptoms over 1st rib, distal scalenes.    Sensation: Intact.    Flexibility: Mildly restricted scalenes, UT, levator and pec minor R>L.    CMS Impairment/Limitation/Restriction for FOTO Shoulder Survey    Therapist reviewed FOTO scores for Angela Lyle on 7/31/2018.   FOTO documents entered into [employer] - see Media section.    Limitation Score: 35%       TREATMENT   Treatment Time In: 1330  Treatment Time Out: 1400  Total Treatment time separate from Evaluation time:30    Angela received therapeutic exercises to develop strength, endurance, ROM, flexibility and posture for 20 minutes including:  Seated UT, levator, scalenes stretching 20"x3 ea.  Supine Cervical Retractions 5sh x 20  Prone Scap Retractions w/ Cervical Stab 5sh x " 20    Angela received the following manual therapy techniques: Joint mobilizations, Manual traction, Myofacial release and Soft tissue Mobilization were applied to the: cervical region for 10 minutes, including:  STM scalenes, UT R  1st rib mobs as tolerated    Angela received cold pack for 10 minutes to to decrease circulation, pain, and swelling.    Home Exercises and Patient Education Provided    Education provided re: Prognosis and Condition    Written Home Exercises Provided: See above.  Exercises were reviewed and Angela was able to demonstrate them prior to the end of the session.   Pt received a written copy of exercises to perform at home. Angela demonstrated good  understanding of the education provided.     See EMR under patient instructions for exercises given.   Assessment   Angela is a 28 y.o. female referred to outpatient Physical Therapy with a medical diagnosis of right cervical/shoulder pain. Pt presents with signs and symptoms consistent with 1st rib dysfunction and cervical instability leading to muscle spasm and strain.    Pt prognosis is Good.   Pt will benefit from skilled outpatient Physical Therapy to address the deficits stated above and in the chart below, provide pt/family education, and to maximize pt's level of independence.     Plan of care discussed with patient: Yes  Pt's spiritual, cultural and educational needs considered and patient is agreeable to the plan of care and goals as stated below:     Anticipated Barriers for therapy: N/A    Medical Necessity is demonstrated by the following  History  Co-morbidities and personal factors that may impact the plan of care Co-morbidities:   N/A    Personal Factors:   no deficits     low   Examination  Body Structures and Functions, activity limitations and participation restrictions that may impact the plan of care Body Regions:   neck  upper extremities    Body Systems:    strength  flexibility    Participation Restrictions:   Difficulty  with work  UE function         low   Clinical Presentation stable and uncomplicated low   Decision Making/ Complexity Score: low     Goals:  Short Term Goals: 8 weeks   1.  Patient will report pain < /= to 1/10 after full work day in order to return to full prior level of function.  2.  Patient will demonstrate more normalized cervical/thoracic posture in order to decrease strain on CT junction and 1st rib.  3.  Patient will demonstrate gross B UE strength 4+/5 in order to safely return to full ability to perform pilates activities.  4.  Patient will report ability to perform pilates activities without pain.    Plan   Certification Period/Plan of care expiration: 7/31/2018 to 9/25/2018.    Outpatient Physical Therapy 1 times weekly for 8 weeks to include the following interventions: Electrical Stimulation dry needling if indicated, Manual Therapy, Moist Heat/ Ice, Neuromuscular Re-ed, Therapeutic Activites, Therapeutic Exercise and dry needling if indicated.     Carlota Mc, PT, DPT, SCS

## 2018-08-08 ENCOUNTER — CLINICAL SUPPORT (OUTPATIENT)
Dept: REHABILITATION | Facility: HOSPITAL | Age: 28
End: 2018-08-08
Attending: ORTHOPAEDIC SURGERY
Payer: COMMERCIAL

## 2018-08-08 DIAGNOSIS — M54.2 CERVICAL PAIN: ICD-10-CM

## 2018-08-08 DIAGNOSIS — M25.511 ACUTE PAIN OF RIGHT SHOULDER: ICD-10-CM

## 2018-08-08 PROCEDURE — 97140 MANUAL THERAPY 1/> REGIONS: CPT

## 2018-08-08 PROCEDURE — 97110 THERAPEUTIC EXERCISES: CPT

## 2018-08-08 NOTE — PROGRESS NOTES
"                          Physical Therapy Daily Treatment Note     Name: Angela Lyle  Clinic Number: [id number]    Therapy Diagnosis:   Encounter Diagnoses   Name Primary?    Cervical pain     Acute pain of right shoulder      Physician: Rolly Lockett MD    Visit Date: 8/8/2018    Physician Orders: PT Eval and Treat  Medical Diagnosis: M25.511 (ICD-10-CM) - Right shoulder pain, unspecified chronicity  Evaluation Date: 7/31/2018  Authorization Period Expiration: 12/31/2018  Plan of Care Certification Period: 9/25/2018  Visit # / Visits authorized: 2/15     Time In: 1105  Time Out: 1150 am  Total Billable Time: 45 minutes     Precautions: standard    Subjective      Pt reports:she is feeling okay today.   she was compliant with home exercise program given last session.   Response to previous treatment:good  Functional change: decreased pain    Pain: 4/10  Location: right neck  and shoulder      Objective     Angela received therapeutic exercises to develop strength, endurance, ROM, flexibility and posture for 45 minutes including:  Seated UT, levator, scalenes stretching 20"x3 ea  Prone Scap Retractions w/ Cervical Stab 3 sh 3x 10  Chin tucks 10 sec x10     Angela received the following manual therapy techniques: Joint mobilizations, Manual traction, Myofacial release and Soft tissue Mobilization were applied to the: cervical region for 10 minutes, including:  STM scalenes, UT R  1st rib mobs as tolerated      Angela received hot pack for 10 minutes to increase circulation and promote tissue healing.        Home Exercises Provided and Patient Education Provided     Education provided:   - via HEP to Go    Written Home Exercises Provided: Patient instructed to cont prior HEP.  Exercises were reviewed and Angela was able to demonstrate them prior to the end of the session.  Angela demonstrated good  understanding of the education provided.     See EMR under Media for exercises provided {Blank " "single:54951::"8/8/2018","prior visit"    Assessment     Patient demonstrated cervical and R scapular instability and would benefit from continued endurance training. Patient provided an updated HEP including chin tucks and prone on elbows serratus anterior. Patient would benefit from continued stabilization and then strengthening post treatment.  Angela is progressing well towards her goals.   Pt prognosis is Good.     Pt will continue to benefit from skilled outpatient physical therapy to address the deficits listed in the problem list box on initial evaluation, provide pt/family education and to maximize pt's level of independence in the home and community environment.     Pt's spiritual, cultural and educational needs considered and pt agreeable to plan of care and goals.    Anticipated barriers to physical therapy: none    Goals:   Short Term Goals: 8 weeks   1.  Patient will report pain < /= to 1/10 after full work day in order to return to full prior level of function.  2.  Patient will demonstrate more normalized cervical/thoracic posture in order to decrease strain on CT junction and 1st rib.  3.  Patient will demonstrate gross B UE strength 4+/5 in order to safely return to full ability to perform pilates activities.  4.  Patient will report ability to perform pilates activities without pain.    Plan     Continue POC per patient tolerance.    Ruby Fernando, PT     "

## 2018-08-22 ENCOUNTER — CLINICAL SUPPORT (OUTPATIENT)
Dept: REHABILITATION | Facility: HOSPITAL | Age: 28
End: 2018-08-22
Attending: ORTHOPAEDIC SURGERY
Payer: COMMERCIAL

## 2018-08-22 DIAGNOSIS — M25.511 ACUTE PAIN OF RIGHT SHOULDER: ICD-10-CM

## 2018-08-22 DIAGNOSIS — M54.2 CERVICAL PAIN: ICD-10-CM

## 2018-08-22 PROCEDURE — 97140 MANUAL THERAPY 1/> REGIONS: CPT

## 2018-08-22 PROCEDURE — 97110 THERAPEUTIC EXERCISES: CPT

## 2018-08-22 NOTE — PROGRESS NOTES
"                          Physical Therapy Daily Treatment Note     Name: Angela Lyle  Clinic Number: [id number]    Therapy Diagnosis:   Encounter Diagnoses   Name Primary?    Cervical pain     Acute pain of right shoulder      Physician: Rolly Lockett MD    Visit Date: 8/22/2018    Physician Orders: PT Eval and Treat  Medical Diagnosis: M25.511 (ICD-10-CM) - Right shoulder pain, unspecified chronicity  Evaluation Date: 7/31/2018  Authorization Period Expiration: 12/31/2018  Plan of Care Certification Period: 9/25/2018  Visit # / Visits authorized: 3/15     Time In: 1000 am  Time Out: 1050 am  Total Billable Time: 50 minutes     Precautions: standard    Subjective      Pt reports:she still feels constant pain and has not been able to perform HEP.   she was compliant with home exercise program given last session.   Response to previous treatment:good  Functional change: decreased pain    Pain: 2/10  Location: right neck  and shoulder      Objective     Angela received therapeutic exercises to develop strength, endurance, ROM, flexibility and posture for 35 minutes including:  Seated UT, levator, scalenes stretching 20"x3 ea-np  Prone Scap Retractions w/ Cervical Stab 5 sh 3x 10  Chin tucks 30 sec x5  Prone on elbows SA 15 sec hold x10  Nose diagonals 2x15 JESUS with SA     Angela received the following manual therapy techniques: Joint mobilizations, Manual traction, Myofacial release and Soft tissue Mobilization were applied to the: cervical region for 10 minutes, including:  Cupping R upper trap and scalenes       Angela received hot pack for 10 minutes to increase circulation and promote tissue healing.        Home Exercises Provided and Patient Education Provided     Education provided:   - via HEP to Go    Written Home Exercises Provided: Patient instructed to cont prior HEP.  Exercises were reviewed and Angela was able to demonstrate them prior to the end of the session.  Angela demonstrated good  " "understanding of the education provided.     See EMR under Media for exercises provided {Prachi single:39204::"8/22/2018","prior visit"    Assessment     Patient continues to show signs of instability and poor posture causing compensations of upper traps, scalene and SCM.  Patient provided an updated HEP including chin tucks and prone on elbows serratus anterior. Patient would benefit from continued stabilization and then strengthening post treatment.  Angela is progressing well towards her goals.   Pt prognosis is Good.     Pt will continue to benefit from skilled outpatient physical therapy to address the deficits listed in the problem list box on initial evaluation, provide pt/family education and to maximize pt's level of independence in the home and community environment.     Pt's spiritual, cultural and educational needs considered and pt agreeable to plan of care and goals.    Anticipated barriers to physical therapy: none    Goals:   Short Term Goals: 8 weeks   1.  Patient will report pain < /= to 1/10 after full work day in order to return to full prior level of function.  2.  Patient will demonstrate more normalized cervical/thoracic posture in order to decrease strain on CT junction and 1st rib.  3.  Patient will demonstrate gross B UE strength 4+/5 in order to safely return to full ability to perform pilates activities.  4.  Patient will report ability to perform pilates activities without pain.    Plan     Continue POC per patient tolerance.    Ruby Fernando, PT, DPT  "

## 2018-08-29 ENCOUNTER — CLINICAL SUPPORT (OUTPATIENT)
Dept: REHABILITATION | Facility: HOSPITAL | Age: 28
End: 2018-08-29
Attending: ORTHOPAEDIC SURGERY
Payer: COMMERCIAL

## 2018-08-29 DIAGNOSIS — M25.511 ACUTE PAIN OF RIGHT SHOULDER: ICD-10-CM

## 2018-08-29 DIAGNOSIS — M54.2 CERVICAL PAIN: ICD-10-CM

## 2018-08-29 PROCEDURE — 97110 THERAPEUTIC EXERCISES: CPT

## 2018-08-29 NOTE — PROGRESS NOTES
"                          Physical Therapy Daily Treatment Note     Name: Angela Lyle  Clinic Number: [id number]    Therapy Diagnosis:   Encounter Diagnoses   Name Primary?    Cervical pain     Acute pain of right shoulder      Physician: Rolly Lockett MD    Visit Date: 8/29/2018    Physician Orders: PT Eval and Treat  Medical Diagnosis: M25.511 (ICD-10-CM) - Right shoulder pain, unspecified chronicity  Evaluation Date: 7/31/2018  Authorization Period Expiration: 12/31/2018  Plan of Care Certification Period: 9/25/2018  Visit # / Visits authorized: 4/15     Time In: 300 pm  Time Out: 400 pm  Total Billable Time: 60 minutes     Precautions: standard    Subjective      Pt reports:she has been more compliant with HEP and that her back and knees have been bothering her more.   she was compliant with home exercise program given last session.   Response to previous treatment:good  Functional change: decreased pain    Pain: 2/10  Location: right neck  and shoulder      Objective   Measurements this visit:  Hip abd: 4/5 B    Angela received therapeutic exercises to develop strength, endurance, ROM, flexibility and posture for 45 minutes including:  Seated UT, levator, scalenes stretching 20"x3 ea-np  Prone Scap Retractions w/ Cervical Stab 5 sh 3x 10-np  Chin tucks 30 sec x5-np  Prone on elbows SA 30 sec hold x3  Nose diagonals and rotations x10 B JESUS with SA  mulitifidi activation x1 min  Multifidi with hand lifts 10 sec ea x5 sets  Clamshells otb 2x15 B     nAgela received the following manual therapy techniques: Joint mobilizations, Manual traction, Myofacial release and Soft tissue Mobilization were applied to the: cervical region for 0 minutes, including:  Cupping R upper trap and scalenes (NEXT VISIT)      Angela received hot pack for 10 minutes to increase circulation and promote tissue healing.        Home Exercises Provided and Patient Education Provided     Education provided:   - via HEP to " "Go    Written Home Exercises Provided: Patient instructed to cont prior HEP.  Exercises were reviewed and Angela was able to demonstrate them prior to the end of the session.  Angela demonstrated good  understanding of the education provided.     See EMR under Media for exercises provided {Prachi single:28837::"8/29/2018","prior visit"    Assessment     Patient continues to present with patterns of joint/ligamentous laxity and is using compensations creating pain.  Patient would benefit from continued stabilization and then strengthening post treatment.  Angela is progressing well towards her goals.   Pt prognosis is Good.     Pt will continue to benefit from skilled outpatient physical therapy to address the deficits listed in the problem list box on initial evaluation, provide pt/family education and to maximize pt's level of independence in the home and community environment.     Pt's spiritual, cultural and educational needs considered and pt agreeable to plan of care and goals.    Anticipated barriers to physical therapy: none    Goals:   Short Term Goals: 8 weeks   1.  Patient will report pain < /= to 1/10 after full work day in order to return to full prior level of function.  2.  Patient will demonstrate more normalized cervical/thoracic posture in order to decrease strain on CT junction and 1st rib.  3.  Patient will demonstrate gross B UE strength 4+/5 in order to safely return to full ability to perform pilates activities.  4.  Patient will report ability to perform pilates activities without pain.    Plan     Continue POC per patient tolerance.    Ruby Fernando, PT, DPT  "

## 2018-09-05 ENCOUNTER — CLINICAL SUPPORT (OUTPATIENT)
Dept: REHABILITATION | Facility: HOSPITAL | Age: 28
End: 2018-09-05
Attending: ORTHOPAEDIC SURGERY
Payer: COMMERCIAL

## 2018-09-05 DIAGNOSIS — M25.511 ACUTE PAIN OF RIGHT SHOULDER: ICD-10-CM

## 2018-09-05 DIAGNOSIS — M54.2 CERVICAL PAIN: ICD-10-CM

## 2018-09-05 PROCEDURE — 97110 THERAPEUTIC EXERCISES: CPT

## 2018-09-05 NOTE — PROGRESS NOTES
"                          Physical Therapy Daily Treatment Note     Name: Angela Lyle  Clinic Number: [id number]    Therapy Diagnosis:   Encounter Diagnoses   Name Primary?    Cervical pain     Acute pain of right shoulder      Physician: Rolly Lockett MD    Visit Date: 9/5/2018    Physician Orders: PT Eval and Treat  Medical Diagnosis: M25.511 (ICD-10-CM) - Right shoulder pain, unspecified chronicity  Evaluation Date: 7/31/2018  Authorization Period Expiration: 12/31/2018  Plan of Care Certification Period: 9/25/2018  Visit # / Visits authorized: 5/15     Time In: 900 am  Time Out: 1000am  Total Billable Time: 60 minutes     Precautions: standard    Subjective      Pt reports:she has been more compliant with HEP and that her neck has been feeling better.   she was compliant with home exercise program given last session.   Response to previous treatment:good  Functional change: decreased pain    Pain: 2/10  Location: right neck  and shoulder      Objective   Measurements this visit:  Hip abd: 4/5 B    Angela received therapeutic exercises to develop strength, endurance, ROM, flexibility and posture for 45 minutes including:  Seated UT, levator, scalenes stretching 20"x3 ea-np  Prone Scap Retractions w/ Cervical Stab 5 sh 3x 10-np  Chin tucks 30 sec x3  Prone on elbows SA 30 sec hold x3 ytb  Nose diagonals and rotations x10 B JESUS with SA-np  mulitifidi activation x1 min  Multifidi with hand lifts 10 sec ea x5 sets  Clamshells otb 2x15 B-np  Prone neck ext off EOT 2x10 with 5 sh     Angela received the following manual therapy techniques: Joint mobilizations, Manual traction, Myofacial release and Soft tissue Mobilization were applied to the: elbow region for 5 minutes, including:  Cupping R upper trap and scalenes (NEXT VISIT)  R lateral epicondylitis STM and CFM x5 min    Angela received hot pack for 10 minutes to increase circulation and promote tissue healing.    PT reviewed FOTO scores for Angela " "Francisca Lyle on 9/5/18  FOTO score: 64      Functional Limitations Reports - G Codes  Category: mobility  Tool: FOTO      Current ():  CJ  Goal (): CJ  Discharge ():  NA            Home Exercises Provided and Patient Education Provided     Education provided:   - via HEP to Go    Written Home Exercises Provided: Patient instructed to cont prior HEP.  Exercises were reviewed and Angela was able to demonstrate them prior to the end of the session.  Angela demonstrated good  understanding of the education provided.     See EMR under Media for exercises provided {Blank single:60275::"9/5/2018","prior visit"    Assessment     Patient tolerated cervical extensor exercises well with appropriate levels of fatigue. She demonstrated a good multifidi activation compared to previous visit.  Patient would benefit from continued stabilization and then strengthening post treatment.  Angela is progressing well towards her goals.   Pt prognosis is Good.     Pt will continue to benefit from skilled outpatient physical therapy to address the deficits listed in the problem list box on initial evaluation, provide pt/family education and to maximize pt's level of independence in the home and community environment.     Pt's spiritual, cultural and educational needs considered and pt agreeable to plan of care and goals.    Anticipated barriers to physical therapy: none    Goals:   Short Term Goals: 8 weeks   1.  Patient will report pain < /= to 1/10 after full work day in order to return to full prior level of function.  2.  Patient will demonstrate more normalized cervical/thoracic posture in order to decrease strain on CT junction and 1st rib.  3.  Patient will demonstrate gross B UE strength 4+/5 in order to safely return to full ability to perform pilates activities.  4.  Patient will report ability to perform pilates activities without pain.    Plan     Continue POC per patient tolerance.    Ruby Fernando, PT, " DPT

## 2018-09-12 ENCOUNTER — CLINICAL SUPPORT (OUTPATIENT)
Dept: REHABILITATION | Facility: HOSPITAL | Age: 28
End: 2018-09-12
Attending: ORTHOPAEDIC SURGERY
Payer: COMMERCIAL

## 2018-09-12 DIAGNOSIS — M25.511 ACUTE PAIN OF RIGHT SHOULDER: ICD-10-CM

## 2018-09-12 DIAGNOSIS — M54.2 CERVICAL PAIN: ICD-10-CM

## 2018-09-12 PROCEDURE — 97110 THERAPEUTIC EXERCISES: CPT

## 2018-09-12 NOTE — PROGRESS NOTES
"                          Physical Therapy Daily Treatment Note     Name: Angela Lyle  Clinic Number: [id number]    Therapy Diagnosis:   Encounter Diagnoses   Name Primary?    Cervical pain     Acute pain of right shoulder      Physician: Rolly Lockett MD    Visit Date: 9/12/2018    Physician Orders: PT Eval and Treat  Medical Diagnosis: M25.511 (ICD-10-CM) - Right shoulder pain, unspecified chronicity  Evaluation Date: 7/31/2018  Authorization Period Expiration: 12/31/2018  Plan of Care Certification Period: 9/25/2018  Visit # / Visits authorized: 6/15     Time In: 420 pm  Time Out: 500 pm  Total Billable Time: 40 minutes     Precautions: standard    Subjective      Pt reports:she continues to feel better in her neck.   she was compliant with home exercise program given last session.   Response to previous treatment:good  Functional change: decreased pain    Pain: 2/10  Location: right neck  and shoulder      Objective   Measurements this visit:  Hip abd: 4/5 B    Angela received therapeutic exercises to develop strength, endurance, ROM, flexibility and posture for 40 minutes including:  Seated UT, levator, scalenes stretching 20"x3 ea-np  Prone Scap Retractions w/ Cervical Stab 10 sh 2x10  Chin tucks 1 min x3  Prone on elbows SA 30 sec hold x3 ytb  Nose diagonals and rotations x10 B JESUS with SA-np  mulitifidi activation x1 min-np  Multifidi with hand lifts 10 sec ea x5 sets-np  Clamshells otb 2x15 B-np  Prone neck ext off EOT 2x10 with 5 sh  No money otb 2x15  Wall angels x10     Angela received the following manual therapy techniques: Joint mobilizations, Manual traction, Myofacial release and Soft tissue Mobilization were applied to the: elbow region for 2 minutes, including:  Cupping R mid scapular with ART x10  R lateral epicondylitis STM and CFM x5 min-np    Angela received hot pack for 10 minutes to increase circulation and promote tissue healing.    PT reviewed FOTO scores for Angela Espinosa " "Dariela on 9/5/18  FOTO score: 64      Functional Limitations Reports - G Codes  Category: mobility  Tool: FOTO      Current ():  CJ  Goal (): CJ  Discharge ():  NA            Home Exercises Provided and Patient Education Provided     Education provided:   - via HEP to Go    Written Home Exercises Provided: Patient instructed to cont prior HEP.  Exercises were reviewed and Angela was able to demonstrate them prior to the end of the session.  Angela demonstrated good  understanding of the education provided.     See EMR under Media for exercises provided {Blank single:95232::"9/12/2018","prior visit"    Assessment     Patient continues to tolerate all exercises well with good improvement noted with endurance. Patient would benefit from continued stabilization and then strengthening post treatment.  Angela is progressing well towards her goals.   Pt prognosis is Good.     Pt will continue to benefit from skilled outpatient physical therapy to address the deficits listed in the problem list box on initial evaluation, provide pt/family education and to maximize pt's level of independence in the home and community environment.     Pt's spiritual, cultural and educational needs considered and pt agreeable to plan of care and goals.    Anticipated barriers to physical therapy: none    Goals:   Short Term Goals: 8 weeks   1.  Patient will report pain < /= to 1/10 after full work day in order to return to full prior level of function.  2.  Patient will demonstrate more normalized cervical/thoracic posture in order to decrease strain on CT junction and 1st rib.  3.  Patient will demonstrate gross B UE strength 4+/5 in order to safely return to full ability to perform pilates activities.  4.  Patient will report ability to perform pilates activities without pain.    Plan     Continue POC per patient tolerance.    Ruby Fernando, PT, DPT  "

## 2018-09-20 ENCOUNTER — CLINICAL SUPPORT (OUTPATIENT)
Dept: REHABILITATION | Facility: HOSPITAL | Age: 28
End: 2018-09-20
Attending: ORTHOPAEDIC SURGERY
Payer: COMMERCIAL

## 2018-09-20 DIAGNOSIS — M54.2 CERVICAL PAIN: ICD-10-CM

## 2018-09-20 DIAGNOSIS — M25.511 ACUTE PAIN OF RIGHT SHOULDER: ICD-10-CM

## 2018-09-20 PROCEDURE — 97110 THERAPEUTIC EXERCISES: CPT

## 2018-09-20 NOTE — PROGRESS NOTES
"                          Physical Therapy Daily Treatment Note     Name: Angela Lyle  Clinic Number: [id number]    Therapy Diagnosis:   Encounter Diagnoses   Name Primary?    Cervical pain     Acute pain of right shoulder      Physician: Rolly Lockett MD    Visit Date: 9/20/2018    Physician Orders: PT Eval and Treat  Medical Diagnosis: M25.511 (ICD-10-CM) - Right shoulder pain, unspecified chronicity  Evaluation Date: 7/31/2018  Authorization Period Expiration: 12/31/2018  Plan of Care Certification Period: 9/25/2018  Visit # / Visits authorized: 7/15     Time In: 1015 am  Time Out: 1100 am  Total Billable Time: 45 minutes     Precautions: standard    Subjective      Pt reports:she just had some pain yesterday after work and it has been the first time in a while she has felt pain in her neck.   she was compliant with home exercise program given last session.   Response to previous treatment:good  Functional change: decreased pain    Pain: 1/10  Location: right neck  and shoulder      Objective   Measurements this visit:  Hip abd: 4/5 B    Angela received therapeutic exercises to develop strength, endurance, ROM, flexibility and posture for 45 minutes including:  Seated UT, levator, scalenes stretching 20"x3 ea-np  Prone Scap Retractions w/ Cervical Stab 10 sh 2x10-np  Chin tucks 1 min x3  Prone on elbows SA 30 sec hold x3 ytb  Nose diagonals and rotations x10 B JESUS with SA-np  mulitifidi activation x1 min  Multifidi with hand lifts 10 sec ea x5 sets-np  Clamshells otb 2x15 B-np  Prone neck ext off EOT 2x10 with 5 sh  No money otb 2x15  Wall angels x10  Prone rows, habd and ext 2x15 R     Angela received the following manual therapy techniques: Joint mobilizations, Manual traction, Myofacial release and Soft tissue Mobilization were applied to the: elbow region for 0 minutes, including:  Cupping R mid scapular with ART x10  R lateral epicondylitis STM and CFM x5 min-np    Angela received hot pack for 10 " "minutes to increase circulation and promote tissue healing.-np    PT reviewed FOTO scores for Angela Lyle on 9/5/18  FOTO score: 64      Functional Limitations Reports - G Codes  Category: mobility  Tool: FOTO      Current ():  CJ  Goal (): CJ  Discharge ():  NA            Home Exercises Provided and Patient Education Provided     Education provided:   - via HEP to Go    Written Home Exercises Provided: Patient instructed to cont prior HEP.  Exercises were reviewed and Angela was able to demonstrate them prior to the end of the session.  Angela demonstrated good  understanding of the education provided.     See EMR under Media for exercises provided {Blank single:25835::"9/20/2018","prior visit"    Assessment     Patient demonstrates a good understanding of HEP to maintain functional gains independently. She is has shown good benefit from PT intervention. All pt questions were answered and patient was encouraged to call/email with any additional questions regarding care.  Angela is progressing well towards her goals.       Pt's spiritual, cultural and educational needs considered and pt agreeable to plan of care and goals.    Anticipated barriers to physical therapy: none    Goals:   Short Term Goals: 8 weeks   1.  Patient will report pain < /= to 1/10 after full work day in order to return to full prior level of function.-met  2.  Patient will demonstrate more normalized cervical/thoracic posture in order to decrease strain on CT junction and 1st rib.- partially met  3.  Patient will demonstrate gross B UE strength 4+/5 in order to safely return to full ability to perform pilates activities.- partially met  4.  Patient will report ability to perform pilates activities without pain.- partially met    Plan   Discontinue PT secondary to patient feeling confident in maintaining symptoms independently with HEP.    Ruby Fernando, PT, DPT  "

## 2018-10-19 ENCOUNTER — OFFICE VISIT (OUTPATIENT)
Dept: DERMATOLOGY | Facility: CLINIC | Age: 28
End: 2018-10-19
Payer: COMMERCIAL

## 2018-10-19 DIAGNOSIS — D18.00 ANGIOMA: ICD-10-CM

## 2018-10-19 DIAGNOSIS — D22.60 MULTIPLE BENIGN MELANOCYTIC NEVI OF UPPER EXTREMITY, LOWER EXTREMITY, AND TRUNK: ICD-10-CM

## 2018-10-19 DIAGNOSIS — D22.5 MULTIPLE BENIGN MELANOCYTIC NEVI OF UPPER EXTREMITY, LOWER EXTREMITY, AND TRUNK: ICD-10-CM

## 2018-10-19 DIAGNOSIS — L21.9 SEBORRHEIC DERMATITIS: Primary | ICD-10-CM

## 2018-10-19 DIAGNOSIS — Z86.018 HISTORY OF DYSPLASTIC NEVUS: ICD-10-CM

## 2018-10-19 DIAGNOSIS — D22.70 MULTIPLE BENIGN MELANOCYTIC NEVI OF UPPER EXTREMITY, LOWER EXTREMITY, AND TRUNK: ICD-10-CM

## 2018-10-19 PROCEDURE — 99203 OFFICE O/P NEW LOW 30 MIN: CPT | Mod: S$GLB,,, | Performed by: DERMATOLOGY

## 2018-10-19 RX ORDER — KETOCONAZOLE 20 MG/G
CREAM TOPICAL
Qty: 60 G | Refills: 5 | Status: SHIPPED | OUTPATIENT
Start: 2018-10-19 | End: 2020-05-25

## 2018-10-19 NOTE — PROGRESS NOTES
Subjective:       Patient ID:  Angela Lyle is a 28 y.o. female who presents for   Chief Complaint   Patient presents with    Dry Skin     face and ears    Mole     arms     Dry Skin  - Initial  Affected locations: face, left ear and right ear  Duration: 1 year  Signs / symptoms: dryness, redness and irritated  Severity: mild to moderate  Timing: constant  Aggravated by: nothing  Relieving factors/Treatments tried: OTC moisturizer  Improvement on treatment: no relief    Mole  - Initial  Affected locations: diffuse  Duration: 20 years  Signs / symptoms: asymptomatic  Severity: mild  Timing: constant  Aggravated by: nothing  Relieving factors/Treatments tried: nothing  Improvement on treatment: no relief      Review of Systems   Musculoskeletal: Positive for joint swelling and arthralgias.   Skin: Positive for rash, dry skin and tendency to form keloidal scars.   Neurological: Light-headedness: due to shoulder surgery.   Hematologic/Lymphatic: Does not bruise/bleed easily.        Objective:    Physical Exam   Constitutional: She appears well-developed and well-nourished. No distress.   HENT:   Mouth/Throat: Lips normal.    Eyes: Lids are normal.  No conjunctival no injection.   Neurological: She is alert and oriented to person, place, and time. She is not disoriented.   Psychiatric: She has a normal mood and affect.   Skin:   Areas Examined (abnormalities noted in diagram):   Scalp / Hair Palpated and Inspected  Head / Face Inspection Performed  Neck Inspection Performed  Chest / Axilla Inspection Performed  Abdomen Inspection Performed  Genitals / Buttocks / Groin Inspection Performed  Back Inspection Performed  RUE Inspected  LUE Inspection Performed  RLE Inspected  LLE Inspection Performed  Nails and Digits Inspection Performed                           Diagram Legend     Erythematous scaling macule/papule c/w actinic keratosis       Vascular papule c/w angioma      Pigmented verrucoid papule/plaque  c/w seborrheic keratosis      Yellow umbilicated papule c/w sebaceous hyperplasia      Irregularly shaped tan macule c/w lentigo     1-2 mm smooth white papules consistent with Milia      Movable subcutaneous cyst with punctum c/w epidermal inclusion cyst      Subcutaneous movable cyst c/w pilar cyst      Firm pink to brown papule c/w dermatofibroma      Pedunculated fleshy papule(s) c/w skin tag(s)      Evenly pigmented macule c/w junctional nevus     Mildly variegated pigmented, slightly irregular-bordered macule c/w mildly atypical nevus      Flesh colored to evenly pigmented papule c/w intradermal nevus       Pink pearly papule/plaque c/w basal cell carcinoma      Erythematous hyperkeratotic cursted plaque c/w SCC      Surgical scar with no sign of skin cancer recurrence      Open and closed comedones      Inflammatory papules and pustules      Verrucoid papule consistent consistent with wart     Erythematous eczematous patches and plaques     Dystrophic onycholytic nail with subungual debris c/w onychomycosis     Umbilicated papule    Erythematous-base heme-crusted tan verrucoid plaque consistent with inflamed seborrheic keratosis     Erythematous Silvery Scaling Plaque c/w Psoriasis     See annotation      Assessment / Plan:        Seborrheic dermatitis  -     ketoconazole (NIZORAL) 2 % cream; Apply to affected areas of face BID prn scaling.  Dispense: 60 g; Refill: 5  -  [facility] informational brochure on the condition was provided to the patient.    Multiple benign melanocytic nevi of upper extremity, lower extremity, and trunk  History of dysplastic nevus  Total body skin examination performed today including at least 12 points as noted in physical examination. No lesions suspicious for malignancy noted.  Patient instructed in importance of daily broad-spectrum sunscreen use with SPF of at least 30. Sun avoidance and topical protection/protective clothing discussed.  Multiple benign-appearing nevi present on  exam today. Reassurance provided. Counseled patient to periodically examine moles and return to clinic if any moles change or become symptomatic.    Angioma  This is a benign vascular lesion. Reassurance given. No treatment required.    Follow-up in about 1 year (around 10/19/2019) for TBSE, or sooner if any new problems or changing lesions.

## 2019-01-10 ENCOUNTER — OFFICE VISIT (OUTPATIENT)
Dept: OBSTETRICS AND GYNECOLOGY | Facility: CLINIC | Age: 29
End: 2019-01-10
Payer: COMMERCIAL

## 2019-01-10 VITALS
SYSTOLIC BLOOD PRESSURE: 110 MMHG | BODY MASS INDEX: 17.54 KG/M2 | HEIGHT: 64 IN | WEIGHT: 102.75 LBS | DIASTOLIC BLOOD PRESSURE: 74 MMHG

## 2019-01-10 DIAGNOSIS — E28.2 PCOS (POLYCYSTIC OVARIAN SYNDROME): ICD-10-CM

## 2019-01-10 DIAGNOSIS — Z01.419 WELL WOMAN EXAM WITH ROUTINE GYNECOLOGICAL EXAM: Primary | ICD-10-CM

## 2019-01-10 PROCEDURE — 99395 PR PREVENTIVE VISIT,EST,18-39: ICD-10-PCS | Mod: S$GLB,,, | Performed by: OBSTETRICS & GYNECOLOGY

## 2019-01-10 PROCEDURE — 99999 PR PBB SHADOW E&M-EST. PATIENT-LVL III: CPT | Mod: PBBFAC,,, | Performed by: OBSTETRICS & GYNECOLOGY

## 2019-01-10 PROCEDURE — 99999 PR PBB SHADOW E&M-EST. PATIENT-LVL III: ICD-10-PCS | Mod: PBBFAC,,, | Performed by: OBSTETRICS & GYNECOLOGY

## 2019-01-10 PROCEDURE — 99395 PREV VISIT EST AGE 18-39: CPT | Mod: S$GLB,,, | Performed by: OBSTETRICS & GYNECOLOGY

## 2019-01-10 NOTE — PROGRESS NOTES
Subjective:       Patient ID: Angela Lyle is a 28 y.o. female.    Chief Complaint:  Well Woman      History of Present Illness  HPI  SUBJECTIVE:   28 y.o. female  here for annual.     She describes her periods as irregular, lasting 32-42 days. Normal flow. She has been told she had PCOS in the past. She has hirsutism and mild acne. She is considering conception in the next 3 years or so. Not sure if she would want to carry first or her female partner (4 years older). She is considering going back to school.  denies break through bleeding.   denies vaginal itching or irritation.  denies vaginal discharge.    She is sexually active with 1 female partner.  She uses no method for contraception.    History of abnormal pap: No  Last Pap: 2016 - NILM  Last MMG: N/A  Last Colonoscopy: N/A    FH: Denies family history of breast, GYN or colon cancer.    GYN & OB History  Patient's last menstrual period was 2018.   Date of Last Pap: 10/19/2016    OB History    Para Term  AB Living   0             SAB TAB Ectopic Multiple Live Births                         Review of Systems  Review of Systems   Constitutional: Positive for fatigue. Negative for chills, diaphoresis and fever.   Respiratory: Negative for cough and shortness of breath.    Cardiovascular: Negative for chest pain and palpitations.   Gastrointestinal: Negative for abdominal pain, constipation, diarrhea, nausea and vomiting.   Endocrine: Negative for diabetes, hyperthyroidism and hypothyroidism.   Genitourinary: Positive for menstrual problem. Negative for decreased libido, dyspareunia, frequency, pelvic pain, vaginal bleeding, vaginal discharge, vaginal pain and dysmenorrhea.   Musculoskeletal: Negative for back pain and myalgias.   Neurological: Negative for headaches.   Psychiatric/Behavioral: Negative for depression. The patient is not nervous/anxious.            Objective:    Physical Exam:   Constitutional: She is oriented  to person, place, and time. She appears well-developed and well-nourished. No distress.    HENT:   Head: Normocephalic and atraumatic.    Eyes: EOM are normal.    Neck: Normal range of motion. No thyromegaly present.     Pulmonary/Chest: Effort normal.        Abdominal: Soft. She exhibits no distension, no mass and no abdominal incision. There is no tenderness. There is no rebound and no guarding. No hernia.     Genitourinary:   Genitourinary Comments: Normal external female genitalia; vagina rugated, normal; cervix normal, no masses; uterus small mobile nontender; no adnexal masses palpated; rectal deferred             Musculoskeletal: Normal range of motion and moves all extremeties.       Neurological: She is alert and oriented to person, place, and time.    Skin: Skin is warm. No rash noted.    Psychiatric: She has a normal mood and affect. Her behavior is normal. Judgment and thought content normal.          Assessment:        1. Well woman exam with routine gynecological exam    2. PCOS (polycystic ovarian syndrome)             Plan:      Angela was seen today for well woman.    Diagnoses and all orders for this visit:    Well woman exam with routine gynecological exam  - Pap guidelines discussed. Pap 10/2016 - NILM. Pap not indicated.   - MMG age 39.   - Contraception - declined.  - STD screening - declined.    PCOS (polycystic ovarian syndrome)  -     Glucose Tolerance 2 Hour; Future  - Counseled on OCPs for cycle regulation.   - Consider further workup once ready to conceive.   - Requested GTT to assess glucose intolerance.       Orders Placed This Encounter   Procedures    Glucose Tolerance 2 Hour       Follow-up in about 1 year (around 1/10/2020) for annual.

## 2019-01-14 ENCOUNTER — PATIENT MESSAGE (OUTPATIENT)
Dept: OBSTETRICS AND GYNECOLOGY | Facility: CLINIC | Age: 29
End: 2019-01-14

## 2019-01-14 DIAGNOSIS — E28.2 PCOS (POLYCYSTIC OVARIAN SYNDROME): Primary | ICD-10-CM

## 2019-01-16 ENCOUNTER — LAB VISIT (OUTPATIENT)
Dept: LAB | Facility: OTHER | Age: 29
End: 2019-01-16
Attending: OBSTETRICS & GYNECOLOGY
Payer: COMMERCIAL

## 2019-01-16 DIAGNOSIS — E28.2 PCOS (POLYCYSTIC OVARIAN SYNDROME): ICD-10-CM

## 2019-01-16 LAB
GLUCOSE SERPL-MCNC: 113 MG/DL
GLUCOSE SERPL-MCNC: 141 MG/DL
GLUCOSE SERPL-MCNC: 88 MG/DL
INSULIN COLLECTION INTERVAL: ABNORMAL
INSULIN SERPL-ACNC: 76.4 UU/ML

## 2019-01-16 PROCEDURE — 36415 COLL VENOUS BLD VENIPUNCTURE: CPT

## 2019-01-16 PROCEDURE — 82951 GLUCOSE TOLERANCE TEST (GTT): CPT

## 2019-01-16 PROCEDURE — 83525 ASSAY OF INSULIN: CPT

## 2019-01-30 ENCOUNTER — HOSPITAL ENCOUNTER (OUTPATIENT)
Dept: RADIOLOGY | Facility: HOSPITAL | Age: 29
Discharge: HOME | End: 2019-01-30
Attending: ORTHOPAEDIC SURGERY
Payer: COMMERCIAL

## 2019-01-30 ENCOUNTER — OFFICE VISIT (OUTPATIENT)
Dept: SPORTS MEDICINE | Facility: CLINIC | Age: 29
End: 2019-01-30
Payer: COMMERCIAL

## 2019-01-30 VITALS
WEIGHT: 102 LBS | BODY MASS INDEX: 17.42 KG/M2 | HEIGHT: 64 IN | HEART RATE: 91 BPM | DIASTOLIC BLOOD PRESSURE: 71 MMHG | SYSTOLIC BLOOD PRESSURE: 101 MMHG

## 2019-01-30 DIAGNOSIS — M25.511 RIGHT SHOULDER PAIN, UNSPECIFIED CHRONICITY: Primary | ICD-10-CM

## 2019-01-30 DIAGNOSIS — M25.511 RIGHT SHOULDER PAIN, UNSPECIFIED CHRONICITY: ICD-10-CM

## 2019-01-30 PROCEDURE — 3008F BODY MASS INDEX DOCD: CPT | Mod: CPTII,S$GLB,, | Performed by: ORTHOPAEDIC SURGERY

## 2019-01-30 PROCEDURE — 99999 PR PBB SHADOW E&M-EST. PATIENT-LVL III: ICD-10-PCS | Mod: PBBFAC,,, | Performed by: ORTHOPAEDIC SURGERY

## 2019-01-30 PROCEDURE — 3008F PR BODY MASS INDEX (BMI) DOCUMENTED: ICD-10-PCS | Mod: CPTII,S$GLB,, | Performed by: ORTHOPAEDIC SURGERY

## 2019-01-30 PROCEDURE — 99999 PR PBB SHADOW E&M-EST. PATIENT-LVL III: CPT | Mod: PBBFAC,,, | Performed by: ORTHOPAEDIC SURGERY

## 2019-01-30 PROCEDURE — 73030 X-RAY EXAM OF SHOULDER: CPT | Mod: 26,RT,, | Performed by: RADIOLOGY

## 2019-01-30 PROCEDURE — 99214 OFFICE O/P EST MOD 30 MIN: CPT | Mod: S$GLB,,, | Performed by: ORTHOPAEDIC SURGERY

## 2019-01-30 PROCEDURE — 73030 X-RAY EXAM OF SHOULDER: CPT | Mod: TC,FY,PO,RT

## 2019-01-30 PROCEDURE — 99214 PR OFFICE/OUTPT VISIT, EST, LEVL IV, 30-39 MIN: ICD-10-PCS | Mod: S$GLB,,, | Performed by: ORTHOPAEDIC SURGERY

## 2019-01-30 PROCEDURE — 73030 XR SHOULDER COMPLETE 2 OR MORE VIEWS RIGHT: ICD-10-PCS | Mod: 26,RT,, | Performed by: RADIOLOGY

## 2019-01-30 NOTE — PROGRESS NOTES
CC: right shoulder pain    HISTORY OF PRESENT ILLNESS:   Pt is here today for post-operative followup of her shoulder arthroscopy.  She is doing well.  We have reviewed her findings and discussed plan of care and future treatment options.      She reports she is about 80% better compared with before surgery.  Her main complaint today is posterior shoulder/neck pain, around the trapezius, that occurs frequently after exercise. It typically lasts about 3-5 days and then resolves. She has previously done PT briefly for this.      DATE OF PROCEDURE: 06/06/2017  OPERATION:   right   1. Shoulder open subpectoral biceps tenodesis (CPT 41224)  2. Shoulder arthroscopic suprascapular nerve decompression (complex, -22 modifier)  3. Shoulder arthroscopic partial thickness cuff debridement  4. Shoulder subacromial buresectomy  5. Shoulder arthroscopic extensive debridement (anterior, posterior glenohumeral joint, subacromial space) (CPT 88400)                               Review of Systems   Constitution: Negative. Negative for chills, fever and night sweats.   HENT: Negative for congestion and headaches.    Eyes: Negative for blurred vision, left vision loss and right vision loss.   Cardiovascular: Negative for chest pain and syncope.   Respiratory: Negative for cough and shortness of breath.    Endocrine: Negative for polydipsia, polyphagia and polyuria.   Hematologic/Lymphatic: Negative for bleeding problem. Does not bruise/bleed easily.   Skin: Negative for dry skin, itching and rash.   Musculoskeletal: Negative for falls and muscle weakness.   Gastrointestinal: Negative for abdominal pain and bowel incontinence.   Genitourinary: Negative for bladder incontinence and nocturia.   Neurological: Negative for disturbances in coordination, loss of balance and seizures.   Psychiatric/Behavioral: Negative for depression. The patient does not have insomnia.    Allergic/Immunologic: Negative for hives and persistent infections.      Past Medical History:   Diagnosis Date    Allergy     Anemia     hx of HOSEA on fergon in past    Anxiety     Arthritis     right shoulder    Depression     Fatigue     History of psychiatric care     Keloid cicatrix     PCOS (polycystic ovarian syndrome)     Psychiatric problem     Therapy        Past Surgical History:   Procedure Laterality Date    ARTHROSCOPY-SHOULDER Right 6/6/2017    [facility]    ARTHROSCOPY-SHOULDER WITH SUBACROMIAL DECOMPRESSION Right 6/6/2017    [facility]    DEBRIDEMENT-SHOULDER-ARTHROSCOPIC Right 6/6/2017    [facility]    INJECTION-JOINT Left 5/7/2018    [facility] PAIN MGT    OPEN BICEPS SUB PECTORALIS TENODESIS (TENDON FIXATION) Right 6/6/2017    [facility]    SHOULDER SURGERY Right 06/2017    TONSILLECTOMY      wisdom teeth removal  2010       Family History   Problem Relation Age of Onset    Arthritis Mother         back surgery    Fibromyalgia Mother     Hyperlipidemia Father     Arthritis Maternal Grandmother     Heart disease Paternal Grandfather     Amblyopia Neg Hx     Blindness Neg Hx     Cataracts Neg Hx     Glaucoma Neg Hx     Macular degeneration Neg Hx     Retinal detachment Neg Hx     Strabismus Neg Hx     Melanoma Neg Hx        Social History     Socioeconomic History    Marital status: Single     Spouse name: None    Number of children: None    Years of education: None    Highest education level: None   Social Needs    Financial resource strain: None    Food insecurity - worry: None    Food insecurity - inability: None    Transportation needs - medical: None    Transportation needs - non-medical: None   Occupational History    Occupation: Labor & Delivery RN   Tobacco Use    Smoking status: Never Smoker    Smokeless tobacco: Never Used   Substance and Sexual Activity    Alcohol use: Yes     Drug use: No    Sexual activity: Yes     Partners: Female     Birth control/protection: None   Other Topics Concern    Patient feels they ought to cut down on drinking/drug use Not Asked    Patient annoyed by others criticizing their drinking/drug use Not Asked    Patient has felt bad or guilty about drinking/drug use Not Asked    Patient has had a drink/used drugs as an eye opener in the AM Not Asked    Are you pregnant or think you may be? No    Breast-feeding No   Social History Narrative        Works as L&D RN at [employer]    Lives with roommates in apartment in [place].       Current Outpatient Medications   Medication Sig Dispense Refill    ergocalciferol, vitamin D2, 2,000 unit Tab Take 2,000 Units by mouth once daily.      ketoconazole (NIZORAL) 2 % cream Apply to affected areas of face BID prn scaling. 60 g 5    ondansetron (ZOFRAN-ODT) 4 MG TbDL Take 1 tablet (4 mg total) by mouth every 6 to 8 hours as needed. 30 tablet 1     No current facility-administered medications for this visit.        Review of patient's allergies indicates:   Allergen Reactions    Sulfa (sulfonamide antibiotics) Hives and Itching    Sulfamethoxazole-trimethoprim      Other reaction(s): Itching       PHYSICAL EXAMINATION:  Constitutional:  NAD; well-developed and well-nourished  Pulmonary/Chest: Effort normal  Skin: Warm and dry; no rashes   Neuro: Alert and oriented to person, place, and time; no focal numbness or weakness       Right shoulder:  Incision sites healed well  No evidence of any erythema, infection or induration  elbow Range of motion full   No tenderness to palpation posteriorly  2+ DP pulse  No swelling  FF to 180  ER to 60  IR to L5  + scapular dyskinesis  - Neer/Hawkin's/Leflore's      X-rays of the right shoulder were ordered and personally reviewed with the patient; they show no fractures or DJD.                                                                               ASSESSMENT:                                                                                                                                                1. Status post above, doing well.                                                                                                                               PLAN:                                                                                                                                                     1. Will refer to PT for scapula dyskinesis  2. Emphasized scapular function.  3. I have discussed return to activity in detail.  4. She will see us back in 6 months                                   5. All questions were answered and he should contact us if hehas any questions or concerns in the interim.

## 2019-02-08 ENCOUNTER — CLINICAL SUPPORT (OUTPATIENT)
Dept: REHABILITATION | Facility: HOSPITAL | Age: 29
End: 2019-02-08
Payer: COMMERCIAL

## 2019-02-08 DIAGNOSIS — M25.511 RIGHT SHOULDER PAIN, UNSPECIFIED CHRONICITY: ICD-10-CM

## 2019-02-08 DIAGNOSIS — M54.2 CERVICAL PAIN: ICD-10-CM

## 2019-02-08 PROCEDURE — 97110 THERAPEUTIC EXERCISES: CPT | Mod: PO

## 2019-02-08 PROCEDURE — 97161 PT EVAL LOW COMPLEX 20 MIN: CPT | Mod: PO

## 2019-02-08 NOTE — PLAN OF CARE
[facility]  Physical Therapy Initial Evaluation    Name: Angela Lyle  Clinic Number: [id number]    Therapy Diagnosis:   Encounter Diagnoses   Name Primary?    Cervical pain     Right shoulder pain, unspecified chronicity      Physician: Casale, Michael Joseph,*  Physician Orders: PT Eval and Treat Right shoulder pain/cervical tightness  Medical Diagnosis from Referral: Right shoulder pain  Evaluation Date: 2/8/2019  Authorization Period Expiration: 1/30/20  Plan of Care Expiration: 2/8/19-4/8/19  Visit # / Visits authorized: 1/ 1    Time In: 810a  Time Out: 910a  Total Billable Time: 60 minutes    Precautions: Standard    Subjective   Date of onset: Chronic, following shoulder surgery last year.  Shoulder did improve, but has different pain associated  History of current condition - Angela reports: Pt stated that underwent ATS on her right shoulder summer of last year had did have pain relief.  She has tried on multiple occasions to start an exercise program since, always ends up with deep right shoulder pain and into the cervical area.  Increased pain towards end of the day, but has been woken up in the middle of the night with discomfort.  Flexion motion tends to bring onset.  Has tried pilates and other workouts, all do increase symptoms.     Medical History:   Past Medical History:   Diagnosis Date    Allergy     Anemia     hx of HOSEA on fergon in past    Anxiety     Arthritis     right shoulder    Depression     Fatigue     History of psychiatric care     Keloid cicatrix     PCOS (polycystic ovarian syndrome)     Psychiatric problem     Therapy        Surgical History:   Angela Lyle  has a past surgical history that includes Tonsillectomy; wisdom teeth removal (2010); and Shoulder surgery (Right, 06/2017).    Medications:   Angela has a current medication list which includes the following prescription(s): ergocalciferol (vitamin d2), ketoconazole, and  ondansetron.    Allergies:   Review of patient's allergies indicates:   Allergen Reactions    Sulfa (sulfonamide antibiotics) Hives and Itching    Sulfamethoxazole-trimethoprim      Other reaction(s): Itching        Imaging, none: Nothing for current shoulder condition    Prior Therapy: PT post shoulder surg  Social History:  lives alone  Occupation: Nurse  Prior Level of Function: Able to exercise without onset of pain.  Current Level of Function: Pain after long shifts, unable to complete exercise program without onset of pain.    Pain:  Current 2/10, worst 6/10, best 0/10   Location: right shoulder   Description: Dull and Deep  Aggravating Factors: Lifting and Exercise programs with prolonged posture  Easing Factors: rest    Pts goals: Pt would like to return to active lifestyle without pain.  Find workout that does not increase symptoms.    Objective     Posture: Elevated bilateral shoulders  Increased Rounding right    MMT:  SA right 4+/5, L 5/5  Shoulder ER right 4/5, Left 5/5  Rhomboid right 4+/5, left 5/5  Mid trap right 4/5, left 4+/5    ST:  + sca retract test  + UT test  + 1st rib elevation    ROM;  PROM right IR 64 deg, Left 75 deg  ER WNL        TREATMENT   Treatment Time In: 945  Treatment Time Out: 10  Total Treatment time separate from Evaluation: 15 minutes    Angela received therapeutic exercises to develop strength, endurance, ROM and posture for 15 minutes including:  -Prone on ball rhomboid/mid trap ext 2x10 ea  -seated on ball Brueggars with G-TB cues for scapular depression prior to motion 3x10  -1st rib depression  -R UT stretch 3x 45 sec PT apply pressure  - PROM shoulder IR    Home Exercises and Patient Education Provided    Education provided:   - Pt provided with HEP to include Bruegarrs, prone rhomboid/mid trap exercise, postural exercise for right shoulder retraction     Written Home Exercises Provided: yes.  Exercises were reviewed and Angela was able to demonstrate them prior to  the end of the session.  Angela demonstrated fair  understanding of the education provided.     See EMR under Patient Instructions for exercises provided 2/8/2019.    Assessment   Angela is a 28 y.o. female referred to outpatient Physical Therapy with a medical diagnosis of right shoulder pain . Pt presents with decreased ROM right shoulder IR, tightness right cervical into right shoulder, altered firing pattern with overactive UT, an dpain in the right shoulder.    Pt prognosis is Good.   Pt will benefit from skilled outpatient Physical Therapy to address the deficits stated above and in the chart below, provide pt/family education, and to maximize pt's level of independence.     Plan of care discussed with patient: Yes  Pt's spiritual, cultural and educational needs considered and patient is agreeable to the plan of care and goals as stated below:     Anticipated Barriers for therapy: None at this time     Medical Necessity is demonstrated by the following  History  Co-morbidities and personal factors that may impact the plan of care Co-morbidities:   none     Personal Factors:   no deficits     low   Examination  Body Structures and Functions, activity limitations and participation restrictions that may impact the plan of care Body Regions:   upper extremities    Body Systems:    ROM  strength  gross coordinated movement    Participation Restrictions:   None     Activity limitations:   Learning and applying knowledge  no deficits    General Tasks and Commands  no deficits    Communication  no deficits    Mobility  no deficits    Self care  no deficits    Domestic Life  no deficits    Interactions/Relationships  no deficits    Life Areas  no deficits    Community and Social Life  no deficits         low   Clinical Presentation stable and uncomplicated low   Decision Making/ Complexity Score: low     Goals:  Short Term Goals: 2 weeks   1: I with progressed HEP  2: Report performing all exercises without onset of  pain  3: Report sleeping through the night without waking secondary to pain.    Long Term Goals: 8 weeks   1: Improve R MMS ER to 5/5  2: Improve post thoracic muscular strength right side to 5/5 for improved stability with overhead activity and decreased pain.  3: Pt improve left cervical rotation to 90 deg without pain.  4: Pt report participating in an exercise program for 30 min without onset of pain >1/10    Plan   Plan of care Certification: 2/8/2019 to 4/8/19    Outpatient Physical Therapy 2 times weekly for 8 weeks to include the following interventions: Manual Therapy, Moist Heat/ Ice, Patient Education, Self Care and Therapeutic Exercise.     Thomas Villegas, PT

## 2019-02-12 ENCOUNTER — PATIENT MESSAGE (OUTPATIENT)
Dept: OBSTETRICS AND GYNECOLOGY | Facility: CLINIC | Age: 29
End: 2019-02-12

## 2019-02-20 ENCOUNTER — CLINICAL SUPPORT (OUTPATIENT)
Dept: REHABILITATION | Facility: HOSPITAL | Age: 29
End: 2019-02-20
Payer: COMMERCIAL

## 2019-02-20 DIAGNOSIS — M25.511 RIGHT SHOULDER PAIN, UNSPECIFIED CHRONICITY: ICD-10-CM

## 2019-02-20 DIAGNOSIS — M54.2 CERVICAL PAIN: ICD-10-CM

## 2019-02-20 PROCEDURE — 97110 THERAPEUTIC EXERCISES: CPT | Mod: PO

## 2019-02-20 PROCEDURE — 97140 MANUAL THERAPY 1/> REGIONS: CPT | Mod: PO

## 2019-02-20 NOTE — PROGRESS NOTES
[facility]  Physical Therapy Note     Name: Angela Lyle  Clinic Number: [id number]     Therapy Diagnosis:        Encounter Diagnoses   Name Primary?    Cervical pain      Right shoulder pain, unspecified chronicity        Physician: Casale, Michael Joseph,*  Physician Orders: PT Eval and Treat Right shoulder pain/cervical tightness  Medical Diagnosis from Referral: Right shoulder pain  Evaluation Date: 2/8/2019  Authorization Period Expiration: 1/30/20  Plan of Care Expiration: 2/8/19-4/8/19  Visit # / Visits authorized: 2/      Time In: 805a  Time Out: 900a  Total Billable Time: 55 minutes     Precautions: Standard     Subjective   Pt stated that she was able to do some of the exercises, but the ER with the band caused a lot of pain in the clavicular area.  Described as deep and throbbing.  No position change seemed to help.  Did fade after 2 days of discontinuing her exercise.  Was able to do the post thoracic strengthening exercises, did feel fatigue with activity.    Pain rated to: 3/10 in the clavicular area    Objective:  + 1st rib elevation  Palpable tenderness Subscap  Muscle guarding right cervical paraspinal  MMT shoulder flex 4/5 with pain    TE: performed to increase strength, stability, and reduce pain in the right shoulder and cervical area: 30 min  SL abd to neutral 3x10  Shoulder IR/Er in supine with   Prone shoulder rows 0 wt 3x10  Prone ext 3x10  Seated scaption lift on ball 3x15  Seated UT stretch 3x30 sec  Right shoulder IR stretch 4x 45 sec    MT: Performed to increase extensibility, mobility, and reduce pain: 15 min  Cervical distraction grade II-III  SCOM release   Sub nucchal release  DN: pt was educated on intervention, risks and benefits as well as expected outcomes.  Pt signed waiver prior to procedure.  .3mm needle homeostatic UT point, sub scap point, medial deltoid, bilateral cervical paraspinal C6-C4  No adverse effects were noted.    Education:  "Discussed current HEP, removed AROM IR/ER with band and cont with isometric IR/ER.      A: Pt cont to present with right pain at clavicular area, did decrease with stretching , did note some "grinding' in area with rotational exercises.  Cont to present with right cervical and UT guarding and weakness with post thoracic musculature.  Pt will cont to benefit from skilled PT to address pain and weakness.    P: Cont with strengthening in painfree range.  Possible incorporation of more DN points.  Perform SFMA rolling for GUEVARA.             "

## 2019-02-22 ENCOUNTER — CLINICAL SUPPORT (OUTPATIENT)
Dept: REHABILITATION | Facility: HOSPITAL | Age: 29
End: 2019-02-22
Payer: COMMERCIAL

## 2019-02-22 DIAGNOSIS — M25.511 RIGHT SHOULDER PAIN, UNSPECIFIED CHRONICITY: ICD-10-CM

## 2019-02-22 DIAGNOSIS — M54.2 CERVICAL PAIN: ICD-10-CM

## 2019-02-22 PROCEDURE — 97140 MANUAL THERAPY 1/> REGIONS: CPT | Mod: PO

## 2019-02-22 PROCEDURE — 97110 THERAPEUTIC EXERCISES: CPT | Mod: PO

## 2019-02-22 NOTE — PROGRESS NOTES
[facility]  Physical Therapy Note     Name: Angela Lyle  Clinic Number: [id number]     Therapy Diagnosis:        Encounter Diagnoses   Name Primary?    Cervical pain      Right shoulder pain, unspecified chronicity        Physician: Casale, Michael Joseph,*  Physician Orders: PT Eval and Treat Right shoulder pain/cervical tightness  Medical Diagnosis from Referral: Right shoulder pain  Evaluation Date: 2/8/2019  Authorization Period Expiration: 1/30/20  Plan of Care Expiration: 2/8/19-4/8/19  Visit # / Visits authorized: 3/      Time In: 800a  Time Out: 900a  Total Billable Time: 55 minutes     Precautions: Standard     Subjective   Pt stated that she did feel better following the treatment on Wed.  Not significant improvement, but did feel like there was less grinding in the clavicular area.  Did change up the HEP, seems much more comfortable with isometrics.    Pain rated to: 2/10 in the clavicular area    Objective:  + 1st rib elevation right  Palpable tenderness Subscap  Muscle guarding right cervical paraspinal  MMT shoulder flex 4/5 with pain  PROM right IR prior to treatment 52 deg, post treatment 74 deg    TE: performed to increase strength, stability, and reduce pain in the right shoulder and cervical area: 30 min  SL abd to neutral 3x10  Shoulder IR/Er in supine with   Prone shoulder rows 0 wt 3x10  Prone ext 3x10  Seated scaption lift on ball 3x15  Seated UT stretch 3x30 sec  Right shoulder IR stretch 4x 45 sec  SL abd 3x10  SFMA rolling UE 5x ea direction.    MT: Performed to increase extensibility, mobility, and reduce pain: 15 min  Cervical distraction grade II-III  SCOM release   Sub nucchal release  Scapular lift right   DN: pt was educated on intervention, risks and benefits as well as expected outcomes.  Pt signed waiver prior to procedure.  .3mm needle homeostatic UT point, sub scap point, medial deltoid, bilateral cervical paraspinal C6-C4  No adverse  "effects were noted.    Education: Discussed current HEP, add sleeper stretch       A: Pt cont to present with right pain at clavicular area, did decrease with stretching , did note some "grinding' in area with rotational exercises.  Cont to present with right cervical and UT guarding and weakness with post thoracic musculature.  Pt will cont to benefit from skilled PT to address pain and weakness.    P: Cont with strengthening in painfree range, work on GUEVARA right UE, cont improving IR ROM.             "

## 2019-02-25 ENCOUNTER — CLINICAL SUPPORT (OUTPATIENT)
Dept: REHABILITATION | Facility: HOSPITAL | Age: 29
End: 2019-02-25
Payer: COMMERCIAL

## 2019-02-25 DIAGNOSIS — M54.2 CERVICAL PAIN: ICD-10-CM

## 2019-02-25 DIAGNOSIS — M25.511 RIGHT SHOULDER PAIN, UNSPECIFIED CHRONICITY: ICD-10-CM

## 2019-02-25 PROCEDURE — 97140 MANUAL THERAPY 1/> REGIONS: CPT | Mod: PO

## 2019-02-25 PROCEDURE — 97110 THERAPEUTIC EXERCISES: CPT | Mod: PO

## 2019-02-26 NOTE — PROGRESS NOTES
[facility]  Physical Therapy Note     Name: Angela Lyle  Clinic Number: [id number]     Therapy Diagnosis:        Encounter Diagnoses   Name Primary?    Cervical pain      Right shoulder pain, unspecified chronicity        Physician: Casale, Michael Joseph,*  Physician Orders: PT Eval and Treat Right shoulder pain/cervical tightness  Medical Diagnosis from Referral: Right shoulder pain  Evaluation Date: 2/8/2019  Authorization Period Expiration: 1/30/20  Plan of Care Expiration: 2/8/19-4/8/19  Visit # / Visits authorized: 4/      Time In: 805a  Time Out: 900a  Total Billable Time: 55 minutes     Precautions: Standard     Subjective   Pt stated that she did feel the best she has in a long time following the session on Friday, no pain at all for a day.  Slowly came back on by Sunday afternoon.  Cont to do her exercises, still only isometric IR/ER.  Pain rated to: 2/10 in the clavicular area    Objective:  + 1st rib elevation right  Palpable tenderness Subscap  Muscle guarding right cervical paraspinal  MMT shoulder flex 4/5 with pain  PROM right IR prior to treatment 52 deg, post treatment 74 deg    TE: performed to increase strength, stability, and reduce pain in the right shoulder and cervical area: 30 min  SL abd to neutral 3x10  Shoulder IR/Er in supine with MR  Prone shoulder rows 0 wt 3x10  Prone ext 3x10  Seated scaption lift on ball 3x15  Seated UT stretch 3x30 sec NP  Right shoulder IR stretch 6x 45 sec  SL abd 3x10  SFMA rolling UE 5x ea direction.NP  PNF D2 with Y-TB 3x10, shoulder flex/rot/lift in quad 3x10  Sleeper stretch 4x30 sec  Rolando wing against wall 3x10    MT: Performed to increase extensibility, mobility, and reduce pain: 15 min  Cervical distraction grade II-III  SCOM release   Sub nucchal release  Scapular lift right   DN: pt was educated on intervention, risks and benefits as well as expected outcomes.  Pt signed waiver prior to procedure.  .3mm needle  "homeostatic UT point, sub scap point, medial deltoid, bilateral cervical paraspinal C6-C4  No adverse effects were noted.    Education: Discussed current HEP, add sleeper stretch, vera wings       A: Pt cont to present with right pain at clavicular area, did decrease with stretching , did note some "grinding' in area with rotational exercises.  Cont to present with right cervical and UT guarding and weakness with post thoracic musculature.  Pt will cont to benefit from skilled PT to address pain and weakness.    Goals:  Short Term Goals: 2 weeks   1: I with progressed HEP  2: Report performing all exercises without onset of pain  3: Report sleeping through the night without waking secondary to pain.     Long Term Goals: 8 weeks   1: Improve R MMS ER to 5/5  2: Improve post thoracic muscular strength right side to 5/5 for improved stability with overhead activity and decreased pain.  3: Pt improve left cervical rotation to 90 deg without pain.  4: Pt report participating in an exercise program for 30 min without onset of pain >1/10       P: Cont with strengthening in painfree range, work on GUEVARA right UE, cont improving IR ROM.             "

## 2019-02-27 ENCOUNTER — PATIENT MESSAGE (OUTPATIENT)
Dept: OBSTETRICS AND GYNECOLOGY | Facility: CLINIC | Age: 29
End: 2019-02-27

## 2019-03-04 ENCOUNTER — CLINICAL SUPPORT (OUTPATIENT)
Dept: REHABILITATION | Facility: HOSPITAL | Age: 29
End: 2019-03-04
Payer: COMMERCIAL

## 2019-03-04 DIAGNOSIS — M54.2 CERVICAL PAIN: ICD-10-CM

## 2019-03-04 DIAGNOSIS — M25.511 RIGHT SHOULDER PAIN, UNSPECIFIED CHRONICITY: ICD-10-CM

## 2019-03-04 PROCEDURE — 97110 THERAPEUTIC EXERCISES: CPT | Mod: PO

## 2019-03-04 PROCEDURE — 97140 MANUAL THERAPY 1/> REGIONS: CPT | Mod: PO

## 2019-03-04 NOTE — PROGRESS NOTES
[facility]  Physical Therapy Note     Name: Angela Lyle  Clinic Number: [id number]     Therapy Diagnosis:        Encounter Diagnoses   Name Primary?    Cervical pain      Right shoulder pain, unspecified chronicity        Physician: Casale, Michael Joseph,*  Physician Orders: PT Eval and Treat Right shoulder pain/cervical tightness  Medical Diagnosis from Referral: Right shoulder pain  Evaluation Date: 2/8/2019  Authorization Period Expiration: 1/30/20  Plan of Care Expiration: 2/8/19-4/8/19  Visit # / Visits authorized: 4/      Time In: 805a  Time Out: 900a  Total Billable Time: 55 minutes     Precautions: Standard     Subjective   Pt stated that she did feel the best she has in a long time following the session on Friday, no pain at all for a day.  Slowly came back on by Sunday afternoon.  Cont to do her exercises, still only isometric IR/ER.  Pain rated to: 2/10 in the clavicular area    Objective:  + 1st rib elevation right  Palpable tenderness Subscap  Muscle guarding right cervical paraspinal  MMT shoulder flex 4/5 with pain  PROM right IR prior to treatment 52 deg, post treatment 74 deg    TE: performed to increase strength, stability, and reduce pain in the right shoulder and cervical area: 30 min  SL abd to neutral 3x10  Shoulder IR/Er in supine with MR  Prone shoulder rows 0 wt 3x10  Prone ext 3x10  Seated scaption lift on ball 3x15  Seated UT stretch 3x30 sec NP  Right shoulder IR stretch 6x 45 sec  SL abd 3x10  SFMA rolling UE 5x ea direction.NP  PNF D2 with Y-TB 3x10, shoulder flex/rot/lift in quad 3x10  Sleeper stretch 4x30 sec  Rolando wing against wall 3x10  Thoracic rotation in quad 2x10 ea    MT: Performed to increase extensibility, mobility, and reduce pain: 15 min  Cervical distraction grade II-III  SCOM release   Sub nucchal release  Scapular lift right   DN: pt was educated on intervention, risks and benefits as well as expected outcomes.  Pt signed waiver  prior to procedure.  .3mm needle homeostatic UT point, sub scap point, medial deltoid, bilateral cervical paraspinal C6-C4  No adverse effects were noted.    Education: Discussed current HEP, add sleeper stretch, vera wings       A: Pt cont to present with right pain at clavicular area, did report less today.  Showed significant improvement with rolling post exercise.  Cont to present with right cervical and UT guarding and weakness with post thoracic musculature.  Pt will cont to benefit from skilled PT to address pain and weakness.    Goals:  Short Term Goals: 2 weeks   1: I with progressed HEP  2: Report performing all exercises without onset of pain  3: Report sleeping through the night without waking secondary to pain.     Long Term Goals: 8 weeks   1: Improve R MMS ER to 5/5  2: Improve post thoracic muscular strength right side to 5/5 for improved stability with overhead activity and decreased pain.  3: Pt improve left cervical rotation to 90 deg without pain.  4: Pt report participating in an exercise program for 30 min without onset of pain >1/10       P: Cont with strengthening in painfree range, work on GUEVARA right UE, cont improving IR ROM.

## 2019-03-08 ENCOUNTER — CLINICAL SUPPORT (OUTPATIENT)
Dept: REHABILITATION | Facility: HOSPITAL | Age: 29
End: 2019-03-08
Attending: INTERNAL MEDICINE
Payer: COMMERCIAL

## 2019-03-08 DIAGNOSIS — M54.2 CERVICAL PAIN: ICD-10-CM

## 2019-03-08 DIAGNOSIS — M25.511 RIGHT SHOULDER PAIN, UNSPECIFIED CHRONICITY: ICD-10-CM

## 2019-03-08 PROCEDURE — 97140 MANUAL THERAPY 1/> REGIONS: CPT | Mod: PO

## 2019-03-08 PROCEDURE — 97110 THERAPEUTIC EXERCISES: CPT | Mod: PO

## 2019-03-08 NOTE — PROGRESS NOTES
[facility]  Physical Therapy Progress Note     Name: Angela Lyle  Clinic Number: [id number]     Therapy Diagnosis:        Encounter Diagnoses   Name Primary?    Cervical pain      Right shoulder pain, unspecified chronicity        Physician: Casale, Michael Joseph,*  Physician Orders: PT Eval and Treat Right shoulder pain/cervical tightness  Medical Diagnosis from Referral: Right shoulder pain  Evaluation Date: 2/8/2019  Authorization Period Expiration: 1/30/20  Plan of Care Expiration: 2/8/19-4/8/19  Visit # / Visits authorized: 4/      Time In: 800a  Time Out: 900a  Total Billable Time: 55 minutes     Precautions: Standard     Subjective   Pt stated that she has had some symptoms the past two days, but not consistant and did decrease.  Able to go out and enjoy [place] without pain.  Cont to do all exercises at home.  Pain rated to: 2/10 in the clavicular area    Objective:  + 1st rib elevation right  PROM Right shoulder IR to 84 deg  MMT ER 4+/5 without pain  MMT shoulder flex 4/5 without pain  SFMA Rolling with UE, able to complete ext motion    TE: performed to increase strength, stability, and reduce pain in the right shoulder and cervical area: 30 min  SL abd to neutral 3x10  Shoulder IR/Er in supine with MR  Prone shoulder rows 0 wt 3x10  Prone ext 3x10  Seated scaption lift on ball 3x15  Seated UT stretch 3x30 sec NP  Right shoulder IR stretch 6x 45 sec  SL abd 3x10  SFMA rolling UE 5x ea direction.NP  PNF D2 with Y-TB 3x10, shoulder flex/rot/lift in quad 3x10  Sleeper stretch 4x30 sec  Rolando wing against wall 3x10  Thoracic rotation in quad 2x10 ea    MT: Performed to increase extensibility, mobility, and reduce pain: 15 min  Cervical distraction grade II-III  SCOM release   Sub nucchal release  Scapular lift right   DN: pt was educated on intervention, risks and benefits as well as expected outcomes.  Pt signed waiver prior to procedure.  .3mm needle homeostatic UT  point, sub scap point, medial deltoid, bilateral cervical paraspinal C6-C4  No adverse effects were noted.    Education: Discussed current HEP, add sleeper stretch, vera wings       A: Pt cont to present with right pain at clavicular area, did report less today.  Showed significant improvement with rolling post exercise.  Cont to present with right cervical and UT guarding and weakness with post thoracic musculature.  Pt will cont to benefit from skilled PT to address pain and weakness.    Goals:  Short Term Goals: 2 weeks   1: I with progressed HEP  2: Report performing all exercises without onset of pain  3: Report sleeping through the night without waking secondary to pain.     Long Term Goals: 8 weeks   1: Improve R MMS ER to 5/5  2: Improve post thoracic muscular strength right side to 5/5 for improved stability with overhead activity and decreased pain.  3: Pt improve left cervical rotation to 90 deg without pain.  4: Pt report participating in an exercise program for 30 min without onset of pain >1/10       P: Cont with strengthening in painfree range, work on GUEVARA right UE, cont improving IR ROM.

## 2019-03-11 ENCOUNTER — CLINICAL SUPPORT (OUTPATIENT)
Dept: REHABILITATION | Facility: HOSPITAL | Age: 29
End: 2019-03-11
Payer: COMMERCIAL

## 2019-03-11 DIAGNOSIS — M25.511 RIGHT SHOULDER PAIN, UNSPECIFIED CHRONICITY: ICD-10-CM

## 2019-03-11 DIAGNOSIS — M54.2 CERVICAL PAIN: ICD-10-CM

## 2019-03-11 PROCEDURE — 97110 THERAPEUTIC EXERCISES: CPT | Mod: PO

## 2019-03-11 PROCEDURE — 97140 MANUAL THERAPY 1/> REGIONS: CPT | Mod: PO

## 2019-03-13 ENCOUNTER — CLINICAL SUPPORT (OUTPATIENT)
Dept: REHABILITATION | Facility: HOSPITAL | Age: 29
End: 2019-03-13
Payer: COMMERCIAL

## 2019-03-13 DIAGNOSIS — M25.511 RIGHT SHOULDER PAIN, UNSPECIFIED CHRONICITY: ICD-10-CM

## 2019-03-13 DIAGNOSIS — M54.2 CERVICAL PAIN: ICD-10-CM

## 2019-03-13 PROCEDURE — 97140 MANUAL THERAPY 1/> REGIONS: CPT | Mod: PO

## 2019-03-13 PROCEDURE — 97110 THERAPEUTIC EXERCISES: CPT | Mod: PO

## 2019-03-13 NOTE — PROGRESS NOTES
"[facility]  Physical Therapy Note     Name: Angela Lyle  Clinic Number: [id number]     Therapy Diagnosis:        Encounter Diagnoses   Name Primary?    Cervical pain      Right shoulder pain, unspecified chronicity        Physician: Casale, Michael Joseph,*  Physician Orders: PT Eval and Treat Right shoulder pain/cervical tightness  Medical Diagnosis from Referral: Right shoulder pain  Evaluation Date: 2/8/2019  Authorization Period Expiration: 1/30/20  Plan of Care Expiration: 2/8/19-4/8/19  Visit # / Visits authorized: 8/      Time In: 800a  Time Out: 900a  Total Billable Time: 55 minutes     Precautions: Standard     Subjective   Pt stated that she was able to do the stair master yesterday, did feel some "pulling" in the neck but was not bad.  Overall is better than Mondayl.  Pain rated to: 2/10 in the clavicular area    Objective:  + 1st rib elevation right  PROM Right shoulder IR to 84 deg  MMT ER 4+/5 without pain  MMT shoulder flex 4/5 without pain  SFMA Rolling with UE, able to complete ext motion    TE: performed to increase strength, stability, and reduce pain in the right shoulder and cervical area: 30 min  SL abd to neutral 3x10  Shoulder IR/Er in supine with MR  Prone shoulder rows 0 wt 3x10  Prone ext 3x10  Seated scaption lift on ball 3x15  Seated UT stretch 3x30 sec NP  Right shoulder IR stretch 6x 45 sec  SL abd 3x10  SFMA rolling UE 5x ea direction.NP  PNF D2 with Y-TB 3x10, shoulder flex/rot/lift in quad 2x10  Sleeper stretch 4x30 sec NP  Rolando wing against wall 3x10  Thoracic rotation in quad 3x10 ea    MT: Performed to increase extensibility, mobility, and reduce pain: 15 min  Cervical distraction grade II-III  SCOM release   Sub nucchal release  Scapular lift right   DN: pt was educated on intervention, risks and benefits as well as expected outcomes.  Pt signed waiver prior to procedure.  .3mm needle homeostatic UT point, sub scap point, medial deltoid, " bilateral cervical paraspinal C6-C4  No adverse effects were noted.    Education: Discussed current HEP, add sleeper stretch, vera wings       A: Pt cont to present with right pain at clavicular area, did report less today.  Showed significant improvement with rolling post exercise.  Cont to present with right cervical and UT guarding and weakness with post thoracic musculature.  Pt will cont to benefit from skilled PT to address pain and weakness.    Goals:  Short Term Goals: 2 weeks   1: I with progressed HEP  2: Report performing all exercises without onset of pain  3: Report sleeping through the night without waking secondary to pain.     Long Term Goals: 8 weeks   1: Improve R MMS ER to 5/5  2: Improve post thoracic muscular strength right side to 5/5 for improved stability with overhead activity and decreased pain.  3: Pt improve left cervical rotation to 90 deg without pain.  4: Pt report participating in an exercise program for 30 min without onset of pain >1/10       P: Cont with strengthening in painfree range, work on GUEVARA right UE, cont improving IR ROM.

## 2019-03-18 ENCOUNTER — CLINICAL SUPPORT (OUTPATIENT)
Dept: REHABILITATION | Facility: HOSPITAL | Age: 29
End: 2019-03-18
Payer: COMMERCIAL

## 2019-03-18 DIAGNOSIS — M54.2 CERVICAL PAIN: ICD-10-CM

## 2019-03-18 DIAGNOSIS — M25.511 RIGHT SHOULDER PAIN, UNSPECIFIED CHRONICITY: ICD-10-CM

## 2019-03-18 PROCEDURE — 97110 THERAPEUTIC EXERCISES: CPT | Mod: PO

## 2019-03-18 PROCEDURE — 97140 MANUAL THERAPY 1/> REGIONS: CPT | Mod: PO

## 2019-03-18 NOTE — PROGRESS NOTES
[facility]  Physical Therapy Note     Name: Angela Lyle  Clinic Number: [id number]     Therapy Diagnosis:        Encounter Diagnoses   Name Primary?    Cervical pain      Right shoulder pain, unspecified chronicity        Physician: Casale, Michael Joseph,*  Physician Orders: PT Eval and Treat Right shoulder pain/cervical tightness  Medical Diagnosis from Referral: Right shoulder pain  Evaluation Date: 2/8/2019  Authorization Period Expiration: 1/30/20  Plan of Care Expiration: 2/8/19-4/8/19  Visit # / Visits authorized: 10/ 20     Time In: 803a  Time Out: 900a  Total Billable Time: 55 minutes     Precautions: Standard     Subjective   Pt stated that she was extremely busy at work following the last visit.  Was unable to do her exercises, but did have pain on Sat evening and Sunday morning.  Able to get through some of the exercises, but did have trouble  Pain rated to: 2/10 in the clavicular area    Objective:  + 1st rib elevation right  PROM Right shoulder IR to 84 deg  MMT ER 4+/5 without pain  MMT shoulder flex 4/5 without pain  SFMA Rolling with UE, able to complete ext motion  Cervical rotation 90 deg bilateral (mild hike of shoulder on right rotation)    TE: performed to increase strength, stability, and reduce pain in the right shoulder and cervical area: 30 min  SL abd to neutral 3x10  Shoulder IR/Er in supine with MR  Prone shoulder rows 0 wt 3x10  Prone ext 3x10  Seated scaption lift on ball 3x15  Seated UT stretch 3x30 sec NP  Right shoulder IR stretch 6x 45 sec  SL abd 3x10  SFMA rolling UE 5x ea direction.NP  PNF D2 with Y-TB 3x10, shoulder flex/rot/lift in quad 2x10  Sleeper stretch 4x30 sec NP  Rolando wing against wall 3x10  Thoracic rotation in quad 3x10 ea  Brueggars on 1/2 bolster 2x10  Bilateral ER with O-TB forearms on wall with flex 2x10 ( pain reported)    MT: Performed to increase extensibility, mobility, and reduce pain: 15 min  Cervical distraction  grade II-III  SCOM release   Sub nucchal release  Scapular lift right   DN: pt was educated on intervention, risks and benefits as well as expected outcomes.  Pt signed waiver prior to procedure.  .3mm needle homeostatic UT point, sub scap point, medial deltoid, bilateral cervical paraspinal C6-C4  No adverse effects were noted.    Education: Discussed current HEP, add sleeper stretch       A: Pt cont to present with right pain at clavicular area, did report less today.  Showed improved IR, does tighten back up following exercises.  Improvd MMS ER some discomfort on 1/2 bolster.  Pt will cont to benefit from skilled PT to address pain and weakness.    Goals:  Short Term Goals: 2 weeks   1: I with progressed HEP MET  2: Report performing all exercises without onset of pain  3: Report sleeping through the night without waking secondary to pain. MET     Long Term Goals: 8 weeks   1: Improve R MMS ER to 5/5 Progress  2: Improve post thoracic muscular strength right side to 5/5 for improved stability with overhead activity and decreased pain. Progress  3: Pt improve left cervical rotation to 90 deg without pain. Met  4: Pt report participating in an exercise program for 30 min without onset of pain >1/10       P: Cont with strengthening in painfree range, work on GUEVARA right UE, cont improving IR ROM.

## 2019-03-21 ENCOUNTER — CLINICAL SUPPORT (OUTPATIENT)
Dept: REHABILITATION | Facility: HOSPITAL | Age: 29
End: 2019-03-21
Payer: COMMERCIAL

## 2019-03-21 DIAGNOSIS — M54.2 CERVICAL PAIN: ICD-10-CM

## 2019-03-21 PROCEDURE — 97140 MANUAL THERAPY 1/> REGIONS: CPT | Mod: PO

## 2019-03-21 PROCEDURE — 97110 THERAPEUTIC EXERCISES: CPT | Mod: PO

## 2019-03-21 NOTE — PROGRESS NOTES
"[facility]  Physical Therapy Note     Name: Angela Lyle  Clinic Number: [id number]     Therapy Diagnosis:        Encounter Diagnoses   Name Primary?    Cervical pain      Right shoulder pain, unspecified chronicity        Physician: Casale, Michael Joseph,*  Physician Orders: PT Eval and Treat Right shoulder pain/cervical tightness  Medical Diagnosis from Referral: Right shoulder pain  Evaluation Date: 2/8/2019  Authorization Period Expiration: 1/30/20  Plan of Care Expiration: 2/8/19-4/8/19  Visit # / Visits authorized: 11/ 20     Time In: 905a  Time Out: 10a  Total Billable Time: 55 minutes     Precautions: Standard     Subjective   Pt stated that she did feel better following the last session, less pulling in the cervical area and improved motion while doing work around the house.  Doews not feel the "crackle she normally does.  Pain rated to: 2/10 in the clavicular area    Objective:  + 1st rib elevation right  PROM Right shoulder IR to 84 deg  MMT ER 4+/5 without pain  MMT shoulder flex 4/5 without pain  SFMA Rolling with UE, able to complete ext motion  Cervical rotation 90 deg bilateral (mild hike of shoulder on right rotation)    TE: performed to increase strength, stability, and reduce pain in the right shoulder and cervical area: 30 min  SL abd to neutral 3x10  Shoulder IR/Er in supine with MR  Prone shoulder rows 0 wt 3x10  Prone ext 3x10  Seated scaption lift on ball 3x15  Seated UT stretch 3x30 sec NP  Right shoulder IR stretch 6x 45 sec  SL abd 3x10  SFMA rolling UE 5x ea direction.NP  PNF D2 with Y-TB 3x10, shoulder flex/rot/lift in quad 2x10  Sleeper stretch 4x30 sec NP  Rolando wing against wall 3x10  Thoracic rotation in quad 3x10 ea  Brueggars on 1/2 bolster 2x10  Bilateral ER with O-TB forearms on wall with flex 2x10 ( pain reported)  Prone on ball Ws 2x10 (shoulder hike noted at 10)    MT: Performed to increase extensibility, mobility, and reduce pain: 15 " min  Cervical distraction grade II-III  SCOM release   Sub nucchal release  Scapular lift right   DN: pt was educated on intervention, risks and benefits as well as expected outcomes.  Pt signed waiver prior to procedure.  .3mm needle homeostatic UT point, sub scap point, medial deltoid, bilateral cervical paraspinal C6-C4  No adverse effects were noted.    Education: Discussed current HEP, add sleeper stretch       A: Pt cont to present with right pain at clavicular area, did report less today.  Showed improved IR, does tighten back up following exercises.  Improvd MMS ER some discomfort on 1/2 bolster.  Improved tolerance to strengthening, will work on endurance and SCOM stretching.  Pt will cont to benefit from skilled PT to address pain and weakness.    Goals:  Short Term Goals: 2 weeks   1: I with progressed HEP MET  2: Report performing all exercises without onset of pain  3: Report sleeping through the night without waking secondary to pain. MET     Long Term Goals: 8 weeks   1: Improve R MMS ER to 5/5 Progress  2: Improve post thoracic muscular strength right side to 5/5 for improved stability with overhead activity and decreased pain. Progress  3: Pt improve left cervical rotation to 90 deg without pain. Met  4: Pt report participating in an exercise program for 30 min without onset of pain >1/10       P: Cont increased strengthening, assess 1st rib placement.

## 2019-03-25 ENCOUNTER — CLINICAL SUPPORT (OUTPATIENT)
Dept: REHABILITATION | Facility: HOSPITAL | Age: 29
End: 2019-03-25
Payer: COMMERCIAL

## 2019-03-25 DIAGNOSIS — M54.2 CERVICAL PAIN: ICD-10-CM

## 2019-03-25 DIAGNOSIS — M25.511 RIGHT SHOULDER PAIN, UNSPECIFIED CHRONICITY: ICD-10-CM

## 2019-03-25 PROCEDURE — 97110 THERAPEUTIC EXERCISES: CPT | Mod: PO

## 2019-03-26 ENCOUNTER — HOSPITAL ENCOUNTER (OUTPATIENT)
Dept: CARDIOLOGY | Facility: OTHER | Age: 29
Discharge: HOME | End: 2019-03-26
Attending: INTERNAL MEDICINE
Payer: COMMERCIAL

## 2019-03-26 ENCOUNTER — OFFICE VISIT (OUTPATIENT)
Dept: INTERNAL MEDICINE | Facility: CLINIC | Age: 29
End: 2019-03-26
Attending: INTERNAL MEDICINE
Payer: COMMERCIAL

## 2019-03-26 VITALS
OXYGEN SATURATION: 100 % | BODY MASS INDEX: 17.05 KG/M2 | HEART RATE: 77 BPM | DIASTOLIC BLOOD PRESSURE: 70 MMHG | SYSTOLIC BLOOD PRESSURE: 100 MMHG | WEIGHT: 99.88 LBS | HEIGHT: 64 IN

## 2019-03-26 DIAGNOSIS — F32.5 MAJOR DEPRESSIVE DISORDER, SINGLE EPISODE IN FULL REMISSION: ICD-10-CM

## 2019-03-26 DIAGNOSIS — R07.9 CHEST PAIN, UNSPECIFIED TYPE: ICD-10-CM

## 2019-03-26 DIAGNOSIS — Z00.00 ANNUAL PHYSICAL EXAM: Primary | ICD-10-CM

## 2019-03-26 DIAGNOSIS — D64.9 ANEMIA, UNSPECIFIED TYPE: ICD-10-CM

## 2019-03-26 DIAGNOSIS — E55.9 VITAMIN D DEFICIENCY: ICD-10-CM

## 2019-03-26 DIAGNOSIS — R53.83 FATIGUE, UNSPECIFIED TYPE: ICD-10-CM

## 2019-03-26 PROCEDURE — 93005 ELECTROCARDIOGRAM TRACING: CPT

## 2019-03-26 PROCEDURE — 93010 ELECTROCARDIOGRAM REPORT: CPT | Mod: ,,, | Performed by: INTERNAL MEDICINE

## 2019-03-26 PROCEDURE — 93010 EKG 12-LEAD: ICD-10-PCS | Mod: ,,, | Performed by: INTERNAL MEDICINE

## 2019-03-26 PROCEDURE — 99395 PR PREVENTIVE VISIT,EST,18-39: ICD-10-PCS | Mod: S$GLB,,, | Performed by: INTERNAL MEDICINE

## 2019-03-26 PROCEDURE — 99999 PR PBB SHADOW E&M-EST. PATIENT-LVL IV: ICD-10-PCS | Mod: PBBFAC,,, | Performed by: INTERNAL MEDICINE

## 2019-03-26 PROCEDURE — 99999 PR PBB SHADOW E&M-EST. PATIENT-LVL IV: CPT | Mod: PBBFAC,,, | Performed by: INTERNAL MEDICINE

## 2019-03-26 PROCEDURE — 99395 PREV VISIT EST AGE 18-39: CPT | Mod: S$GLB,,, | Performed by: INTERNAL MEDICINE

## 2019-03-26 NOTE — PROGRESS NOTES
Subjective:   Patient ID: Angela Lyle is a 28 y.o. female  Chief complaint:   Chief Complaint   Patient presents with    Annual Exam       HPI  Pt here for annual exam     Moved to days and then about to switch to diffierent clinic with more consistent schedule from 8-4:30 and hoping that this improves energy level   - moving from nights to days did help some with this as well     Depression: still seeing therapist and pt reports that therapist feels as though pt has made significant improvement   Previously tolerated lexapro and wellbutrin - off for a couple of years now     Gyn: utd on exam     Fatigue: no snoring, no nighttime awakenings on average but occ will awaken at 4am and will go back to sleep quickly  Able to fall asleep  - sleeping more reg hours   - will awaken at 6a on work days, 8-9a on off days   - in bed by 10-11p  Increased exercise and exercising about 2 times per week - biking or stairmaster      PCOS: working to improve diet - dec candy intake     In PT for shoulder pain - had surgery about 2 weeks ago   Has hx of left labrum tear   - f/u with Dr. Stephanie diggs d def: off of suppl for a few months but then resumed suppl 3 months ago     Had 2 episodes of cp - pressure in nature - since last seen - last was a couple of weeks ago  Had EKG and echo last year for cp that was different than what was described   Located in sternum   - nonradiating   - lasted about 1-2 hours   - not triggered by anxiety or at times of inc stress   - no gerd sx prior to this   - occurred out of the blue  - able to breath during these episodes - no sob  - no nausea or sweating   - first one was at grocery   - 2nd episode was at work   - intensity was stable   - nonsmoker, no cp with exertion   - asymptomatic today and since last episode     These episodes were different than prior (see prior clinic note from 2018 for details)     Review of Systems    Objective:  Vitals:    03/26/19 1027   BP: 100/70   Pulse:  "77   SpO2: 100%   Weight: 45.3 kg (99 lb 13.9 oz)   Height: 5' 4" (1.626 m)     Body mass index is 17.14 kg/m².    Physical Exam   Constitutional: She is oriented to person, place, and time. She appears well-developed and well-nourished.   HENT:   Head: Normocephalic and atraumatic.   Right Ear: External ear normal.   Left Ear: External ear normal.   Nose: Nose normal.   Mouth/Throat: Oropharynx is clear and moist. No oropharyngeal exudate.   No carotid bruits   Eyes: Conjunctivae and EOM are normal.   Neck: Neck supple. No thyromegaly present.   Cardiovascular: Normal rate, regular rhythm, normal heart sounds and intact distal pulses.   Pulmonary/Chest: Effort normal and breath sounds normal.   Abdominal: Soft. Bowel sounds are normal.   Musculoskeletal: She exhibits no edema or tenderness.   Lymphadenopathy:     She has no cervical adenopathy.   Neurological: She is alert and oriented to person, place, and time.   Skin: Skin is warm and dry.   Psychiatric: Her behavior is normal. Thought content normal.   Vitals reviewed.    Assessment:  1. Annual physical exam    2. Anemia, unspecified type    3. Vitamin D deficiency    4. Chest pain, unspecified type    5. Major depressive disorder, single episode in full remission    6. Fatigue, unspecified type        Plan:  Angela was seen today for annual exam.    Diagnoses and all orders for this visit:    Annual physical exam  -     Hemoglobin A1c; Future  -     TSH; Future  -     Cancel: Lipid panel; Future  -     CBC auto differential; Future  -     Comprehensive metabolic panel; Future  Recommend daily sunscreen, cardiovascular exercise min 30 min 5 days per week. Seatbelts routinely.    Anemia, unspecified type  -     Ferritin; Future  -     Iron and TIBC; Future  -     Vitamin B12; Future    Vitamin D deficiency  -     Vitamin D; Future  Cont suppl, check level     Chest pain, unspecified type  -     SCHEDULED EKG 12-LEAD (to Muse); Future  -     Ambulatory Referral " to Cardiology  Er and rtc prompts given   Et unclear   Update ekg - refer to cards for opinion   Keep log of symptoms     Major depressive disorder, single episode in full remission  No si/hi/fleming  Improved   Cont therapy  Will consider resuming wellbutrin 150 xl in future if fatigue does not improve with change in job schedule     Fatigue - multifactorial  Inc exercise to 4 days per week if tolerated after cards appt   Cont therapy   Consider resuming wellbutrin in future to see if mood contributing although depression improved   Consider sleep eval - keep reg sleep schedule even on off days     Health Maintenance   Topic Date Due    Pap Smear  10/21/2019    TETANUS VACCINE  05/22/2025    Influenza Vaccine  Completed    Lipid Panel  Completed

## 2019-03-27 ENCOUNTER — OFFICE VISIT (OUTPATIENT)
Dept: CARDIOLOGY | Facility: CLINIC | Age: 29
End: 2019-03-27
Payer: COMMERCIAL

## 2019-03-27 ENCOUNTER — CLINICAL SUPPORT (OUTPATIENT)
Dept: REHABILITATION | Facility: HOSPITAL | Age: 29
End: 2019-03-27
Payer: COMMERCIAL

## 2019-03-27 VITALS
OXYGEN SATURATION: 100 % | SYSTOLIC BLOOD PRESSURE: 118 MMHG | BODY MASS INDEX: 17.32 KG/M2 | HEART RATE: 95 BPM | WEIGHT: 101.44 LBS | HEIGHT: 64 IN | DIASTOLIC BLOOD PRESSURE: 82 MMHG

## 2019-03-27 DIAGNOSIS — R07.9 CHEST PAIN OF UNKNOWN ETIOLOGY: ICD-10-CM

## 2019-03-27 DIAGNOSIS — M54.2 CERVICAL PAIN: ICD-10-CM

## 2019-03-27 DIAGNOSIS — M25.511 RIGHT SHOULDER PAIN, UNSPECIFIED CHRONICITY: ICD-10-CM

## 2019-03-27 PROCEDURE — 99999 PR PBB SHADOW E&M-EST. PATIENT-LVL III: ICD-10-PCS | Mod: PBBFAC,,, | Performed by: INTERNAL MEDICINE

## 2019-03-27 PROCEDURE — 3008F BODY MASS INDEX DOCD: CPT | Mod: CPTII,S$GLB,, | Performed by: INTERNAL MEDICINE

## 2019-03-27 PROCEDURE — 99204 PR OFFICE/OUTPT VISIT, NEW, LEVL IV, 45-59 MIN: ICD-10-PCS | Mod: S$GLB,,, | Performed by: INTERNAL MEDICINE

## 2019-03-27 PROCEDURE — 3008F PR BODY MASS INDEX (BMI) DOCUMENTED: ICD-10-PCS | Mod: CPTII,S$GLB,, | Performed by: INTERNAL MEDICINE

## 2019-03-27 PROCEDURE — 97110 THERAPEUTIC EXERCISES: CPT | Mod: PO

## 2019-03-27 PROCEDURE — 97140 MANUAL THERAPY 1/> REGIONS: CPT | Mod: PO

## 2019-03-27 PROCEDURE — 99999 PR PBB SHADOW E&M-EST. PATIENT-LVL III: CPT | Mod: PBBFAC,,, | Performed by: INTERNAL MEDICINE

## 2019-03-27 PROCEDURE — 99204 OFFICE O/P NEW MOD 45 MIN: CPT | Mod: S$GLB,,, | Performed by: INTERNAL MEDICINE

## 2019-03-27 NOTE — LETTER
March 27, 2019      Leah Shields MD  [address]  [facility] [address]           [facility] - Cardiology  [address]  [place] [address]  Phone: [phone]          Patient: Angela Lyle   MR Number: [id number]   YOB: 1990   Date of Visit: 3/27/2019       Dear Dr. Leah Shields:    Thank you for referring Angela Lyle to me for evaluation. Attached you will find relevant portions of my assessment and plan of care.    If you have questions, please do not hesitate to call me. I look forward to following Angela Lyle along with you.    Sincerely,    Dave Evans MD    Enclosure  CC:  No Recipients    If you would like to receive this communication electronically, please contact [email or web address] or [phone] to request more information on [employer] access.    For providers and/or their staff who would like to refer a patient to [facility], please contact us through our one-stop-shop provider referral line, [facility], at [phone].    If you feel you have received this communication in error or would no longer like to receive these types of communications, please e-mail [email or web address]

## 2019-03-27 NOTE — PROGRESS NOTES
[facility]  Physical Therapy Note     Name: Angela Lyle  Clinic Number: [id number]     Therapy Diagnosis:        Encounter Diagnoses   Name Primary?    Cervical pain      Right shoulder pain, unspecified chronicity        Physician: Casale, Michael Joseph,*  Physician Orders: PT Eval and Treat Right shoulder pain/cervical tightness  Medical Diagnosis from Referral: Right shoulder pain  Evaluation Date: 2/8/2019  Authorization Period Expiration: 1/30/20  Plan of Care Expiration: 2/8/19-4/8/19  Visit # / Visits authorized: 12/ 20     Time In: 8a  Time Out: 9a  Total Billable Time: 50 minutes     Precautions: Standard     Subjective   Pt stated that she felt better, did go to a show where she had to look up and top the right for an extended amount of time.  Felt some tightness in the cervical area, but no pain associated..  Pain rated to: 2/10 in the clavicular area    Objective:  + 1st rib elevation right  PROM Right shoulder IR to 84 deg  MMT ER 4+/5 without pain  MMT shoulder flex 4/5 without pain  SFMA Rolling with UE, able to complete ext motion  Cervical rotation 90 deg bilateral (mild hike of shoulder on right rotation)    TE: performed to increase strength, stability, and reduce pain in the right shoulder and cervical area: 30 min  SL abd to neutral 3x10  Shoulder IR/Er in supine with MR  Prone shoulder rows 0 wt 3x10  Prone ext 3x10  Seated scaption lift on ball 3x15  Seated UT stretch 3x30 sec NP  Right shoulder IR stretch 6x 45 sec  SL abd 3x10  SFMA rolling UE 5x ea direction.NP  PNF D2 with Y-TB 3x10, shoulder flex/rot/lift in quad 2x10  Sleeper stretch 4x30 sec NP  Rolando wing against wall 3x10  Thoracic rotation in quad 3x10 ea  Brueggars on 1/2 bolster 2x10  Bilateral ER with O-TB forearms on wall with flex 2x10 ( pain reported)  Prone on ball Ws 2x10 (shoulder hike noted at 10)   Seated on ball scaption lift 3x15,   Seated levator scap stretch 4x 30 sec    MT:  Performed to increase extensibility, mobility, and reduce pain: 15 min  Cervical distraction grade II-III  SCOM release   Sub nucchal release  Scapular lift right   DN: pt was educated on intervention, risks and benefits as well as expected outcomes.  Pt signed waiver prior to procedure.  .3mm needle homeostatic UT point, sub scap point, medial deltoid, bilateral cervical paraspinal C6-C4  No adverse effects were noted.    Education: Discussed current HEP, add sleeper stretch       A: Pt cont to present with right sided symptoms, but over all pain rating has reduced.  She has been able to participate in more recreational activities without pain, in the clinic shows improved scapulo-humeral rhythm and less post capsule tightness rebound with activity .  Pt will cont to benefit from skilled PT to address pain and weakness.    Goals:  Short Term Goals: 2 weeks   1: I with progressed HEP MET  2: Report performing all exercises without onset of pain MET  3: Report sleeping through the night without waking secondary to pain. MET   .  Long Term Goals: 8 weeks   1: Improve R MMS ER to 5/5 Progress  2: Improve post thoracic muscular strength right side to 5/5 for improved stability with overhead activity and decreased pain. Progress  3: Pt improve left cervical rotation to 90 deg without pain. Met  4: Pt report participating in an exercise program for 30 min without onset of pain >1/10       P: Cont increased strengthening, begin exercises above 90 deg.

## 2019-03-27 NOTE — Clinical Note
Thank you for referring Angela Lyle for evaluation of with chest pain of undetermined etiology. Please see my note for details of this encounter. If you have any questions, please contact me.  Thank you again for the referral.

## 2019-03-27 NOTE — PROGRESS NOTES
[facility]  Physical Therapy Note     Name: Angela Lyle  Clinic Number: [id number]     Therapy Diagnosis:        Encounter Diagnoses   Name Primary?    Cervical pain      Right shoulder pain, unspecified chronicity        Physician: Casale, Michael Joseph,*  Physician Orders: PT Eval and Treat Right shoulder pain/cervical tightness  Medical Diagnosis from Referral: Right shoulder pain  Evaluation Date: 2/8/2019  Authorization Period Expiration: 1/30/20  Plan of Care Expiration: 2/8/19-4/8/19  Visit # / Visits authorized: 11/ 20     Time In: 8a  Time Out: 9a  Total Billable Time: 55 minutes     Precautions: Standard     Subjective   Pt stated that she caught a stomach bug over the wkend, did not do any of her exercises.  Was happy that the neck and shoulder did not get aggravated while sick, feels she has gotten much better.  Pain rated to: 2/10 in the clavicular area    Objective:  + 1st rib elevation right  PROM Right shoulder IR to 84 deg  MMT ER 4+/5 without pain  MMT shoulder flex 4/5 without pain  SFMA Rolling with UE, able to complete ext motion  Cervical rotation 90 deg bilateral (mild hike of shoulder on right rotation)    TE: performed to increase strength, stability, and reduce pain in the right shoulder and cervical area: 30 min  SL abd to neutral 3x10  Shoulder IR/Er in supine with MR  Prone shoulder rows 0 wt 3x10  Prone ext 3x10  Seated scaption lift on ball 3x15  Seated UT stretch 3x30 sec NP  Right shoulder IR stretch 6x 45 sec  SL abd 3x10  SFMA rolling UE 5x ea direction.NP  PNF D2 with Y-TB 3x10, shoulder flex/rot/lift in quad 2x10  Sleeper stretch 4x30 sec NP  Rolando wing against wall 3x10  Thoracic rotation in quad 3x10 ea  Brueggars on 1/2 bolster 2x10  Bilateral ER with O-TB forearms on wall with flex 2x10 ( pain reported)  Prone on ball Ws 2x10 (shoulder hike noted at 10)    MT: Performed to increase extensibility, mobility, and reduce pain: 15  min  Cervical distraction grade II-III  SCOM release   Sub nucchal release  Scapular lift right   DN: pt was educated on intervention, risks and benefits as well as expected outcomes.  Pt signed waiver prior to procedure.  .3mm needle homeostatic UT point, sub scap point, medial deltoid, bilateral cervical paraspinal C6-C4  No adverse effects were noted.    Education: Discussed current HEP, add sleeper stretch       A: Pt cont to present with right pain at clavicular area, did report less today.  Showed improved IR, does not tighten up as much as she did in the past.  Improved tolerance to strengthening and overall stability with overhead activity, will work on endurance and SCOM stretching.  Pt will cont to benefit from skilled PT to address pain and weakness.    Goals:  Short Term Goals: 2 weeks   1: I with progressed HEP MET  2: Report performing all exercises without onset of pain  3: Report sleeping through the night without waking secondary to pain. MET     Long Term Goals: 8 weeks   1: Improve R MMS ER to 5/5 Progress  2: Improve post thoracic muscular strength right side to 5/5 for improved stability with overhead activity and decreased pain. Progress  3: Pt improve left cervical rotation to 90 deg without pain. Met  4: Pt report participating in an exercise program for 30 min without onset of pain >1/10       P: Cont increased strengthening, begin exercises above 90 deg.

## 2019-03-28 ENCOUNTER — PATIENT MESSAGE (OUTPATIENT)
Dept: INTERNAL MEDICINE | Facility: CLINIC | Age: 29
End: 2019-03-28

## 2019-03-28 NOTE — PROGRESS NOTES
Chart has been dictated using voice recognition software.  It is not been reviewed carefully for any transcriptional errors due to this technology.   Subjective:   Patient ID:  Angela Lyle is a 28 y.o. female who presents for evaluation of Chest Pain and Shortness of Breath      HPI: Patient, Nurse at 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  Chest pain and insomani started in 2010 when started Wellbutrin and continued for 2 weeks.  When insomnia rreiovlved, chest ain rersolved.  Occusrred when starterd Well butrin acgain a few years later. Got off of well butrin in 2016 and then still  Had chest pain. Would occur irregularly, spontaneously, lasting a couple fo days. Not relieved with Xanax. Pain reccureed and saw PCP a year ago .  ECG and echo was normal.      Pain is heavy tight associated with dyspnea lasting a couple of hours or more.  Latest was last Saturday. No associated symptoms other than the dyspnea.  Had it again today when washing dishes.  Also had it in a hot tub.  Has been working out at the gym without getting chest discomfort, not with groceries. Never occurs with exertion other than one time while washing dishes. Has rare palpitations.  No syncope.  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  The patient's chest pain started in 2010 when she was started on bupropion.  With the patient began having with chest pressure and tightness associated with shortness of breath that lasted from minutes to hours.  While she continued on bupropion, the chest pain and insomnia resolved after 2 weeks. After a time, the patient discontinued the bupropion  and started with back on the drug some years later with return of the chest pain and shortness of breath, again lasting only about 2 weeks.  The patient again went off of the bupropion and was free  of symptoms will not taking the drug.  With if recently, the patient began having recurrent episodes of the chest discomfort.    With the chest pain is a heaviness or tightness associated with dyspnea and last for a couple hours or more.  Her last episode was 5 days ago and was associated with dyspnea.  This chest pain is never associated with exertion except for 1 time when she was washing dishes.  Can be associated with getting into a hot tub.  She never has this chest discomfort when working out at the gym.      The patient does not have syncope, orthopnea, or PND.  She does get some lightheadedness and occasional palpitations.    Cardiac risk factors: positive family history    Past Medical History:   Diagnosis Date    Allergy     Anemia     hx of HOSEA on fergon in past    Anxiety     Arthritis     right shoulder    Depression     Fatigue     History of psychiatric care     Keloid cicatrix     PCOS (polycystic ovarian syndrome)     Psychiatric problem     Therapy        Past Surgical History:   Procedure Laterality Date    ARTHROSCOPY-SHOULDER Right 6/6/2017    [facility]    ARTHROSCOPY-SHOULDER WITH SUBACROMIAL DECOMPRESSION Right 6/6/2017    [facility]    DEBRIDEMENT-SHOULDER-ARTHROSCOPIC Right 6/6/2017    [facility]    INJECTION-JOINT Left 5/7/2018    [facility] at [facility] PAIN MGT    OPEN BICEPS SUB PECTORALIS TENODESIS (TENDON FIXATION) Right 6/6/2017    [facility]    SHOULDER SURGERY Right 06/2017    TONSILLECTOMY      wisdom teeth removal  2010       Social History     Tobacco Use    Smoking status: Never Smoker    Smokeless tobacco: Never Used   Substance Use Topics    Alcohol use: Yes     Comment: ocassional    Drug use: No       Outpatient Medications Prior to Visit   Medication Sig Dispense Refill    inositol-D chiro inositol (OVASITOL) 2,000-50 mg PwPk   "     ketoconazole (NIZORAL) 2 % cream Apply to affected areas of face BID prn scaling. 60 g 5    ondansetron (ZOFRAN-ODT) 4 MG TbDL Take 1 tablet (4 mg total) by mouth every 6 to 8 hours as needed. 30 tablet 1    ergocalciferol, vitamin D2, 2,000 unit Tab Take 2,000 Units by mouth once daily.       No facility-administered medications prior to visit.        Review of patient's allergies indicates:   Allergen Reactions    Sulfa (sulfonamide antibiotics) Hives and Itching    Sulfamethoxazole-trimethoprim      Other reaction(s): Itching       Review of Systems   Constitution: Negative for weight gain and weight loss.   HENT: Positive for nosebleeds.    Eyes: Negative for vision loss in left eye and vision loss in right eye.   Cardiovascular: Negative for claudication.        As above   Respiratory: Negative for hemoptysis, shortness of breath, snoring, sputum production and wheezing.    Endocrine: Negative for polydipsia and polyuria.   Hematologic/Lymphatic: Does not bruise/bleed easily.   Musculoskeletal: Positive for joint pain (shoulder, left hip). Negative for myalgias.   Gastrointestinal: Negative for change in bowel habit, hematemesis, hematochezia, melena, nausea and vomiting.   Genitourinary: Negative for hematuria.   Neurological: Negative for focal weakness and numbness.     Objective:   Physical Exam   Constitutional: She is oriented to person, place, and time. She appears well-developed and well-nourished.   /82   Pulse 95   Ht 5' 4" (1.626 m)   Wt 46 kg (101 lb 6.6 oz)   SpO2 100%   BMI 17.41 kg/m²      Neck: Neck supple. No JVD present. Carotid bruit is not present.   Cardiovascular: Normal rate, regular rhythm, normal heart sounds and intact distal pulses. Exam reveals no gallop and no friction rub.   No murmur heard.  Pulmonary/Chest: Effort normal and breath sounds normal. She has no wheezes. She has no rales.   Abdominal: Soft. Bowel sounds are normal. She exhibits no abdominal bruit. " There is no hepatomegaly. There is no tenderness.   Musculoskeletal: She exhibits no edema.   Neurological: She is alert and oriented to person, place, and time. She has normal strength.   Skin: No cyanosis. Nails show no clubbing.       Lab Results   Component Value Date    WBC 3.90 03/26/2019    HGB 12.4 03/26/2019    HCT 36.3 (L) 03/26/2019    MCV 88 03/26/2019     03/26/2019       Lab Results   Component Value Date     03/26/2019    K 3.9 03/26/2019    BUN 9 03/26/2019    CREATININE 0.7 03/26/2019    GLU 88 03/26/2019    HGBA1C 5.1 03/26/2019    CHOL 141 12/21/2017    HDL 60 12/21/2017    LDLCALC 72.6 12/21/2017    TRIG 42 12/21/2017    CHOLHDL 42.6 12/21/2017    HGB 12.4 03/26/2019    HCT 36.3 (L) 03/26/2019     03/26/2019     ECG (26-mar-2019) showed sinus bradycardia and was a normal ECG.   Assessment:     1. Chest pain of unknown etiology      The patient has chest pain and dyspnea of uncertain etiology.  She has a normal echocardiogram 1 year ago and her physical examination shows no evidence of cardiac dysfunction.  Her chest pain is almost always at rest.  It is highly unlikely that this chest pain and/or dyspnea is related to myocardial dysfunction.  However, the patient should undergo stress testing without imaging to determine if there is any hint of myocardial ischemia.  If the stress test is positive, a decision will be made whether to repeat the stress test with imaging or proceed directly to catheterization.  If the stress test is negative, then it is clear that this chest pain is not due to a cardiac problem and the patient does not need to have cardiovascular follow-up at that point.  Further decisions will be made upon reviewing the results of the stress test.  Plan:     Angela was seen today for chest pain and shortness of breath.    Diagnoses and all orders for this visit:    Chest pain of unknown etiology  -     Treadmill Stress Test (CUPID ONLY); Future          Dave B.  MD [facility]  Consultative Cardiology

## 2019-04-01 ENCOUNTER — CLINICAL SUPPORT (OUTPATIENT)
Dept: REHABILITATION | Facility: HOSPITAL | Age: 29
End: 2019-04-01
Payer: COMMERCIAL

## 2019-04-01 DIAGNOSIS — M54.2 CERVICAL PAIN: ICD-10-CM

## 2019-04-01 DIAGNOSIS — M25.511 RIGHT SHOULDER PAIN, UNSPECIFIED CHRONICITY: ICD-10-CM

## 2019-04-01 PROCEDURE — 97110 THERAPEUTIC EXERCISES: CPT | Mod: PO

## 2019-04-01 PROCEDURE — 97140 MANUAL THERAPY 1/> REGIONS: CPT | Mod: PO

## 2019-04-05 ENCOUNTER — HOSPITAL ENCOUNTER (OUTPATIENT)
Dept: CARDIOLOGY | Facility: CLINIC | Age: 29
Discharge: HOME | End: 2019-04-05
Attending: INTERNAL MEDICINE
Payer: COMMERCIAL

## 2019-04-05 DIAGNOSIS — R07.9 CHEST PAIN OF UNKNOWN ETIOLOGY: ICD-10-CM

## 2019-04-05 LAB
CV STRESS BASE HR: 75 BPM
DIASTOLIC BLOOD PRESSURE: 62 MMHG
OHS CV CPX 1 MINUTE RECOVERY HEART RATE: 155 BPM
OHS CV CPX 85 PERCENT MAX PREDICTED HEART RATE MALE: 153
OHS CV CPX ESTIMATED METS: 11
OHS CV CPX MAX PREDICTED HEART RATE: 180
OHS CV CPX PATIENT IS FEMALE: 1
OHS CV CPX PATIENT IS MALE: 0
OHS CV CPX PEAK DIASTOLIC BLOOD PRESSURE: 82 MMHG
OHS CV CPX PEAK HEAR RATE: 181 BPM
OHS CV CPX PEAK RATE PRESSURE PRODUCT: NORMAL
OHS CV CPX PEAK SYSTOLIC BLOOD PRESSURE: 144 MMHG
OHS CV CPX PERCENT MAX PREDICTED HEART RATE ACHIEVED: 100
OHS CV CPX PERCENT TARGET HEART RATE ACHIEVED: 118.3
OHS CV CPX RATE PRESSURE PRODUCT PRESENTING: 8400
OHS CV CPX TARGET HEART RATE: 153
STRESS ECHO POST EXERCISE DUR MIN: 7 MIN
STRESS ECHO POST EXERCISE DUR SEC: 0
SYSTOLIC BLOOD PRESSURE: 112 MMHG

## 2019-04-05 PROCEDURE — 93015 TREADMILL STRESS TEST (CUPID ONLY): ICD-10-PCS | Mod: S$GLB,,, | Performed by: INTERNAL MEDICINE

## 2019-04-05 PROCEDURE — 93015 CV STRESS TEST SUPVJ I&R: CPT | Mod: S$GLB,,, | Performed by: INTERNAL MEDICINE

## 2019-04-12 NOTE — PROGRESS NOTES
[facility]  Physical Therapy Note     Name: Angela Lyle  Clinic Number: [id number]     Therapy Diagnosis:        Encounter Diagnoses   Name Primary?    Cervical pain      Right shoulder pain, unspecified chronicity        Physician: Casale, Michael Joseph,*  Physician Orders: PT Eval and Treat Right shoulder pain/cervical tightness  Medical Diagnosis from Referral: Right shoulder pain  Evaluation Date: 2/8/2019  Authorization Period Expiration: 1/30/20  Plan of Care Expiration: 2/8/19-4/8/19  Visit # / Visits authorized: 13/ 20     Time In: 8a  Time Out: 9a  Total Billable Time: 50 minutes     Precautions: Standard     Subjective   Pt stated that she felt better, has had longer duration without discomfort.  Pain rated to: 2/10 in the clavicular area    Objective:  + 1st rib elevation right  PROM Right shoulder IR to 84 deg  MMT ER 4+/5 without pain  MMT shoulder flex 4/5 without pain  SFMA Rolling with UE, able to complete ext motion  Cervical rotation 90 deg bilateral (mild hike of shoulder on right rotation)    TE: performed to increase strength, stability, and reduce pain in the right shoulder and cervical area: 30 min  SL abd to neutral 3x10  Shoulder IR/Er in supine with MR  Prone shoulder rows 0 wt 3x10  Prone ext 3x10  Seated scaption lift on ball 3x15  Seated UT stretch 3x30 sec NP  Right shoulder IR stretch 6x 45 sec  SL abd 3x10  SFMA rolling UE 5x ea direction.NP  PNF D2 with Y-TB 3x10, shoulder flex/rot/lift in quad 2x10  Sleeper stretch 4x30 sec NP  Rolando wing against wall 3x10  Thoracic rotation in quad 3x10 ea  Brueggars on 1/2 bolster 2x10  Bilateral ER with O-TB forearms on wall with flex 2x10 ( pain reported)  Prone on ball Ws 2x10 (shoulder hike noted at 10)   Seated on ball scaption lift 3x15,   Seated levator scap stretch 4x 30 sec    MT: Performed to increase extensibility, mobility, and reduce pain: 15 min  Cervical distraction grade II-III  SCOM release    Sub nucchal release  Scapular lift right   DN: pt was educated on intervention, risks and benefits as well as expected outcomes.  Pt signed waiver prior to procedure.  .3mm needle homeostatic UT point, sub scap point, medial deltoid, bilateral cervical paraspinal C6-C4  No adverse effects were noted.    Education: Discussed current HEP, add sleeper stretch       A: Pt cont to present with right sided symptoms, but over all pain rating has reduced.  She has been able to participate in more recreational activities without pain, in the clinic shows improved scapulo-humeral rhythm and less post capsule tightness rebound with activity .  Pt will cont to benefit from skilled PT to address pain and weakness.    Goals:  Short Term Goals: 2 weeks   1: I with progressed HEP MET  2: Report performing all exercises without onset of pain MET  3: Report sleeping through the night without waking secondary to pain. MET   .  Long Term Goals: 8 weeks   1: Improve R MMS ER to 5/5 Progress  2: Improve post thoracic muscular strength right side to 5/5 for improved stability with overhead activity and decreased pain. Progress  3: Pt improve left cervical rotation to 90 deg without pain. Met  4: Pt report participating in an exercise program for 30 min without onset of pain >1/10       P: Cont increased strengthening, begin exercises above 90 deg.

## 2019-04-14 ENCOUNTER — PATIENT MESSAGE (OUTPATIENT)
Dept: CARDIOLOGY | Facility: CLINIC | Age: 29
End: 2019-04-14

## 2019-05-28 ENCOUNTER — PATIENT MESSAGE (OUTPATIENT)
Dept: SPORTS MEDICINE | Facility: CLINIC | Age: 29
End: 2019-05-28

## 2019-06-10 ENCOUNTER — PATIENT MESSAGE (OUTPATIENT)
Dept: SPORTS MEDICINE | Facility: CLINIC | Age: 29
End: 2019-06-10

## 2019-06-10 DIAGNOSIS — M25.519 SHOULDER PAIN, UNSPECIFIED CHRONICITY, UNSPECIFIED LATERALITY: Primary | ICD-10-CM

## 2019-07-11 ENCOUNTER — CLINICAL SUPPORT (OUTPATIENT)
Dept: REHABILITATION | Facility: HOSPITAL | Age: 29
End: 2019-07-11
Payer: COMMERCIAL

## 2019-07-11 DIAGNOSIS — M54.2 CERVICAL PAIN: ICD-10-CM

## 2019-07-11 DIAGNOSIS — M25.511 RIGHT SHOULDER PAIN, UNSPECIFIED CHRONICITY: ICD-10-CM

## 2019-07-11 PROCEDURE — 97140 MANUAL THERAPY 1/> REGIONS: CPT | Mod: PO

## 2019-07-11 PROCEDURE — 97110 THERAPEUTIC EXERCISES: CPT | Mod: PO

## 2019-07-11 NOTE — PLAN OF CARE
[facility]  Physical Therapy Note     Name: Angela Lyle  Clinic Number: [id number]     Therapy Diagnosis:           Encounter Diagnoses   Name Primary?    Cervical pain      Right shoulder pain, unspecified chronicity        Physician: Casale, Michael Joseph,*  Physician Orders: PT Eval and Treat Right shoulder pain/cervical tightness  Medical Diagnosis from Referral: Right shoulder pain  Evaluation Date: 7/11/19  Authorization Period Expiration: 1/30/20  Plan of Care Expiration: 7/11/19-9/11/19  Visit # / Visits authorized: 1/12     Time In: 425a  Time Out: 520p  Total Billable Time: 50 minutes     Precautions: Standard     Subjective   Pt stated that she cont to perform all of her exercises, never went up in reps.  Does not have the same degree of discomfort, the shoulder feels better, cont to get muscle pain in the neck.    Pain rated to: 3/10 right mid to lateral cervical area     Objective:  + 1st rib elevation right  PROM Right shoulder IR to 84 deg  MMT ER 4/5 without pain  MMT shoulder flex 4/5 without pain  Cervical rotation 90 deg bilateral (mild hike of shoulder on right rotation), chin deviation right  -Scap retraction test  - cervical distraction  + UT tigjhness with shoulder incorporation  PIVM hypomibile right side C2-c6     TE: performed to increase strength, stability, and reduce pain in the right shoulder and cervical area: 20 min    Seated UT stretch 3x30 sec NP  Right shoulder IR stretch 6x 45 sec  PNF D2 with Y-TB 3x10, shoulder flex/rot/lift in quad 2x10  Sleeper stretch 4x30 sec NP  Rolando wing against wall 3x10  Thoracic rotation in quad 3x10 ea  Brueggars on 1/2 bolster 2x10  Isolated shoulder IR/ER PT apply resistence and brace shoulder to limited compensatory motion, 3x 12 ea      MT: Performed to increase extensibility, mobility, and reduce pain: 30 min  Cervical distraction grade II-III  SCOM release   Sub nucchal release  Scapular lift right   Sub  scap release  PIVM cervical/upslide/downslide grade II  STM righ cervical paraspinal  DN: pt was educated on intervention, risks and benefits as well as expected outcomes.  Pt signed waiver prior to procedure.  .3mm needle homeostatic UT point, sub scap point, medial deltoid, bilateral cervical paraspinal C6-C4  No adverse effects were noted.     Education: Discussed current HEP, add sleeper stretch        A: Pt cont to present with right sided symptoms, but over all pain rating has reduced since start of PT.  Does show decreased intervetebral motion on right, elevated 1st rib right, and compensatory shoulder hike with challenged IR/ER motion. In the clinic shows improved scapulo-humeral rhythm and less post capsule tightness rebound with activity .  Pt will cont to benefit from skilled PT to address pain and weakness.     Goals:  Short Term Goals: 2 weeks      .  Long Term Goals: 8 weeks   1: Improve R MMS ER to 5/5 Progress  2: Improve post thoracic muscular strength right side to 5/5 for improved stability with overhead activity and decreased pain. Progress  3: Pt improve left cervical rotation to 90 deg without pain. Met  4: Pt report participating in an exercise program for 30 min without onset of pain >1/10        P: Cont increased strengthening, C1 lateral glide manual treatment, isometric cervical rotation with shoulder depressed.

## 2019-07-16 ENCOUNTER — CLINICAL SUPPORT (OUTPATIENT)
Dept: REHABILITATION | Facility: HOSPITAL | Age: 29
End: 2019-07-16
Payer: COMMERCIAL

## 2019-07-16 DIAGNOSIS — M25.511 RIGHT SHOULDER PAIN, UNSPECIFIED CHRONICITY: ICD-10-CM

## 2019-07-16 DIAGNOSIS — M54.2 CERVICAL PAIN: ICD-10-CM

## 2019-07-16 PROCEDURE — 97140 MANUAL THERAPY 1/> REGIONS: CPT | Mod: PO

## 2019-07-16 PROCEDURE — 97110 THERAPEUTIC EXERCISES: CPT | Mod: PO

## 2019-07-21 NOTE — PROGRESS NOTES
[facility]  Physical Therapy Note     Name: Angela Lyle  Clinic Number: [id number]     Therapy Diagnosis:           Encounter Diagnoses   Name Primary?    Cervical pain      Right shoulder pain, unspecified chronicity        Physician: Casale, Michael Joseph,*  Physician Orders: PT Eval and Treat Right shoulder pain/cervical tightness  Medical Diagnosis from Referral: Right shoulder pain  Evaluation Date: 7/11/19  Authorization Period Expiration: 1/30/20  Plan of Care Expiration: 7/11/19-9/11/19  Visit # / Visits authorized: 2/12     Time In: 430a  Time Out: 520p  Total Billable Time: 45 minutes     Precautions: Standard     Subjective   Pt stated that she cont to perform all of her exercises, after the last treatment felt much better and less of the pain in the top of the clavicle area.  Was able to work with less cervical pain by the end of the day   Pain rated to: 1/10 right mid to lateral cervical area     Objective:  + 1st rib elevation right  PROM Right shoulder IR to 84 deg  MMT ER 4/5 without pain  MMT shoulder flex 4/5 without pain  Cervical rotation 90 deg bilateral (mild hike of shoulder on right rotation), chin deviation right  -Scap retraction test  - cervical distraction  + UT tigjhness with shoulder incorporation  PIVM hypomibile right side C2-c6     TE: performed to increase strength, stability, and reduce pain in the right shoulder and cervical area: 15 min     Seated UT stretch 3x30 sec NP  Right shoulder IR stretch 6x 45 sec  PNF D2 with Y-TB 3x10, shoulder flex/rot/lift in quad 2x10  Sleeper stretch 4x30 sec NP  Rolando wing against wall 3x10  Thoracic rotation in quad 3x10 ea  Brueggars on 1/2 bolster 2x10  Isolated shoulder IR/ER PT apply resistence and brace shoulder to limited compensatory motion, 3x 12 ea      MT: Performed to increase extensibility, mobility, and reduce pain: 30 min  Cervical distraction grade II-III  SCOM release   Sub nucchal  release  Scapular lift right   Sub scap release  PIVM cervical/upslide/downslide grade II  STM righ cervical paraspinal  Lateral glide of atlas grade II, right  DN: pt was educated on intervention, risks and benefits as well as expected outcomes.  Pt signed waiver prior to procedure.  .3mm needle homeostatic UT point, sub scap point, medial deltoid, bilateral cervical paraspinal C6-C4  No adverse effects were noted.     Education: Discussed current HEP, add sleeper stretch        A: Pt cont to present with right sided symptoms, responded well to deep UT strtech and SCOM/Scalene stretch.  Performed lateral glide of the atlas and did note improved chin alignment.  In the clinic shows improved scapulo-humeral rhythm and less post capsule tightness rebound with activity .  Pt will cont to benefit from skilled PT to address pain and weakness.     Goals:  Short Term Goals: 2 weeks       .  Long Term Goals: 8 weeks   1: Improve R MMS ER to 5/5 Progress  2: Improve post thoracic muscular strength right side to 5/5 for improved stability with overhead activity and decreased pain. Progress  3: Pt improve left cervical rotation to 90 deg without pain. Met  4: Pt report participating in an exercise program for 30 min without onset of pain >1/10        P: Cont increased strengthening, right shoulder strengthening.

## 2019-07-30 ENCOUNTER — CLINICAL SUPPORT (OUTPATIENT)
Dept: REHABILITATION | Facility: HOSPITAL | Age: 29
End: 2019-07-30
Payer: COMMERCIAL

## 2019-07-30 DIAGNOSIS — M54.2 CERVICAL PAIN: ICD-10-CM

## 2019-07-30 DIAGNOSIS — M25.511 RIGHT SHOULDER PAIN, UNSPECIFIED CHRONICITY: ICD-10-CM

## 2019-07-30 PROCEDURE — 97110 THERAPEUTIC EXERCISES: CPT | Mod: PO

## 2019-07-30 PROCEDURE — 97140 MANUAL THERAPY 1/> REGIONS: CPT | Mod: PO

## 2019-07-31 NOTE — PROGRESS NOTES
[facility]  Physical Therapy Note     Name: Angela Lyle  Clinic Number: [id number]     Therapy Diagnosis:           Encounter Diagnoses   Name Primary?    Cervical pain      Right shoulder pain, unspecified chronicity        Physician: Casale, Michael Joseph,*  Physician Orders: PT Eval and Treat Right shoulder pain/cervical tightness  Medical Diagnosis from Referral: Right shoulder pain  Evaluation Date: 7/11/19  Authorization Period Expiration: 1/30/20  Plan of Care Expiration: 7/11/19-9/11/19  Visit # / Visits authorized: 3/12     Time In: 430a  Time Out: 515p  Total Billable Time: 45 minutes     Precautions: Standard     Subjective   Pt stated that she felt better than she had in a long time following the last appointment, had to care fo rher god daughter and thinks that all the running around flared it up.  Not as bad and more centralized in the anter-lateral cervical area.  Pain rated to: 1/10 right mid to lateral cervical area     Objective:  + 1st rib elevation right  PROM Right shoulder IR to 84 deg  MMT ER 4/5 without pain  MMT shoulder flex 4/5 without pain  Cervical rotation 90 deg bilateral (mild hike of shoulder on right rotation), chin deviation right  -Scap retraction test  - cervical distraction  + UT tigjhness with shoulder incorporation  PIVM hypomibile right side C2-c6     TE: performed to increase strength, stability, and reduce pain in the right shoulder and cervical area: 15 min     Seated UT stretch 3x30 sec NP  Right shoulder IR stretch 6x 45 sec  PNF D2 with Y-TB 3x10, shoulder flex/rot/lift in quad 2x10  Sleeper stretch 4x30 sec NP  Rolando wing against wall 3x10  Thoracic rotation in quad 3x10 ea  Brueggars on 1/2 bolster 2x10  Isolated shoulder IR/ER PT apply resistence and brace shoulder to limited compensatory motion, 3x 12 ea      MT: Performed to increase extensibility, mobility, and reduce pain: 30 min  Cervical distraction grade II-III  SCOM  release   Sub nucchal release  Scapular lift right   Sub scap release  PIVM cervical/upslide/downslide grade II  STM righ cervical paraspinal  Lateral glide of atlas grade II, right  DN: pt was educated on intervention, risks and benefits as well as expected outcomes.  Pt signed waiver prior to procedure.  .3mm needle homeostatic UT point, sub scap point, medial deltoid, bilateral cervical paraspinal C6-C4  No adverse effects were noted.     Education: Discussed current HEP, add sleeper stretch        A: Pt cont to present with right sided symptoms, responded well to deep UT strtech and SCOM/Scalene stretch.  Performed lateral glide of the atlas again this session, pt had more tenderness in that area,  and did note improved chin alignment.  In the clinic shows improved scapulo-humeral rhythm and less post capsule tightness rebound with activity .  Pt will cont to benefit from skilled PT to address pain and weakness.     Goals:  Short Term Goals: 2 weeks       .  Long Term Goals: 8 weeks   1: Improve R MMS ER to 5/5 Progress  2: Improve post thoracic muscular strength right side to 5/5 for improved stability with overhead activity and decreased pain. Progress  3: Pt improve left cervical rotation to 90 deg without pain. Met  4: Pt report participating in an exercise program for 30 min without onset of pain >1/10        P: Cont increased strengthening, right shoulder strengthening.

## 2019-08-13 ENCOUNTER — CLINICAL SUPPORT (OUTPATIENT)
Dept: REHABILITATION | Facility: HOSPITAL | Age: 29
End: 2019-08-13
Payer: COMMERCIAL

## 2019-08-13 DIAGNOSIS — M25.511 RIGHT SHOULDER PAIN, UNSPECIFIED CHRONICITY: ICD-10-CM

## 2019-08-13 DIAGNOSIS — M54.2 CERVICAL PAIN: ICD-10-CM

## 2019-08-13 PROCEDURE — 97110 THERAPEUTIC EXERCISES: CPT | Mod: PO

## 2019-08-13 PROCEDURE — 97140 MANUAL THERAPY 1/> REGIONS: CPT | Mod: PO

## 2019-08-16 NOTE — PROGRESS NOTES
[facility]  Physical Therapy Note     Name: Angela Lyle  Clinic Number: [id number]     Therapy Diagnosis:           Encounter Diagnoses   Name Primary?    Cervical pain      Right shoulder pain, unspecified chronicity        Physician: Casale, Michael Joseph,*  Physician Orders: PT Eval and Treat Right shoulder pain/cervical tightness  Medical Diagnosis from Referral: Right shoulder pain  Evaluation Date: 7/11/19  Authorization Period Expiration: 1/30/20  Plan of Care Expiration: 7/11/19-9/11/19  Visit # / Visits authorized: 4/12     Time In: 435a  Time Out: 515p  Total Billable Time: 40 minutes     Precautions: Standard     Subjective   Pt stated that she had felt better, but did have a long car ride that amplified symptoms.  Feels a deeper pain in the cervical area, always on the side.  Latteral shoulder pain has improved  Pain rated to: 1/10 right mid to lateral cervical area     Objective:  + 1st rib elevation right  PROM Right shoulder IR to 84 deg  MMT ER 4/5 without pain  MMT shoulder flex 4/5 without pain  Cervical rotation 90 deg bilateral (mild hike of shoulder on right rotation), chin deviation right  -Scap retraction test  - cervical distraction  + UT tigjhness with shoulder incorporation  PIVM hypomibile right side C2-c6     TE: performed to increase strength, stability, and reduce pain in the right shoulder and cervical area: 15 min     Seated UT stretch 3x30 sec NP  Right shoulder IR stretch 6x 45 sec  PNF D2 with Y-TB 3x10, shoulder flex/rot/lift in quad 2x10  Sleeper stretch 4x30 sec NP  Rolando wing against wall 3x10  Thoracic rotation in quad 3x10 ea  Brueggars on 1/2 bolster 2x10  Isolated shoulder IR/ER PT apply resistence and brace shoulder to limited compensatory motion, 3x 12 ea      MT: Performed to increase extensibility, mobility, and reduce pain: 30 min  Cervical distraction grade II-III  SCOM release   Sub nucchal release  Scapular lift right   Sub  scap release  PIVM cervical/upslide/downslide grade II  STM righ cervical paraspinal  Lateral glide of atlas grade II, right  DN: pt was educated on intervention, risks and benefits as well as expected outcomes.  Pt signed waiver prior to procedure.  .3mm needle homeostatic UT point, sub scap point, medial deltoid, bilateral cervical paraspinal C6-C4  No adverse effects were noted.     Education: Discussed current HEP      A: Pt cont to present with right sided symptoms, responded well to deep UT strtech and SCOM/Scalene stretch.  Performed lateral glide of the atlas again this session, pt had more tenderness in that area,  and did note improved chin alignment. Felt more relief with cervical stabilization with manual stretch to the UT on right side In the clinic shows improved scapulo-humeral rhythm and less post capsule tightness rebound with activity .  Pt will cont to benefit from skilled PT to address pain and weakness.     Goals:  Short Term Goals: 2 weeks       .  Long Term Goals: 8 weeks   1: Improve R MMS ER to 5/5 Progress  2: Improve post thoracic muscular strength right side to 5/5 for improved stability with overhead activity and decreased pain. Progress  3: Pt improve left cervical rotation to 90 deg without pain. Met  4: Pt report participating in an exercise program for 30 min without onset of pain >1/10        P: Cont increased strengthening, right shoulder strengthening.

## 2019-09-04 ENCOUNTER — CLINICAL SUPPORT (OUTPATIENT)
Dept: REHABILITATION | Facility: HOSPITAL | Age: 29
End: 2019-09-04
Payer: COMMERCIAL

## 2019-09-04 DIAGNOSIS — M25.511 RIGHT SHOULDER PAIN, UNSPECIFIED CHRONICITY: ICD-10-CM

## 2019-09-04 DIAGNOSIS — M54.2 CERVICAL PAIN: ICD-10-CM

## 2019-09-04 PROCEDURE — 97140 MANUAL THERAPY 1/> REGIONS: CPT | Mod: PO

## 2019-09-04 PROCEDURE — 97110 THERAPEUTIC EXERCISES: CPT | Mod: PO

## 2019-09-04 NOTE — PROGRESS NOTES
[facility]  Physical Therapy Note     Name: Angela Lyle  Clinic Number: [id number]     Therapy Diagnosis:           Encounter Diagnoses   Name Primary?    Cervical pain      Right shoulder pain, unspecified chronicity        Physician: Casale, Michael Joseph,*  Physician Orders: PT Eval and Treat Right shoulder pain/cervical tightness  Medical Diagnosis from Referral: Right shoulder pain  Evaluation Date: 7/11/19  Authorization Period Expiration: 1/30/20  Plan of Care Expiration: 7/11/19-9/11/19  Visit # / Visits authorized: 4/12     Time In: 435a  Time Out: 515p  Total Billable Time: 40 minutes     Precautions: Standard     Subjective   Pt stated that cont to do well following the last visit, but 4 days later woke up with increased post cervical pain with no relief for one whole day.  Did change with change in position, described as deep ache to 8/10.  Has resolved completely, does not know what increased symptoms.  Currently has pain and tightness in the right cervical down to top of shoulder. Has improved sleep, stiffness in morning.  Pain rated to: 1/10 right mid to lateral cervical area     Objective:  + 1st rib elevation right> left  PROM Right shoulder IR to 90 deg post stretch  MMT ER 4/5 without pain  MMT shoulder flex 4/5 without pain  Cervical rotation 90 deg bilateral (mild hike of shoulder on right rotation), chin deviation right  -Scap retraction test  - cervical distraction  + UT tigjhness with shoulder incorporation  PIVM hypomibile right side C2-c6     TE: performed to increase strength, stability, and reduce pain in the right shoulder and cervical area: 15 min     Seated UT stretch 3x30 sec NP  Right shoulder IR stretch 6x 45 sec  PNF D2 with Y-TB 3x10, shoulder flex/rot/lift in quad 2x10  Sleeper stretch 4x30 sec NP  Rolando wing against wall 3x10  Thoracic rotation in quad 3x10 ea  Brueggars on 1/2 bolster 2x10  Isolated shoulder IR/ER PT apply resistence and  brace shoulder to limited compensatory motion, 3x 12 ea      MT: Performed to increase extensibility, mobility, and reduce pain: 30 min  Cervical distraction grade II-III  SCOM release   Sub nucchal release  Scapular lift right   Sub scap release  PIVM cervical/upslide/downslide grade II  STM righ cervical paraspinal  Lateral glide of atlas grade II, right  DN: pt was educated on intervention, risks and benefits as well as expected outcomes.  Pt signed waiver prior to procedure.  .3mm needle homeostatic UT point, sub scap point, medial deltoid, bilateral cervical paraspinal C6-C4  .23 mm needle right pterygoid sup and infer  Needles placed for 3 min.  No adverse effects were noted.     Education: Discussed current HEP      A: Pt cont to present with right sided symptoms, minor elevated first rib, right sided UT tightness..  Performed lateral glide of the atlas again this session, pt had less tenderness with great results,  and did note improved chin alignment. Felt more relief with cervical stabilization with manual stretch to the UT on right side again today .  Pt will cont to benefit from skilled PT to address pain and weakness.     Goals:  Short Term Goals: 2 weeks       .  Long Term Goals: 8 weeks   1: Improve R MMS ER to 5/5 Progress  2: Improve post thoracic muscular strength right side to 5/5 for improved stability with overhead activity and decreased pain. Progress  3: Pt improve left cervical rotation to 90 deg without pain. Met  4: Pt report participating in an exercise program for 30 min without onset of pain >1/10        P: Cont increased strengthening, right shoulder strengthening.

## 2019-09-09 ENCOUNTER — PATIENT MESSAGE (OUTPATIENT)
Dept: INTERNAL MEDICINE | Facility: CLINIC | Age: 29
End: 2019-09-09

## 2019-09-09 DIAGNOSIS — R53.83 FATIGUE, UNSPECIFIED TYPE: Primary | ICD-10-CM

## 2019-09-09 NOTE — TELEPHONE ENCOUNTER
Yes, agree with GI appt and sleep eval  - referral to sleep signed   Recommend that she keep scheduled appt with GI and start zantac 75 twice daily if not already started

## 2019-09-09 NOTE — TELEPHONE ENCOUNTER
Ms Lyle this is Dr. Shields 's recommendation:  You need  GI appt and sleep eval  - referral to sleep signed   Recommend that she keep scheduled appt with GI and start zantac 75 twice daily if not already started

## 2019-09-10 ENCOUNTER — CLINICAL SUPPORT (OUTPATIENT)
Dept: REHABILITATION | Facility: HOSPITAL | Age: 29
End: 2019-09-10
Payer: COMMERCIAL

## 2019-09-10 ENCOUNTER — TELEPHONE (OUTPATIENT)
Dept: INTERNAL MEDICINE | Facility: CLINIC | Age: 29
End: 2019-09-10

## 2019-09-10 DIAGNOSIS — M54.2 CERVICAL PAIN: ICD-10-CM

## 2019-09-10 DIAGNOSIS — M25.511 RIGHT SHOULDER PAIN, UNSPECIFIED CHRONICITY: ICD-10-CM

## 2019-09-10 PROCEDURE — 97110 THERAPEUTIC EXERCISES: CPT | Mod: PO

## 2019-09-10 PROCEDURE — 97140 MANUAL THERAPY 1/> REGIONS: CPT | Mod: PO

## 2019-09-10 NOTE — TELEPHONE ENCOUNTER
Left voice message for patient to schedule appointment from referral to Sleep Medicine.  Colin CASTANEDA  [phone]

## 2019-09-10 NOTE — PROGRESS NOTES
[facility]  Physical Therapy Note     Name: Angela Lyle  Clinic Number: [id number]     Therapy Diagnosis:           Encounter Diagnoses   Name Primary?    Cervical pain      Right shoulder pain, unspecified chronicity        Physician: Casale, Michael Joseph,*  Physician Orders: PT Eval and Treat Right shoulder pain/cervical tightness  Medical Diagnosis from Referral: Right shoulder pain  Evaluation Date: 7/11/19  Authorization Period Expiration: 1/30/20  Plan of Care Expiration: 7/11/19-9/11/19  Visit # / Visits authorized: 4/12     Time In: 435a  Time Out: 515p  Total Billable Time: 40 minutes     Precautions: Standard     Subjective   Pt stated that cont to do well, has not had an episode as bad as the last one.  Has noted that she has more fatigue in the right shoulder that she remembers in the past.  Pain has been more managable.  .  Pain rated to: 1/10 right mid to lateral cervical area     Objective:  + 1st rib elevation right> left  PROM Right shoulder IR to 90 deg post stretch  MMT ER 4/5 without pain  MMT shoulder flex 4/5 without pain  Cervical rotation 90 deg bilateral (mild hike of shoulder on right rotation), chin deviation right  -Scap retraction test  - cervical distraction  + UT tigjhness with shoulder incorporation  PIVM hypomibile right side C2-c6     TE: performed to increase strength, stability, and reduce pain in the right shoulder and cervical area: 15 min     Seated UT stretch 3x30 sec NP  Right shoulder IR stretch 6x 45 sec  PNF D2 with Y-TB 3x10, shoulder flex/rot/lift in quad 2x10  Sleeper stretch 4x30 sec NP  Rolando wing against wall 3x10  Thoracic rotation in quad 3x10 ea  Brueggars on 1/2 bolster 2x10  Isolated shoulder IR/ER PT apply resistence and brace shoulder to limited compensatory motion, 3x 12 ea      MT: Performed to increase extensibility, mobility, and reduce pain: 30 min  Cervical distraction grade II-III  SCOM release   Sub nucchal  release  Scapular lift right   Sub scap release  PIVM cervical/upslide/downslide grade II  STM righ cervical paraspinal  Lateral glide of atlas grade II, right  DN: pt was educated on intervention, risks and benefits as well as expected outcomes.  Pt signed waiver prior to procedure.  .3mm needle homeostatic UT point, sub scap point, medial deltoid, bilateral cervical paraspinal C6-C4  .23 mm needle right pterygoid sup and infer  Needles placed for 3 min.  No adverse effects were noted.     Education: Discussed current HEP      A: Pt cont to present with right sided symptoms, minor elevated first rib, right sided UT tightness..  Performed lateral glide of the atlas again this session, pt had less tenderness with great results,  and did note improved chin alignment. Felt more relief with cervical stabilization with manual stretch to the UT on right side again today .  Pt will cont to benefit from skilled PT to address pain and weakness.     Goals:  Short Term Goals: 2 weeks       .  Long Term Goals: 8 weeks   1: Improve R MMS ER to 5/5 Progress  2: Improve post thoracic muscular strength right side to 5/5 for improved stability with overhead activity and decreased pain. Progress  3: Pt improve left cervical rotation to 90 deg without pain. Met  4: Pt report participating in an exercise program for 30 min without onset of pain >1/10        P: Cont increased strengthening, right shoulder strengthening.

## 2019-09-17 ENCOUNTER — CLINICAL SUPPORT (OUTPATIENT)
Dept: REHABILITATION | Facility: HOSPITAL | Age: 29
End: 2019-09-17
Payer: COMMERCIAL

## 2019-09-17 DIAGNOSIS — M25.511 RIGHT SHOULDER PAIN, UNSPECIFIED CHRONICITY: ICD-10-CM

## 2019-09-17 DIAGNOSIS — M54.2 CERVICAL PAIN: ICD-10-CM

## 2019-09-17 PROCEDURE — 97110 THERAPEUTIC EXERCISES: CPT | Mod: PO

## 2019-09-17 PROCEDURE — 97140 MANUAL THERAPY 1/> REGIONS: CPT | Mod: PO

## 2019-09-20 ENCOUNTER — PATIENT MESSAGE (OUTPATIENT)
Dept: OBSTETRICS AND GYNECOLOGY | Facility: CLINIC | Age: 29
End: 2019-09-20

## 2019-09-20 ENCOUNTER — OFFICE VISIT (OUTPATIENT)
Dept: OBSTETRICS AND GYNECOLOGY | Facility: CLINIC | Age: 29
End: 2019-09-20
Payer: COMMERCIAL

## 2019-09-20 VITALS
DIASTOLIC BLOOD PRESSURE: 70 MMHG | BODY MASS INDEX: 17.8 KG/M2 | WEIGHT: 104.25 LBS | SYSTOLIC BLOOD PRESSURE: 102 MMHG | HEIGHT: 64 IN

## 2019-09-20 DIAGNOSIS — N76.0 VULVOVAGINITIS: Primary | ICD-10-CM

## 2019-09-20 LAB
CANDIDA RRNA VAG QL PROBE: NEGATIVE
G VAGINALIS RRNA GENITAL QL PROBE: NEGATIVE
T VAGINALIS RRNA GENITAL QL PROBE: NEGATIVE

## 2019-09-20 PROCEDURE — 3008F BODY MASS INDEX DOCD: CPT | Mod: CPTII,S$GLB,, | Performed by: NURSE PRACTITIONER

## 2019-09-20 PROCEDURE — 87661 TRICHOMONAS VAGINALIS AMPLIF: CPT

## 2019-09-20 PROCEDURE — 99213 PR OFFICE/OUTPT VISIT, EST, LEVL III, 20-29 MIN: ICD-10-PCS | Mod: S$GLB,,, | Performed by: NURSE PRACTITIONER

## 2019-09-20 PROCEDURE — 99213 OFFICE O/P EST LOW 20 MIN: CPT | Mod: S$GLB,,, | Performed by: NURSE PRACTITIONER

## 2019-09-20 PROCEDURE — 99999 PR PBB SHADOW E&M-EST. PATIENT-LVL III: CPT | Mod: PBBFAC,,, | Performed by: NURSE PRACTITIONER

## 2019-09-20 PROCEDURE — 99999 PR PBB SHADOW E&M-EST. PATIENT-LVL III: ICD-10-PCS | Mod: PBBFAC,,, | Performed by: NURSE PRACTITIONER

## 2019-09-20 PROCEDURE — 3008F PR BODY MASS INDEX (BMI) DOCUMENTED: ICD-10-PCS | Mod: CPTII,S$GLB,, | Performed by: NURSE PRACTITIONER

## 2019-09-20 RX ORDER — TRIAMCINOLONE ACETONIDE 0.25 MG/G
CREAM TOPICAL 2 TIMES DAILY
Qty: 15 G | Refills: 0 | Status: SHIPPED | OUTPATIENT
Start: 2019-09-20 | End: 2020-05-25

## 2019-09-20 NOTE — PROGRESS NOTES
Chief Complaint   Patient presents with    Vaginal Itching       History of Present Illness: Angela Lyle is a 29 y.o. female that presents today 9/20/2019   [facility] c/o external vaginal itching x few days. She reports that she did get some new underwear, but that is the only new thing that she can think has changed. She denies the use of scented products in the vaginal area or changing any detergents or hygiene products. She denies any abnormal vaginal discharge, odor or burning. No other complaints or concerns noted.       Past Medical History:   Diagnosis Date    Allergy     Anemia     hx of HOSEA on fergon in past    Anxiety     Arthritis     right shoulder    Depression     Fatigue     History of psychiatric care     Keloid cicatrix     PCOS (polycystic ovarian syndrome)     Psychiatric problem     Therapy        Past Surgical History:   Procedure Laterality Date    ARTHROSCOPY-SHOULDER Right 6/6/2017    [facility]    ARTHROSCOPY-SHOULDER WITH SUBACROMIAL DECOMPRESSION Right 6/6/2017    [facility] OR    DEBRIDEMENT-SHOULDER-ARTHROSCOPIC Right 6/6/2017    Performed [facility]    INJECTION-JOINT Left 5/7/2018    Performed by [facility] at [facility] PAIN MGT    OPEN BICEPS SUB PECTORALIS TENODESIS (TENDON FIXATION) Right 6/6/2017    [facility] OR    SHOULDER SURGERY Right 06/2017    TONSILLECTOMY      wisdom teeth removal  2010       Current Outpatient Medications   Medication Sig Dispense Refill    inositol-D chiro inositol (OVASITOL) 2,000-50 mg PwPk       ergocalciferol, vitamin D2, 2,000 unit Tab Take 2,000 Units by mouth once daily.      ketoconazole (NIZORAL) 2 % cream Apply to affected areas of face BID prn scaling. 60 g 5    ondansetron (ZOFRAN-ODT) 4 MG TbDL Take 1 tablet (4 mg total) by mouth every 6 to 8 hours as needed. 30 tablet 1     No  "current facility-administered medications for this visit.        Review of patient's allergies indicates:   Allergen Reactions    Sulfa (sulfonamide antibiotics) Hives and Itching    Sulfamethoxazole-trimethoprim      Other reaction(s): Itching       Family History   Problem Relation Age of Onset    Arthritis Mother         back surgery    Fibromyalgia Mother     Hyperlipidemia Father     Arthritis Maternal Grandmother     Heart disease Paternal Grandfather     Amblyopia Neg Hx     Blindness Neg Hx     Cataracts Neg Hx     Glaucoma Neg Hx     Macular degeneration Neg Hx     Retinal detachment Neg Hx     Strabismus Neg Hx     Melanoma Neg Hx        Social History     Tobacco Use    Smoking status: Never Smoker    Smokeless tobacco: Never Used   Substance Use Topics    Alcohol use: Yes     Comment: ocassional    Drug use: No       OB History    Para Term  AB Living   0             SAB TAB Ectopic Multiple Live Births                   Review of Symptoms:  GENERAL: Denies weight gain or weight loss. Feeling well overall.   SKIN: Denies rash or lesions.   HEAD: Denies head injury or headache.   NODES: Denies enlarged lymph nodes.   CHEST: Denies chest pain or shortness of breath.   CARDIOVASCULAR: Denies palpitations or left sided chest pain.   ABDOMEN: No abdominal pain, constipation, diarrhea, nausea, vomiting or rectal bleeding.   URINARY: No frequency, dysuria, hematuria, or burning on urination.      /70   Ht 5' 4" (1.626 m)   Wt 47.3 kg (104 lb 4.4 oz)   LMP 2019   Physical Exam:  APPEARANCE: Well nourished, well developed, in no acute distress.  SKIN: Normal skin turgor, no lesions.  NECK: Neck symmetric without masses   RESPIRATORY: Normal respiratory effort with no retractions or use of accessory muscles  ABDOMEN: Soft. No tenderness or masses. No hepatosplenomegaly. No hernias.  PELVIC: Normal external female genitalia without lesions; irritation and excoriations " from scratching noted on labia. Normal hair distribution. Adequate perineal body, normal urethral meatus. Urethra with no masses.  Bladder nontender. Vagina moist and well rugated without lesions, + scant amount of thin clear discharge. Cervix pink and without lesions. No significant cystocele or rectocele.     ASSESSMENT/PLAN:  Vulvovaginitis  -     Vaginosis Screen by DNA Probe        -Reinforced to avoid any scented products in the vaginal area such as bubble baths, bath bombs, scented soaps/body washes, douches, feminine washes, etc... Also wear cotton underwear and change out of wet/sweaty clothing as soon as possible. Pt verbalized understanding.      Follow-up:  Will f/u with results  RTC if symptoms worsen or do not improve  RTC as needed

## 2019-09-23 NOTE — PROGRESS NOTES
[facility] OUTPATIENT THERAPY AND WELLNESS  Physical Therapy Note     Name: Angela Lyle  Clinic Number: [id number]     Therapy Diagnosis:           Encounter Diagnoses   Name Primary?    Cervical pain      Right shoulder pain, unspecified chronicity        Physician: Casale, Michael Joseph,*  Physician Orders: PT Eval and Treat Right shoulder pain/cervical tightness  Medical Diagnosis from Referral: Right shoulder pain  Evaluation Date: 7/11/19  Authorization Period Expiration: 1/30/20  Plan of Care Expiration: 7/11/19-9/11/19  Visit # / Visits authorized: 5/12     Time In: 435a  Time Out: 515p  Total Billable Time: 40 minutes     Precautions: Standard     Subjective   Pt stated that she was doing great, feels she is getting stronger in the shoulders.  Did have an episode of pain in the deep right cervical to clavical.  .  Pain rated to: 1/10 right mid to lateral cervical area     Objective:  + 1st rib elevation right> left  PROM Right shoulder IR to 90 deg post stretch  MMT ER 4/5 without pain  MMT shoulder flex 4/5 without pain  Cervical rotation 90 deg bilateral (mild hike of shoulder on right rotation), chin deviation right  -Scap retraction test  - cervical distraction  + UT tigjhness with shoulder incorporation  PIVM hypomibile right side C2-c6     TE: performed to increase strength, stability, and reduce pain in the right shoulder and cervical area: 15 min     Seated UT stretch 3x30 sec NP  Right shoulder IR stretch 6x 45 sec  PNF D2 with Y-TB 3x10, shoulder flex/rot/lift in quad 2x10  Sleeper stretch 4x30 sec NP  Rolando wing against wall 3x10  Thoracic rotation in quad 3x10 ea  Brueggars on 1/2 bolster 2x10  Isolated shoulder IR/ER PT apply resistence and brace shoulder to limited compensatory motion, 3x 12 ea      MT: Performed to increase extensibility, mobility, and reduce pain: 30 min  Cervical distraction grade II-III  SCOM release   Sub nucchal release  Scapular lift right   Sub scap  release  PIVM cervical/upslide/downslide grade II  STM righ cervical paraspinal  Lateral glide of atlas grade II, right  DN: pt was educated on intervention, risks and benefits as well as expected outcomes.  Pt signed waiver prior to procedure.  .3mm needle homeostatic UT point, sub scap point, medial deltoid, bilateral cervical paraspinal C6-C4  .23 mm needle right pterygoid sup and infer  Needles placed for 3 min.  No adverse effects were noted.     Education: Discussed current HEP      A: Pt cont to present with right sided symptoms, minor elevated first rib, right sided UT tightness..  Performed lateral glide of the atlas again this session, pt had less tenderness with great results,  and did note improved chin alignment. Felt more relief with cervical stabilization with manual stretch to the UT on right side again today .  Pt will cont to benefit from skilled PT to address pain and weakness.     Goals:  Short Term Goals: 2 weeks       .  Long Term Goals: 8 weeks   1: Improve R MMS ER to 5/5 Progress  2: Improve post thoracic muscular strength right side to 5/5 for improved stability with overhead activity and decreased pain. Progress  3: Pt improve left cervical rotation to 90 deg without pain. Met  4: Pt report participating in an exercise program for 30 min without onset of pain >1/10        P: Cont increased strengthening, right shoulder strengthening.

## 2019-09-24 ENCOUNTER — CLINICAL SUPPORT (OUTPATIENT)
Dept: REHABILITATION | Facility: HOSPITAL | Age: 29
End: 2019-09-24
Payer: COMMERCIAL

## 2019-09-24 DIAGNOSIS — M25.511 CHRONIC RIGHT SHOULDER PAIN: ICD-10-CM

## 2019-09-24 DIAGNOSIS — G89.29 CHRONIC RIGHT SHOULDER PAIN: ICD-10-CM

## 2019-09-24 DIAGNOSIS — M54.2 CERVICAL PAIN: ICD-10-CM

## 2019-09-24 PROCEDURE — 97140 MANUAL THERAPY 1/> REGIONS: CPT | Mod: PO

## 2019-09-24 NOTE — PROGRESS NOTES
[facility]  Physical Therapy Note     Name: Angela Lyle  Clinic Number: [id number]     Therapy Diagnosis:           Encounter Diagnoses   Name Primary?    Cervical pain      Right shoulder pain, unspecified chronicity        Physician: Casale, Michael Joseph,*  Physician Orders: PT Eval and Treat Right shoulder pain/cervical tightness  Medical Diagnosis from Referral: Right shoulder pain  Evaluation Date: 7/11/19  Authorization Period Expiration: 1/30/20  Plan of Care Expiration: 7/11/19-9/11/19  Visit # / Visits authorized: 6/12     Time In: 435a  Time Out: 515p  Total Billable Time: 40 minutes     Precautions: Standard     Subjective   Pt stated that she was doing great, feels she is getting stronger in the shoulders, had to drive about 2 hours and after did have a lot of pain.  .  Pain rated to: 1/10 right mid to lateral cervical area     Objective:  + 1st rib elevation right> left  PROM Right shoulder IR to 90 deg post stretch  MMT ER 4/5 without pain  MMT shoulder flex 4/5 without pain  Cervical rotation 90 deg bilateral (mild hike of shoulder on right rotation), chin deviation right  -Scap retraction test  - cervical distraction  + UT tigjhness with shoulder incorporation  PIVM hypomibile right side C2-c6     TE: performed to increase strength, stability, and reduce pain in the right shoulder and cervical area: 15 min     Seated UT stretch 3x30 sec NP  Right shoulder IR stretch 6x 45 sec  PNF D2 with Y-TB 3x10, shoulder flex/rot/lift in quad 2x10  Sleeper stretch 4x30 sec NP  Rolando wing against wall 3x10  Thoracic rotation in quad 3x10 ea  Brueggars on 1/2 bolster 2x10  Isolated shoulder IR/ER PT apply resistence and brace shoulder to limited compensatory motion, 3x 12 ea      MT: Performed to increase extensibility, mobility, and reduce pain: 30 min  Cervical distraction grade II-III  SCOM release   Sub nucchal release  Scapular lift right   Sub scap release  PIVM  cervical/upslide/downslide grade II  STM righ cervical paraspinal  Lateral glide of atlas grade II, right  DN: pt was educated on intervention, risks and benefits as well as expected outcomes.  Pt signed waiver prior to procedure.  .3mm needle homeostatic UT point, sub scap point, medial deltoid, bilateral cervical paraspinal C6-C4  .23 mm needle right pterygoid sup and infer  Needles placed for 3 min.  No adverse effects were noted.     Education: Discussed current HEP      A: Pt cont to present with right sided symptoms, minor elevated first rib, right sided UT tightness..  Performed lateral glide of the atlas again this session, pt had less tenderness with great results,  and did note improved chin alignment. Felt more relief with cervical stabilization with manual stretch to the UT on right side again today.  Pt shows good increae in RTC strength  Pt will cont to benefit from skilled PT to address pain and weakness.     Goals:  Short Term Goals: 2 weeks       .  Long Term Goals: 8 weeks   1: Improve R MMS ER to 5/5 Progress  2: Improve post thoracic muscular strength right side to 5/5 for improved stability with overhead activity and decreased pain. Progress  3: Pt improve left cervical rotation to 90 deg without pain. Met  4: Pt report participating in an exercise program for 30 min without onset of pain >1/10        P: Cont increased strengthening, right shoulder strengthening.

## 2019-10-01 ENCOUNTER — CLINICAL SUPPORT (OUTPATIENT)
Dept: REHABILITATION | Facility: HOSPITAL | Age: 29
End: 2019-10-01
Payer: COMMERCIAL

## 2019-10-01 DIAGNOSIS — M54.2 CERVICAL PAIN: ICD-10-CM

## 2019-10-01 DIAGNOSIS — M25.511 RIGHT SHOULDER PAIN, UNSPECIFIED CHRONICITY: ICD-10-CM

## 2019-10-01 PROCEDURE — 97110 THERAPEUTIC EXERCISES: CPT | Mod: PO

## 2019-10-01 PROCEDURE — 97140 MANUAL THERAPY 1/> REGIONS: CPT | Mod: PO

## 2019-10-01 NOTE — PROGRESS NOTES
[facility]  Physical Therapy Note     Name: Angela Lyle  Clinic Number: [id number]     Therapy Diagnosis:           Encounter Diagnoses   Name Primary?    Cervical pain      Right shoulder pain, unspecified chronicity        Physician: Casale, Michael Joseph,*  Physician Orders: PT Eval and Treat Right shoulder pain/cervical tightness  Medical Diagnosis from Referral: Right shoulder pain  Evaluation Date: 7/11/19  Authorization Period Expiration: 1/30/20  Plan of Care Expiration: 7/11/19-9/11/19  Visit # / Visits authorized: 7/12     Time In: 430a  Time Out: 525p  Total Billable Time: 45 minutes     Precautions: Standard     Subjective   Pt stated that she was doing well, has tried to concentrate on what triggers her pain, feels that prolonged seated activity is the worst, if she is up and moving it does seem to feel a little better.  Right now does not hurt, just feels tight  .  Pain rated to: 1/10 right mid to lateral cervical area     Objective:  + 1st rib elevation right> left  PROM Right shoulder IR to 90 deg post stretch  MMT ER 4/5 without pain  MMT shoulder flex 4/5 without pain  Cervical rotation 90 deg bilateral (mild hike of shoulder on right rotation), chin deviation right  -Scap retraction test  - cervical distraction  + UT tigjhness with shoulder incorporation  PIVM hypomibile right side C2-c6     TE: performed to increase strength, stability, and reduce pain in the right shoulder and cervical area: 15 min     Seated UT stretch 3x30 sec NP  Right shoulder IR stretch 6x 45 sec  PNF D2 with Y-TB 3x10, shoulder flex/rot/lift in quad 2x10  Sleeper stretch 4x30 sec NP  Rolando wing against wall 3x10  Thoracic rotation in quad 3x10 ea  Brueggars on 1/2 bolster 2x10  Isolated shoulder IR/ER PT apply resistence and brace shoulder to limited compensatory motion, 3x 12 ea      MT: Performed to increase extensibility, mobility, and reduce pain: 30 min  Cervical distraction  grade II-III  SCOM release   Sub nucchal release  Scapular lift right   Sub scap release  PIVM cervical/upslide/downslide grade II  STM righ cervical paraspinal  Lateral glide of atlas grade II, right  DN: pt was educated on intervention, risks and benefits as well as expected outcomes.  Pt signed waiver prior to procedure.  .3mm needle homeostatic UT point, sub scap point, medial deltoid, bilateral cervical paraspinal C6-C4  .23 mm needle right pterygoid sup and infer  Needles placed for 3 min.  No adverse effects were noted.  K-Tape to right UT to shoulder for postural cueing     Education: Discussed current HEP      A: Pt cont to present with right sided symptoms, minor elevated first rib, right sided UT tightness, right Scalean and SCOM tightness.  Post chin tuck pt showed chin deviation to the right, progressively improved following manual treatment. Felt more relief with cervical stabilization with manual stretch to the UT on right side again today.  Pt shows good increae in RTC strength  Pt will cont to benefit from skilled PT to address pain and weakness.     Goals:  Short Term Goals: 2 weeks       .  Long Term Goals: 8 weeks   1: Improve R MMS ER to 5/5 Progress  2: Improve post thoracic muscular strength right side to 5/5 for improved stability with overhead activity and decreased pain. Progress  3: Pt improve left cervical rotation to 90 deg without pain. Met  4: Pt report participating in an exercise program for 30 min without onset of pain >1/10        P: Cont increased strengthening, right shoulder strengthening.

## 2019-10-07 ENCOUNTER — PATIENT MESSAGE (OUTPATIENT)
Dept: OBSTETRICS AND GYNECOLOGY | Facility: CLINIC | Age: 29
End: 2019-10-07

## 2019-10-11 ENCOUNTER — PATIENT MESSAGE (OUTPATIENT)
Dept: OBSTETRICS AND GYNECOLOGY | Facility: CLINIC | Age: 29
End: 2019-10-11

## 2019-10-15 ENCOUNTER — HOSPITAL ENCOUNTER (OUTPATIENT)
Dept: RADIOLOGY | Facility: OTHER | Age: 29
Discharge: HOME | End: 2019-10-15
Attending: INTERNAL MEDICINE
Payer: COMMERCIAL

## 2019-10-15 DIAGNOSIS — K60.0 ACUTE ANAL FISSURE: ICD-10-CM

## 2019-10-15 DIAGNOSIS — R07.9 CHEST PAIN, UNSPECIFIED: ICD-10-CM

## 2019-10-15 PROCEDURE — 76705 ECHO EXAM OF ABDOMEN: CPT | Mod: 26,,, | Performed by: RADIOLOGY

## 2019-10-15 PROCEDURE — 76705 US ABDOMEN LIMITED: ICD-10-PCS | Mod: 26,,, | Performed by: RADIOLOGY

## 2019-10-15 PROCEDURE — 76705 ECHO EXAM OF ABDOMEN: CPT | Mod: TC

## 2019-10-22 ENCOUNTER — PATIENT MESSAGE (OUTPATIENT)
Dept: INTERNAL MEDICINE | Facility: CLINIC | Age: 29
End: 2019-10-22

## 2019-10-22 DIAGNOSIS — R00.2 PALPITATION: Primary | ICD-10-CM

## 2019-10-23 ENCOUNTER — TELEPHONE (OUTPATIENT)
Dept: INTERNAL MEDICINE | Facility: CLINIC | Age: 29
End: 2019-10-23

## 2019-11-14 ENCOUNTER — CLINICAL SUPPORT (OUTPATIENT)
Dept: REHABILITATION | Facility: HOSPITAL | Age: 29
End: 2019-11-14
Payer: COMMERCIAL

## 2019-11-14 DIAGNOSIS — M25.511 RIGHT SHOULDER PAIN, UNSPECIFIED CHRONICITY: ICD-10-CM

## 2019-11-14 DIAGNOSIS — M54.2 CERVICAL PAIN: ICD-10-CM

## 2019-11-14 PROCEDURE — 97140 MANUAL THERAPY 1/> REGIONS: CPT | Mod: PO

## 2019-11-14 PROCEDURE — 97110 THERAPEUTIC EXERCISES: CPT | Mod: PO

## 2019-11-19 ENCOUNTER — CLINICAL SUPPORT (OUTPATIENT)
Dept: REHABILITATION | Facility: HOSPITAL | Age: 29
End: 2019-11-19
Payer: COMMERCIAL

## 2019-11-19 DIAGNOSIS — M54.2 CERVICAL PAIN: ICD-10-CM

## 2019-11-19 DIAGNOSIS — M25.511 RIGHT SHOULDER PAIN, UNSPECIFIED CHRONICITY: ICD-10-CM

## 2019-11-19 PROCEDURE — 97140 MANUAL THERAPY 1/> REGIONS: CPT | Mod: PO

## 2019-11-19 PROCEDURE — 97110 THERAPEUTIC EXERCISES: CPT | Mod: PO

## 2019-11-21 ENCOUNTER — OFFICE VISIT (OUTPATIENT)
Dept: SLEEP MEDICINE | Facility: CLINIC | Age: 29
End: 2019-11-21
Attending: INTERNAL MEDICINE
Payer: COMMERCIAL

## 2019-11-21 ENCOUNTER — OFFICE VISIT (OUTPATIENT)
Dept: DERMATOLOGY | Facility: CLINIC | Age: 29
End: 2019-11-21
Payer: COMMERCIAL

## 2019-11-21 VITALS
HEART RATE: 70 BPM | SYSTOLIC BLOOD PRESSURE: 102 MMHG | DIASTOLIC BLOOD PRESSURE: 65 MMHG | HEIGHT: 64 IN | BODY MASS INDEX: 17.86 KG/M2 | WEIGHT: 104.63 LBS

## 2019-11-21 DIAGNOSIS — D22.60 MULTIPLE BENIGN MELANOCYTIC NEVI OF UPPER EXTREMITY, LOWER EXTREMITY, AND TRUNK: ICD-10-CM

## 2019-11-21 DIAGNOSIS — D22.30 MELANOCYTIC NEVUS OF FACE: ICD-10-CM

## 2019-11-21 DIAGNOSIS — Z86.018 HISTORY OF DYSPLASTIC NEVUS: ICD-10-CM

## 2019-11-21 DIAGNOSIS — D22.5 MULTIPLE BENIGN MELANOCYTIC NEVI OF UPPER EXTREMITY, LOWER EXTREMITY, AND TRUNK: ICD-10-CM

## 2019-11-21 DIAGNOSIS — G47.30 SLEEP APNEA, UNSPECIFIED TYPE: Primary | ICD-10-CM

## 2019-11-21 DIAGNOSIS — D22.4 MELANOCYTIC NEVUS OF NECK: ICD-10-CM

## 2019-11-21 DIAGNOSIS — D22.70 MULTIPLE BENIGN MELANOCYTIC NEVI OF UPPER EXTREMITY, LOWER EXTREMITY, AND TRUNK: ICD-10-CM

## 2019-11-21 DIAGNOSIS — L21.9 SEBORRHEIC DERMATITIS: Primary | ICD-10-CM

## 2019-11-21 DIAGNOSIS — L24.89 IRRITANT CONTACT DERMATITIS DUE TO OTHER AGENTS: ICD-10-CM

## 2019-11-21 PROCEDURE — 99999 PR PBB SHADOW E&M-EST. PATIENT-LVL III: CPT | Mod: PBBFAC,,, | Performed by: PSYCHIATRY & NEUROLOGY

## 2019-11-21 PROCEDURE — 99204 OFFICE O/P NEW MOD 45 MIN: CPT | Mod: S$GLB,,, | Performed by: PSYCHIATRY & NEUROLOGY

## 2019-11-21 PROCEDURE — 99214 OFFICE O/P EST MOD 30 MIN: CPT | Mod: S$GLB,,, | Performed by: DERMATOLOGY

## 2019-11-21 PROCEDURE — 99204 PR OFFICE/OUTPT VISIT, NEW, LEVL IV, 45-59 MIN: ICD-10-PCS | Mod: S$GLB,,, | Performed by: PSYCHIATRY & NEUROLOGY

## 2019-11-21 PROCEDURE — 3008F BODY MASS INDEX DOCD: CPT | Mod: CPTII,S$GLB,, | Performed by: PSYCHIATRY & NEUROLOGY

## 2019-11-21 PROCEDURE — 3008F PR BODY MASS INDEX (BMI) DOCUMENTED: ICD-10-PCS | Mod: CPTII,S$GLB,, | Performed by: PSYCHIATRY & NEUROLOGY

## 2019-11-21 PROCEDURE — 99999 PR PBB SHADOW E&M-EST. PATIENT-LVL III: ICD-10-PCS | Mod: PBBFAC,,, | Performed by: PSYCHIATRY & NEUROLOGY

## 2019-11-21 PROCEDURE — 99214 PR OFFICE/OUTPT VISIT, EST, LEVL IV, 30-39 MIN: ICD-10-PCS | Mod: S$GLB,,, | Performed by: DERMATOLOGY

## 2019-11-21 RX ORDER — FLUTICASONE PROPIONATE 0.05 MG/G
OINTMENT TOPICAL
Qty: 60 G | Refills: 1 | Status: SHIPPED | OUTPATIENT
Start: 2019-11-21 | End: 2020-05-25

## 2019-11-21 RX ORDER — FLUOCINONIDE TOPICAL SOLUTION USP, 0.05% 0.5 MG/ML
SOLUTION TOPICAL
Qty: 60 ML | Refills: 3 | Status: SHIPPED | OUTPATIENT
Start: 2019-11-21 | End: 2020-05-25

## 2019-11-21 RX ORDER — KETOCONAZOLE 20 MG/ML
SHAMPOO, SUSPENSION TOPICAL
Qty: 240 ML | Refills: 5 | Status: SHIPPED | OUTPATIENT
Start: 2019-11-21 | End: 2023-12-26

## 2019-11-21 RX ORDER — NITROGLYCERIN 4 MG/G
OINTMENT RECTAL
Refills: 0 | COMMUNITY
Start: 2019-09-20 | End: 2019-12-16

## 2019-11-21 NOTE — PROGRESS NOTES
Subjective:       Patient ID:  Angela Lyle is a 29 y.o. female who presents for   Chief Complaint   Patient presents with    Rash     vulva,     Seborrheic Dermatitis     k-cream not helping, scaling,      Rash  - Initial  Affected locations: groin  Duration: 2 months  Signs / symptoms: itching  Severity: mild  Timing: intermittent  Aggravated by: friction  Treatments tried: ketoconazole cream.  Improvement on treatment: mild    Seborrheic Dermatitis  - Initial  Affected locations: right ear, left ear and face  Duration: 1 year  Signs / symptoms: scaling  Severity: mild  Timing: constant  Treatments tried: ketoconazole cream   Improvement on treatment: mild    Mole  - Initial  Affected locations: diffuse  Duration: 30 years  Signs / symptoms: asymptomatic  Severity: mild  Timing: constant  Aggravated by: nothing  Relieving factors/Treatments tried: nothing  Improvement on treatment: no relief      Review of Systems   Skin: Positive for itching, rash, sun sensitivity, daily sunscreen use and wears hat.   Hematologic/Lymphatic: Does not bruise/bleed easily.        Objective:    Physical Exam   Constitutional: She appears well-developed and well-nourished. No distress.   HENT:   Mouth/Throat: Lips normal.    Eyes: Lids are normal.  No conjunctival no injection.   Genitourinary:       There is rash on the right labia. There is rash on the left labia.   Neurological: She is alert and oriented to person, place, and time. She is not disoriented.   Psychiatric: She has a normal mood and affect.   Skin:   Areas Examined (abnormalities noted in diagram):   Scalp / Hair Palpated and Inspected  Head / Face Inspection Performed  Neck Inspection Performed  Chest / Axilla Inspection Performed  Abdomen Inspection Performed  Genitals / Buttocks / Groin Inspection Performed  Back Inspection Performed  RUE Inspected  LUE Inspection Performed  RLE Inspected  LLE Inspection Performed  Nails and Digits Inspection  Performed                           Diagram Legend     Erythematous scaling macule/papule c/w actinic keratosis       Vascular papule c/w angioma      Pigmented verrucoid papule/plaque c/w seborrheic keratosis      Yellow umbilicated papule c/w sebaceous hyperplasia      Irregularly shaped tan macule c/w lentigo     1-2 mm smooth white papules consistent with Milia      Movable subcutaneous cyst with punctum c/w epidermal inclusion cyst      Subcutaneous movable cyst c/w pilar cyst      Firm pink to brown papule c/w dermatofibroma      Pedunculated fleshy papule(s) c/w skin tag(s)      Evenly pigmented macule c/w junctional nevus     Mildly variegated pigmented, slightly irregular-bordered macule c/w mildly atypical nevus      Flesh colored to evenly pigmented papule c/w intradermal nevus       Pink pearly papule/plaque c/w basal cell carcinoma      Erythematous hyperkeratotic cursted plaque c/w SCC      Surgical scar with no sign of skin cancer recurrence      Open and closed comedones      Inflammatory papules and pustules      Verrucoid papule consistent consistent with wart     Erythematous eczematous patches and plaques     Dystrophic onycholytic nail with subungual debris c/w onychomycosis     Umbilicated papule    Erythematous-base heme-crusted tan verrucoid plaque consistent with inflamed seborrheic keratosis     Erythematous Silvery Scaling Plaque c/w Psoriasis     See annotation      Assessment / Plan:        Seborrheic dermatitis  -     ketoconazole (NIZORAL) 2 % shampoo; Wash scalp with medicated shampoo at least 2x/week. Let sit on scalp at least 5 minutes prior to rinsing  Dispense: 240 mL; Refill: 5  -     fluocinonide (LIDEX) 0.05 % external solution; Apply to affected areas of scalp qday - bid prn scaling or itching.  Dispense: 60 mL; Refill: 3  Use ketoconazole cream on face BID daily.  Use TAC 0.025% cream on face prn flares.    Irritant contact dermatitis due to other agents vs Seborrheic  dermatitis of vulva  -     fluticasone propionate (CUTIVATE) 0.005 % ointment; Apply to affected areas of groin BID prn irritation.  Dispense: 60 g; Refill: 1  Avoid all fragrance. Trial of Vanicream Z-Bar soap.    Melanocytic nevus of face  Melanocytic nevus of neck  Multiple benign melanocytic nevi of upper extremity, lower extremity, and trunk  Multiple benign-appearing nevi present on exam today. Reassurance provided. Counseled patient to periodically examine moles and return to clinic if any changes in size, shape, or color are noted or if it becomes symptomatic (bleeding, itching, pain, etc).    History of dysplastic nevus  Area(s) of previous dysplastic nevus evaluated with no signs of recurrence. Reassurance provided.    Follow up in about 6 weeks (around 1/2/2020).

## 2019-11-21 NOTE — LETTER
November 27, 2019      Leah Shields MD  [address]  [facility] [address]           [facility] [place] [address] [facility] [address]  [address]  [facility] [address]  Phone: [phone]          Patient: Angela Lyle   MR Number: [id number]   YOB: 1990   Date of Visit: 11/21/2019       Dear Dr. Leah Shields:    Thank you for referring Angela Lyle to me for evaluation. Attached you will find relevant portions of my assessment and plan of care.    If you have questions, please do not hesitate to call me. I look forward to following Angela Lyle along with you.    Sincerely,    Ludivina Higuera MD    Enclosure  CC:  No Recipients    If you would like to receive this communication electronically, please contact [email or web address] or [phone] to request more information on [employer] Link access.    For providers and/or their staff who would like to refer a patient to [facility], please contact us through our one-stop-shop provider referral line, [facility], at [phone].    If you feel you have received this communication in error or would no longer like to receive these types of communications, please e-mail [email or web address]

## 2019-11-21 NOTE — PROGRESS NOTES
[facility]  Physical Therapy Note     Name: Angela Lyle  Clinic Number: [id number]     Therapy Diagnosis:           Encounter Diagnoses   Name Primary?    Cervical pain      Right shoulder pain, unspecified chronicity        Physician: Casale, Michael Joseph,*  Physician Orders: PT Eval and Treat Right shoulder pain/cervical tightness  Medical Diagnosis from Referral: Right shoulder pain  Evaluation Date: 7/11/19  Authorization Period Expiration: 1/30/20  Plan of Care Expiration:  10/11/19-12/11/19  Visit # / Visits authorized: 7/12     Time In: 430a  Time Out: 525p  Total Billable Time: 45 minutes     Precautions: Standard     Subjective   Pt stated that she was doing well, felt like it was getting much better with having a break, but then it came back, most notably in the right lateral cerivcal area.  .  Pain rated to: 2/10 right mid to lateral cervical area     Objective:  SFMA Rolling: : + GUEVARA with right ext UE  MMT Low trap 4-/5  + right 1st rib elevation     TE: performed to increase strength, stability, and reduce pain in the right shoulder and cervical area: 15 min     SFMA rolling UE, bilateral decreased assist 10 min  Brueggars on 1/2 bolster 2x10  Isolated shoulder IR/ER PT apply resistence and brace shoulder to limited compensatory motion, 3x 12 ea   Y's 2x 10     MT: Performed to increase extensibility, mobility, and reduce pain: 30 min  Cervical distraction grade II-III  SCOM release   Sub nucchal release  Scapular lift right   Sub scap release  PIVM cervical/upslide/downslide grade II  STM righ cervical paraspinal  Lateral glide of atlas grade II, right  DN: pt was educated on intervention, risks and benefits as well as expected outcomes.  Pt signed waiver prior to procedure.  .3mm needle homeostatic UT point, sub scap point, medial deltoid, bilateral cervical paraspinal C6-C4  .23 mm needle right pterygoid sup and infer  Needles placed for 3 min.  No adverse  effects were noted.  K-Tape to right UT to shoulder for postural cueing     Education: Discussed current HEP      A: Pt cont to present with right sided symptoms, minor elevated first rib, right sided UT tightness, right Scalean and SCOM tightness.  Post chin tuck shows centereing. Felt more relief with cervical stabilization with manual stretch to the UT on right side again today. Rolling activity shows GUEVARA with ext UE, did improve with repetition. Pt shows good increae in RTC strength  Pt will cont to benefit from skilled PT to address pain and weakness.     Goals:  Short Term Goals: 2 weeks       .  Long Term Goals: 8 weeks   1: Improve R MMS ER to 5/5 Progress  2: Improve post thoracic muscular strength right side to 5/5 for improved stability with overhead activity and decreased pain. Progress  3: Pt improve left cervical rotation to 90 deg without pain. Met  4: Pt report participating in an exercise program for 30 min without onset of pain >1/10        P: Cont increased strengthening, right shoulder strengthening, SFMA lunges with ER isometrics

## 2019-11-21 NOTE — PROGRESS NOTES
Angela Lyle  was seen at the request of  Leah Shields MD for sleep evaluation.    11/21/2019 INITIAL HISTORY OF PRESENT ILLNESS:  Angela Lyle is a 29 y.o. female is here to be evaluated for a sleep disorder.       CHIEF COMPLAINT:      The patient's complaints include interrupted, non refreshing sleep for many years.    Angela Lyle denied  snoring,  witnessed breathing pauses and  gasping for air in sleep.  Reports restlessless sleep, but no excessive leg moments specifically.    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  She was tested for anemia, vit D, B12 def, thyroid problem, anemia - all NL.  Reports occasional nasal/sinus congestion. Reports poor sense of smell.    Still in PT post shoulder surgery - at times bothers her in her sleep.  Fatigue significantly limiting her work (had to change jobs) and social life  She can not exercise due to fatigue and EDS.    Taking MVT.     FSS    Read and C[place]il DeHe a number. Strongly Disagree ? Strongly  Agree 1->7  1. My motivation is lower when I am  Fatigued 7  2. Exercise brings on my fatigue.3  3. I am easily fatigued. 7  4. Fatigue interferes with my physical  Functioning. 7  5. Fatigue causes frequent problems for  Me.7  6. My fatigue prevents sustained physical  Functioning. 7  7. Fatigue interferes with carrying out  certain duties and responsibilities.  5  8. Fatigue is among my most disabling  symptoms.  7  9. Fatigue interferes with my work, family,  or social life.  7  Total score: 57      EPWORTH SLEEPINESS SCALE 11/21/2019   Sitting and reading 1   Watching TV 1   Sitting, inactive in a public place (e.g. a theatre or a meeting) 0   As a passenger in a car for an hour without a break 0   Lying down to rest in the afternoon when circumstances permit 2   Sitting and talking to someone 0   Sitting quietly after a lunch without alcohol 2   In a car, while stopped for a few  minutes in traffic 0   Total score 6       PHQ9 11/21/2019   Little interest or pleasure in doing things: Several days   Feeling down, depressed or hopeless: Not at all   Trouble falling asleep, staying asleep, or sleeping too much: Several days   Feeling tired or having little energy: Nearly every day   Poor appetite or overeating: Not at all   Feeling bad about yourself- or that you are a failure or have let yourself or family down Not at all   Trouble concentrating on things, such as reading the newspaper or watching television: Several days   Moving or speaking so slowly that other people could have noticed. Or the opposite- being so fidgety or restless that you have been moving around a lot more than usual: Not at all   Thoughts that you would be better off dead or hurting yourself in some way: Not at all   If you indicated you have experienced any of the aforementioned problems, how difficult have these problems made it for you to do your work, take care of things at home or get along with other people? Somewhat difficult   Total Score 6     GAD7 11/21/2019   Feeling nervous, anxious, on edge Several days   Not being able to stop or control worrying Not at all   Worrying too much about different things Several days   Trouble relaxing Several days   Being so restless that its hard to sit still Not at all   Becoming easily annoyed or irritable Not at all   Feeling afraid as if something awful might happen Not at all   If you marked you are experiencing any of the aforementioned problems, how difficult have these made it for you to do your work, take care of things at home, or get along with other people? Somewhat difficult   [place]-7 Score 3         SLEEP ROUTINE AND LIFESTYLE 11/21/2019 :  Sleep Clinic New Patient 11/21/2019   What time do you go to bed on a week day? (Give a range) 11:00p   What time do you go to bed on a day off? (Give a range) 11:00p   How long does it take you to fall asleep? (Give a range)  within 15 mins   On average, how many times per night do you wake up? 0-3   How long does it take you to fall back into sleep? (Give a range) within 15 mins   What time do you wake up to start your day on a week day? (Give a range) 7:00a   What time do you wake up to start your day on a day off? (Give a range) 9:00-10:00a   What time do you get out of bed? (Give a range) weekdays 15 mins, weekend 1 hour   On average, how many hours do you sleep? 7-9   On average, how many naps do you take per day? 0-1   Rate your sleep quality from 0 to 5 (0-poor, 5-great). 3   Have you experienced:  N/a   How much weight have you lost or gained (in lbs.) in the last year? 0   On average, how many times per night do you go to the bathroom?  0-2   Have you ever had a sleep study/CPAP machine/surgery for sleep apnea? No   Have you ever had a CPAP machine for sleep apnea? No   Have you ever had surgery for sleep apnea? No       Sleep Clinic ROS  11/21/2019   Difficulty breathing through the nose?  Sometimes   Sore throat or dry mouth in the morning? Sometimes   Irregular or very fast heart beat?  Sometimes   Shortness of breath?  No   Acid reflux? Sometimes   Body aches and pains?  Yes   Morning headaches? No   Dizziness? Sometimes   Mood changes?  Sometimes   Do you exercise?  No   Do you feel like moving your legs a lot?  No          Denies symptoms concerning for parasomnia      The patient had tonsillectomy in the past      Social History:      Occupation:nurse 8 hr shifts  Bed partner: yes  Exercise routine: no energy  Caffeine:  Tried not to due to palpitations.     PREVIOUS SLEEP STUDIES:   none      DME:       PAST MEDICAL HISTORY:    Active Ambulatory Problems     Diagnosis Date Noted    Adult BMI <19 kg/sq m 06/10/2013    Major depressive disorder, single episode in full remission 03/25/2014    Tendinitis of right rotator cuff 07/25/2016    Right shoulder pain 07/25/2016    Shoulder instability 06/06/2017    Left hip  pain 02/05/2018    Chronic pain 05/07/2018    Cervical pain 07/31/2018    Fatigue 03/26/2019    Chest pain of unknown etiology 03/27/2019     Resolved Ambulatory Problems     Diagnosis Date Noted    No Resolved Ambulatory Problems     Past Medical History:   Diagnosis Date    Allergy     Anemia     Anxiety     Arthritis     Depression     History of psychiatric care     Keloid cicatrix     PCOS (polycystic ovarian syndrome)     Psychiatric problem     Therapy                 PAST SURGICAL HISTORY:    Past Surgical History:   Procedure Laterality Date    SHOULDER SURGERY Right 06/2017    TONSILLECTOMY      wisdom teeth removal  2010         FAMILY HISTORY:                Family History   Problem Relation Age of Onset    Arthritis Mother         back surgery    Fibromyalgia Mother     Hyperlipidemia Father     Arthritis Maternal Grandmother     Heart disease Paternal Grandfather     Amblyopia Neg Hx     Blindness Neg Hx     Cataracts Neg Hx     Glaucoma Neg Hx     Macular degeneration Neg Hx     Retinal detachment Neg Hx     Strabismus Neg Hx     Melanoma Neg Hx        SOCIAL HISTORY:          Tobacco:   Social History     Tobacco Use   Smoking Status Never Smoker   Smokeless Tobacco Never Used       alcohol use:    Social History     Substance and Sexual Activity   Alcohol Use Yes    Comment: ocassional                   ALLERGIES:    Review of patient's allergies indicates:   Allergen Reactions    Sulfa (sulfonamide antibiotics) Hives and Itching    Sulfamethoxazole-trimethoprim      Other reaction(s): Itching       CURRENT MEDICATIONS:    Current Outpatient Medications   Medication Sig Dispense Refill    inositol-D chiro inositol (OVASITOL) 2,000-50 mg PwPk       ketoconazole (NIZORAL) 2 % cream Apply to affected areas of face BID prn scaling. 60 g 5    multivitamin capsule Take 1 capsule by mouth once daily.      ondansetron (ZOFRAN-ODT) 4 MG TbDL Take 1 tablet (4 mg total) by  "mouth every 6 to 8 hours as needed. 30 tablet 1    RECTIV 0.4 % (w/w) Oint APPLY 1 INCH OF OINTMENT INSIDE RECTUM EVERY 12 HOURS FOR UP TO 3 WEEKS  0    ergocalciferol, vitamin D2, 2,000 unit Tab Take 2,000 Units by mouth once daily.      triamcinolone acetonide 0.025% (KENALOG) 0.025 % cream Apply topically 2 (two) times daily. for 10 days 15 g 0     No current facility-administered medications for this visit.                       PHYSICAL EXAM:  /65 (BP Location: Right arm, Patient Position: Sitting, BP Method: Medium (Automatic))   Pulse 70   Ht 5' 4" (1.626 m)   Wt 47.4 kg (104 lb 9.7 oz)   BMI 17.96 kg/m²   GENERAL: Normal development, well groomed.  HEENT:   HEENT:  Conjunctivae are non-erythematous; Pupils equal, round, and reactive to light; Nose is symmetrical; Nasal mucosa is pink and moist; Septum is midline; Inferior turbinates are normal; Nasal airflow is normal; Posterior pharynx is pink; Modified Mallampati:III-IV; Posterior palate is low; Tonsils not visualized; Uvula is normal and pink;Tongue is enlarged; Dentition is fair; No TMJ tenderness; Jaw opening and protrusion without click and without discomfort.  NECK: Supple. Neck circumference is 14 inches. No thyromegaly. No palpable nodes.     SKIN: On face and neck: No abrasions, no rashes, no lesions.  No subcutaneous nodules are palpable.  RESPIRATORY: Chest is clear to auscultation.  Normal chest expansion and non-labored breathing at rest.  CARDIOVASCULAR: Normal S1, S2.  No murmurs, gallops or rubs. No carotid bruits bilaterally.  No edema. No clubbing. No cyanosis.    NEURO: Oriented to time, place and person. Normal attention span and concentration. Gait normal.    PSYCH: Affect is full. Mood is normal  MUSCULOSKELETAL: Moves 4 extremities. Gait normal.         Using [facility]: yes      ASSESSMENT:    1. Sleep Apnea NEC. The patient symptomatically has  excessive daytime sleepiness, excessive daytime severe disabling fatigue, " "interrupted sleep and nocturia  with exam findings of "a crowded oral airway and elevated body mass index. The patient has medical co-morbidities of impaired glucose tolerance 2 to PCOS,  which can be worsened by RODERICK. This warrants further investigation for possible obstructive sleep apnea.          PLAN:    Diagnostic: Polysomnogram in lab. The nature of this procedure and its indication was discussed with the patient. she would  like to come discuss PSG results.    Weight loss strategies were discussed in detail         More than 15 minutes of this 30 minutes visit was spent in counseling: during our discussion today, we talked about the etiology of  RODERICK as well as the potential ramifications of untreated sleep apnea, which could include daytime sleepiness, hypertension, heart disease and/or stroke.  We discussed potential treatment options, which could include weight loss, body positioning, continuous positive airway pressure (CPAP), or referral for surgical consideration. Meanwhile, she  is urged to avoid supine sleep, weight gain and alcoholic beverages since all of these can worsen RODERICK.     Precautions: The patient was advised to abstain from driving should he feel sleepy or drowsy.    Follow up: MD/NP  after the sleep study has been completed.     Thank you for allowing me the opportunity to participate in the care of your patient.    This visit summary will be sent to referring provider via inbasket      "

## 2019-11-22 NOTE — PROGRESS NOTES
[facility]  Physical Therapy Note     Name: Angela Lyle  Clinic Number: [id number]     Therapy Diagnosis:           Encounter Diagnoses   Name Primary?    Cervical pain      Right shoulder pain, unspecified chronicity        Physician: Casale, Michael Joseph,*  Physician Orders: PT Eval and Treat Right shoulder pain/cervical tightness  Medical Diagnosis from Referral: Right shoulder pain  Evaluation Date: 7/11/19  Authorization Period Expiration: 1/30/20  Plan of Care Expiration:  10/11/19-12/11/19  Visit # / Visits authorized: 8/12     Time In: 430a  Time Out: 525p  Total Billable Time: 45 minutes     Precautions: Standard     Subjective   Pt stated that she was extremely sore after the last visit.  Felt worse in the clavical area.  Was not able to do any of the new exercises.  .  Pain rated to: 2/10 right mid to lateral cervical area     Objective:  SFMA Rolling: : + GUEVARA with right ext UE  MMT Low trap 4-/5  + right 1st rib elevation     TE: performed to increase strength, stability, and reduce pain in the right shoulder and cervical area: 30 min     SFMA rolling UE, bilateral decreased assist 30 min  Brueggars on 1/2 bolster 2x10  Isolated shoulder IR/ER PT apply resistence and brace shoulder to limited compensatory motion, 3x 12 ea   Y's 2x 10  Planks 3 x 20 sec, cues for posture  Reach roll and lift 3x 8  Rolando wings 3x8     MT: Performed to increase extensibility, mobility, and reduce pain: 15 min  Cervical distraction grade II-III  SCOM release   Sub nucchal release  Scapular lift right   Sub scap release  PIVM cervical/upslide/downslide grade II  STM righ cervical paraspinal  Lateral glide of atlas grade II, right  DN: pt was educated on intervention, risks and benefits as well as expected outcomes.  Pt signed waiver prior to procedure.  .3mm needle homeostatic UT point, sub scap point, medial deltoid, bilateral cervical paraspinal C6-C4  .23 mm needle right pterygoid  sup and infer  Needles placed for 3 min.  No adverse effects were noted.  K-Tape to right UT to shoulder for postural cueing     Education: Discussed current HEP      A: Pt cont to present with right sided symptoms, minor elevated first rib, right sided UT tightness, right Scalean and SCOM tightness.  Post chin tuck shows centereing. Felt more relief with cervical stabilization with manual stretch to the UT on right side again today. Rolling activity shows GUEVARA with ext UE, did improve with repetition. Pt shows good increae in RTC strength  Pt will cont to benefit from skilled PT to address pain and weakness.     Goals:  Short Term Goals: 2 weeks       .  Long Term Goals: 8 weeks   1: Improve R MMS ER to 5/5 Progress  2: Improve post thoracic muscular strength right side to 5/5 for improved stability with overhead activity and decreased pain. Progress  3: Pt improve left cervical rotation to 90 deg without pain. Met  4: Pt report participating in an exercise program for 30 min without onset of pain >1/10        P: Cont increased strengthening, right shoulder strengthening, SFMA lunges with ER isometrics

## 2019-11-26 ENCOUNTER — TELEPHONE (OUTPATIENT)
Dept: SLEEP MEDICINE | Facility: OTHER | Age: 29
End: 2019-11-26

## 2019-12-03 ENCOUNTER — CLINICAL SUPPORT (OUTPATIENT)
Dept: REHABILITATION | Facility: HOSPITAL | Age: 29
End: 2019-12-03
Payer: COMMERCIAL

## 2019-12-03 DIAGNOSIS — M25.511 RIGHT SHOULDER PAIN, UNSPECIFIED CHRONICITY: ICD-10-CM

## 2019-12-03 DIAGNOSIS — M54.2 CERVICAL PAIN: ICD-10-CM

## 2019-12-03 PROCEDURE — 97140 MANUAL THERAPY 1/> REGIONS: CPT | Mod: PO

## 2019-12-03 PROCEDURE — 97110 THERAPEUTIC EXERCISES: CPT | Mod: PO

## 2019-12-13 ENCOUNTER — TELEPHONE (OUTPATIENT)
Dept: SLEEP MEDICINE | Facility: OTHER | Age: 29
End: 2019-12-13

## 2019-12-16 ENCOUNTER — HOSPITAL ENCOUNTER (OUTPATIENT)
Dept: SLEEP MEDICINE | Facility: OTHER | Age: 29
Discharge: HOME | End: 2019-12-16
Attending: PSYCHIATRY & NEUROLOGY
Payer: COMMERCIAL

## 2019-12-16 DIAGNOSIS — G47.20 SLEEP STAGE DYSFUNCTION: ICD-10-CM

## 2019-12-16 DIAGNOSIS — G47.30 SLEEP APNEA, UNSPECIFIED TYPE: ICD-10-CM

## 2019-12-16 PROCEDURE — 95800 SLP STDY UNATTENDED: CPT

## 2019-12-16 NOTE — PROGRESS NOTES
[facility]  Physical Therapy Note     Name: Angela Lyle  Clinic Number: [id number]     Therapy Diagnosis:           Encounter Diagnoses   Name Primary?    Cervical pain      Right shoulder pain, unspecified chronicity        Physician: Casale, Michael Joseph,*  Physician Orders: PT Eval and Treat Right shoulder pain/cervical tightness  Medical Diagnosis from Referral: Right shoulder pain  Evaluation Date: 7/11/19  Authorization Period Expiration: 1/30/20  Plan of Care Expiration:  10/11/19-12/11/19  Visit # / Visits authorized: 9/12     Time In: 430a  Time Out: 525p  Total Billable Time: 45 minutes     Precautions: Standard     Subjective   Pt stated that she was extremely sore after the last visit.  Felt worse in the clavical area again.  Has not felt any progress.  .  Pain rated to: 2/10 right mid to lateral cervical area     Objective:  SFMA Rolling: : + GUEVARA with right ext UE  MMT Low trap 4-/5  + right 1st rib elevation     TE: performed to increase strength, stability, and reduce pain in the right shoulder and cervical area: 30 min     SFMA rolling UE, bilateral decreased assist 30 min  Brueggars on 1/2 bolster 2x10  Isolated shoulder IR/ER PT apply resistence and brace shoulder to limited compensatory motion, 3x 12 ea   Y's 2x 10  Planks 3 x 20 sec, cues for posture  Reach roll and lift 3x 8  Rolando wings 3x8     MT: Performed to increase extensibility, mobility, and reduce pain: 15 min  Cervical distraction grade II-III  SCOM release   Sub nucchal release  Scapular lift right   Sub scap release  PIVM cervical/upslide/downslide grade II  STM righ cervical paraspinal  Lateral glide of atlas grade II, right  DN: pt was educated on intervention, risks and benefits as well as expected outcomes.  Pt signed waiver prior to procedure.  .3mm needle homeostatic UT point, sub scap point, medial deltoid, bilateral cervical paraspinal C6-C4  .23 mm needle right pterygoid sup and  infer  Needles placed for 3 min.  No adverse effects were noted.  K-Tape to right UT to shoulder for postural cueing     Education: Discussed current HEP      A: Pt cont to present with right sided symptoms, minor elevated first rib, right sided UT tightness, right Scalean and SCOM tightness.  Post chin tuck shows centereing. Felt more relief with cervical stabilization with manual stretch to the UT on right side again today. Rolling activity shows GUEVARA with ext UE, did improve with repetition. Pt shows good increae in RTC strength  Pt will cont to benefit from skilled PT to address pain and weakness.     Goals:  Short Term Goals: 2 weeks       .  Long Term Goals: 8 weeks   1: Improve R MMS ER to 5/5 Progress  2: Improve post thoracic muscular strength right side to 5/5 for improved stability with overhead activity and decreased pain. Progress  3: Pt improve left cervical rotation to 90 deg without pain. Met  4: Pt report participating in an exercise program for 30 min without onset of pain >1/10        P: Placeing PT on hold until Pt sees M.D.  No progress has been made.

## 2019-12-16 NOTE — Clinical Note
Jaden, Can we repeat the study?Previous study the patient reported extremely poor sleep quality compared to baseline. I will place another order. Thank you

## 2019-12-17 ENCOUNTER — PATIENT MESSAGE (OUTPATIENT)
Dept: SLEEP MEDICINE | Facility: CLINIC | Age: 29
End: 2019-12-17

## 2019-12-18 ENCOUNTER — TELEPHONE (OUTPATIENT)
Dept: SLEEP MEDICINE | Facility: CLINIC | Age: 29
End: 2019-12-18

## 2019-12-18 DIAGNOSIS — G47.30 SLEEP APNEA, UNSPECIFIED TYPE: Primary | ICD-10-CM

## 2019-12-18 PROCEDURE — 95800 SLP STDY UNATTENDED: CPT | Mod: 26,,, | Performed by: PSYCHIATRY & NEUROLOGY

## 2019-12-18 PROCEDURE — 95800 PR SLEEP STUDY, UNATTENDED, RECORD HEART RATE/O2 SAT/RESP ANAL/SLEEP TIME: ICD-10-PCS | Mod: 26,,, | Performed by: PSYCHIATRY & NEUROLOGY

## 2019-12-19 ENCOUNTER — TELEPHONE (OUTPATIENT)
Dept: SLEEP MEDICINE | Facility: OTHER | Age: 29
End: 2019-12-19

## 2019-12-19 NOTE — TELEPHONE ENCOUNTER
Can we repeat the study?  Previous study the patient reported extremely poor sleep quality compared to baseline.

## 2019-12-23 ENCOUNTER — TELEPHONE (OUTPATIENT)
Dept: SLEEP MEDICINE | Facility: CLINIC | Age: 29
End: 2019-12-23

## 2019-12-23 ENCOUNTER — PATIENT MESSAGE (OUTPATIENT)
Dept: SLEEP MEDICINE | Facility: CLINIC | Age: 29
End: 2019-12-23

## 2019-12-23 DIAGNOSIS — G47.30 SLEEP APNEA, UNSPECIFIED TYPE: Primary | ICD-10-CM

## 2019-12-23 NOTE — TELEPHONE ENCOUNTER
Since the patient failed HST, given prior clinical suspicion for RODERICK, will do in lab overnight PSG

## 2020-01-07 ENCOUNTER — PATIENT MESSAGE (OUTPATIENT)
Dept: SLEEP MEDICINE | Facility: CLINIC | Age: 30
End: 2020-01-07

## 2020-01-10 ENCOUNTER — TELEPHONE (OUTPATIENT)
Dept: SLEEP MEDICINE | Facility: OTHER | Age: 30
End: 2020-01-10

## 2020-01-13 ENCOUNTER — HOSPITAL ENCOUNTER (OUTPATIENT)
Dept: SLEEP MEDICINE | Facility: OTHER | Age: 30
Discharge: HOME | End: 2020-01-13
Attending: PSYCHIATRY & NEUROLOGY
Payer: COMMERCIAL

## 2020-01-13 DIAGNOSIS — G47.33 OSA (OBSTRUCTIVE SLEEP APNEA): ICD-10-CM

## 2020-01-13 DIAGNOSIS — G47.30 SLEEP APNEA, UNSPECIFIED TYPE: ICD-10-CM

## 2020-01-13 PROCEDURE — 95800 SLP STDY UNATTENDED: CPT

## 2020-01-14 ENCOUNTER — PATIENT MESSAGE (OUTPATIENT)
Dept: SLEEP MEDICINE | Facility: CLINIC | Age: 30
End: 2020-01-14

## 2020-01-14 PROCEDURE — 95800 PR SLEEP STUDY, UNATTENDED, RECORD HEART RATE/O2 SAT/RESP ANAL/SLEEP TIME: ICD-10-PCS | Mod: 26,,, | Performed by: PSYCHIATRY & NEUROLOGY

## 2020-01-14 PROCEDURE — 95800 SLP STDY UNATTENDED: CPT | Mod: 26,,, | Performed by: PSYCHIATRY & NEUROLOGY

## 2020-01-20 ENCOUNTER — TELEPHONE (OUTPATIENT)
Dept: SLEEP MEDICINE | Facility: CLINIC | Age: 30
End: 2020-01-20

## 2020-01-20 ENCOUNTER — PATIENT MESSAGE (OUTPATIENT)
Dept: SLEEP MEDICINE | Facility: CLINIC | Age: 30
End: 2020-01-20

## 2020-01-21 NOTE — TELEPHONE ENCOUNTER
Dear MsLudwin Angela,    Please find attached your sleep study report.    Your sleep architecture was pretty interrupted, and you did toss and turn a lot.   However the study returned positive for mild sleep apnea.    Positive for mild sleep apnea with 6  breathing interruptions per hour of sleep.  Leading treatment options for mild sleep apnea include CPAP and oral appliances for Obstructive sleep apnea (RODERICK).    Please see the detailed description  below.     Please let me know your questions or if you would like to review the study in more details (with all the graphs and charts that can not go through [facility] due to technological limitations) and we will schedule a follow up appointment.       Sincerely,    Ludivina Higuera MD    _______________________________________________________________        PHYSICIAN INTERPRETATION AND COMMENTS: Findings are consistent with mild, obstructive sleep apnea (RODERICK)  (G47.33), by overall AHI (apnea hypopnea index). However, findings on this study suggest that the degree of sleep disordered  breathing is in the moderate severity range, when RDI is measured. This study was technically adequate to allow for interpretation.  CLINICAL HISTORY: 29 year old female presented with: 14 inch neck, BMI of 18, an [place] sleepiness score of 15, and  history of depression. Based on the clinical history, the patient has a low pre-test probability of having mild RODERICK.  SLEEP STUDY FINDINGS: Patient underwent a one night Home Sleep Test and by behavioral criteria, slept for approximately 6  hours, with a sleep latency of 8 minutes and a sleep efficiency of 85.1%. The patient slept supine 29.9% of the night based on valid  recording time of 5.96 hours. When considering more subtle measures of sleep disordered breathing, the overall respiratory  disturbance index is mild (RDI=6) based on a 1% hypopnea desaturation criteria with confirmation by surrogate arousal indicators.  The apneas/hypopneas  are accompanied by minimal oxygen desaturation (percent time below 90% SpO2: 0.1%, Min SpO2: 88.5%).  The average desaturation across all sleep disordered breathing events is 3.4%. Snoring occurs for 0.1% (30 dB) of the study. The  mean pulse rate is 60 BPM, with very frequent pulse rate variability (64 events with >= 6 BPM increase/decrease per hour).  TREATMENT CONSIDERATIONS: Consider trial of Auto-titrating CPAP 6-20 cm, mask of patient's choice, and heated  humidification. If patient has difficulty with CPAP adherence or ongoing RODERICK symptoms or despite CPAP adherence, then consider  an in-lab titration sleep study in order to determine optimal fixed CPAP setting. Alternatively consider oral appliance fitted by a  dentist specializing in these devices, or surgical consultation for uvulopalatopharyngoplasty (UPPP) for treatment of obstructive sleep  apnea.  DISEASE MANAGEMENT CONSIDERATIONS: Definitive treatment for RODERICK is recommended.        __________________________________________________________________          HERE IS THE OVERVIEW OF RODERICK TREATMENT OPTIONS    Continuous Positive Air Pressure (CPAP)  -----------------------------------------------------------  Continuous positive air pressure (CPAP) uses gentle air pressure to hold the airway open. CPAP is often the most effective treatment for sleep apnea and severe snoring. It works very well for many people. But keep in mind that it can take several adjustments before the setup is right for you.  How CPAP Works  CPAP is a small portable pump beside the bed. The pump sends air through a hose, which is held over your nose and/or mouth by a mask. Mild air pressure is gently pushed through your airway. The air pressure nudges sagging tissues aside. This widens the airway so you can breathe better. CPAP may be combined with other kinds of therapy for sleep apnea.    A mask over the nose gently directs air into the throat to keep the airway open.         Types  of Air Pressure Treatments  There are different types of CPAP. Your doctor or CPAP technician will help you decide which type is best for you:  Basic CPAP keeps the pressure constant all night long.  A bilevel device (BiPAP) provides more pressure when you breathe in and less when you breathe out. A BiPAP machine also may be set to provide automatic breaths to maintain breathing if you stop breathing while sleeping.  An autoCPAP device automatically adjusts pressure throughout the night and in response to changes such as body position, sleep stage, and snoring.  © 1751-6604 [employer]. [address] [place] [address]. All rights reserved. This information is not intended as a substitute for professional medical care. Always follow your healthcare professional's instructions.          Mouthpieces (Oral Appliances)  for Sleep Apnea  --------------------------------------  For simple snoring or mild to moderate apnea, a special mouthpiece may help. A dental specialist works with your health care provider to build and fit a mouthpiece just for you. A follow-up sleep study checks how well the device is working for you. Mouthpieces are also called oral appliances.      Moving the jaw and tongue forward with a mouthpiece can open the airway to reduce sleep apnea.      Moving the Jaw Forward  Most mouthpieces move the jaw and tongue forward. That keeps the tongue from blocking the airway. Mouthpieces can work well, but they are not for everyone. Work with your health care provider to get a mouthpiece that fits just right for you. And avoid over-the-counter mouthpieces -- they often do not work.  Tips  To have the most success with your mouthpiece, keep these tips in mind:  It will take some time to get used to wearing a mouthpiece. At first it may feel uncomfortable or make your mouth water. If these problems last, tell your health care provider.  Expect several rounds of adjustments to get the  mouthpiece to fit and work just right for you.  Mouthpieces dont cure the problems that cause snoring or sleep apnea. So you need to use your mouthpiece all night, every night.  Follow your health care providers instructions for keeping the mouthpiece clean.  When youre not wearing your mouthpiece, store it in its case.           Surgery for Sleep Apnea  ----------------------------------------  Surgical Treatment for Snoring and Sleep Apnea  The goal of most surgeries for breathing problems is to widen the airway. This is done by taking out or shrinking excess tissue where the mouth meets the throat. Nasal and jaw surgery can help correct nose or jaw problems that contribute to snoring and apnea. This sheet describes procedures that may be recommended for you.  UPPP (Uvulopalatopharyngoplasty)        UPPP trims the uvula and removes other tissue from the back of the mouth.      This is the most common surgical procedure for sleep apnea. It trims the soft palate and uvula, and removes the tonsils and other tissue. It is major surgery performed in a hospital. Most patients go home within 24 hours.  Risks and Complications of UPPP  Complications are uncommon with this procedure, but can include:  Bleeding  Throat pain  Scarring  Nasal-sounding speech  False feeling that something is in throat  Liquids sometimes going into nose when swallowing      LAUP (Laser-Assisted Uvulopalatoplasty)  This procedure helps relieve snoring. It may also be used in some cases of mild apnea. The doctor uses a laser or electric current to remove some of the soft palate and part or all of the uvula. This treatment may be done over several sessions in the doctors office.  Risks and Complications of LAUP  The risks and complications are the same as for UPPP, but even less likely to occur.  RFA (Radiofrequency Ablation)  This procedure helps relieve snoring. The doctor uses radio waves to reduce the size of the turbinates or uvula,  nearby tissue, and sometimes the back of the tongue.  Risks and Complications of RFA  Mouth ulcer  Nerve pain    Swelling in airway  Pocket of pus (abscess) on tongue        Nasal Surgery  Problems in the nose can make snoring or sleep apnea worse. They can also make CPAP (a common treatment for snoring and sleep apnea) harder to use. If blockages in your nose are severe, surgery can improve the airflow. It can reduce the size of the turbinates, straighten a deviated septum, and remove any polyps (overgrowths of sinus lining).  Risks and Complications of Nasal Surgery  Bruising  Bleeding  Damage to or perforation of septum  Dryness in nose  Jaw Surgery  If your jaw sits too far back, your tongue may also be too far back. That makes the tongue more likely to block the airway when you sleep. Moving the jaw forward moves the tongue forward and widens the airway overall.  Risks and Complications of Jaw Surgery  In some cases, the jaw does not heal in the desired position. Your doctor can tell you more about this. Other possible complications include:  Loss of teeth or need for orthodontic treatment to realign teeth.  Loss of feeling in jaw or teeth.  Change in facial appearance.  More Severe Cases  If your apnea is severe and no other treatment helps, other kinds of surgery may help. Your doctor can tell you about them. Be sure you understand their risks as well as their benefits.  © 0996-1989 [employer]. [address] [place] [address], [place] [address]. All rights reserved. This information is not intended as a substitute for professional medical care. Always follow your healthcare professional's instructions.

## 2020-02-03 ENCOUNTER — PATIENT MESSAGE (OUTPATIENT)
Dept: SLEEP MEDICINE | Facility: CLINIC | Age: 30
End: 2020-02-03

## 2020-04-10 ENCOUNTER — PATIENT MESSAGE (OUTPATIENT)
Dept: OBSTETRICS AND GYNECOLOGY | Facility: CLINIC | Age: 30
End: 2020-04-10

## 2020-05-04 ENCOUNTER — PATIENT MESSAGE (OUTPATIENT)
Dept: INTERNAL MEDICINE | Facility: CLINIC | Age: 30
End: 2020-05-04

## 2020-05-15 ENCOUNTER — TELEPHONE (OUTPATIENT)
Dept: CARDIOTHORACIC SURGERY | Facility: CLINIC | Age: 30
End: 2020-05-15

## 2020-05-15 NOTE — TELEPHONE ENCOUNTER
Called pt who would like a second opinion related to TOS.  Pt informed that we do not have a CTS provider for TOS and that Vascular Surgery does.  Pt informed that I will forward her message to Dr. Haynes's office for an appt.  Pt verbalized understanding.    ----- Message from Fausto Yeung sent at 5/15/2020 10:37 AM CDT -----  Contact: Pt    The Pt would like to schedule an appt for a second opinion.    Phone # [phone]

## 2020-05-25 ENCOUNTER — INITIAL CONSULT (OUTPATIENT)
Dept: VASCULAR SURGERY | Facility: CLINIC | Age: 30
End: 2020-05-25
Attending: SURGERY
Payer: COMMERCIAL

## 2020-05-25 VITALS
HEART RATE: 67 BPM | WEIGHT: 105.81 LBS | HEIGHT: 64 IN | SYSTOLIC BLOOD PRESSURE: 106 MMHG | BODY MASS INDEX: 18.06 KG/M2 | DIASTOLIC BLOOD PRESSURE: 71 MMHG | TEMPERATURE: 98 F

## 2020-05-25 DIAGNOSIS — M54.2 NECK PAIN: ICD-10-CM

## 2020-05-25 DIAGNOSIS — M54.2 CERVICAL PAIN: Primary | ICD-10-CM

## 2020-05-25 PROCEDURE — 99203 OFFICE O/P NEW LOW 30 MIN: CPT | Mod: S$GLB,,, | Performed by: SURGERY

## 2020-05-25 PROCEDURE — 99999 PR PBB SHADOW E&M-EST. PATIENT-LVL IV: ICD-10-PCS | Mod: PBBFAC,,, | Performed by: SURGERY

## 2020-05-25 PROCEDURE — 99203 PR OFFICE/OUTPT VISIT, NEW, LEVL III, 30-44 MIN: ICD-10-PCS | Mod: S$GLB,,, | Performed by: SURGERY

## 2020-05-25 PROCEDURE — 3008F PR BODY MASS INDEX (BMI) DOCUMENTED: ICD-10-PCS | Mod: CPTII,S$GLB,, | Performed by: SURGERY

## 2020-05-25 PROCEDURE — 99999 PR PBB SHADOW E&M-EST. PATIENT-LVL IV: CPT | Mod: PBBFAC,,, | Performed by: SURGERY

## 2020-05-25 PROCEDURE — 3008F BODY MASS INDEX DOCD: CPT | Mod: CPTII,S$GLB,, | Performed by: SURGERY

## 2020-05-25 RX ORDER — METOPROLOL SUCCINATE 25 MG/1
TABLET, EXTENDED RELEASE ORAL
COMMUNITY
Start: 2020-04-24 | End: 2023-10-05

## 2020-05-25 RX ORDER — FLUDROCORTISONE ACETATE 0.1 MG/1
TABLET ORAL
COMMUNITY
Start: 2020-05-18 | End: 2020-12-17

## 2020-05-25 RX ORDER — DULOXETIN HYDROCHLORIDE 20 MG/1
CAPSULE, DELAYED RELEASE ORAL
COMMUNITY
Start: 2020-05-22 | End: 2020-12-17

## 2020-05-25 NOTE — PROGRESS NOTES
VASCULAR SURGERY NOTE    Patient ID: Angela Lyle is a 30 y.o. female.    I. HISTORY     Chief Complaint: neck pain    HPI: Angela Lyle is a 30 y.o. female who is here today for new patient initial appointment. She has PMH Jori-Danlos, POTS, PCOS and is referred  here today for suspected thoracic outlet syndrome. She has been previously seen and evaluated at [facility], referral for 2nd opinion. She notes a chronic history of right shoulder pain, s/p surgery in 2017 with resolution of shoulder pain and ROM issues. She notes in 2018 development of a new right lateral neck pain that radiated up the neck. She denies any associated trauma. She worked with physical therapy for the past several months with minimal alleviation of the pain. She denies decreased range of motion, weakness, numbness or paresthesias of the arms. The pain does not radiate to her arm, hand, or chest. She reports no significant cardiac history, and is not on a blood thinner. For the last month she has has also had left posterior neck pain that is similar to the chronic right neck pain. She denies any left-sided arm pain/numbness/tingling. She reports that the pain is mildly alleviated by physical therapy, warm compresses. She reports that she had an injection in her neck by an orthopedist at [facility] at one point but felt like it made her symptoms worse. The pain seems so worsen with use and palpation. She denies vascular changes with the exception of one event. She says that her right hand turned white and cold one time when she was doing stretches with the shoulder hyperextended and arm abducted. When she stopped stretching and returned to normal position the hand went back to pink/red color.       Past Medical History:   Diagnosis Date    Allergy     Anemia     hx of HOSEA on fergon in past    Anxiety     Arthritis     right shoulder    Depression     Fatigue     History of psychiatric care     Keloid cicatrix     PCOS  (polycystic ovarian syndrome)     Psychiatric problem     Therapy         Past Surgical History:   Procedure Laterality Date    SHOULDER SURGERY Right 06/2017    TONSILLECTOMY      wisdom teeth removal  2010       Social History     Tobacco Use   Smoking Status Never Smoker   Smokeless Tobacco Never Used        Review of Systems   Constitution: Negative for chills and fever.   HENT: Negative for ear pain and hoarse voice.    Eyes: Negative for discharge and pain.   Cardiovascular: Negative for chest pain and palpitations.   Respiratory: Negative for cough, hemoptysis and shortness of breath.    Endocrine: Negative for polydipsia and polyuria.   Skin: Negative for dry skin and rash.   Musculoskeletal: Positive for neck pain. Negative for back pain. Joint pain:    Gastrointestinal: Negative for abdominal pain, diarrhea, nausea and vomiting.   Genitourinary: Negative for dysuria and hematuria.   Neurological: Negative for dizziness, loss of balance and paresthesias.         II. PHYSICAL EXAM     Physical Exam  GEN: Alert/oriented, normal affect  HEENT: atraumatic, normocephalic, no head tilt/angulation  Neck: supple, trachea midline  CHEST: normal respiratory effort, symmetrical chest rise  CARD: Regular rate, regular rhythm  EXT: Warm, no cyanosis/edema  VASC: 2+ radial pulses unchanged with arm abduction bilaterally    Arm Abduction:  Left: mild suprascapular pain  Right: mild suprascapular pain    Scalene Triangle palpation:  Left: + local, no peripheral  Right: + local, no peripheral    Pec Minor Insertion palpation:  Left: asymptomatic  Right: + local, no peripheral    EAST:  Left: negative  Right: negative    ULTT:  Left: negative  Right: negative    QuickDash Score: 41      III. ASSESSMENT & PLAN (MEDICAL DECISION MAKING)     1. Cervical pain    2. Neck pain        Imaging Results:  CXR: no cervical ribs bilaterally. Normal appearance of first ribs    Assessment/Diagnosis and Plan:  30 y.o. female with PMH  with Jori-Danlos, POTS, PCOS who presents to clinic for evaluation of suspicion for neurogenic Thoracic Outlet Syndrome. She complains of neck pain. The patient meets 1 diagnostic criteria for NTOS (local pain at scalene triangle). She has no arm, hand or other peripheral symptoms. She does not have evidence of vascular TOS.  She complains that her pain is mostly in her neck and describes a deep aching pain that varies in severity. She has never had any neck imaging performed. She has never seen a neurologist for her pain.  Will refer to neurology for further evaluation.    - Referral to Neurology Dr. Schwartz for evaluation of neck symptoms  - Return to clinic as needed if upper extremity symptoms develop        DILLON Haynes II, MD, [facility]  Vascular Surgeon  [facility]

## 2020-05-25 NOTE — PROGRESS NOTES
VASCULAR SURGERY NOTE    Patient ID: Angela Lyle is a 30 y.o. female    I. HISTORY     Chief Complaint: TOS     HPI: Angela Lyle is a 30 y.o. female with PMH Jori-Danlos, POTS, PCOS who is here today for new patient initial appointment for suspicious of thoracic outlet syndrome. She has been previously seen and evaluated at [facility], referral for 2nd opinion. She notes a chronic history of right shoulder pain, s/p surgery in 2017 with resolution of ROM issues and associated pain. She notes in 2018 development of a new right lateral neck pain that radiated up the neck. She worked with physical therapy, with mild to moderate alleviation of the pain. She denies decreased range of motion, weakness, numbness or paresthesias of the arm. CXR reveals no cervical rib, no bony abnormalities, mild signs of degenerative disease. She reports no significant cardiac history, and is not on a blood thinner. New 1-month history of left posterior neck pain that is similar to the chronic right neck pain, without arm pain/numbness/tingling. She reports that the pain is mildly alleviated by physical therapy, warm compresses and lidocaine injections.         Past Medical History:   Diagnosis Date    Allergy     Anemia     hx of HOSEA on fergon in past    Anxiety     Arthritis     right shoulder    Depression     Fatigue     History of psychiatric care     Keloid cicatrix     PCOS (polycystic ovarian syndrome)     Psychiatric problem     Therapy         Past Surgical History:   Procedure Laterality Date    SHOULDER SURGERY Right 06/2017    TONSILLECTOMY      wisdom teeth removal  2010       Social History     Tobacco Use   Smoking Status Never Smoker   Smokeless Tobacco Never Used        Review of Systems   Constitution: Negative.   Eyes: Negative.    Respiratory: Negative.    Endocrine: Negative.    Hematologic/Lymphatic: Negative.    Skin: Negative.    Musculoskeletal: Positive for joint pain and neck  pain (bilateral). Negative for muscle weakness.   All other systems reviewed and are negative.        II. PHYSICAL EXAM     Physical Exam   Constitutional: She is oriented to person, place, and time. She appears well-developed and well-nourished.   Cardiovascular: Normal rate and intact distal pulses.   2+ bilateral brachial and radial pulses   Pulmonary/Chest: Effort normal. No respiratory distress.   Abdominal: She exhibits no distension. There is no tenderness.   Musculoskeletal:   Posterior supraclavicular tenderness to palpation bilaterally. Normal ROM of neck and bilateral UE.    Neurological: She is alert and oriented to person, place, and time.   Skin: Skin is warm and dry.   Nursing note and vitals reviewed.    Arm Abduction:  Left: ***  Right: ***     Scalene Triangle palpation:  Left: ***  Right: ***     Pec Minor Insertion palpation:  Left: ***  Right: ***     EAST:  Left: ***  Right: ***     ULTT:  Left: ***  Right: ***     QuickDash Score: 11.4 %    III. ASSESSMENT & PLAN (MEDICAL DECISION MAKING)     Imaging Results:     CXR  (1/30/19) - reveals no cervical rib. Mild DJD.  No fracture or dislocation.  No bone destruction identified    Assessment/Diagnosis and Plan:  30 y.o. female with PMH with Jori-Danlos, POTS, PCOS who presents to clinic for evaluation of suspicion for neurogenic Thoracic Outlet Syndrome. She has been previously evaluated at [facility], MRI reveals no significant findings. Counseled patient has only local symptoms, without peripheral symptoms, therefore low likelihood of neurogenic TOS. Will refer to movement neurology to further evaluate bilateral posterior supraclavicular tenderness to palpation.     - Referral to  *** with movement neurology   - Return to clinic as needed if symptoms change or worsen

## 2020-05-26 PROBLEM — M54.2 NECK PAIN: Status: ACTIVE | Noted: 2020-05-26

## 2020-05-29 ENCOUNTER — TELEPHONE (OUTPATIENT)
Dept: NEUROLOGY | Facility: CLINIC | Age: 30
End: 2020-05-29

## 2020-05-29 NOTE — TELEPHONE ENCOUNTER
Called and spoke with pt. She stated she does not want the initial visit to be virtual. Pt scheduled 07/17/20. Pt verbalized understanding.

## 2020-06-30 ENCOUNTER — TELEPHONE (OUTPATIENT)
Dept: NEUROLOGY | Facility: CLINIC | Age: 30
End: 2020-06-30

## 2020-08-27 ENCOUNTER — TELEPHONE (OUTPATIENT)
Dept: SLEEP MEDICINE | Facility: CLINIC | Age: 30
End: 2020-08-27

## 2020-08-27 NOTE — TELEPHONE ENCOUNTER
CB,    Please call to schedule a follow up with me    Thank you    _______________________________    I have asked [facility] to give me an access to her machine ([facility])      __________________________________  Dear Ms. Lyle       I'm sorry to hear that you are still suffering from excessive daytime sleepiness and fatigue.   I will need to see you with your CPAP machine for me to check settings and the feedback, may be tweak a couple of things.. and also to talk to you about all other possible causes of sleepiness.  I will have my medical assistant call you to schedule the follow up, or you can set it up through [facility].            Sincerely,    Ludivina Higuera MD    ===View-only below this line===      ----- Message -----       From:Angela Lyle       Sent:8/27/2020 11:25 AM CDT         To:Ludivina Higuera MD    Subject:Non-Urgent Medical    Hi Dr. Higuera,    I've been using the CPAP for a couple months now and had a in office inferior turbinate reduction with Dr. Gonzalez. [facility] said my CPAP usage looked great.    Unfortunately, I haven't had a reduction in my daytime fatigue and sleepiness.     I am also being treated by Dr. Jae Poe in [place] for POTS. He's tried several different medications for me, and while I have had a reduction in the heart rate problems, they haven't helped with the fatigue and sleepiness either.    Dr. Gonzalez suggested that I get back in touch with you. I am also sleeping very poorly, waking up often, and feeling completely unrested. I get up to use the bathroom between 1 and 5 (on a really bad night!) per night. The high end is when I've had too much water close to bed time. The CPAP is also annoying me more now, even though I had gotten completely used to it during the months of using it. I'm at the point where I think I sleep better without it.    What can I do?    Thanks,  Angela Lyle

## 2020-09-29 NOTE — PROGRESS NOTES
The patient location is: home  The chief complaint leading to consultation is: follow up  Visit type: audiovisual  Total time spent with patient: 30 min  Each patient to whom he or she provides medical services by telemedicine is:  (1) informed of the relationship between the physician and patient and the respective role of any other health care provider with respect to management of the patient; and (2) notified that he or she may decline to receive medical services by telemedicine and may withdraw from such care at any time.         Angela Lyle  was seen for the follow up after HST -  was significant for mild ODSA  Since that time she had turbinate reduction by Dr Gonzalez. Since then started APAP through [facility] (ordered by Dr. Gonzalez).  Does not find it helpful. She had been waking up 1-2 per night. Up 3-4 times;    She has been seeing Dr. Poe for chest pain. Did EKG. Being worked up for possiblle POTS and Hypermobility syndrome.     She is planning to start [employer] job (will be able to start having in November 2020).  Still reports interupted sleep and afternoon drowsiness. She has good and bad days.   ESS 10/24 today    Using [facility] Airview 6-20 cm H2O 90% - 9  Cm H2O  Usage 09/06/2020 - 10/05/2020  Usage days 19/30 days (63%)  >= 4 hours 18 days (60%)  < 4 hours 1 days (3%)  Usage hours 119 hours 1 minutes  Average usage (total days) 3 hours 58 minutes  Average usage (days used) 6 hours 16 minutes  Median usage (days used) 6 hours 38 minutes  Total used hours (value since last reset - 10/05/2020) 1,268 hours  Pressure - cmH2O Median: 9.1 95th percentile: 10.9 Maximum: 11.7  Leaks - L/min Median: 0.9 95th percentile: 22.4 Maximum: 38.3  Events per hour AI: 2.9 HI: 0.4 AHI: 3.3  Apnea Index Central: 2.9 Obstructive: 0.0 Unknown: 0.1  RERA Index 0.2  Cheyne-Vazquez respiration (average duration per night)      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  She was tested for anemia, vit D, B12 def, thyroid problem, anemia - all NL.  Reports occasional nasal/sinus congestion. Reports poor sense of smell.   Taking MVT.     History was addended: Reports sleepiness since highschool; taking naps which are not always refreshing, rarely recall the dreams from those naps.   Denied sleep paralysis, sleep hallucinations and cataplexy.          PREVIOUS SLEEP STUDIES:   HST3/13/20: Significant Obstructive sleep apnea (RODERICK) with AHI (apnea hypopnea Index) of 5 ; RDI of 6 and SaO2 of 88.5% (weight  104 lbs).  DME: [facility]      PHYSICAL EXAM:  There were no vitals taken for this visit.      Using [facility]: yes      ASSESSMENT:    1.RODERICK; mild . The patient symptomatically has  excessive daytime sleepiness, excessive daytime severe disabling fatigue, interrupted sleep and nocturia  with exam findings of "a crowded oral airway and elevated body mass index. The patient has medical co-morbidities of impaired glucose tolerance 2 to PCOS,  which can be worsened by RODERICK. This warrants treatment    2. Sleepiness since highschool, sleepiness out of proportion to mild RODERICK. Central disorder of hypersomnolence needs to be excluded.       PLAN:    Diagnostic: Polysomnogram in lab once she is on [facility] plan to re-check RODERICK status since turbinate reduction (the patient will let me know once insurance kicks in - I will order in lab PSG).     Polysomnogram followed by MSLT. Please keep limb leads for MSLT. The nature of this procedure and its indication was discussed with the patient. 2 week sleep log was provided to fill before the sleep study and he should leave with the technician on the night of the test. The patient was advised that urine drug screen is required on the day of MSLT. The following medications should be stopped before MSLT: Cymbalta      The patient reports excessive daytime fatigue, sleepiness, frequent naps, difficulty concentrating, drowsiness " on awakening.  hallucinations, sleep paralysis, symptoms suspicious for cataplexy. Sleep apnea was ruled out/ is well managed.         Will ask [facility] for the access to her APAP machine  - she will let me know on Monday.    Sleep hygiene brochure was sent to the patient   Can start melaonin or ZZZ-quill over the coner for sleep continuity  Continue APAP fpr now    More than 15 minutes of this 30 minutes visit was spent in counseling: during our discussion today, we talked about the etiology of  RODERICK as well as the potential ramifications of untreated sleep apnea, which could include daytime sleepiness, hypertension, heart disease and/or stroke.  We discussed potential treatment options, which could include weight loss, body positioning, continuous positive airway pressure (CPAP), or referral for surgical consideration. Meanwhile, she  is urged to avoid supine sleep, weight gain and alcoholic beverages since all of these can worsen RODERICK.     Precautions: The patient was advised to abstain from driving should he feel sleepy or drowsy.    Follow up: MD.  after the sleep study has been completed.     Thank you for allowing me the opportunity to participate in the care of your patient.    This visit summary will be sent to referring provider via inbasket

## 2020-09-30 ENCOUNTER — PATIENT MESSAGE (OUTPATIENT)
Dept: SLEEP MEDICINE | Facility: CLINIC | Age: 30
End: 2020-09-30

## 2020-10-01 ENCOUNTER — OFFICE VISIT (OUTPATIENT)
Dept: SLEEP MEDICINE | Facility: CLINIC | Age: 30
End: 2020-10-01
Payer: COMMERCIAL

## 2020-10-01 DIAGNOSIS — G47.19 EXCESSIVE DAYTIME SLEEPINESS: ICD-10-CM

## 2020-10-01 DIAGNOSIS — G47.33 OSA (OBSTRUCTIVE SLEEP APNEA): Primary | ICD-10-CM

## 2020-10-01 DIAGNOSIS — G47.30 SLEEP APNEA, UNSPECIFIED TYPE: ICD-10-CM

## 2020-10-01 PROCEDURE — 99214 PR OFFICE/OUTPT VISIT, EST, LEVL IV, 30-39 MIN: ICD-10-PCS | Mod: 95,,, | Performed by: PSYCHIATRY & NEUROLOGY

## 2020-10-01 PROCEDURE — 99214 OFFICE O/P EST MOD 30 MIN: CPT | Mod: 95,,, | Performed by: PSYCHIATRY & NEUROLOGY

## 2020-10-01 NOTE — PATIENT INSTRUCTIONS
Dear Ms. Lyle       It was nice seeing you today  Please check back with me on Monday, so we can look at your CPAP download.  I will ask [facility] to lucas me access to your machine.    Please [facility]  - I will order in lab study then.    For now -= try to perfect your sleep hygiene and try over the counter sleep aids - melatonin and ZZZquill for example .        Sincerely,    Ludivina Higuera MD          Sleep Hygiene Practices     1. Try going to bed only when you are drowsy.  ?   2. If you are unable to fall asleep or stay asleep, leave your bedroom and engage in a quiet activity elsewhere. Do not permit yourself to fall asleep outside the bedroom. Return to bed when and only when you are sleepy. Repeat this process as often as necessary throughout night.   3. Maintain regular wake-up time, even on days off work & weekends   4. Use your bedroom for sleep and sex   5. Do not watch TV in bed  6. Avoid napping during the daytime. If daytime sleepiness becomes overwhelming, limit nap time to a single nap of less than 1hr, no later than 3pm.   7. Distract your mind. Avoid clock watching. Lying in bed unable to sleep and frustrated needs to be avoided. Try reading or watching a videotape or listening to books on tape. It may be necessary to go into another room to do these.   8. Avoid caffeine within 4-6hrs of bedtime   9. Avoid use of nicotine close to bedtime   10. do not drink alcoholic beverages within 4-6hrs of bedtime   11. While a light snack before bedtime can help promote sound sleep, avoid large meals.   12. Obtain regular exercise, but avoid strenuous exercise within 4hrs of bedtime   13. Minimize light, noise, and extremes in temperature in the bedroom.   14. Precautions: The patient was advised to abstain from driving should they feel sleepy or drowsy.      .christy

## 2020-10-05 ENCOUNTER — PATIENT MESSAGE (OUTPATIENT)
Dept: SLEEP MEDICINE | Facility: CLINIC | Age: 30
End: 2020-10-05

## 2020-10-05 ENCOUNTER — PATIENT MESSAGE (OUTPATIENT)
Dept: ADMINISTRATIVE | Facility: HOSPITAL | Age: 30
End: 2020-10-05

## 2020-12-01 ENCOUNTER — PATIENT MESSAGE (OUTPATIENT)
Dept: INTERNAL MEDICINE | Facility: CLINIC | Age: 30
End: 2020-12-01

## 2020-12-04 ENCOUNTER — PATIENT MESSAGE (OUTPATIENT)
Dept: SLEEP MEDICINE | Facility: CLINIC | Age: 30
End: 2020-12-04

## 2020-12-04 DIAGNOSIS — Z01.84 ANTIBODY RESPONSE EXAMINATION: ICD-10-CM

## 2020-12-05 ENCOUNTER — TELEPHONE (OUTPATIENT)
Dept: NEUROLOGY | Facility: CLINIC | Age: 30
End: 2020-12-05

## 2020-12-05 ENCOUNTER — PATIENT MESSAGE (OUTPATIENT)
Dept: NEUROLOGY | Facility: CLINIC | Age: 30
End: 2020-12-05

## 2020-12-08 ENCOUNTER — PATIENT MESSAGE (OUTPATIENT)
Dept: SLEEP MEDICINE | Facility: CLINIC | Age: 30
End: 2020-12-08

## 2020-12-08 ENCOUNTER — PATIENT MESSAGE (OUTPATIENT)
Dept: NEUROLOGY | Facility: CLINIC | Age: 30
End: 2020-12-08

## 2020-12-10 ENCOUNTER — OFFICE VISIT (OUTPATIENT)
Dept: INTERNAL MEDICINE | Facility: CLINIC | Age: 30
End: 2020-12-10
Payer: COMMERCIAL

## 2020-12-10 VITALS
WEIGHT: 104.75 LBS | OXYGEN SATURATION: 99 % | HEIGHT: 64 IN | BODY MASS INDEX: 17.88 KG/M2 | DIASTOLIC BLOOD PRESSURE: 72 MMHG | HEART RATE: 70 BPM | SYSTOLIC BLOOD PRESSURE: 106 MMHG

## 2020-12-10 DIAGNOSIS — M25.319 INSTABILITY OF SHOULDER JOINT, UNSPECIFIED LATERALITY: ICD-10-CM

## 2020-12-10 DIAGNOSIS — Z00.00 ANNUAL PHYSICAL EXAM: Primary | ICD-10-CM

## 2020-12-10 DIAGNOSIS — G47.33 OSA (OBSTRUCTIVE SLEEP APNEA): ICD-10-CM

## 2020-12-10 PROCEDURE — 99395 PREV VISIT EST AGE 18-39: CPT | Mod: S$GLB,,, | Performed by: NURSE PRACTITIONER

## 2020-12-10 PROCEDURE — 1126F AMNT PAIN NOTED NONE PRSNT: CPT | Mod: S$GLB,,, | Performed by: NURSE PRACTITIONER

## 2020-12-10 PROCEDURE — 3008F BODY MASS INDEX DOCD: CPT | Mod: CPTII,S$GLB,, | Performed by: NURSE PRACTITIONER

## 2020-12-10 PROCEDURE — 99395 PR PREVENTIVE VISIT,EST,18-39: ICD-10-PCS | Mod: S$GLB,,, | Performed by: NURSE PRACTITIONER

## 2020-12-10 PROCEDURE — 99999 PR PBB SHADOW E&M-EST. PATIENT-LVL IV: ICD-10-PCS | Mod: PBBFAC,,, | Performed by: NURSE PRACTITIONER

## 2020-12-10 PROCEDURE — 99999 PR PBB SHADOW E&M-EST. PATIENT-LVL IV: CPT | Mod: PBBFAC,,, | Performed by: NURSE PRACTITIONER

## 2020-12-10 PROCEDURE — 3008F PR BODY MASS INDEX (BMI) DOCUMENTED: ICD-10-PCS | Mod: CPTII,S$GLB,, | Performed by: NURSE PRACTITIONER

## 2020-12-10 PROCEDURE — 1126F PR PAIN SEVERITY QUANTIFIED, NO PAIN PRESENT: ICD-10-PCS | Mod: S$GLB,,, | Performed by: NURSE PRACTITIONER

## 2020-12-10 RX ORDER — MIDODRINE HYDROCHLORIDE 5 MG/1
2.5 TABLET ORAL 2 TIMES DAILY PRN
COMMUNITY
End: 2023-12-26

## 2020-12-10 RX ORDER — DULOXETIN HYDROCHLORIDE 30 MG/1
30 CAPSULE, DELAYED RELEASE ORAL 2 TIMES DAILY
COMMUNITY
End: 2021-01-29

## 2020-12-10 NOTE — PROGRESS NOTES
Internal Medicine Annual Exam       CHIEF COMPLAINT     The patient, Angela Lyle, who is a 30 y.o. female presents for an annual exam/school physical.    HPI     Here for school physical.     HM-  PAP-   Tdap- 5/22/2015  Flu- 10/2/2020    Past Medical History:  Past Medical History:   Diagnosis Date    Allergy     Anemia     hx of HOSEA on fergon in past    Anxiety     Arthritis     right shoulder    Depression     Fatigue     History of psychiatric care     Keloid cicatrix     PCOS (polycystic ovarian syndrome)     Psychiatric problem     Therapy        Past Surgical History:   Procedure Laterality Date    SHOULDER SURGERY Right 06/2017    TONSILLECTOMY      wisdom teeth removal  2010        Family History   Problem Relation Age of Onset    Arthritis Mother         back surgery    Fibromyalgia Mother     Depression Mother     Hypertension Mother     Miscarriages / Stillbirths Mother     Hyperlipidemia Father     Hearing loss Father     Heart disease Father     Arthritis Maternal Grandmother     Hearing loss Maternal Grandmother     Hypertension Maternal Grandmother     Stroke Maternal Grandmother     Heart disease Paternal Grandfather     Diabetes Paternal Grandfather     Hearing loss Paternal Grandfather     Cancer Maternal Aunt     Early death Maternal Grandfather     Heart disease Maternal Grandfather     Hearing loss Paternal Grandmother     Stroke Paternal Grandmother     Amblyopia Neg Hx     Blindness Neg Hx     Cataracts Neg Hx     Glaucoma Neg Hx     Macular degeneration Neg Hx     Retinal detachment Neg Hx     Strabismus Neg Hx     Melanoma Neg Hx         Social History     Socioeconomic History    Marital status: Single     Spouse name: Not on file    Number of children: Not on file    Years of education: Not on file    Highest education level: Not on file   Occupational History    Occupation: Labor & Delivery RN   Social Needs    Financial resource  strain: Not hard at all    Food insecurity     Worry: Never true     Inability: Never true    Transportation needs     Medical: No     Non-medical: No   Tobacco Use    Smoking status: Never Smoker    Smokeless tobacco: Never Used   Substance and Sexual Activity    Alcohol use: Yes     Alcohol/week: 5.0 standard drinks     Types: 5 Glasses of wine per week     Frequency: 4 or more times a week     Drinks per session: 1 or 2     Binge frequency: Never     Comment: ocassional    Drug use: No    Sexual activity: Yes     Partners: Female     Birth control/protection: None   Lifestyle    Physical activity     Days per week: Not on file     Minutes per session: Not on file    Stress: Not on file   Relationships    Social connections     Talks on phone: Not on file     Gets together: Not on file     Attends Gnosticism service: Not on file     Active member of club or organization: Not on file     Attends meetings of clubs or organizations: Not on file     Relationship status: Not on file   Other Topics Concern    Patient feels they ought to cut down on drinking/drug use Not Asked    Patient annoyed by others criticizing their drinking/drug use Not Asked    Patient has felt bad or guilty about drinking/drug use Not Asked    Patient has had a drink/used drugs as an eye opener in the AM Not Asked    Are you pregnant or think you may be? No    Breast-feeding No   Social History Narrative        Works as L&D RN at [employer]    Lives with roommates in apartment in [facility].        Social History     Tobacco Use   Smoking Status Never Smoker   Smokeless Tobacco Never Used        Allergies as of 12/10/2020 - Reviewed 12/10/2020   Allergen Reaction Noted    Sulfa (sulfonamide antibiotics) Hives and Itching 01/29/2013    Sulfamethoxazole-trimethoprim  03/28/2013          Home Medications:  Prior to Admission medications    Medication Sig Start Date End Date Taking? Authorizing Provider   DULoxetine (CYMBALTA) 20 MG capsule   5/22/20   Historical Provider   ergocalciferol, vitamin D2, 2,000 unit Tab Take 2,000 Units by mouth once daily. 9/11/17   Leah Shields MD   fludrocortisone (FLORINEF) 0.1 mg Tab PLEASE SEE ATTACHED FOR DETAILED DIRECTIONS 5/18/20   Historical Provider   inositol-D chiro inositol (OVASITOL) 2,000-50 mg PwPk     Historical Provider   ketoconazole (NIZORAL) 2 % shampoo Wash scalp with medicated shampoo at least 2x/week. Let sit on scalp at least 5 minutes prior to rinsing 11/21/19   Esthela Joseph MD   metoprolol succinate (TOPROL-XL) 25 MG 24 hr tablet TAKE 1/2 TABLET BY MOUTH TWICE A DAY.MAY INCREASE TO 1 TAB TWICE A DAY.MAY TAKE EXTRA 4 TAB MAX/DAY 4/24/20   Historical Provider   multivitamin capsule Take 1 capsule by mouth once daily.    Historical Provider       Review of Systems:  Review of Systems   Constitutional: Negative for activity change, chills, fever and unexpected weight change.   HENT: Negative for hearing loss, rhinorrhea and trouble swallowing.    Eyes: Negative for discharge and visual disturbance.   Respiratory: Positive for chest tightness. Negative for cough, shortness of breath and wheezing.    Cardiovascular: Positive for palpitations. Negative for chest pain and leg swelling.   Gastrointestinal: Negative for abdominal pain, blood in stool, constipation, diarrhea and vomiting.   Endocrine: Negative for polydipsia and polyuria.   Genitourinary: Positive for menstrual problem (cramping/abd pain with diarrhea and cold sweats.). Negative for difficulty urinating, dysuria and hematuria.   Musculoskeletal: Positive for arthralgias (right shoulder pain and surgery in 2017 - with PT currently. Dx with kalen-Danos ) and neck pain. Negative for joint swelling.   Skin: Negative for rash.   Neurological: Negative for dizziness, weakness, light-headedness and headaches.   Psychiatric/Behavioral: Negative for confusion and dysphoric mood.       Health Maintainence:   Immunizations:  Health  "Maintenance       Date Due Completion Date    Hepatitis C Screening 1989 ---    Cervical Cancer Screening 10/21/2019 10/21/2016    TETANUS VACCINE 05/22/2025 5/22/2015           PHYSICAL EXAM     /72 (BP Location: Right arm, Patient Position: Sitting, BP Method: Large (Manual))   Pulse 70   Ht 5' 4" (1.626 m)   Wt 47.5 kg (104 lb 11.5 oz)   LMP 12/04/2020 (Exact Date)   SpO2 99%   BMI 17.97 kg/m²  Body mass index is 17.97 kg/m².    Physical Exam  Vitals signs reviewed.   Constitutional:       Appearance: She is well-developed.   HENT:      Head: Normocephalic.      Right Ear: External ear normal.      Left Ear: External ear normal.      Nose: Nose normal.      Mouth/Throat:      Pharynx: No oropharyngeal exudate.   Eyes:      Pupils: Pupils are equal, round, and reactive to light.   Neck:      Musculoskeletal: Neck supple.      Thyroid: No thyromegaly.      Vascular: No JVD.      Trachea: No tracheal deviation.   Cardiovascular:      Rate and Rhythm: Normal rate and regular rhythm.      Heart sounds: Normal heart sounds. No murmur. No friction rub. No gallop.    Pulmonary:      Effort: Pulmonary effort is normal. No respiratory distress.      Breath sounds: Normal breath sounds. No wheezing or rales.   Abdominal:      General: Bowel sounds are normal. There is no distension.      Palpations: Abdomen is soft.      Tenderness: There is no abdominal tenderness.   Musculoskeletal: Normal range of motion.         General: No tenderness.   Lymphadenopathy:      Cervical: No cervical adenopathy.   Skin:     General: Skin is warm and dry.      Findings: No rash.   Neurological:      Mental Status: She is alert and oriented to person, place, and time.   Psychiatric:         Behavior: Behavior normal.         LABS     Lab Results   Component Value Date    HGBA1C 5.1 03/26/2019     CMP  Sodium   Date Value Ref Range Status   03/26/2019 139 136 - 145 mmol/L Final     Potassium   Date Value Ref Range Status "   03/26/2019 3.9 3.5 - 5.1 mmol/L Final     Chloride   Date Value Ref Range Status   03/26/2019 105 95 - 110 mmol/L Final     CO2   Date Value Ref Range Status   03/26/2019 26 23 - 29 mmol/L Final     Glucose   Date Value Ref Range Status   03/26/2019 88 70 - 110 mg/dL Final     BUN   Date Value Ref Range Status   03/26/2019 9 6 - 20 mg/dL Final     Creatinine   Date Value Ref Range Status   03/26/2019 0.7 0.5 - 1.4 mg/dL Final     Calcium   Date Value Ref Range Status   03/26/2019 9.5 8.7 - 10.5 mg/dL Final     Total Protein   Date Value Ref Range Status   03/26/2019 7.1 6.0 - 8.4 g/dL Final     Albumin   Date Value Ref Range Status   03/26/2019 4.2 3.5 - 5.2 g/dL Final     Total Bilirubin   Date Value Ref Range Status   03/26/2019 0.7 0.1 - 1.0 mg/dL Final     Comment:     For infants and newborns, interpretation of results should be based  on gestational age, weight and in agreement with clinical  observations.  Premature Infant recommended reference ranges:  Up to 24 hours.............<8.0 mg/dL  Up to 48 hours............<12.0 mg/dL  3-5 days..................<15.0 mg/dL  6-29 days.................<15.0 mg/dL       Alkaline Phosphatase   Date Value Ref Range Status   03/26/2019 54 (L) 55 - 135 U/L Final     AST   Date Value Ref Range Status   03/26/2019 19 10 - 40 U/L Final     ALT   Date Value Ref Range Status   03/26/2019 15 10 - 44 U/L Final     Anion Gap   Date Value Ref Range Status   03/26/2019 8 8 - 16 mmol/L Final     eGFR if    Date Value Ref Range Status   03/26/2019 >60 >60 mL/min/1.73 m^2 Final     eGFR if non    Date Value Ref Range Status   03/26/2019 >60 >60 mL/min/1.73 m^2 Final     Comment:     Calculation used to obtain the estimated glomerular filtration  rate (eGFR) is the CKD-EPI equation.        Lab Results   Component Value Date    WBC 3.90 03/26/2019    HGB 12.4 03/26/2019    HCT 36.3 (L) 03/26/2019    MCV 88 03/26/2019     03/26/2019     Lab  Results   Component Value Date    CHOL 141 12/21/2017    CHOL 154 06/13/2013     Lab Results   Component Value Date    HDL 60 12/21/2017    HDL 58 06/13/2013     Lab Results   Component Value Date    LDLCALC 72.6 12/21/2017    LDLCALC 89.0 06/13/2013     Lab Results   Component Value Date    TRIG 42 12/21/2017    TRIG 36 06/13/2013     Lab Results   Component Value Date    CHOLHDL 42.6 12/21/2017    CHOLHDL 37.7 06/13/2013     Lab Results   Component Value Date    TSH 1.079 03/26/2019       ASSESSMENT/PLAN     Angela Lyle is a 30 y.o. female    Annual physical exam-  All age and gender related screenings discussed     Instability of shoulder joint, unspecified laterality- stable. Will cont current meds.     RODERICK (obstructive sleep apnea)- stable. Cont current regimen.     Follow up with PCP in 1 one year for annual     Marisol MURPHY, APRN, FNP-c   Department of Internal Medicine - [facility] [address]  4:08 PM

## 2020-12-17 ENCOUNTER — OFFICE VISIT (OUTPATIENT)
Dept: OBSTETRICS AND GYNECOLOGY | Facility: CLINIC | Age: 30
End: 2020-12-17
Payer: COMMERCIAL

## 2020-12-17 VITALS — WEIGHT: 106.06 LBS | DIASTOLIC BLOOD PRESSURE: 66 MMHG | BODY MASS INDEX: 18.2 KG/M2 | SYSTOLIC BLOOD PRESSURE: 114 MMHG

## 2020-12-17 DIAGNOSIS — Z01.419 WELL WOMAN EXAM WITH ROUTINE GYNECOLOGICAL EXAM: Primary | ICD-10-CM

## 2020-12-17 PROCEDURE — 99395 PR PREVENTIVE VISIT,EST,18-39: ICD-10-PCS | Mod: S$GLB,,, | Performed by: ADVANCED PRACTICE MIDWIFE

## 2020-12-17 PROCEDURE — 88175 CYTOPATH C/V AUTO FLUID REDO: CPT

## 2020-12-17 PROCEDURE — 3008F BODY MASS INDEX DOCD: CPT | Mod: CPTII,S$GLB,, | Performed by: ADVANCED PRACTICE MIDWIFE

## 2020-12-17 PROCEDURE — 87624 HPV HI-RISK TYP POOLED RSLT: CPT

## 2020-12-17 PROCEDURE — 99999 PR PBB SHADOW E&M-EST. PATIENT-LVL II: ICD-10-PCS | Mod: PBBFAC,,, | Performed by: ADVANCED PRACTICE MIDWIFE

## 2020-12-17 PROCEDURE — 3008F PR BODY MASS INDEX (BMI) DOCUMENTED: ICD-10-PCS | Mod: CPTII,S$GLB,, | Performed by: ADVANCED PRACTICE MIDWIFE

## 2020-12-17 PROCEDURE — 99395 PREV VISIT EST AGE 18-39: CPT | Mod: S$GLB,,, | Performed by: ADVANCED PRACTICE MIDWIFE

## 2020-12-17 PROCEDURE — 1125F AMNT PAIN NOTED PAIN PRSNT: CPT | Mod: S$GLB,,, | Performed by: ADVANCED PRACTICE MIDWIFE

## 2020-12-17 PROCEDURE — 99999 PR PBB SHADOW E&M-EST. PATIENT-LVL II: CPT | Mod: PBBFAC,,, | Performed by: ADVANCED PRACTICE MIDWIFE

## 2020-12-17 PROCEDURE — 1125F PR PAIN SEVERITY QUANTIFIED, PAIN PRESENT: ICD-10-PCS | Mod: S$GLB,,, | Performed by: ADVANCED PRACTICE MIDWIFE

## 2020-12-17 NOTE — PROGRESS NOTES
CC: Well woman exam    Angela Lyle is a 30 y.o. female  presents for well woman exam.  Patient's last menstrual period was 2020 (exact date).  She has been diagnosed with PCOS and has long (up to 40 days) cycles that begin painfully about three times annual (most recent was such). She declines hormonal contraception for regulation of cycle at this time. Annual labs were completed in 2019 and showed no insulin resistance. She has a female partner with whom she's considering pregnancy in next year or two (discussed prenatals, timing, etc). She hasn't noticed any breast changes.     Last pap: 2016    Past Medical History:   Diagnosis Date    Allergy     Anemia     hx of HOSEA on fergon in past    Anxiety     Arthritis     right shoulder    Depression     Fatigue     History of psychiatric care     Hypermobile Jori-Danlos syndrome 2019    Keloid cicatrix     PCOS (polycystic ovarian syndrome)     POTS (postural orthostatic tachycardia syndrome) 2019    Psychiatric problem     Therapy      Past Surgical History:   Procedure Laterality Date    SHOULDER SURGERY Right 2017    TONSILLECTOMY      wisdom teeth removal  2010     Social History     Socioeconomic History    Marital status: Single     Spouse name: Not on file    Number of children: Not on file    Years of education: Not on file    Highest education level: Not on file   Occupational History    Occupation: Labor & Delivery RN   Social Needs    Financial resource strain: Not hard at all    Food insecurity     Worry: Never true     Inability: Never true    Transportation needs     Medical: No     Non-medical: No   Tobacco Use    Smoking status: Never Smoker    Smokeless tobacco: Never Used   Substance and Sexual Activity    Alcohol use: Yes     Alcohol/week: 5.0 standard drinks     Types: 5 Glasses of wine per week     Frequency: 4 or more times a week     Drinks per session: 1 or 2     Binge frequency: Never      Comment: ocassional    Drug use: No    Sexual activity: Yes     Partners: Female     Birth control/protection: None   Lifestyle    Physical activity     Days per week: Not on file     Minutes per session: Not on file    Stress: Not on file   Relationships    Social connections     Talks on phone: Not on file     Gets together: Not on file     Attends [employer] service: Not on file     Active member of club or organization: Not on file     Attends meetings of clubs or organizations: Not on file     Relationship status: Not on file   Other Topics Concern    Patient feels they ought to cut down on drinking/drug use Not Asked    Patient annoyed by others criticizing their drinking/drug use Not Asked    Patient has felt bad or guilty about drinking/drug use Not Asked    Patient has had a drink/used drugs as an eye opener in the AM Not Asked    Are you pregnant or think you may be? 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     Family History   Problem Relation Age of Onset    Arthritis Mother         back surgery    Fibromyalgia Mother     Depression Mother     Hypertension Mother     Miscarriages / Stillbirths Mother     Hyperlipidemia Father     Hearing loss Father     Heart disease Father     Arthritis Maternal Grandmother     Hearing loss Maternal Grandmother     Hypertension Maternal Grandmother     Stroke Maternal Grandmother     Heart disease Paternal Grandfather     Diabetes Paternal Grandfather     Hearing loss Paternal Grandfather     Cancer Maternal Aunt     Early death Maternal Grandfather     Heart disease Maternal Grandfather     Hearing loss Paternal Grandmother     Stroke Paternal Grandmother     Amblyopia Neg Hx     Blindness Neg Hx     Cataracts Neg Hx     Glaucoma Neg Hx     Macular degeneration Neg Hx     Retinal detachment Neg Hx     Strabismus Neg Hx     Melanoma Neg Hx       OB History        0    Para        Term                AB        Living           SAB        TAB        Ectopic        Multiple        Live Births                     /66   Wt 48.1 kg (106 lb 0.7 oz)   LMP 2020 (Exact Date)   BMI 18.20 kg/m²       ROS:  GENERAL: Denies weight gain or weight loss. Feeling well overall.   SKIN: Denies rash or lesions.   HEAD: Denies head injury or headache.   NODES: Denies enlarged lymph nodes.   CHEST: Denies chest pain or shortness of breath.   CARDIOVASCULAR: Denies palpitations or left sided chest pain.   ABDOMEN: No abdominal pain, constipation, diarrhea, nausea, vomiting or rectal bleeding.   URINARY: No frequency, dysuria, hematuria, or burning on urination.  REPRODUCTIVE: See HPI.   BREASTS: The patient performs breast self-examination and denies pain, lumps, or nipple discharge.   HEMATOLOGIC: No easy bruisability or excessive bleeding.   MUSCULOSKELETAL: Denies joint pain or swelling.   NEUROLOGIC: Denies syncope or weakness.   PSYCHIATRIC: Denies depression, anxiety or mood swings.    PHYSICAL EXAM:  APPEARANCE: Well nourished, well developed, in no acute distress.  AFFECT: Alert and oriented x 3  SKIN: Warm, dry, & intact. No acne or hirsutism.  NECK: Neck symmetric without masses or thyromegaly  NODES: No cervical, axillary, epitrochlear, inguinal, or femoral lymph node enlargement  CHEST: Good respiratory effect.  ABDOMEN: Soft.  No tenderness or masses.  No hepatosplenomegaly.  No hernias.  BREASTS: Symmetrical, no skin changes or visible lesions.  No palpable masses, nipple discharge bilaterally.  PELVIC: Normal external genitalia without lesions.  Normal hair distribution.  Adequate perineal body, normal urethral meatus.  Vagina moist and well rugated without lesions or discharge.  Cervix pink, without lesions, discharge or tenderness.  No significant cystocele or rectocele.  Bimanual exam deferred.  EXTREMITIES: No  edema.    ASSESSMENT/PLAN:  Well woman exam with routine gynecological exam  -     Liquid-Based Pap Smear, Screening  -     HPV High Risk Genotypes, PCR        Patient was counseled today on A.C.S. Pap guidelines and recommendations for yearly pelvic exams, mammograms and monthly self breast exams; to see her PCP for other health maintenance.     No follow-ups on file.

## 2020-12-22 ENCOUNTER — IMMUNIZATION (OUTPATIENT)
Dept: INTERNAL MEDICINE | Facility: CLINIC | Age: 30
End: 2020-12-22
Payer: COMMERCIAL

## 2020-12-22 DIAGNOSIS — Z23 NEED FOR VACCINATION: ICD-10-CM

## 2020-12-22 PROCEDURE — 91300 COVID-19, MRNA, LNP-S, PF, 30 MCG/0.3 ML DOSE VACCINE: ICD-10-PCS | Mod: ,,, | Performed by: INTERNAL MEDICINE

## 2020-12-22 PROCEDURE — 91300 COVID-19, MRNA, LNP-S, PF, 30 MCG/0.3 ML DOSE VACCINE: CPT | Mod: ,,, | Performed by: INTERNAL MEDICINE

## 2020-12-22 PROCEDURE — 0001A COVID-19, MRNA, LNP-S, PF, 30 MCG/0.3 ML DOSE VACCINE: CPT | Mod: CV19,,, | Performed by: INTERNAL MEDICINE

## 2020-12-22 PROCEDURE — 0001A COVID-19, MRNA, LNP-S, PF, 30 MCG/0.3 ML DOSE VACCINE: ICD-10-PCS | Mod: CV19,,, | Performed by: INTERNAL MEDICINE

## 2020-12-24 LAB
HPV HR 12 DNA SPEC QL NAA+PROBE: NEGATIVE
HPV16 AG SPEC QL: NEGATIVE
HPV18 DNA SPEC QL NAA+PROBE: NEGATIVE

## 2021-01-08 ENCOUNTER — PATIENT MESSAGE (OUTPATIENT)
Dept: SLEEP MEDICINE | Facility: CLINIC | Age: 31
End: 2021-01-08

## 2021-01-11 ENCOUNTER — OFFICE VISIT (OUTPATIENT)
Dept: NEUROLOGY | Facility: CLINIC | Age: 31
End: 2021-01-11
Attending: PHYSICAL MEDICINE & REHABILITATION
Payer: COMMERCIAL

## 2021-01-11 VITALS
HEART RATE: 69 BPM | HEIGHT: 64 IN | DIASTOLIC BLOOD PRESSURE: 69 MMHG | SYSTOLIC BLOOD PRESSURE: 107 MMHG | BODY MASS INDEX: 18.11 KG/M2 | WEIGHT: 106.06 LBS

## 2021-01-11 DIAGNOSIS — M54.2 CERVICAL PAIN: ICD-10-CM

## 2021-01-11 DIAGNOSIS — M54.2 NECK PAIN: ICD-10-CM

## 2021-01-11 PROCEDURE — 99999 PR PBB SHADOW E&M-EST. PATIENT-LVL IV: ICD-10-PCS | Mod: PBBFAC,,, | Performed by: PSYCHIATRY & NEUROLOGY

## 2021-01-11 PROCEDURE — 3008F PR BODY MASS INDEX (BMI) DOCUMENTED: ICD-10-PCS | Mod: CPTII,S$GLB,, | Performed by: PSYCHIATRY & NEUROLOGY

## 2021-01-11 PROCEDURE — 1125F PR PAIN SEVERITY QUANTIFIED, PAIN PRESENT: ICD-10-PCS | Mod: S$GLB,,, | Performed by: PSYCHIATRY & NEUROLOGY

## 2021-01-11 PROCEDURE — 3008F BODY MASS INDEX DOCD: CPT | Mod: CPTII,S$GLB,, | Performed by: PSYCHIATRY & NEUROLOGY

## 2021-01-11 PROCEDURE — 99999 PR PBB SHADOW E&M-EST. PATIENT-LVL IV: CPT | Mod: PBBFAC,,, | Performed by: PSYCHIATRY & NEUROLOGY

## 2021-01-11 PROCEDURE — 99245 OFF/OP CONSLTJ NEW/EST HI 55: CPT | Mod: S$GLB,,, | Performed by: PSYCHIATRY & NEUROLOGY

## 2021-01-11 PROCEDURE — 99245 PR OFFICE CONSULTATION,LEVEL V: ICD-10-PCS | Mod: S$GLB,,, | Performed by: PSYCHIATRY & NEUROLOGY

## 2021-01-11 PROCEDURE — 1125F AMNT PAIN NOTED PAIN PRSNT: CPT | Mod: S$GLB,,, | Performed by: PSYCHIATRY & NEUROLOGY

## 2021-01-12 ENCOUNTER — IMMUNIZATION (OUTPATIENT)
Dept: INTERNAL MEDICINE | Facility: CLINIC | Age: 31
End: 2021-01-12
Payer: COMMERCIAL

## 2021-01-12 DIAGNOSIS — Z23 NEED FOR VACCINATION: ICD-10-CM

## 2021-01-12 PROCEDURE — 91300 COVID-19, MRNA, LNP-S, PF, 30 MCG/0.3 ML DOSE VACCINE: CPT | Mod: PBBFAC | Performed by: INTERNAL MEDICINE

## 2021-01-12 PROCEDURE — 0002A COVID-19, MRNA, LNP-S, PF, 30 MCG/0.3 ML DOSE VACCINE: CPT | Mod: PBBFAC | Performed by: INTERNAL MEDICINE

## 2021-01-15 ENCOUNTER — HOSPITAL ENCOUNTER (OUTPATIENT)
Dept: RADIOLOGY | Facility: HOSPITAL | Age: 31
Discharge: HOME | End: 2021-01-15
Attending: PSYCHIATRY & NEUROLOGY
Payer: COMMERCIAL

## 2021-01-15 DIAGNOSIS — M54.2 NECK PAIN: ICD-10-CM

## 2021-01-15 DIAGNOSIS — M54.2 CERVICAL PAIN: ICD-10-CM

## 2021-01-15 PROCEDURE — 72156 MRI CERVICAL SPINE DEMYELINATING W W/O CONTRAST: ICD-10-PCS | Mod: 26,,, | Performed by: RADIOLOGY

## 2021-01-15 PROCEDURE — 70553 MRI BRAIN STEM W/O & W/DYE: CPT | Mod: 26,,, | Performed by: RADIOLOGY

## 2021-01-15 PROCEDURE — 70553 MRI BRAIN STEM W/O & W/DYE: CPT | Mod: TC

## 2021-01-15 PROCEDURE — 70553 MRI BRAIN DEMYELINATING W/ WO CONTRAST: ICD-10-PCS | Mod: 26,,, | Performed by: RADIOLOGY

## 2021-01-15 PROCEDURE — A9585 GADOBUTROL INJECTION: HCPCS | Performed by: PSYCHIATRY & NEUROLOGY

## 2021-01-15 PROCEDURE — 25500020 PHARM REV CODE 255: Performed by: PSYCHIATRY & NEUROLOGY

## 2021-01-15 PROCEDURE — 72156 MRI NECK SPINE W/O & W/DYE: CPT | Mod: 26,,, | Performed by: RADIOLOGY

## 2021-01-15 PROCEDURE — 72156 MRI NECK SPINE W/O & W/DYE: CPT | Mod: TC

## 2021-01-15 RX ORDER — GADOBUTROL 604.72 MG/ML
5 INJECTION INTRAVENOUS
Status: COMPLETED | OUTPATIENT
Start: 2021-01-15 | End: 2021-01-15

## 2021-01-15 RX ADMIN — GADOBUTROL 5 ML: 604.72 INJECTION INTRAVENOUS at 07:01

## 2021-01-22 ENCOUNTER — CLINICAL SUPPORT (OUTPATIENT)
Dept: REHABILITATION | Facility: HOSPITAL | Age: 31
End: 2021-01-22
Payer: COMMERCIAL

## 2021-01-22 ENCOUNTER — PATIENT MESSAGE (OUTPATIENT)
Dept: NEUROLOGY | Facility: CLINIC | Age: 31
End: 2021-01-22

## 2021-01-22 DIAGNOSIS — M54.2 CERVICAL PAIN: ICD-10-CM

## 2021-01-22 DIAGNOSIS — M25.511 RIGHT SHOULDER PAIN, UNSPECIFIED CHRONICITY: ICD-10-CM

## 2021-01-22 PROCEDURE — 97161 PT EVAL LOW COMPLEX 20 MIN: CPT

## 2021-01-22 PROCEDURE — 97140 MANUAL THERAPY 1/> REGIONS: CPT

## 2021-01-26 ENCOUNTER — CLINICAL SUPPORT (OUTPATIENT)
Dept: REHABILITATION | Facility: HOSPITAL | Age: 31
End: 2021-01-26
Payer: COMMERCIAL

## 2021-01-26 DIAGNOSIS — M54.2 CERVICAL PAIN: ICD-10-CM

## 2021-01-26 DIAGNOSIS — M25.511 RIGHT SHOULDER PAIN, UNSPECIFIED CHRONICITY: ICD-10-CM

## 2021-01-26 PROCEDURE — 97140 MANUAL THERAPY 1/> REGIONS: CPT

## 2021-01-27 LAB
FINAL PATHOLOGIC DIAGNOSIS: NORMAL
Lab: NORMAL

## 2021-01-28 ENCOUNTER — TELEPHONE (OUTPATIENT)
Dept: ORTHOPEDICS | Facility: CLINIC | Age: 31
End: 2021-01-28

## 2021-01-28 DIAGNOSIS — R52 PAIN: Primary | ICD-10-CM

## 2021-01-29 ENCOUNTER — OFFICE VISIT (OUTPATIENT)
Dept: ORTHOPEDICS | Facility: CLINIC | Age: 31
End: 2021-01-29
Payer: COMMERCIAL

## 2021-01-29 ENCOUNTER — HOSPITAL ENCOUNTER (OUTPATIENT)
Dept: RADIOLOGY | Facility: OTHER | Age: 31
Discharge: HOME | End: 2021-01-29
Attending: PHYSICIAN ASSISTANT
Payer: COMMERCIAL

## 2021-01-29 VITALS
HEART RATE: 69 BPM | BODY MASS INDEX: 18.1 KG/M2 | DIASTOLIC BLOOD PRESSURE: 72 MMHG | HEIGHT: 64 IN | SYSTOLIC BLOOD PRESSURE: 108 MMHG | WEIGHT: 106 LBS

## 2021-01-29 DIAGNOSIS — M79.641 BILATERAL HAND PAIN: Primary | ICD-10-CM

## 2021-01-29 DIAGNOSIS — R52 PAIN: ICD-10-CM

## 2021-01-29 DIAGNOSIS — M79.642 BILATERAL HAND PAIN: Primary | ICD-10-CM

## 2021-01-29 PROCEDURE — 73130 XR HAND COMPLETE 3 VIEWS BILATERAL: ICD-10-PCS | Mod: 26,50,, | Performed by: RADIOLOGY

## 2021-01-29 PROCEDURE — 73130 X-RAY EXAM OF HAND: CPT | Mod: 26,50,, | Performed by: RADIOLOGY

## 2021-01-29 PROCEDURE — 1125F AMNT PAIN NOTED PAIN PRSNT: CPT | Mod: S$GLB,,, | Performed by: PHYSICIAN ASSISTANT

## 2021-01-29 PROCEDURE — 73130 X-RAY EXAM OF HAND: CPT | Mod: TC,50,FY

## 2021-01-29 PROCEDURE — 1125F PR PAIN SEVERITY QUANTIFIED, PAIN PRESENT: ICD-10-PCS | Mod: S$GLB,,, | Performed by: PHYSICIAN ASSISTANT

## 2021-01-29 PROCEDURE — 99999 PR PBB SHADOW E&M-EST. PATIENT-LVL III: ICD-10-PCS | Mod: PBBFAC,,, | Performed by: PHYSICIAN ASSISTANT

## 2021-01-29 PROCEDURE — 3008F BODY MASS INDEX DOCD: CPT | Mod: CPTII,S$GLB,, | Performed by: PHYSICIAN ASSISTANT

## 2021-01-29 PROCEDURE — 99999 PR PBB SHADOW E&M-EST. PATIENT-LVL III: CPT | Mod: PBBFAC,,, | Performed by: PHYSICIAN ASSISTANT

## 2021-01-29 PROCEDURE — 99204 OFFICE O/P NEW MOD 45 MIN: CPT | Mod: S$GLB,,, | Performed by: PHYSICIAN ASSISTANT

## 2021-01-29 PROCEDURE — 3008F PR BODY MASS INDEX (BMI) DOCUMENTED: ICD-10-PCS | Mod: CPTII,S$GLB,, | Performed by: PHYSICIAN ASSISTANT

## 2021-01-29 PROCEDURE — 99204 PR OFFICE/OUTPT VISIT, NEW, LEVL IV, 45-59 MIN: ICD-10-PCS | Mod: S$GLB,,, | Performed by: PHYSICIAN ASSISTANT

## 2021-02-01 ENCOUNTER — CLINICAL SUPPORT (OUTPATIENT)
Dept: REHABILITATION | Facility: HOSPITAL | Age: 31
End: 2021-02-01
Payer: COMMERCIAL

## 2021-02-01 ENCOUNTER — PATIENT MESSAGE (OUTPATIENT)
Dept: INTERNAL MEDICINE | Facility: CLINIC | Age: 31
End: 2021-02-01

## 2021-02-01 DIAGNOSIS — M25.511 CHRONIC RIGHT SHOULDER PAIN: ICD-10-CM

## 2021-02-01 DIAGNOSIS — M54.2 CERVICAL PAIN: ICD-10-CM

## 2021-02-01 DIAGNOSIS — G89.29 CHRONIC RIGHT SHOULDER PAIN: ICD-10-CM

## 2021-02-01 PROCEDURE — 97140 MANUAL THERAPY 1/> REGIONS: CPT

## 2021-02-08 ENCOUNTER — CLINICAL SUPPORT (OUTPATIENT)
Dept: REHABILITATION | Facility: HOSPITAL | Age: 31
End: 2021-02-08
Payer: COMMERCIAL

## 2021-02-08 ENCOUNTER — HOSPITAL ENCOUNTER (OUTPATIENT)
Dept: SLEEP MEDICINE | Facility: OTHER | Age: 31
Discharge: HOME | End: 2021-02-08
Attending: PSYCHIATRY & NEUROLOGY
Payer: COMMERCIAL

## 2021-02-08 DIAGNOSIS — G47.33 OSA (OBSTRUCTIVE SLEEP APNEA): ICD-10-CM

## 2021-02-08 DIAGNOSIS — M25.511 RIGHT SHOULDER PAIN, UNSPECIFIED CHRONICITY: ICD-10-CM

## 2021-02-08 DIAGNOSIS — G47.19 EXCESSIVE DAYTIME SLEEPINESS: ICD-10-CM

## 2021-02-08 DIAGNOSIS — M54.2 CERVICAL PAIN: ICD-10-CM

## 2021-02-08 DIAGNOSIS — G47.11 IDIOPATHIC HYPERSOMNIA: ICD-10-CM

## 2021-02-08 PROCEDURE — 97140 MANUAL THERAPY 1/> REGIONS: CPT

## 2021-02-08 PROCEDURE — 95805 PR MULTIPLE SLEEP LATENCY TEST: ICD-10-PCS | Mod: 26,,, | Performed by: PSYCHIATRY & NEUROLOGY

## 2021-02-08 PROCEDURE — 95805 MULTIPLE SLEEP LATENCY TEST: CPT | Mod: 26,,, | Performed by: PSYCHIATRY & NEUROLOGY

## 2021-02-08 PROCEDURE — 95810 PR POLYSOMNOGRAPHY, 4 OR MORE: ICD-10-PCS | Mod: 26,,, | Performed by: PSYCHIATRY & NEUROLOGY

## 2021-02-08 PROCEDURE — 95810 POLYSOM 6/> YRS 4/> PARAM: CPT

## 2021-02-08 PROCEDURE — 95805 MULTIPLE SLEEP LATENCY TEST: CPT

## 2021-02-08 PROCEDURE — 95810 POLYSOM 6/> YRS 4/> PARAM: CPT | Mod: 26,,, | Performed by: PSYCHIATRY & NEUROLOGY

## 2021-02-09 ENCOUNTER — CLINICAL SUPPORT (OUTPATIENT)
Dept: REHABILITATION | Facility: HOSPITAL | Age: 31
End: 2021-02-09
Payer: COMMERCIAL

## 2021-02-09 DIAGNOSIS — M79.641 BILATERAL HAND PAIN: ICD-10-CM

## 2021-02-09 DIAGNOSIS — R29.898 DECREASED PINCH STRENGTH: ICD-10-CM

## 2021-02-09 DIAGNOSIS — R29.898 DECREASED GRIP STRENGTH: ICD-10-CM

## 2021-02-09 DIAGNOSIS — M79.642 BILATERAL HAND PAIN: ICD-10-CM

## 2021-02-09 LAB
AMPHET+METHAMPHET UR QL: NEGATIVE
BARBITURATES UR QL SCN>200 NG/ML: NEGATIVE
BENZODIAZ UR QL SCN>200 NG/ML: NEGATIVE
BZE UR QL SCN: NEGATIVE
CANNABINOIDS UR QL SCN: NEGATIVE
CREAT UR-MCNC: 20.1 MG/DL (ref 15–325)
ETHANOL UR-MCNC: <10 MG/DL
METHADONE UR QL SCN>300 NG/ML: NEGATIVE
OPIATES UR QL SCN: NEGATIVE
PCP UR QL SCN>25 NG/ML: NEGATIVE
TOXICOLOGY INFORMATION: NORMAL

## 2021-02-09 PROCEDURE — 97165 OT EVAL LOW COMPLEX 30 MIN: CPT

## 2021-02-09 PROCEDURE — 80307 DRUG TEST PRSMV CHEM ANLYZR: CPT

## 2021-02-09 PROCEDURE — 97110 THERAPEUTIC EXERCISES: CPT

## 2021-02-10 ENCOUNTER — PATIENT MESSAGE (OUTPATIENT)
Dept: NEUROLOGY | Facility: CLINIC | Age: 31
End: 2021-02-10

## 2021-02-17 ENCOUNTER — PATIENT MESSAGE (OUTPATIENT)
Dept: NEUROLOGY | Facility: CLINIC | Age: 31
End: 2021-02-17

## 2021-02-17 ENCOUNTER — CLINICAL SUPPORT (OUTPATIENT)
Dept: REHABILITATION | Facility: HOSPITAL | Age: 31
End: 2021-02-17
Payer: COMMERCIAL

## 2021-02-17 DIAGNOSIS — R29.898 DECREASED PINCH STRENGTH: ICD-10-CM

## 2021-02-17 DIAGNOSIS — M79.642 BILATERAL HAND PAIN: ICD-10-CM

## 2021-02-17 DIAGNOSIS — M54.2 CERVICAL PAIN: ICD-10-CM

## 2021-02-17 DIAGNOSIS — M54.2 NECK PAIN: ICD-10-CM

## 2021-02-17 DIAGNOSIS — M79.641 BILATERAL HAND PAIN: ICD-10-CM

## 2021-02-17 DIAGNOSIS — M25.511 RIGHT SHOULDER PAIN, UNSPECIFIED CHRONICITY: ICD-10-CM

## 2021-02-17 DIAGNOSIS — R29.898 DECREASED GRIP STRENGTH: ICD-10-CM

## 2021-02-17 DIAGNOSIS — M54.2 CERVICAL PAIN: Primary | ICD-10-CM

## 2021-02-17 PROCEDURE — 97140 MANUAL THERAPY 1/> REGIONS: CPT

## 2021-02-17 PROCEDURE — 97110 THERAPEUTIC EXERCISES: CPT

## 2021-02-24 ENCOUNTER — TELEPHONE (OUTPATIENT)
Dept: SLEEP MEDICINE | Facility: CLINIC | Age: 31
End: 2021-02-24

## 2021-02-24 ENCOUNTER — CLINICAL SUPPORT (OUTPATIENT)
Dept: REHABILITATION | Facility: HOSPITAL | Age: 31
End: 2021-02-24
Payer: COMMERCIAL

## 2021-02-24 DIAGNOSIS — M25.511 RIGHT SHOULDER PAIN, UNSPECIFIED CHRONICITY: ICD-10-CM

## 2021-02-24 DIAGNOSIS — M79.642 BILATERAL HAND PAIN: ICD-10-CM

## 2021-02-24 DIAGNOSIS — M54.2 CERVICAL PAIN: ICD-10-CM

## 2021-02-24 DIAGNOSIS — R29.898 DECREASED GRIP STRENGTH: ICD-10-CM

## 2021-02-24 DIAGNOSIS — R29.898 DECREASED PINCH STRENGTH: ICD-10-CM

## 2021-02-24 DIAGNOSIS — M79.641 BILATERAL HAND PAIN: ICD-10-CM

## 2021-02-24 PROCEDURE — 97140 MANUAL THERAPY 1/> REGIONS: CPT

## 2021-02-24 PROCEDURE — 97110 THERAPEUTIC EXERCISES: CPT

## 2021-02-24 PROCEDURE — 97022 WHIRLPOOL THERAPY: CPT

## 2021-02-25 ENCOUNTER — PATIENT MESSAGE (OUTPATIENT)
Dept: REHABILITATION | Facility: HOSPITAL | Age: 31
End: 2021-02-25

## 2021-02-25 ENCOUNTER — PATIENT MESSAGE (OUTPATIENT)
Dept: SLEEP MEDICINE | Facility: CLINIC | Age: 31
End: 2021-02-25

## 2021-02-25 ENCOUNTER — TELEPHONE (OUTPATIENT)
Dept: SLEEP MEDICINE | Facility: CLINIC | Age: 31
End: 2021-02-25

## 2021-03-01 ENCOUNTER — OFFICE VISIT (OUTPATIENT)
Dept: SLEEP MEDICINE | Facility: CLINIC | Age: 31
End: 2021-03-01
Payer: COMMERCIAL

## 2021-03-01 DIAGNOSIS — G47.26 SHIFT WORK SLEEP DISORDER: ICD-10-CM

## 2021-03-01 DIAGNOSIS — G47.11 IDIOPATHIC HYPERSOMNIA: ICD-10-CM

## 2021-03-01 DIAGNOSIS — G47.19 EXCESSIVE DAYTIME SLEEPINESS: Primary | ICD-10-CM

## 2021-03-01 PROCEDURE — 99214 PR OFFICE/OUTPT VISIT, EST, LEVL IV, 30-39 MIN: ICD-10-PCS | Mod: 95,,, | Performed by: PSYCHIATRY & NEUROLOGY

## 2021-03-01 PROCEDURE — 99214 OFFICE O/P EST MOD 30 MIN: CPT | Mod: 95,,, | Performed by: PSYCHIATRY & NEUROLOGY

## 2021-03-01 RX ORDER — MODAFINIL 100 MG/1
TABLET ORAL
Qty: 60 TABLET | Refills: 5 | Status: SHIPPED | OUTPATIENT
Start: 2021-03-01 | End: 2023-10-05

## 2021-03-16 ENCOUNTER — PATIENT MESSAGE (OUTPATIENT)
Dept: NEUROLOGY | Facility: CLINIC | Age: 31
End: 2021-03-16

## 2021-03-17 ENCOUNTER — CLINICAL SUPPORT (OUTPATIENT)
Dept: REHABILITATION | Facility: HOSPITAL | Age: 31
End: 2021-03-17
Payer: COMMERCIAL

## 2021-03-17 DIAGNOSIS — R29.898 DECREASED PINCH STRENGTH: ICD-10-CM

## 2021-03-17 DIAGNOSIS — R29.898 DECREASED GRIP STRENGTH: ICD-10-CM

## 2021-03-17 DIAGNOSIS — M79.641 BILATERAL HAND PAIN: ICD-10-CM

## 2021-03-17 DIAGNOSIS — M79.642 BILATERAL HAND PAIN: ICD-10-CM

## 2021-03-17 PROCEDURE — 97110 THERAPEUTIC EXERCISES: CPT

## 2021-03-17 RX ORDER — TRIHEXYPHENIDYL HYDROCHLORIDE 2 MG/1
1 TABLET ORAL 2 TIMES DAILY WITH MEALS
Qty: 30 TABLET | Refills: 11 | Status: SHIPPED | OUTPATIENT
Start: 2021-03-17 | End: 2023-10-05

## 2021-03-22 ENCOUNTER — CLINICAL SUPPORT (OUTPATIENT)
Dept: REHABILITATION | Facility: HOSPITAL | Age: 31
End: 2021-03-22
Payer: COMMERCIAL

## 2021-03-22 DIAGNOSIS — M54.2 CERVICAL PAIN: ICD-10-CM

## 2021-03-22 DIAGNOSIS — M25.511 RIGHT SHOULDER PAIN, UNSPECIFIED CHRONICITY: ICD-10-CM

## 2021-03-22 PROCEDURE — 97140 MANUAL THERAPY 1/> REGIONS: CPT

## 2021-03-22 PROCEDURE — 97110 THERAPEUTIC EXERCISES: CPT

## 2021-03-31 ENCOUNTER — CLINICAL SUPPORT (OUTPATIENT)
Dept: REHABILITATION | Facility: HOSPITAL | Age: 31
End: 2021-03-31
Payer: COMMERCIAL

## 2021-03-31 DIAGNOSIS — M54.2 CERVICAL PAIN: ICD-10-CM

## 2021-03-31 DIAGNOSIS — M79.642 BILATERAL HAND PAIN: ICD-10-CM

## 2021-03-31 DIAGNOSIS — R29.898 DECREASED PINCH STRENGTH: ICD-10-CM

## 2021-03-31 DIAGNOSIS — M79.641 BILATERAL HAND PAIN: ICD-10-CM

## 2021-03-31 DIAGNOSIS — R29.898 DECREASED GRIP STRENGTH: ICD-10-CM

## 2021-03-31 DIAGNOSIS — M25.511 RIGHT SHOULDER PAIN, UNSPECIFIED CHRONICITY: ICD-10-CM

## 2021-03-31 PROCEDURE — 97110 THERAPEUTIC EXERCISES: CPT

## 2021-03-31 PROCEDURE — 97022 WHIRLPOOL THERAPY: CPT

## 2021-03-31 PROCEDURE — 97140 MANUAL THERAPY 1/> REGIONS: CPT

## 2021-04-06 ENCOUNTER — CLINICAL SUPPORT (OUTPATIENT)
Dept: REHABILITATION | Facility: HOSPITAL | Age: 31
End: 2021-04-06
Payer: COMMERCIAL

## 2021-04-06 DIAGNOSIS — M54.2 CERVICAL PAIN: ICD-10-CM

## 2021-04-06 DIAGNOSIS — M25.511 RIGHT SHOULDER PAIN, UNSPECIFIED CHRONICITY: ICD-10-CM

## 2021-04-06 PROCEDURE — 97140 MANUAL THERAPY 1/> REGIONS: CPT

## 2021-04-06 PROCEDURE — 97110 THERAPEUTIC EXERCISES: CPT

## 2021-04-13 ENCOUNTER — CLINICAL SUPPORT (OUTPATIENT)
Dept: REHABILITATION | Facility: HOSPITAL | Age: 31
End: 2021-04-13
Payer: COMMERCIAL

## 2021-04-13 DIAGNOSIS — M54.2 CERVICAL PAIN: ICD-10-CM

## 2021-04-13 DIAGNOSIS — M25.511 RIGHT SHOULDER PAIN, UNSPECIFIED CHRONICITY: ICD-10-CM

## 2021-04-13 PROCEDURE — 97110 THERAPEUTIC EXERCISES: CPT

## 2021-04-13 PROCEDURE — 97140 MANUAL THERAPY 1/> REGIONS: CPT

## 2021-04-19 ENCOUNTER — PATIENT MESSAGE (OUTPATIENT)
Dept: NEUROLOGY | Facility: CLINIC | Age: 31
End: 2021-04-19

## 2021-04-19 ENCOUNTER — OFFICE VISIT (OUTPATIENT)
Dept: NEUROLOGY | Facility: CLINIC | Age: 31
End: 2021-04-19
Payer: COMMERCIAL

## 2021-04-19 DIAGNOSIS — G24.3 CERVICAL DYSTONIA: ICD-10-CM

## 2021-04-19 DIAGNOSIS — M54.2 CERVICAL PAIN: Primary | ICD-10-CM

## 2021-04-19 PROCEDURE — 99214 OFFICE O/P EST MOD 30 MIN: CPT | Mod: 95,,, | Performed by: PSYCHIATRY & NEUROLOGY

## 2021-04-19 PROCEDURE — 99214 PR OFFICE/OUTPT VISIT, EST, LEVL IV, 30-39 MIN: ICD-10-PCS | Mod: 95,,, | Performed by: PSYCHIATRY & NEUROLOGY

## 2021-04-19 RX ORDER — BACLOFEN 10 MG/1
5 TABLET ORAL 3 TIMES DAILY PRN
Qty: 90 TABLET | Refills: 11 | Status: SHIPPED | OUTPATIENT
Start: 2021-04-19 | End: 2023-10-05

## 2021-04-20 DIAGNOSIS — G24.3 CERVICAL DYSTONIA: Primary | ICD-10-CM

## 2021-04-21 ENCOUNTER — CLINICAL SUPPORT (OUTPATIENT)
Dept: REHABILITATION | Facility: HOSPITAL | Age: 31
End: 2021-04-21
Payer: COMMERCIAL

## 2021-04-21 DIAGNOSIS — M54.2 CERVICAL PAIN: ICD-10-CM

## 2021-04-21 DIAGNOSIS — M25.511 RIGHT SHOULDER PAIN, UNSPECIFIED CHRONICITY: ICD-10-CM

## 2021-04-21 PROCEDURE — 97110 THERAPEUTIC EXERCISES: CPT

## 2021-04-21 PROCEDURE — 97140 MANUAL THERAPY 1/> REGIONS: CPT

## 2021-04-27 ENCOUNTER — CLINICAL SUPPORT (OUTPATIENT)
Dept: REHABILITATION | Facility: HOSPITAL | Age: 31
End: 2021-04-27
Payer: COMMERCIAL

## 2021-04-27 DIAGNOSIS — M54.2 CERVICAL PAIN: ICD-10-CM

## 2021-04-27 DIAGNOSIS — M25.511 RIGHT SHOULDER PAIN, UNSPECIFIED CHRONICITY: ICD-10-CM

## 2021-04-27 PROCEDURE — 97140 MANUAL THERAPY 1/> REGIONS: CPT

## 2021-04-27 PROCEDURE — 97110 THERAPEUTIC EXERCISES: CPT

## 2021-05-04 ENCOUNTER — CLINICAL SUPPORT (OUTPATIENT)
Dept: REHABILITATION | Facility: HOSPITAL | Age: 31
End: 2021-05-04
Payer: COMMERCIAL

## 2021-05-04 DIAGNOSIS — M25.511 RIGHT SHOULDER PAIN, UNSPECIFIED CHRONICITY: ICD-10-CM

## 2021-05-04 DIAGNOSIS — M54.2 CERVICAL PAIN: ICD-10-CM

## 2021-05-04 PROCEDURE — 97110 THERAPEUTIC EXERCISES: CPT

## 2021-05-04 PROCEDURE — 97140 MANUAL THERAPY 1/> REGIONS: CPT

## 2021-05-11 ENCOUNTER — CLINICAL SUPPORT (OUTPATIENT)
Dept: REHABILITATION | Facility: HOSPITAL | Age: 31
End: 2021-05-11
Payer: COMMERCIAL

## 2021-05-11 DIAGNOSIS — M25.511 RIGHT SHOULDER PAIN, UNSPECIFIED CHRONICITY: ICD-10-CM

## 2021-05-11 DIAGNOSIS — M54.2 CERVICAL PAIN: ICD-10-CM

## 2021-05-11 PROCEDURE — 97110 THERAPEUTIC EXERCISES: CPT

## 2021-05-11 PROCEDURE — 97140 MANUAL THERAPY 1/> REGIONS: CPT

## 2021-05-20 ENCOUNTER — CLINICAL SUPPORT (OUTPATIENT)
Dept: REHABILITATION | Facility: HOSPITAL | Age: 31
End: 2021-05-20
Payer: COMMERCIAL

## 2021-05-20 DIAGNOSIS — M25.511 RIGHT SHOULDER PAIN, UNSPECIFIED CHRONICITY: ICD-10-CM

## 2021-05-20 DIAGNOSIS — M54.2 CERVICAL PAIN: ICD-10-CM

## 2021-05-20 PROCEDURE — 97110 THERAPEUTIC EXERCISES: CPT

## 2021-05-20 PROCEDURE — 97140 MANUAL THERAPY 1/> REGIONS: CPT

## 2021-05-23 ENCOUNTER — PATIENT MESSAGE (OUTPATIENT)
Dept: NEUROLOGY | Facility: CLINIC | Age: 31
End: 2021-05-23

## 2021-05-24 ENCOUNTER — PATIENT MESSAGE (OUTPATIENT)
Dept: REHABILITATION | Facility: HOSPITAL | Age: 31
End: 2021-05-24

## 2021-05-27 ENCOUNTER — CLINICAL SUPPORT (OUTPATIENT)
Dept: REHABILITATION | Facility: HOSPITAL | Age: 31
End: 2021-05-27
Payer: COMMERCIAL

## 2021-05-27 DIAGNOSIS — M25.511 RIGHT SHOULDER PAIN, UNSPECIFIED CHRONICITY: ICD-10-CM

## 2021-05-27 DIAGNOSIS — M54.2 CERVICAL PAIN: ICD-10-CM

## 2021-05-27 PROCEDURE — 97110 THERAPEUTIC EXERCISES: CPT

## 2021-05-27 PROCEDURE — 97140 MANUAL THERAPY 1/> REGIONS: CPT

## 2021-06-03 ENCOUNTER — PATIENT MESSAGE (OUTPATIENT)
Dept: NEUROLOGY | Facility: CLINIC | Age: 31
End: 2021-06-03

## 2021-06-03 ENCOUNTER — CLINICAL SUPPORT (OUTPATIENT)
Dept: REHABILITATION | Facility: HOSPITAL | Age: 31
End: 2021-06-03
Payer: COMMERCIAL

## 2021-06-03 DIAGNOSIS — M25.511 RIGHT SHOULDER PAIN, UNSPECIFIED CHRONICITY: ICD-10-CM

## 2021-06-03 DIAGNOSIS — M54.2 CERVICAL PAIN: ICD-10-CM

## 2021-06-03 PROCEDURE — 97140 MANUAL THERAPY 1/> REGIONS: CPT

## 2021-06-03 PROCEDURE — 97110 THERAPEUTIC EXERCISES: CPT

## 2021-06-10 ENCOUNTER — CLINICAL SUPPORT (OUTPATIENT)
Dept: REHABILITATION | Facility: HOSPITAL | Age: 31
End: 2021-06-10
Payer: COMMERCIAL

## 2021-06-10 DIAGNOSIS — M54.2 CERVICAL PAIN: ICD-10-CM

## 2021-06-10 DIAGNOSIS — M25.511 RIGHT SHOULDER PAIN, UNSPECIFIED CHRONICITY: ICD-10-CM

## 2021-06-10 PROCEDURE — 97110 THERAPEUTIC EXERCISES: CPT

## 2021-06-10 PROCEDURE — 97140 MANUAL THERAPY 1/> REGIONS: CPT

## 2021-06-17 ENCOUNTER — CLINICAL SUPPORT (OUTPATIENT)
Dept: REHABILITATION | Facility: HOSPITAL | Age: 31
End: 2021-06-17
Payer: COMMERCIAL

## 2021-06-17 DIAGNOSIS — M25.511 ACUTE PAIN OF RIGHT SHOULDER: ICD-10-CM

## 2021-06-17 DIAGNOSIS — M54.2 CERVICAL PAIN: ICD-10-CM

## 2021-06-17 PROCEDURE — 97110 THERAPEUTIC EXERCISES: CPT

## 2021-06-17 PROCEDURE — 97140 MANUAL THERAPY 1/> REGIONS: CPT

## 2021-06-23 ENCOUNTER — PATIENT MESSAGE (OUTPATIENT)
Dept: NEUROLOGY | Facility: CLINIC | Age: 31
End: 2021-06-23

## 2021-06-23 DIAGNOSIS — G24.3 CERVICAL DYSTONIA: Primary | ICD-10-CM

## 2021-06-24 ENCOUNTER — CLINICAL SUPPORT (OUTPATIENT)
Dept: REHABILITATION | Facility: HOSPITAL | Age: 31
End: 2021-06-24
Payer: COMMERCIAL

## 2021-06-24 DIAGNOSIS — M25.511 RIGHT SHOULDER PAIN, UNSPECIFIED CHRONICITY: ICD-10-CM

## 2021-06-24 DIAGNOSIS — M54.2 CERVICAL PAIN: ICD-10-CM

## 2021-06-24 PROCEDURE — 97110 THERAPEUTIC EXERCISES: CPT

## 2021-06-24 PROCEDURE — 97140 MANUAL THERAPY 1/> REGIONS: CPT

## 2021-06-29 ENCOUNTER — PATIENT MESSAGE (OUTPATIENT)
Dept: NEUROLOGY | Facility: CLINIC | Age: 31
End: 2021-06-29

## 2021-06-29 RX ORDER — CARBIDOPA AND LEVODOPA 25; 100 MG/1; MG/1
0.5 TABLET ORAL 2 TIMES DAILY
Qty: 30 TABLET | Refills: 11 | Status: SHIPPED | OUTPATIENT
Start: 2021-06-29 | End: 2023-10-05

## 2021-07-02 ENCOUNTER — CLINICAL SUPPORT (OUTPATIENT)
Dept: REHABILITATION | Facility: HOSPITAL | Age: 31
End: 2021-07-02
Payer: COMMERCIAL

## 2021-07-02 DIAGNOSIS — M54.2 CERVICAL PAIN: ICD-10-CM

## 2021-07-02 DIAGNOSIS — M25.511 RIGHT SHOULDER PAIN, UNSPECIFIED CHRONICITY: ICD-10-CM

## 2021-07-02 PROCEDURE — 97110 THERAPEUTIC EXERCISES: CPT

## 2021-07-02 PROCEDURE — 97140 MANUAL THERAPY 1/> REGIONS: CPT

## 2021-09-04 ENCOUNTER — PATIENT MESSAGE (OUTPATIENT)
Dept: NEUROLOGY | Facility: CLINIC | Age: 31
End: 2021-09-04

## 2021-10-14 ENCOUNTER — IMMUNIZATION (OUTPATIENT)
Dept: INTERNAL MEDICINE | Facility: CLINIC | Age: 31
End: 2021-10-14
Payer: COMMERCIAL

## 2021-10-14 DIAGNOSIS — Z23 NEED FOR VACCINATION: Primary | ICD-10-CM

## 2021-10-14 PROCEDURE — 91300 COVID-19, MRNA, LNP-S, PF, 30 MCG/0.3 ML DOSE VACCINE: CPT | Mod: PBBFAC

## 2021-10-14 PROCEDURE — 0003A COVID-19, MRNA, LNP-S, PF, 30 MCG/0.3 ML DOSE VACCINE: CPT | Mod: PBBFAC | Performed by: INTERNAL MEDICINE

## 2021-10-25 ENCOUNTER — PATIENT MESSAGE (OUTPATIENT)
Dept: SLEEP MEDICINE | Facility: CLINIC | Age: 31
End: 2021-10-25
Payer: COMMERCIAL

## 2021-12-15 ENCOUNTER — PATIENT MESSAGE (OUTPATIENT)
Dept: SLEEP MEDICINE | Facility: CLINIC | Age: 31
End: 2021-12-15
Payer: COMMERCIAL

## 2022-04-26 ENCOUNTER — PATIENT MESSAGE (OUTPATIENT)
Dept: SLEEP MEDICINE | Facility: CLINIC | Age: 32
End: 2022-04-26
Payer: COMMERCIAL

## 2022-06-13 ENCOUNTER — PATIENT MESSAGE (OUTPATIENT)
Dept: DERMATOLOGY | Facility: CLINIC | Age: 32
End: 2022-06-13
Payer: COMMERCIAL

## 2022-06-17 ENCOUNTER — OFFICE VISIT (OUTPATIENT)
Dept: DERMATOLOGY | Facility: CLINIC | Age: 32
End: 2022-06-17
Payer: COMMERCIAL

## 2022-06-17 DIAGNOSIS — L90.5 SCAR: ICD-10-CM

## 2022-06-17 DIAGNOSIS — L81.4 LENTIGO: ICD-10-CM

## 2022-06-17 DIAGNOSIS — Z86.018 HISTORY OF DYSPLASTIC NEVUS: ICD-10-CM

## 2022-06-17 DIAGNOSIS — D18.01 CHERRY ANGIOMA: ICD-10-CM

## 2022-06-17 DIAGNOSIS — D48.5 NEOPLASM OF UNCERTAIN BEHAVIOR OF SKIN: ICD-10-CM

## 2022-06-17 DIAGNOSIS — L21.9 SEBORRHEIC DERMATITIS, UNSPECIFIED: Primary | ICD-10-CM

## 2022-06-17 DIAGNOSIS — L30.9 HAND ECZEMA: ICD-10-CM

## 2022-06-17 PROCEDURE — 88342 IMHCHEM/IMCYTCHM 1ST ANTB: CPT | Performed by: PATHOLOGY

## 2022-06-17 PROCEDURE — 99214 PR OFFICE/OUTPT VISIT, EST, LEVL IV, 30-39 MIN: ICD-10-PCS | Mod: 25,S$GLB,, | Performed by: DERMATOLOGY

## 2022-06-17 PROCEDURE — 88305 TISSUE EXAM BY PATHOLOGIST: CPT | Mod: 26,,, | Performed by: PATHOLOGY

## 2022-06-17 PROCEDURE — 88342 CHG IMMUNOCYTOCHEMISTRY: ICD-10-PCS | Mod: 26,,, | Performed by: PATHOLOGY

## 2022-06-17 PROCEDURE — 1160F PR REVIEW ALL MEDS BY PRESCRIBER/CLIN PHARMACIST DOCUMENTED: ICD-10-PCS | Mod: CPTII,S$GLB,, | Performed by: DERMATOLOGY

## 2022-06-17 PROCEDURE — 88305 TISSUE EXAM BY PATHOLOGIST: CPT | Performed by: PATHOLOGY

## 2022-06-17 PROCEDURE — 11102 PR TANGENTIAL BIOPSY, SKIN, SINGLE LESION: ICD-10-PCS | Mod: S$GLB,,, | Performed by: DERMATOLOGY

## 2022-06-17 PROCEDURE — 99214 OFFICE O/P EST MOD 30 MIN: CPT | Mod: 25,S$GLB,, | Performed by: DERMATOLOGY

## 2022-06-17 PROCEDURE — 1159F PR MEDICATION LIST DOCUMENTED IN MEDICAL RECORD: ICD-10-PCS | Mod: CPTII,S$GLB,, | Performed by: DERMATOLOGY

## 2022-06-17 PROCEDURE — 11102 TANGNTL BX SKIN SINGLE LES: CPT | Mod: S$GLB,,, | Performed by: DERMATOLOGY

## 2022-06-17 PROCEDURE — 99999 PR PBB SHADOW E&M-EST. PATIENT-LVL III: CPT | Mod: PBBFAC,,, | Performed by: DERMATOLOGY

## 2022-06-17 PROCEDURE — 99999 PR PBB SHADOW E&M-EST. PATIENT-LVL III: ICD-10-PCS | Mod: PBBFAC,,, | Performed by: DERMATOLOGY

## 2022-06-17 PROCEDURE — 88342 IMHCHEM/IMCYTCHM 1ST ANTB: CPT | Mod: 26,,, | Performed by: PATHOLOGY

## 2022-06-17 PROCEDURE — 1160F RVW MEDS BY RX/DR IN RCRD: CPT | Mod: CPTII,S$GLB,, | Performed by: DERMATOLOGY

## 2022-06-17 PROCEDURE — 88305 TISSUE EXAM BY PATHOLOGIST: ICD-10-PCS | Mod: 26,,, | Performed by: PATHOLOGY

## 2022-06-17 PROCEDURE — 1159F MED LIST DOCD IN RCRD: CPT | Mod: CPTII,S$GLB,, | Performed by: DERMATOLOGY

## 2022-06-17 RX ORDER — KETOCONAZOLE 20 MG/G
CREAM TOPICAL
Qty: 45 G | Refills: 3 | Status: SHIPPED | OUTPATIENT
Start: 2022-06-17 | End: 2023-11-09

## 2022-06-17 RX ORDER — TRIAMCINOLONE ACETONIDE 1 MG/G
CREAM TOPICAL
Qty: 30 G | Refills: 3 | Status: SHIPPED | OUTPATIENT
Start: 2022-06-17 | End: 2024-03-15

## 2022-06-17 NOTE — PROGRESS NOTES
"  Subjective:       Patient ID:  Angela Lyle is a 32 y.o. female who presents for   Chief Complaint   Patient presents with    Skin Check    Lesion    Dry Skin     Patient is here today for a "mole" check.   Pt has a history of  moderate sun exposure in the past.   Pt recalls several blistering sunburns in the past- no  Pt has history of tanning bed use- no  Pt has  had moles removed in the past- yes, benign per pt  Pt has history of melanoma in first degree relatives-  No    Pt c/o white bump on chin x 2-3 years. No bleeding, pain or prev tx.   Pt c/o dry and itch skin around nose and right ear x 5 years. Tx with ketoconazole cresam. Tx helps but rash recurs.  C/o intermittent rash on hands for over a year. Gets itchy bumps. 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  No current tx.       Review of Systems   Skin: Positive for rash, dry skin and activity-related sunscreen use. Negative for daily sunscreen use, tendency to form keloidal scars and recent sunburn.   Hematologic/Lymphatic: Does not bruise/bleed easily.        Objective:    Physical Exam   Constitutional: She appears well-developed and well-nourished. No distress.   HENT:   Mouth/Throat: Lips normal.    Eyes: Lids are normal.  No conjunctival no injection.   Neurological: She is alert and oriented to person, place, and time. She is not disoriented.   Psychiatric: She has a normal mood and affect.   Skin:   Areas Examined (abnormalities noted in diagram):   Scalp / Hair Palpated and Inspected  Head / Face Inspection Performed  Neck Inspection Performed  Chest / Axilla Inspection Performed  Abdomen Inspection Performed  Genitals / Buttocks / Groin Inspection Performed  Back Inspection Performed  RUE Inspected  LUE Inspection Performed  RLE Inspected  LLE Inspection Performed  Nails and Digits Inspection Performed                                       Diagram Legend     Erythematous scaling macule/papule c/w actinic keratosis "       Vascular papule c/w angioma      Pigmented verrucoid papule/plaque c/w seborrheic keratosis      Yellow umbilicated papule c/w sebaceous hyperplasia      Irregularly shaped tan macule c/w lentigo     1-2 mm smooth white papules consistent with Milia      Movable subcutaneous cyst with punctum c/w epidermal inclusion cyst      Subcutaneous movable cyst c/w pilar cyst      Firm pink to brown papule c/w dermatofibroma      Pedunculated fleshy papule(s) c/w skin tag(s)      Evenly pigmented macule c/w junctional nevus     Mildly variegated pigmented, slightly irregular-bordered macule c/w mildly atypical nevus      Flesh colored to evenly pigmented papule c/w intradermal nevus       Pink pearly papule/plaque c/w basal cell carcinoma      Erythematous hyperkeratotic cursted plaque c/w SCC      Surgical scar with no sign of skin cancer recurrence      Open and closed comedones      Inflammatory papules and pustules      Verrucoid papule consistent consistent with wart     Erythematous eczematous patches and plaques     Dystrophic onycholytic nail with subungual debris c/w onychomycosis     Umbilicated papule    Erythematous-base heme-crusted tan verrucoid plaque consistent with inflamed seborrheic keratosis     Erythematous Silvery Scaling Plaque c/w Psoriasis     See annotation      Assessment / Plan:      Pathology Orders:     Normal Orders This Visit    Specimen to Pathology, Dermatology     Comments:    Number of Specimens:->1  ------------------------->-------------------------  Spec 1 Procedure:->Biopsy  Spec 1 Clinical Impression:->r/o atypical nevus  Spec 1 Source:->right lower abdomen    Questions:    Procedure Type: Dermatology and skin neoplasms    Number of Specimens: 1    ------------------------: -------------------------    Spec 1 Procedure: Biopsy    Spec 1 Clinical Impression: r/o atypical nevus    Spec 1 Source: right lower abdomen    Release to patient:         Seborrheic dermatitis,  unspecified  -     ketoconazole (NIZORAL) 2 % cream; aaa qd- bid prn flare to face  Dispense: 45 g; Refill: 3  vanicream z bar for face    Neoplasm of uncertain behavior of skin  Shave biopsy procedure note:    Shave biopsy performed after verbal consent including risk of infection, scar, recurrence, need for additional treatment of site. Area prepped with alcohol, anesthetized with approximately 1.0cc of 1% lidocaine with epinephrine. Lesional tissue shaved with razor blade. Hemostasis achieved with application of aluminum chloride followed by hyfrecation. No complications. Dressing applied. Wound care explained.      -     Specimen to Pathology, Dermatology    Lentigo  This is a benign hyperpigmented sun induced lesion. Recommend daily sun protection/avoidance and use of at least SPF 30, broad spectrum sunscreen (OTC drug) will reduce the number of new lesions. Treatment of these benign lesions are considered cosmetic.    The nature of sun-induced photo-aging and skin cancers is discussed.  Sun avoidance, protective clothing, and the use of 30-SPF sunscreens is advised. Observe closely for skin damage/changes, and call if such occurs.    Cherry angioma  These are benign vascular lesions that are inherited.  Treatment is not necessary.    History of dysplastic nevus  Scar  Area of previous atypical nevus/nevi examined. Site well healed with no signs of recurrence.    Total body skin examination performed today including at least 12 points as noted in physical examination. Lesion/lesions suspicious for atypical nevi noted.    Hand eczema  Discussed with patient the importance of frequent hand moisturization q hour and q hand washing.  Recommended La Roche Posay Lipikar cream or  Cerave Cream or Aveeno Baby Eczema cream or balm.  Apply prescribed topical steroid followed by vaseline and white cotton glove or warm damp towel qhs prn severe flare.      -     triamcinolone acetonide 0.1% (KENALOG) 0.1 % cream; Aaa qd-  bid prn flare to hands. Not more than 2 weeks straight in the same location. Avoid use on face and groin  Dispense: 30 g; Refill: 3             Follow up in about 1 year (around 6/17/2023) for prn bx report.

## 2022-06-17 NOTE — PATIENT INSTRUCTIONS
Discussed with patient the importance of frequent hand moisturization q hour and q hand washing.  Recommended La Roche Posay Lipikar cream or  Cerave Cream or Aveeno Baby Eczema cream or balm.  Apply prescribed topical steroid followed by vaseline and white cotton glove or warm damp towel qhs prn severe flare.         Shave Biopsy Wound Care    Your doctor has performed a shave biopsy today.  A band aid and vaseline ointment has been placed over the site.  This should remain in place for NO LONGER THAN 48 hours.  It is fine to remove the bandaid after 24 hours, if the area is no longer bleeding. It is recommended that you keep the area dry (do not wet)) for the first 24 hours.  After 24 hours, wash the area with warm soap and water and apply Vaseline jelly.  Many patients prefer to use Neosporin or Bacitracin ointment.  This is acceptable; however, know that you can develop an allergy to this medication even if you have used it safely for years.  It is important to keep the area moist.  Letting it dry out and get air slows healing time, and will worsen the scar.        If you notice increasing redness, tenderness, pain, or yellow drainage at the biopsy site, please notify your doctor.  These are signs of an infection.    If your biopsy site is bleeding, apply firm pressure for 15 minutes straight.  Repeat for another 15 minutes, if it is still bleeding.   If the surgical site continues to bleed, then please contact your doctor.      For [employer] users:   You will receive your biopsy results in [facility] as soon as they are available. Please be assured that your physician/provider will review your results and will then determine what further treatment, evaluation, or planning is required. You should be contacted by your physician's/provider's office within 5 business days of receiving your results; If not, please reach out to directly. This is one more way [facility] is putting you first.     [address],  [place] [address]/ [phone] FAX/ [email or web address]      Shave Biopsy Wound Care    Your doctor has performed a shave biopsy today.  A band aid and vaseline ointment has been placed over the site.  This should remain in place for NO LONGER THAN 48 hours.  It is fine to remove the bandaid after 24 hours, if the area is no longer bleeding. It is recommended that you keep the area dry (do not wet)) for the first 24 hours.  After 24 hours, wash the area with warm soap and water and apply Vaseline jelly.  Many patients prefer to use Neosporin or Bacitracin ointment.  This is acceptable; however, know that you can develop an allergy to this medication even if you have used it safely for years.  It is important to keep the area moist.  Letting it dry out and get air slows healing time, and will worsen the scar.        If you notice increasing redness, tenderness, pain, or yellow drainage at the biopsy site, please notify your doctor.  These are signs of an infection.    If your biopsy site is bleeding, apply firm pressure for 15 minutes straight.  Repeat for another 15 minutes, if it is still bleeding.   If the surgical site continues to bleed, then please contact your doctor.      For [facility] users:   You will receive your biopsy results in [facility] as soon as they are available. Please be assured that your physician/provider will review your results and will then determine what further treatment, evaluation, or planning is required. You should be contacted by your physician's/provider's office within 5 business days of receiving your results; If not, please reach out to directly. This is one more way [facility] is putting you first.     [facility] [facility] [place] [address]/ [phone] FAX/ [email or web address]

## 2022-06-20 ENCOUNTER — PATIENT MESSAGE (OUTPATIENT)
Dept: OPTOMETRY | Facility: CLINIC | Age: 32
End: 2022-06-20
Payer: COMMERCIAL

## 2022-06-22 LAB
FINAL PATHOLOGIC DIAGNOSIS: NORMAL
GROSS: NORMAL
Lab: NORMAL
MICROSCOPIC EXAM: NORMAL

## 2022-06-22 NOTE — PROGRESS NOTES
Pathology report and message sent to patient. Please set up a 12 month reminder. Thanks, Dr. Mahmood

## 2022-07-21 ENCOUNTER — OFFICE VISIT (OUTPATIENT)
Dept: OPTOMETRY | Facility: CLINIC | Age: 32
End: 2022-07-21
Payer: COMMERCIAL

## 2022-07-21 ENCOUNTER — PATIENT MESSAGE (OUTPATIENT)
Dept: OPTOMETRY | Facility: CLINIC | Age: 32
End: 2022-07-21

## 2022-07-21 DIAGNOSIS — H52.4 PRESBYOPIA: ICD-10-CM

## 2022-07-21 DIAGNOSIS — H52.13 MYOPIA, BILATERAL: Primary | ICD-10-CM

## 2022-07-21 DIAGNOSIS — Z04.9 DISEASE RULED OUT AFTER EXAMINATION: ICD-10-CM

## 2022-07-21 DIAGNOSIS — Z46.0 FITTING AND ADJUSTMENT OF SPECTACLES AND CONTACT LENSES: Primary | ICD-10-CM

## 2022-07-21 PROCEDURE — 92015 DETERMINE REFRACTIVE STATE: CPT | Mod: S$GLB,,, | Performed by: OPTOMETRIST

## 2022-07-21 PROCEDURE — 1159F MED LIST DOCD IN RCRD: CPT | Mod: CPTII,S$GLB,, | Performed by: OPTOMETRIST

## 2022-07-21 PROCEDURE — 92310 PR CONTACT LENS FITTING (NO CHANGE): ICD-10-PCS | Mod: CSM,,, | Performed by: OPTOMETRIST

## 2022-07-21 PROCEDURE — 1160F PR REVIEW ALL MEDS BY PRESCRIBER/CLIN PHARMACIST DOCUMENTED: ICD-10-PCS | Mod: CPTII,S$GLB,, | Performed by: OPTOMETRIST

## 2022-07-21 PROCEDURE — 1160F RVW MEDS BY RX/DR IN RCRD: CPT | Mod: CPTII,S$GLB,, | Performed by: OPTOMETRIST

## 2022-07-21 PROCEDURE — 99999 PR PBB SHADOW E&M-EST. PATIENT-LVL II: ICD-10-PCS | Mod: PBBFAC,,, | Performed by: OPTOMETRIST

## 2022-07-21 PROCEDURE — 92310 CONTACT LENS FITTING OU: CPT | Mod: CSM,,, | Performed by: OPTOMETRIST

## 2022-07-21 PROCEDURE — 92004 PR EYE EXAM, NEW PATIENT,COMPREHESV: ICD-10-PCS | Mod: S$GLB,,, | Performed by: OPTOMETRIST

## 2022-07-21 PROCEDURE — 99999 PR PBB SHADOW E&M-EST. PATIENT-LVL II: CPT | Mod: PBBFAC,,, | Performed by: OPTOMETRIST

## 2022-07-21 PROCEDURE — 92004 COMPRE OPH EXAM NEW PT 1/>: CPT | Mod: S$GLB,,, | Performed by: OPTOMETRIST

## 2022-07-21 PROCEDURE — 92015 PR REFRACTION: ICD-10-PCS | Mod: S$GLB,,, | Performed by: OPTOMETRIST

## 2022-07-21 PROCEDURE — 1159F PR MEDICATION LIST DOCUMENTED IN MEDICAL RECORD: ICD-10-PCS | Mod: CPTII,S$GLB,, | Performed by: OPTOMETRIST

## 2022-07-21 NOTE — PROGRESS NOTES
Contact lens exam done, see exam of same date for documentation.    "Chief Complaint   Patient presents with     Hypertension     Diabetes       Initial /78 (Cuff Size: Adult Large)   Pulse 68   Temp 97.8  F (36.6  C) (Tympanic)   Resp 18   Ht 1.803 m (5' 11\")   Wt (!) 167.4 kg (369 lb)   BMI 51.47 kg/m   Estimated body mass index is 51.47 kg/m  as calculated from the following:    Height as of this encounter: 1.803 m (5' 11\").    Weight as of this encounter: 167.4 kg (369 lb).    Patient presents to the clinic using No DME    Health Maintenance that is potentially due pending provider review:  Lab      n/a    Is there anyone who you would like to be able to receive your results? Not Applicable  If yes have patient fill out [facility]    "

## 2022-07-21 NOTE — PROGRESS NOTES
"HPI     Contact Lens Fit      Additional comments: Patient Angela Lyle is a 32 year old   female.              Comments     Pt here for annual CL fitting + routine eye exam. Pt states that she has   good vision in her current glasses. Pt states that she has trouble with   glare and light sensitivity with night driving since JOHN 1 year ago. Pt   states that she has not worn CL's "in a while" but states that she does   not need I&R for lenses. Patient denies diplopia, headaches,   flashes/floaters, and pain.    DLS: 03/24/2017 with Dr. Patricio LOPEZ: 1 year ago    Gtts: None    POHx:  1. Myopia, bilateral    FOHx: (+)lazy eye - maternal cousin          Last edited by Chiquis Arroyo on 7/21/2022  9:25 AM. (History)            Assessment /Plan     For exam results, see Encounter Report.    Myopia, bilateral  Presbyopia    Disease ruled out after examination      Rx specs     Dispensed new contact lens trials today              Remove nightly, replace daily              OK to order if happy                Good overall ocular health, monitor yearly     RTC 1 year                          "

## 2022-07-30 ENCOUNTER — PATIENT MESSAGE (OUTPATIENT)
Dept: OPTOMETRY | Facility: CLINIC | Age: 32
End: 2022-07-30
Payer: COMMERCIAL

## 2022-08-16 DIAGNOSIS — R50.9 FEVER, UNSPECIFIED FEVER CAUSE: Primary | ICD-10-CM

## 2022-08-16 LAB
CTP QC/QA: YES
SARS-COV-2 AG RESP QL IA.RAPID: NEGATIVE

## 2022-09-12 ENCOUNTER — TELEPHONE (OUTPATIENT)
Dept: OPTOMETRY | Facility: CLINIC | Age: 32
End: 2022-09-12
Payer: COMMERCIAL

## 2022-09-12 ENCOUNTER — OFFICE VISIT (OUTPATIENT)
Dept: OPTOMETRY | Facility: CLINIC | Age: 32
End: 2022-09-12
Attending: SURGERY
Payer: COMMERCIAL

## 2022-09-12 DIAGNOSIS — Z46.0 FITTING AND ADJUSTMENT OF SPECTACLES AND CONTACT LENSES: Primary | ICD-10-CM

## 2022-09-12 PROCEDURE — 99999 PR PBB SHADOW E&M-EST. PATIENT-LVL II: CPT | Mod: PBBFAC,,, | Performed by: OPTOMETRIST

## 2022-09-12 PROCEDURE — 1160F RVW MEDS BY RX/DR IN RCRD: CPT | Mod: CPTII,S$GLB,, | Performed by: OPTOMETRIST

## 2022-09-12 PROCEDURE — 1159F PR MEDICATION LIST DOCUMENTED IN MEDICAL RECORD: ICD-10-PCS | Mod: CPTII,S$GLB,, | Performed by: OPTOMETRIST

## 2022-09-12 PROCEDURE — 99499 NO LOS: ICD-10-PCS | Mod: S$GLB,,, | Performed by: OPTOMETRIST

## 2022-09-12 PROCEDURE — 1160F PR REVIEW ALL MEDS BY PRESCRIBER/CLIN PHARMACIST DOCUMENTED: ICD-10-PCS | Mod: CPTII,S$GLB,, | Performed by: OPTOMETRIST

## 2022-09-12 PROCEDURE — 1159F MED LIST DOCD IN RCRD: CPT | Mod: CPTII,S$GLB,, | Performed by: OPTOMETRIST

## 2022-09-12 PROCEDURE — 99499 UNLISTED E&M SERVICE: CPT | Mod: S$GLB,,, | Performed by: OPTOMETRIST

## 2022-09-12 PROCEDURE — 99999 PR PBB SHADOW E&M-EST. PATIENT-LVL II: ICD-10-PCS | Mod: PBBFAC,,, | Performed by: OPTOMETRIST

## 2022-09-12 NOTE — PROGRESS NOTES
HPI     Blurred Vision     Additional comments: With contact lens trials          Last edited by Inna Curry, OD on 9/12/2022  4:24 PM.            Assessment /Plan     For exam results, see Encounter Report.    Fitting and adjustment of spectacles and contact lenses      Change power OU  Order trials of final Rx   Ok to order if happy with vision and comfort OU

## 2022-09-14 ENCOUNTER — PATIENT MESSAGE (OUTPATIENT)
Dept: OPTOMETRY | Facility: CLINIC | Age: 32
End: 2022-09-14
Payer: COMMERCIAL

## 2022-09-22 ENCOUNTER — HISTORICAL (OUTPATIENT)
Dept: ADMINISTRATIVE | Facility: HOSPITAL | Age: 32
End: 2022-09-22
Payer: COMMERCIAL

## 2022-10-17 ENCOUNTER — PATIENT MESSAGE (OUTPATIENT)
Dept: OPTOMETRY | Facility: CLINIC | Age: 32
End: 2022-10-17
Payer: COMMERCIAL

## 2022-10-19 ENCOUNTER — IMMUNIZATION (OUTPATIENT)
Dept: INTERNAL MEDICINE | Facility: CLINIC | Age: 32
End: 2022-10-19
Payer: COMMERCIAL

## 2022-10-19 DIAGNOSIS — Z23 NEED FOR VACCINATION: Primary | ICD-10-CM

## 2022-10-19 PROCEDURE — 91312 COVID-19, MRNA, LNP-S, BIVALENT BOOSTER, PF, 30 MCG/0.3 ML DOSE: ICD-10-PCS | Mod: S$GLB,,, | Performed by: INTERNAL MEDICINE

## 2022-10-19 PROCEDURE — 0124A COVID-19, MRNA, LNP-S, BIVALENT BOOSTER, PF, 30 MCG/0.3 ML DOSE: CPT | Mod: CV19,PBBFAC | Performed by: INTERNAL MEDICINE

## 2022-10-19 PROCEDURE — 91312 COVID-19, MRNA, LNP-S, BIVALENT BOOSTER, PF, 30 MCG/0.3 ML DOSE: CPT | Mod: S$GLB,,, | Performed by: INTERNAL MEDICINE

## 2022-11-01 NOTE — PROGRESS NOTES
[facility]  Physical Therapy Note     Name: Angela Lyle  Clinic Number: [id number]     Therapy Diagnosis:        Encounter Diagnoses   Name Primary?    Cervical pain      Right shoulder pain, unspecified chronicity        Physician: Casale, Michael Joseph,*  Physician Orders: PT Eval and Treat Right shoulder pain/cervical tightness  Medical Diagnosis from Referral: Right shoulder pain  Evaluation Date: 2/8/2019  Authorization Period Expiration: 1/30/20  Plan of Care Expiration: 2/8/19-4/8/19  Visit # / Visits authorized: 7/      Time In: 800a  Time Out: 900a  Total Billable Time: 55 minutes     Precautions: Standard     Subjective   Pt stated that she was sore after the last visit, did have discomfort in the shoulder but decreased by Sunday.  Feels more crackly than normal.  Pain rated to: 2/10 in the clavicular area    Objective:  + 1st rib elevation right  PROM Right shoulder IR to 84 deg  MMT ER 4+/5 without pain  MMT shoulder flex 4/5 without pain  SFMA Rolling with UE, able to complete ext motion    TE: performed to increase strength, stability, and reduce pain in the right shoulder and cervical area: 30 min  SL abd to neutral 3x10  Shoulder IR/Er in supine with MR  Prone shoulder rows 0 wt 3x10  Prone ext 3x10  Seated scaption lift on ball 3x15  Seated UT stretch 3x30 sec NP  Right shoulder IR stretch 6x 45 sec  SL abd 3x10  SFMA rolling UE 5x ea direction.NP  PNF D2 with Y-TB 3x10, shoulder flex/rot/lift in quad 2x10  Sleeper stretch 4x30 sec NP  Rolando wing against wall 3x10  Thoracic rotation in quad 3x10 ea    MT: Performed to increase extensibility, mobility, and reduce pain: 15 min  Cervical distraction grade II-III  SCOM release   Sub nucchal release  Scapular lift right   DN: pt was educated on intervention, risks and benefits as well as expected outcomes.  Pt signed waiver prior to procedure.  .3mm needle homeostatic UT point, sub scap point, medial deltoid,  bilateral cervical paraspinal C6-C4  No adverse effects were noted.    Education: Discussed current HEP, add sleeper stretch, vera wings       A: Pt cont to present with right pain at clavicular area, did report less today.  Showed significant improvement with rolling post exercise.  Cont to present with right cervical and UT guarding and weakness with post thoracic musculature.  Pt will cont to benefit from skilled PT to address pain and weakness.    Goals:  Short Term Goals: 2 weeks   1: I with progressed HEP  2: Report performing all exercises without onset of pain  3: Report sleeping through the night without waking secondary to pain.     Long Term Goals: 8 weeks   1: Improve R MMS ER to 5/5  2: Improve post thoracic muscular strength right side to 5/5 for improved stability with overhead activity and decreased pain.  3: Pt improve left cervical rotation to 90 deg without pain.  4: Pt report participating in an exercise program for 30 min without onset of pain >1/10       P: Cont with strengthening in painfree range, work on GUEVARA right UE, cont improving IR ROM.              Complex Repair And Rhombic Flap Text: The defect edges were debeveled with a #15 scalpel blade.  The primary defect was closed partially with a complex linear closure.  Given the location of the remaining defect, shape of the defect and the proximity to free margins a rhombic flap was deemed most appropriate for complete closure of the defect.  Using a sterile surgical marker, an appropriate advancement flap was drawn incorporating the defect and placing the expected incisions within the relaxed skin tension lines where possible.    The area thus outlined was incised deep to adipose tissue with a #15 scalpel blade.  The skin margins were undermined to an appropriate distance in all directions utilizing iris scissors.

## 2023-03-20 ENCOUNTER — TELEPHONE (OUTPATIENT)
Dept: OPHTHALMOLOGY | Facility: CLINIC | Age: 33
End: 2023-03-20
Payer: COMMERCIAL

## 2023-03-20 ENCOUNTER — OFFICE VISIT (OUTPATIENT)
Dept: OPTOMETRY | Facility: CLINIC | Age: 33
End: 2023-03-20
Payer: COMMERCIAL

## 2023-03-20 DIAGNOSIS — S05.02XA CONJUNCTIVAL ABRASION, LEFT, INITIAL ENCOUNTER: Primary | ICD-10-CM

## 2023-03-20 PROCEDURE — 92012 INTRM OPH EXAM EST PATIENT: CPT | Mod: S$GLB,,, | Performed by: OPTOMETRIST

## 2023-03-20 PROCEDURE — 99999 PR PBB SHADOW E&M-EST. PATIENT-LVL I: ICD-10-PCS | Mod: PBBFAC,,, | Performed by: OPTOMETRIST

## 2023-03-20 PROCEDURE — 92012 PR EYE EXAM, EST PATIENT,INTERMED: ICD-10-PCS | Mod: S$GLB,,, | Performed by: OPTOMETRIST

## 2023-03-20 PROCEDURE — 99999 PR PBB SHADOW E&M-EST. PATIENT-LVL I: CPT | Mod: PBBFAC,,, | Performed by: OPTOMETRIST

## 2023-03-20 RX ORDER — ERYTHROMYCIN 5 MG/G
OINTMENT OPHTHALMIC 3 TIMES DAILY
Qty: 1 G | Refills: 0 | Status: SHIPPED | OUTPATIENT
Start: 2023-03-20 | End: 2023-03-27

## 2023-03-20 NOTE — PROGRESS NOTES
HPI    CC: Eye pain    On Saturday, pt was poked in the left eye with a makeup brush. She felt   fine that day but Sunday it became irritated red and painful. It feels   like FB sensation in outer lower corner of OS and hurts worse with touch.   Tried using AT's OTC but limited relief.     Last edited by Zita Nguyen, OD on 3/20/2023  2:21 PM.            Assessment /Plan     For exam results, see Encounter Report.    Conjunctival abrasion, left, initial encounter  -     erythromycin (ROMYCIN) ophthalmic ointment; Place into the left eye 3 (three) times daily. for 7 days  Dispense: 1 g; Refill: 0      Educated pt on findings. Small conj abrasion OS temporally. Rx erythromycin tequila TID for 7 days then d/c. Preservative free ATs prn. No CL wear until finished tequila use. If symptoms worsen or dont improve, RTC.       RTC with any worsening.

## 2023-03-20 NOTE — TELEPHONE ENCOUNTER
----- Message from Natasha Reyes sent at 3/20/2023  1:15 PM CDT -----  Regarding: Urgent  Consult/Advisory    Name Of Caller:Angela      Contact Preference:[phone]    Nature of call: Pt called regarding her left eye. She was accidentally poked in the eye with a make up brush with glitter. She stated her eye is exteremly red and painful

## 2023-08-01 NOTE — TELEPHONE ENCOUNTER
I called and left the patient a voicemail informing her of her post operative appointment and that Dr. Brown would like for her to start physical therapy this week with Carlota at the [facility] location    Posterior Auricular Interpolation Flap Text: A decision was made to reconstruct the defect utilizing an interpolation axial flap and a staged reconstruction.  A telfa template was made of the defect.  This telfa template was then used to outline the posterior auricular interpolation flap.  The donor area for the pedicle flap was then injected with anesthesia.  The flap was excised through the skin and subcutaneous tissue down to the layer of the underlying musculature.  The pedicle flap was carefully excised within this deep plane to maintain its blood supply.  The edges of the donor site were undermined.   The donor site was closed in a primary fashion.  The pedicle was then rotated into position and sutured.  Once the tube was sutured into place, adequate blood supply was confirmed with blanching and refill.  The pedicle was then wrapped with xeroform gauze and dressed appropriately with a telfa and gauze bandage to ensure continued blood supply and protect the attached pedicle.

## 2023-08-04 ENCOUNTER — OFFICE VISIT (OUTPATIENT)
Dept: OBSTETRICS AND GYNECOLOGY | Facility: CLINIC | Age: 33
End: 2023-08-04
Payer: COMMERCIAL

## 2023-08-04 VITALS
WEIGHT: 106.94 LBS | HEIGHT: 64 IN | SYSTOLIC BLOOD PRESSURE: 114 MMHG | DIASTOLIC BLOOD PRESSURE: 72 MMHG | BODY MASS INDEX: 18.26 KG/M2

## 2023-08-04 DIAGNOSIS — N93.9 ABNORMAL UTERINE BLEEDING (AUB): ICD-10-CM

## 2023-08-04 DIAGNOSIS — Q79.62 HYPERMOBILE EHLERS-DANLOS SYNDROME: ICD-10-CM

## 2023-08-04 DIAGNOSIS — Z01.419 WELL WOMAN EXAM WITH ROUTINE GYNECOLOGICAL EXAM: Primary | ICD-10-CM

## 2023-08-04 DIAGNOSIS — E28.2 PCOS (POLYCYSTIC OVARIAN SYNDROME): ICD-10-CM

## 2023-08-04 PROCEDURE — 3074F SYST BP LT 130 MM HG: CPT | Mod: CPTII,S$GLB,, | Performed by: OBSTETRICS & GYNECOLOGY

## 2023-08-04 PROCEDURE — 99395 PR PREVENTIVE VISIT,EST,18-39: ICD-10-PCS | Mod: S$GLB,,, | Performed by: OBSTETRICS & GYNECOLOGY

## 2023-08-04 PROCEDURE — 1159F MED LIST DOCD IN RCRD: CPT | Mod: CPTII,S$GLB,, | Performed by: OBSTETRICS & GYNECOLOGY

## 2023-08-04 PROCEDURE — 1159F PR MEDICATION LIST DOCUMENTED IN MEDICAL RECORD: ICD-10-PCS | Mod: CPTII,S$GLB,, | Performed by: OBSTETRICS & GYNECOLOGY

## 2023-08-04 PROCEDURE — 3008F BODY MASS INDEX DOCD: CPT | Mod: CPTII,S$GLB,, | Performed by: OBSTETRICS & GYNECOLOGY

## 2023-08-04 PROCEDURE — 3008F PR BODY MASS INDEX (BMI) DOCUMENTED: ICD-10-PCS | Mod: CPTII,S$GLB,, | Performed by: OBSTETRICS & GYNECOLOGY

## 2023-08-04 PROCEDURE — 3078F DIAST BP <80 MM HG: CPT | Mod: CPTII,S$GLB,, | Performed by: OBSTETRICS & GYNECOLOGY

## 2023-08-04 PROCEDURE — 99999 PR PBB SHADOW E&M-EST. PATIENT-LVL IV: ICD-10-PCS | Mod: PBBFAC,,, | Performed by: OBSTETRICS & GYNECOLOGY

## 2023-08-04 PROCEDURE — 3078F PR MOST RECENT DIASTOLIC BLOOD PRESSURE < 80 MM HG: ICD-10-PCS | Mod: CPTII,S$GLB,, | Performed by: OBSTETRICS & GYNECOLOGY

## 2023-08-04 PROCEDURE — 1160F PR REVIEW ALL MEDS BY PRESCRIBER/CLIN PHARMACIST DOCUMENTED: ICD-10-PCS | Mod: CPTII,S$GLB,, | Performed by: OBSTETRICS & GYNECOLOGY

## 2023-08-04 PROCEDURE — 99395 PREV VISIT EST AGE 18-39: CPT | Mod: S$GLB,,, | Performed by: OBSTETRICS & GYNECOLOGY

## 2023-08-04 PROCEDURE — 3074F PR MOST RECENT SYSTOLIC BLOOD PRESSURE < 130 MM HG: ICD-10-PCS | Mod: CPTII,S$GLB,, | Performed by: OBSTETRICS & GYNECOLOGY

## 2023-08-04 PROCEDURE — 99999 PR PBB SHADOW E&M-EST. PATIENT-LVL IV: CPT | Mod: PBBFAC,,, | Performed by: OBSTETRICS & GYNECOLOGY

## 2023-08-04 PROCEDURE — 1160F RVW MEDS BY RX/DR IN RCRD: CPT | Mod: CPTII,S$GLB,, | Performed by: OBSTETRICS & GYNECOLOGY

## 2023-08-04 NOTE — PROGRESS NOTES
Subjective:       Patient ID: Angela Lyle is a 33 y.o. female.    Chief Complaint:  Well Woman      History of Present Illness  HPI   33 y.o. female  here for annual.     She describes her periods as irregular, every 31-37 days.   denies break through bleeding.   denies vaginal itching or irritation.  denies vaginal discharge.    She is sexually active with 1 female partner. Considering conception with known donor sperm.  She uses no method for contraception.    History of abnormal pap: No  Last Pap: 2020 - NILM/HPV neg  Last MMG: N/A  Last Colonoscopy: N/A    Family History   Problem Relation Age of Onset    Arthritis Mother         back surgery    Fibromyalgia Mother     Depression Mother     Hypertension Mother     Miscarriages / Stillbirths Mother     Hyperlipidemia Father     Hearing loss Father     Heart disease Father     Arthritis Maternal Grandmother     Hearing loss Maternal Grandmother     Hypertension Maternal Grandmother     Stroke Maternal Grandmother     Heart disease Paternal Grandfather     Diabetes Paternal Grandfather     Hearing loss Paternal Grandfather     Cancer Maternal Aunt     Early death Maternal Grandfather     Heart disease Maternal Grandfather     Hearing loss Paternal Grandmother     Stroke Paternal Grandmother     Amblyopia Neg Hx     Blindness Neg Hx     Cataracts Neg Hx     Glaucoma Neg Hx     Macular degeneration Neg Hx     Retinal detachment Neg Hx     Strabismus Neg Hx     Melanoma Neg Hx          GYN & OB History  Patient's last menstrual period was 2023 (approximate).   Date of Last Pap: 2021    OB History    Para Term  AB Living   0             SAB IAB Ectopic Multiple Live Births                   Review of Systems  Review of Systems   Constitutional:  Negative for chills, diaphoresis, fatigue and fever.   Respiratory:  Negative for cough and shortness of breath.    Cardiovascular:  Negative for chest pain and palpitations.    Gastrointestinal:  Negative for abdominal pain, constipation, diarrhea, nausea and vomiting.   Endocrine: Negative for diabetes, hyperthyroidism and hypothyroidism.   Genitourinary:  Negative for decreased libido, dysmenorrhea, dyspareunia, frequency, menstrual problem, pelvic pain, vaginal bleeding, vaginal discharge and vaginal pain.   Musculoskeletal:  Negative for back pain and myalgias.   Integumentary:  Negative for rash, acne and hair changes.   Neurological:  Negative for headaches.   Psychiatric/Behavioral:  Negative for depression. The patient is not nervous/anxious.            Objective:    Physical Exam:   Constitutional: She is oriented to person, place, and time. She appears well-developed and well-nourished. No distress.    HENT:   Head: Normocephalic and atraumatic.    Eyes: EOM are normal.    Neck: No thyromegaly present.     Pulmonary/Chest: Effort normal.        Abdominal: Soft. She exhibits no distension, no mass and no abdominal incision. There is no abdominal tenderness. There is no rebound and no guarding. No hernia.     Genitourinary:    Genitourinary Comments: Normal external female genitalia; vagina rugated, normal; cervix normal, no masses; uterus small mobile nontender; no adnexal masses palpated; rectal deferred               Musculoskeletal: Normal range of motion and moves all extremeties.       Neurological: She is alert and oriented to person, place, and time.    Skin: Skin is warm. No rash noted.    Psychiatric: She has a normal mood and affect. Her behavior is normal. Judgment and thought content normal.          Assessment:        1. Well woman exam with routine gynecological exam    2. Abnormal uterine bleeding (AUB)    3. Hypermobile Jori-Danlos syndrome    4. PCOS (polycystic ovarian syndrome)               Plan:      Angela was seen today for well woman.    Diagnoses and all orders for this visit:    Well woman exam with routine gynecological exam  - Pap guidelines  discussed. Pap 10/2016 - NILM. Pap not indicated.   - MMG age 39.   - Cscope age 44.  - Contraception - declined.  - Preconception counseling done. Discussed given hx of PCOS, anovulation may be an issue, so discussed letrozole as first option. Recommended home OPKs. Pelvic US ordered as has never been assessed. She would be interested in known donor insemination.   - Hypermobile Jori-Danlos syndrome - declined preconception MFM consult.     Orders Placed This Encounter   Procedures    US Pelvis Comp with Transvag NON-OB (xpd       Follow up in about 1 year (around 8/4/2024) for annual.

## 2023-08-04 NOTE — PATIENT INSTRUCTIONS
Female Fertility workup  1. Are you ovulating?  - Michelle called premom   - The first day of your cycle is Day #1.   - Buy ovulation predictor kits: start testing around Day 10-Day 16.   - Come to the lab 7 days after the peak and have bloodwork (progesterone level). Let Dr. Allred know when you get a peak and we can schedule the lab.  - Consider a test called AMH - can be done at [facility] or can be done at [facility] (they sometimes offer a special that includes AMH and SA or AMH for you and your partner for $100).  - TSH, prolactin   - FSH, estradiol at the beginning of your cycle, let Dr. Allred know when you start your menstrual cycle.      2. Tubal Factor Infertility   1. When the fallopian tubes are blocked  2. Causes: endometriosis or history of chlamydia  3. Hysterosalpingogram (Infer - $550)     3. Male factor infertility  - Semen analysis ($150)  - [facility], [facility] ([address] across from [facility])      4. Structural causes  - Fibroid, Uterine Septum  - Order an ultrasound    Take prenatal vitamin daily - folic acid 400 mcg daily - OTC.  Genetic screening    CF mutation   SMA mutation

## 2023-08-11 ENCOUNTER — HOSPITAL ENCOUNTER (OUTPATIENT)
Dept: RADIOLOGY | Facility: OTHER | Age: 33
Discharge: HOME | End: 2023-08-11
Attending: OBSTETRICS & GYNECOLOGY
Payer: COMMERCIAL

## 2023-08-11 DIAGNOSIS — N93.9 ABNORMAL UTERINE BLEEDING (AUB): ICD-10-CM

## 2023-08-11 PROCEDURE — 76856 US EXAM PELVIC COMPLETE: CPT | Mod: TC

## 2023-08-11 PROCEDURE — 76830 US PELVIS COMP WITH TRANSVAG NON-OB (XPD): ICD-10-PCS | Mod: 26,,, | Performed by: RADIOLOGY

## 2023-08-11 PROCEDURE — 76856 US PELVIS COMP WITH TRANSVAG NON-OB (XPD): ICD-10-PCS | Mod: 26,,, | Performed by: RADIOLOGY

## 2023-08-11 PROCEDURE — 76830 TRANSVAGINAL US NON-OB: CPT | Mod: 26,,, | Performed by: RADIOLOGY

## 2023-08-11 PROCEDURE — 76856 US EXAM PELVIC COMPLETE: CPT | Mod: 26,,, | Performed by: RADIOLOGY

## 2023-08-14 ENCOUNTER — PATIENT MESSAGE (OUTPATIENT)
Dept: OBSTETRICS AND GYNECOLOGY | Facility: HOSPITAL | Age: 33
End: 2023-08-14
Payer: COMMERCIAL

## 2023-08-14 DIAGNOSIS — N93.9 ABNORMAL UTERINE BLEEDING (AUB): Primary | ICD-10-CM

## 2023-08-15 PROBLEM — Q79.62 HYPERMOBILE EHLERS-DANLOS SYNDROME: Status: ACTIVE | Noted: 2019-01-01

## 2023-08-15 PROBLEM — E28.2 PCOS (POLYCYSTIC OVARIAN SYNDROME): Status: ACTIVE | Noted: 2023-08-15

## 2023-08-28 DIAGNOSIS — N93.9 ABNORMAL UTERINE BLEEDING (AUB): Primary | ICD-10-CM

## 2023-08-31 ENCOUNTER — LAB VISIT (OUTPATIENT)
Dept: LAB | Facility: OTHER | Age: 33
End: 2023-08-31
Attending: OBSTETRICS & GYNECOLOGY
Payer: COMMERCIAL

## 2023-08-31 DIAGNOSIS — N93.9 ABNORMAL UTERINE BLEEDING (AUB): ICD-10-CM

## 2023-08-31 LAB — PROGEST SERPL-MCNC: 19.3 NG/ML

## 2023-08-31 PROCEDURE — 84144 ASSAY OF PROGESTERONE: CPT | Performed by: OBSTETRICS & GYNECOLOGY

## 2023-08-31 PROCEDURE — 36415 COLL VENOUS BLD VENIPUNCTURE: CPT | Performed by: OBSTETRICS & GYNECOLOGY

## 2023-09-06 DIAGNOSIS — N93.9 ABNORMAL UTERINE BLEEDING (AUB): Primary | ICD-10-CM

## 2023-09-07 RX ORDER — DIAZEPAM 5 MG/1
5 TABLET ORAL ONCE
Qty: 1 TABLET | Refills: 0 | Status: SHIPPED | OUTPATIENT
Start: 2023-09-07 | End: 2023-12-26

## 2023-09-29 ENCOUNTER — PATIENT MESSAGE (OUTPATIENT)
Dept: OBSTETRICS AND GYNECOLOGY | Facility: CLINIC | Age: 33
End: 2023-09-29
Payer: COMMERCIAL

## 2023-10-02 ENCOUNTER — OFFICE VISIT (OUTPATIENT)
Dept: OBSTETRICS AND GYNECOLOGY | Facility: CLINIC | Age: 33
End: 2023-10-02
Payer: COMMERCIAL

## 2023-10-02 DIAGNOSIS — N84.0 ENDOMETRIAL POLYP: Primary | ICD-10-CM

## 2023-10-02 PROCEDURE — 99499 UNLISTED E&M SERVICE: CPT | Mod: 95,,, | Performed by: OBSTETRICS & GYNECOLOGY

## 2023-10-02 PROCEDURE — 99499 NO LOS: ICD-10-PCS | Mod: 95,,, | Performed by: OBSTETRICS & GYNECOLOGY

## 2023-10-02 NOTE — PROGRESS NOTES
The patient location is: [place]  The chief complaint leading to consultation is: preop consents    Visit type: audiovisual    Face to Face time with patient: 5  10 minutes of total time spent on the encounter, which includes face to face time and non-face to face time preparing to see the patient (eg, review of tests), Obtaining and/or reviewing separately obtained history, Documenting clinical information in the electronic or other health record, Independently interpreting results (not separately reported) and communicating results to the patient/family/caregiver, or Care coordination (not separately reported).         Each patient to whom he or she provides medical services by telemedicine is:  (1) informed of the relationship between the physician and patient and the respective role of any other health care provider with respect to management of the patient; and (2) notified that he or she may decline to receive medical services by telemedicine and may withdraw from such care at any time.    Notes:     Subjective:      Patient ID: Angela Lyle is a 33 y.o. female.    Chief Complaint:  Pre-op Exam      History of Present Illness  HPI  34 y/o  presents for consents for in office hysteroscopy for suspected endometrial polyp. No concerns at this time.     GYN & OB History  No LMP recorded.   Date of Last Pap: 2021    OB History    Para Term  AB Living   0             SAB IAB Ectopic Multiple Live Births                   Review of Systems  Review of Systems   Constitutional:  Negative for chills, diaphoresis, fatigue and fever.   Respiratory:  Negative for cough and shortness of breath.    Cardiovascular:  Negative for chest pain and palpitations.   Gastrointestinal:  Negative for abdominal pain, constipation, diarrhea, nausea and vomiting.   Genitourinary:  Negative for dyspareunia, pelvic pain, vaginal bleeding, vaginal discharge and vaginal pain.   Neurological:  Negative for  headaches.   Psychiatric/Behavioral:  Negative for depression.           Objective:     Physical Exam:   Constitutional: She is oriented to person, place, and time. She appears well-developed and well-nourished. No distress.    HENT:   Head: Normocephalic and atraumatic.       Pulmonary/Chest: Effort normal.                  Musculoskeletal: Normal range of motion.       Neurological: She is alert and oriented to person, place, and time.     Psychiatric: She has a normal mood and affect. Her behavior is normal. Judgment and thought content normal.         Assessment:     1. Endometrial polyp            Plan:     Angela was seen today for pre-op exam.    Diagnoses and all orders for this visit:    Endometrial polyp  Office hysteroscopy/polypectomy scheduled for 10/25.  Consents reviewed in detail. Risks, benefits, alternatives discussed.  Preop meds sent previously.     No orders of the defined types were placed in this encounter.      No follow-ups on file.

## 2023-10-03 ENCOUNTER — PATIENT MESSAGE (OUTPATIENT)
Dept: INTERNAL MEDICINE | Facility: CLINIC | Age: 33
End: 2023-10-03
Payer: COMMERCIAL

## 2023-10-03 DIAGNOSIS — Q79.62 HYPERMOBILE EHLERS-DANLOS SYNDROME: Primary | ICD-10-CM

## 2023-10-05 ENCOUNTER — OFFICE VISIT (OUTPATIENT)
Dept: URGENT CARE | Facility: CLINIC | Age: 33
End: 2023-10-05
Payer: COMMERCIAL

## 2023-10-05 VITALS
DIASTOLIC BLOOD PRESSURE: 70 MMHG | SYSTOLIC BLOOD PRESSURE: 103 MMHG | WEIGHT: 106 LBS | HEIGHT: 64 IN | HEART RATE: 60 BPM | BODY MASS INDEX: 18.1 KG/M2 | TEMPERATURE: 98 F | OXYGEN SATURATION: 98 % | RESPIRATION RATE: 16 BRPM

## 2023-10-05 DIAGNOSIS — Z23 NEED FOR VACCINE FOR TD (TETANUS-DIPHTHERIA): ICD-10-CM

## 2023-10-05 DIAGNOSIS — S61.239A PUNCTURE WOUND OF FINGER OF RIGHT HAND, INITIAL ENCOUNTER: Primary | ICD-10-CM

## 2023-10-05 DIAGNOSIS — Z23 IMMUNIZATION DUE: ICD-10-CM

## 2023-10-05 DIAGNOSIS — S69.91XA INJURY OF RIGHT INDEX FINGER, INITIAL ENCOUNTER: ICD-10-CM

## 2023-10-05 DIAGNOSIS — S61.210A LACERATION OF RIGHT INDEX FINGER WITHOUT FOREIGN BODY WITHOUT DAMAGE TO NAIL, INITIAL ENCOUNTER: ICD-10-CM

## 2023-10-05 PROCEDURE — 99203 PR OFFICE/OUTPT VISIT, NEW, LEVL III, 30-44 MIN: ICD-10-PCS | Mod: 25,S$GLB,, | Performed by: NURSE PRACTITIONER

## 2023-10-05 PROCEDURE — 90714 TD VACC NO PRESV 7 YRS+ IM: CPT | Mod: S$GLB,,, | Performed by: NURSE PRACTITIONER

## 2023-10-05 PROCEDURE — 99203 OFFICE O/P NEW LOW 30 MIN: CPT | Mod: 25,S$GLB,, | Performed by: NURSE PRACTITIONER

## 2023-10-05 PROCEDURE — 90471 TD VACCINE GREATER THAN OR EQUAL TO 7YO WITH PRESERVATIVE IM: ICD-10-PCS | Mod: S$GLB,,, | Performed by: NURSE PRACTITIONER

## 2023-10-05 PROCEDURE — 90714 TD VACCINE GREATER THAN OR EQUAL TO 7YO WITH PRESERVATIVE IM: ICD-10-PCS | Mod: S$GLB,,, | Performed by: NURSE PRACTITIONER

## 2023-10-05 PROCEDURE — 90471 IMMUNIZATION ADMIN: CPT | Mod: S$GLB,,, | Performed by: NURSE PRACTITIONER

## 2023-10-05 RX ORDER — MUPIROCIN 20 MG/G
OINTMENT TOPICAL
Status: COMPLETED | OUTPATIENT
Start: 2023-10-05 | End: 2023-10-05

## 2023-10-05 RX ADMIN — MUPIROCIN: 20 OINTMENT TOPICAL at 05:10

## 2023-10-05 NOTE — PROGRESS NOTES
"Subjective:      Patient ID: Angela Lyle is a 33 y.o. female.    Vitals:  height is 5' 4" (1.626 m) and weight is 48.1 kg (106 lb). Her temperature is 98.3 °F (36.8 °C). Her blood pressure is 103/70 and her pulse is 60. Her respiration is 16 and oxygen saturation is 98%.     Chief Complaint: Laceration    Patient presents today with c/o nail puncture injury to right index finger after she accidentally grabbed a rotten piece of wood yesterday while clearing away decking under her home. States is mildly painful at puncture wound site but swelling has improved.   Reports last Td was ~8 years ago.     Laceration   The incident occurred 12 to 24 hours ago. Pain location: right finger. The laceration mechanism was a nail. The pain is at a severity of 4/10. The pain is mild. The pain has been Improving since onset. She reports no foreign bodies present. Her tetanus status is out of date.       Constitution: Negative for activity change, appetite change, chills, sweating, fatigue, fever and generalized weakness.   Neck: Negative for painful lymph nodes.   Cardiovascular:  Negative for palpitations and sob on exertion.   Gastrointestinal:  Negative for nausea, vomiting and diarrhea.   Musculoskeletal:  Positive for pain. Negative for joint pain, joint swelling and abnormal ROM of joint.   Skin:  Positive for wound and puncture wound. Negative for color change, erythema and bruising.   Allergic/Immunologic: Negative for immunizations up-to-date.   Neurological:  Negative for dizziness, passing out, disorientation, altered mental status, numbness and tingling.   Hematologic/Lymphatic: Negative for swollen lymph nodes, easy bruising/bleeding, trouble clotting and history of bleeding disorder. Does not bruise/bleed easily.   Psychiatric/Behavioral:  Negative for altered mental status, disorientation and confusion.       Objective:     Physical Exam   Constitutional: She is oriented to person, place, and time.  " Non-toxic appearance. She does not appear ill. No distress.   HENT:   Head: Normocephalic and atraumatic.   Neck: Neck supple.   Cardiovascular: Normal rate, regular rhythm, normal heart sounds and normal pulses.   Pulmonary/Chest: Effort normal and breath sounds normal.   Abdominal: Normal appearance.   Musculoskeletal: Normal range of motion.         General: Normal range of motion.      Right hand: She exhibits normal capillary refill. Decreased sensation is not present in the ulnar distribution, is not present in the medial distribution and is not present in the radial distribution. Right index finger: Injuries: puncture wound (distal tip of finger palmar surface; tiny puncture wound). Motor /Testing: The patient has normal right wrist strength.   Neurological: no focal deficit. She is alert, oriented to person, place, and time and at baseline. No sensory deficit.   Skin: Skin is warm, dry and not diaphoretic. No bruising, No erythema and No ecchymosis   Psychiatric: Her behavior is normal. Mood, judgment and thought content normal.   Nursing note and vitals reviewed.      Assessment:     1. Puncture wound of finger of right hand, initial encounter    2. Injury of right index finger, initial encounter    3. Need for vaccine for Td (tetanus-diphtheria)    4. Immunization due    5. Laceration of right index finger without foreign body without damage to nail, initial encounter        Plan:       Puncture wound of finger of right hand, initial encounter  -     mupirocin 2 % ointment    Injury of right index finger, initial encounter  -     mupirocin 2 % ointment    Need for vaccine for Td (tetanus-diphtheria)    Immunization due  -     Cancel: (In Office Administered) Td Vaccine  -     (In Office Administered) Td Vaccine    Laceration of right index finger without foreign body without damage to nail, initial encounter  -     Cancel: (In Office Administered) Td Vaccine  -     (In Office Administered) Td  Vaccine             Patient Instructions   Please follow up with your primary care provider within 2-5 days if your signs and symptoms have not resolved or worsen.     If your condition worsens or fails to improve we recommend that you receive another evaluation at the emergency room immediately or contact your primary medical clinic to discuss your concerns.   You must understand that you have received an Urgent Care treatment only and that you may be released before all of your medical problems are known or treated. You, the patient, will arrange for follow up care as instructed.       Keep wound clean with dial soap and water. Apply thin layer of neosporin ointment and cover with a bandaid during the day or when out and about. Leave it open to air at night.  Return to clinic for increased pain, redness, swelling, drainage, or limitation of movement.  Tetanus/Diptheria vaccine updated today.

## 2023-10-05 NOTE — PATIENT INSTRUCTIONS
From: Ivanna Roth RN  Sent: 12/1/2020   1:25 PM CST  To: Ortho Surg Sched [facility]ha Ch[facility]    ----- Message from Ivanna Roth RN sent at 12/1/2020  1:25 PM CST -----  FYI- patient looking to postpone and reschedule for the summer.   Thanks!  Ivanna    ----- Message from Ivanna Gonzalez PA-C sent at 12/1/2020  1:18 PM CST -----    ----- Message from Ivanna Roth RN sent at 12/1/2020 12:10 PM CST -----  Hi-  Patient seen in office yesterday 11/30, surgery orders placed for right shoulder scope, RC repair. Scheduled for January 2021, but patient is primary caretaker for blind  and trying to get help lined up for surgery.   Looking to postpone to summer 2021 if not detrimental to her shoulder, and as long as there is not a major concern the tear will get much larger over next few months.   Also wondering if she does postpone until summer, if she could get another cortisone injection? Last one was in June 2020.   Thanks,  Ivanna       Please follow up with your primary care provider within 2-5 days if your signs and symptoms have not resolved or worsen.     If your condition worsens or fails to improve we recommend that you receive another evaluation at the emergency room immediately or contact your primary medical clinic to discuss your concerns.   You must understand that you have received an Urgent Care treatment only and that you may be released before all of your medical problems are known or treated. You, the patient, will arrange for follow up care as instructed.       Keep wound clean with dial soap and water. Apply thin layer of neosporin ointment and cover with a bandaid during the day or when out and about. Leave it open to air at night.  Return to clinic for increased pain, redness, swelling, drainage, or limitation of movement.  Tetanus/Diptheria vaccine updated today.

## 2023-10-11 ENCOUNTER — OFFICE VISIT (OUTPATIENT)
Dept: OPTOMETRY | Facility: CLINIC | Age: 33
End: 2023-10-11
Payer: COMMERCIAL

## 2023-10-11 DIAGNOSIS — H52.13 MYOPIA, BILATERAL: Primary | ICD-10-CM

## 2023-10-11 DIAGNOSIS — Z46.0 FITTING AND ADJUSTMENT OF SPECTACLES AND CONTACT LENSES: ICD-10-CM

## 2023-10-11 DIAGNOSIS — H04.123 DRY EYE SYNDROME, BILATERAL: ICD-10-CM

## 2023-10-11 DIAGNOSIS — Z46.0 FITTING AND ADJUSTMENT OF SPECTACLES AND CONTACT LENSES: Primary | ICD-10-CM

## 2023-10-11 PROCEDURE — 92014 COMPRE OPH EXAM EST PT 1/>: CPT | Mod: S$GLB,,, | Performed by: OPTOMETRIST

## 2023-10-11 PROCEDURE — 92015 DETERMINE REFRACTIVE STATE: CPT | Mod: S$GLB,,, | Performed by: OPTOMETRIST

## 2023-10-11 PROCEDURE — 99999 PR PBB SHADOW E&M-EST. PATIENT-LVL II: ICD-10-PCS | Mod: PBBFAC,,, | Performed by: OPTOMETRIST

## 2023-10-11 PROCEDURE — 99999 PR PBB SHADOW E&M-EST. PATIENT-LVL II: CPT | Mod: PBBFAC,,, | Performed by: OPTOMETRIST

## 2023-10-11 PROCEDURE — 92014 PR EYE EXAM, EST PATIENT,COMPREHESV: ICD-10-PCS | Mod: S$GLB,,, | Performed by: OPTOMETRIST

## 2023-10-11 PROCEDURE — 92015 PR REFRACTION: ICD-10-PCS | Mod: S$GLB,,, | Performed by: OPTOMETRIST

## 2023-10-11 PROCEDURE — 92310 CONTACT LENS FITTING OU: CPT | Mod: CSM,S$GLB,, | Performed by: OPTOMETRIST

## 2023-10-11 PROCEDURE — 92310 PR CONTACT LENS FITTING (NO CHANGE): ICD-10-PCS | Mod: CSM,S$GLB,, | Performed by: OPTOMETRIST

## 2023-10-11 NOTE — PROGRESS NOTES
HPI    JOHN: 7/21/2022 - Dr. Curry    CC: Pt is here today for a routine eye exam. Pt states that she had a 2   flare up of OD this year. It was red and causing discomfort.     (+)Dryness - whenever she wears that contacts  (-)Burning  (-)Itchiness  (-)Tearing  (-)Flashes  (-)Floaters   (+)Photophobia - occasionally at night  (-)Eye Pain      Diabetic: no    Contact Lens: yes                           Sleep in CL?: no                           Daily wear time: 10 hrs                           Days worn before discarded: 1 day    Eye Meds: no    Last edited by Shey Garcia MA on 10/11/2023 10:50 AM.            Assessment /Plan     For exam results, see Encounter Report.    Myopia, bilateral  Presbyopia  Fitting and adjustment of spectacles and contact lenses        Rx specs     Continue with current contacts              Remove nightly, replace daily              OK to order if happy                  RTC 1 year DFE

## 2023-10-16 ENCOUNTER — PATIENT MESSAGE (OUTPATIENT)
Dept: OPTOMETRY | Facility: CLINIC | Age: 33
End: 2023-10-16
Payer: COMMERCIAL

## 2023-10-16 ENCOUNTER — OFFICE VISIT (OUTPATIENT)
Dept: DERMATOLOGY | Facility: CLINIC | Age: 33
End: 2023-10-16
Payer: COMMERCIAL

## 2023-10-16 DIAGNOSIS — Z86.018 HISTORY OF DYSPLASTIC NEVUS: ICD-10-CM

## 2023-10-16 DIAGNOSIS — L81.4 LENTIGO: ICD-10-CM

## 2023-10-16 DIAGNOSIS — L82.1 SK (SEBORRHEIC KERATOSIS): ICD-10-CM

## 2023-10-16 DIAGNOSIS — D22.9 NEVUS: ICD-10-CM

## 2023-10-16 DIAGNOSIS — L21.9 SEBORRHEIC DERMATITIS, UNSPECIFIED: Primary | ICD-10-CM

## 2023-10-16 DIAGNOSIS — D18.01 CHERRY ANGIOMA: ICD-10-CM

## 2023-10-16 PROCEDURE — 99999 PR PBB SHADOW E&M-EST. PATIENT-LVL III: CPT | Mod: PBBFAC,,, | Performed by: DERMATOLOGY

## 2023-10-16 PROCEDURE — 1159F MED LIST DOCD IN RCRD: CPT | Mod: CPTII,S$GLB,, | Performed by: DERMATOLOGY

## 2023-10-16 PROCEDURE — 1160F PR REVIEW ALL MEDS BY PRESCRIBER/CLIN PHARMACIST DOCUMENTED: ICD-10-PCS | Mod: CPTII,S$GLB,, | Performed by: DERMATOLOGY

## 2023-10-16 PROCEDURE — 99999 PR PBB SHADOW E&M-EST. PATIENT-LVL III: ICD-10-PCS | Mod: PBBFAC,,, | Performed by: DERMATOLOGY

## 2023-10-16 PROCEDURE — 99214 PR OFFICE/OUTPT VISIT, EST, LEVL IV, 30-39 MIN: ICD-10-PCS | Mod: S$GLB,,, | Performed by: DERMATOLOGY

## 2023-10-16 PROCEDURE — 1160F RVW MEDS BY RX/DR IN RCRD: CPT | Mod: CPTII,S$GLB,, | Performed by: DERMATOLOGY

## 2023-10-16 PROCEDURE — 99214 OFFICE O/P EST MOD 30 MIN: CPT | Mod: S$GLB,,, | Performed by: DERMATOLOGY

## 2023-10-16 PROCEDURE — 1159F PR MEDICATION LIST DOCUMENTED IN MEDICAL RECORD: ICD-10-PCS | Mod: CPTII,S$GLB,, | Performed by: DERMATOLOGY

## 2023-10-16 RX ORDER — HYDROCORTISONE AND ACETIC ACID 20.75; 10.375 MG/ML; MG/ML
SOLUTION AURICULAR (OTIC)
Qty: 10 ML | Refills: 6 | Status: SHIPPED | OUTPATIENT
Start: 2023-10-16

## 2023-10-16 RX ORDER — PIMECROLIMUS 10 MG/G
CREAM TOPICAL
Qty: 30 G | Refills: 6 | Status: SHIPPED | OUTPATIENT
Start: 2023-10-16 | End: 2023-10-19 | Stop reason: SDUPTHER

## 2023-10-16 RX ORDER — KETOCONAZOLE 20 MG/ML
SHAMPOO, SUSPENSION TOPICAL
Qty: 120 ML | Refills: 6 | Status: SHIPPED | OUTPATIENT
Start: 2023-10-16

## 2023-10-16 NOTE — PROGRESS NOTES
"  Subjective:      Patient ID:  Angela Lyle is a 33 y.o. female who presents for   Chief Complaint   Patient presents with    Skin Check     tbse    Seborrheic Dermatitis     Nose  Ears       Patient is here today for a "mole" check.   Pt has a history of  moderate sun exposure in the past.   Pt recalls several blistering sunburns in the past- no  Pt has history of tanning bed use- no  Pt has  had moles removed in the past- yes, benign per pt  Pt has history of melanoma in first degree relatives-  No    C/o seb derm to nose and ears x several years. Prev tx with keto cream. Occ irritated. Needs refill on keto shampoo;     Seborrheic Dermatitis      Review of Systems   Skin:  Positive for rash, dry skin, daily sunscreen use and activity-related sunscreen use. Negative for tendency to form keloidal scars and recent sunburn.   Hematologic/Lymphatic: Does not bruise/bleed easily.       Objective:   Physical Exam   Constitutional: She appears well-developed and well-nourished. No distress.   HENT:   Mouth/Throat: Lips normal.    Eyes: Lids are normal.  No conjunctival no injection.   Neurological: She is alert and oriented to person, place, and time. She is not disoriented.   Psychiatric: She has a normal mood and affect.   Skin:   Areas Examined (abnormalities noted in diagram):   Scalp / Hair Palpated and Inspected  Head / Face Inspection Performed  Neck Inspection Performed  Chest / Axilla Inspection Performed  Abdomen Inspection Performed  Genitals / Buttocks / Groin Inspection Performed  Back Inspection Performed  RUE Inspected  LUE Inspection Performed  RLE Inspected  LLE Inspection Performed  Nails and Digits Inspection Performed                             Diagram Legend     Erythematous scaling macule/papule c/w actinic keratosis       Vascular papule c/w angioma      Pigmented verrucoid papule/plaque c/w seborrheic keratosis      Yellow umbilicated papule c/w sebaceous hyperplasia      Irregularly " shaped tan macule c/w lentigo     1-2 mm smooth white papules consistent with Milia      Movable subcutaneous cyst with punctum c/w epidermal inclusion cyst      Subcutaneous movable cyst c/w pilar cyst      Firm pink to brown papule c/w dermatofibroma      Pedunculated fleshy papule(s) c/w skin tag(s)      Evenly pigmented macule c/w junctional nevus     Mildly variegated pigmented, slightly irregular-bordered macule c/w mildly atypical nevus      Flesh colored to evenly pigmented papule c/w intradermal nevus       Pink pearly papule/plaque c/w basal cell carcinoma      Erythematous hyperkeratotic cursted plaque c/w SCC      Surgical scar with no sign of skin cancer recurrence      Open and closed comedones      Inflammatory papules and pustules      Verrucoid papule consistent consistent with wart     Erythematous eczematous patches and plaques     Dystrophic onycholytic nail with subungual debris c/w onychomycosis     Umbilicated papule    Erythematous-base heme-crusted tan verrucoid plaque consistent with inflamed seborrheic keratosis     Erythematous Silvery Scaling Plaque c/w Psoriasis     See annotation      Assessment / Plan:        Seborrheic dermatitis, unspecified  -     pimecrolimus (ELIDEL) 1 % cream; Aaa bid prn flare to face, ear  Dispense: 30 g; Refill: 6  -     acetic acid-hydrocortisone (VOSOL-HC) otic solution; Apply 3-4 drops to ears bid prn flare  Dispense: 10 mL; Refill: 6  -     ketoconazole (NIZORAL) 2 % shampoo; Wash hair with medicated shampoo at least 2x/week - let sit on scalp at least 5 minutes prior to rinsing  Dispense: 120 mL; Refill: 6  Rotate otc medicated shampoo    Cherry angioma  These are benign vascular lesions that are inherited.  Treatment is not necessary.    Nevus  Discussed ABCDE's of nevi.  Monitor for new mole or moles that are becoming bigger, darker, irritated, or developing irregular borders. Brochure provided. Instructed patient to observe lesion(s) for changes and  follow up in clinic if changes are noted. Patient to monitor skin at home for new or changing lesions.     Lentigo  This is a benign hyperpigmented sun induced lesion. Recommend daily sun protection/avoidance and use of at least SPF 30, broad spectrum sunscreen (OTC drug) will reduce the number of new lesions. Treatment of these benign lesions are considered cosmetic.  The nature of sun-induced photo-aging and skin cancers is discussed.  Sun avoidance, protective clothing, and the use of 30-SPF sunscreens is advised. Observe closely for skin damage/changes, and call if such occurs.    SK (seborrheic keratosis)  These are benign inherited growths without a malignant potential. Reassurance given to patient. No treatment is necessary.     History of dysplastic nevus  Area of previous atypical nevus/nevi examined. Site well healed with no signs of recurrence.    Total body skin examination performed today including at least 12 points as noted in physical examination. No lesions suspicious for atypical nevi noted. Monitor for new or changing nevi or pigment recurrence in the scar             Follow up in about 1 year (around 10/16/2024) for TBSE.

## 2023-10-18 ENCOUNTER — CLINICAL SUPPORT (OUTPATIENT)
Dept: REHABILITATION | Facility: HOSPITAL | Age: 33
End: 2023-10-18
Payer: COMMERCIAL

## 2023-10-18 DIAGNOSIS — Q79.62 HYPERMOBILE EHLERS-DANLOS SYNDROME: ICD-10-CM

## 2023-10-18 DIAGNOSIS — R68.89 DECREASED STRENGTH, ENDURANCE, AND MOBILITY: ICD-10-CM

## 2023-10-18 DIAGNOSIS — R52 PAIN: ICD-10-CM

## 2023-10-18 DIAGNOSIS — Z74.09 DECREASED STRENGTH, ENDURANCE, AND MOBILITY: ICD-10-CM

## 2023-10-18 DIAGNOSIS — R53.1 DECREASED STRENGTH, ENDURANCE, AND MOBILITY: ICD-10-CM

## 2023-10-18 DIAGNOSIS — M24.80 JOINT HYPEREXTENSIBILITY OF MULTIPLE SITES: ICD-10-CM

## 2023-10-18 PROCEDURE — 97162 PT EVAL MOD COMPLEX 30 MIN: CPT

## 2023-10-18 PROCEDURE — 97110 THERAPEUTIC EXERCISES: CPT

## 2023-10-18 NOTE — PLAN OF CARE
[facility]   Physical Therapy Initial Evaluation      Date: 10/18/2023   Name: Angela Lyle  Clinic Number: [id number]    Therapy Diagnosis:    Encounter Diagnoses   Name Primary?    Hypermobile Jori-Danlos syndrome     Decreased strength, endurance, and mobility     Joint hyperextensibility of multiple sites     Pain       Physician: Popeye Perez DO     Physician Orders: PT Eval and Treat  Medical Diagnosis from Referral: Hypermobile Jori-Danlos syndrome  Evaluation Date: 10/18/2023  Authorization Period Expiration: 10/3/24  Plan of Care Expiration: 1/18/2023  Progress Note Due: 11/23/2023  Visit # / Visits authorized: 1/1  FOTO: 1/3 (last performed on 10/18/2023)    Precautions: Standard and Falls, POTS, hypermobile EDS    Time In: 4:48 pm  Time Out: 5:38 pm  Total Billable Time (timed & untimed codes): 45 minutes    SUBJECTIVE   Date of onset: 2016    History of current condition - Angela reports extensive therapy for bad shoulder problems in right shoulder 2016/17 after a flu shot and had therapy at [facility]. Did end up having surgery and some of what they did she has been told may not have been a great idea with her EDS diagnosis. She reports chronic shoulder pain since 2016/7.  Had diagnosis of EDS in 2020, had been seeing [facility] and was able to be seen at EDS clinic. She reports that her MD wanted to do prolotherapy and have her pay out of pocket which she was not willing to do without more evidence that it was going to help her pain.  She has had extensive therapy but is still having pain in her bilateral upper trapezial muscle's and lower back. She does also have POTS - which she takes a beta blocker. 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  She reports that she did run cross country in high school and college but that she stopped exercising  when she went to nursing school.    Current Activity  Level: Physical Therapy 2x per week up until about 1 1/2 months ago. Trying to do Yu protocol for POTS - riding recumbent bike.    Falls: [] No  [x] Yes: March had ankle fracture and broke in 2 places. Did not fall/land on ground.    Imaging: [] Xray [] MRI [] CT: none recent      Prior Therapy:  [] No  [x] Yes:   Social History: Patient lives with their spouse  [x] House [] Apartment/Condo []other  Stairs: [] No  [x] Yes: 4 steps - no difficulty  Occupation: Patient is nurse NICU  Prior Level of Function: used to run track/[employer] high school and college. Stopped exercising in nursing school, has had more weakness in past year.  Current Level of Function: difficulty with finger strength, standing/walking for about 1 hour.    Pain:  Current 3/10, worst 10/10, best 0/10   Location: right shoulder bilateral , bilateral upper trapezial , Iliac crest area and left quadratus lumborum  Description: Aching, Dull, Throbbing, and Deep  Aggravating Factors: driving for lower back, using arm or WB through UE's or overhead or static holding.  Easing Factors: lying down, heating pad, and hot bath    Dominant Extremity:    [x] Right    [] Left    Patients goals: Get more stable.    Medical History:   Past Medical History:   Diagnosis Date    Allergy     Anemia     hx of HOSEA on fergon in past    Anxiety     Arthritis     right shoulder    Depression     Fatigue     History of psychiatric care     Hypermobile Jori-Danlos syndrome 2019    Keloid cicatrix     PCOS (polycystic ovarian syndrome)     POTS (postural orthostatic tachycardia syndrome) 2019    Psychiatric problem     Therapy        Surgical History:   Angela Espinosa Dariela  has a past surgical history that includes Tonsillectomy; wisdom teeth removal (2010); and Shoulder surgery (Right, 06/2017).    Medications:   Angela has a current medication list which includes the following prescription(s): acetic acid-hydrocortisone, diazepam, ergocalciferol (vitamin d2),  inositol-d chiro inositol, ketoconazole, ketoconazole, ketoconazole, midodrine, multivitamin, pimecrolimus, and triamcinolone acetonide 0.1%.    Allergies:   Review of patient's allergies indicates:   Allergen Reactions    Sulfa (sulfonamide antibiotics) Hives and Itching    Sulfamethoxazole-trimethoprim      Other reaction(s): Itching        OBJECTIVE     Posture:  Patient presents with postural abnormalities which include:    [x] Forward Head   [x] Increased Lumbar Lordosis   [x] Rounded Shoulder   [] Flat Back Posture   [x] Increased Thoracic Kyphosis [] Pes Planus   [] Increased Trunk Sway  [x] Valgus knee position   [] Increased Trunk Rotation  [] Varus knee position   [] Increased cervical lordosis [] Other: ectomorphic body type    Observation:  Patient noted to shift positions frequently, wraps legs around each other and trunk SB to lock out/support self.    Sensation:    Sensation to light touch over UE's is  [x] Intact [] Impaired [] Defer  Sensation to light touch over LE's is  [x] Intact [] Impaired [] Defer    Reflexes:  Patellar   [x] Defer [] Normal [] Hyper []  Hypo   Achilles  [x] Defer [] Normal [] Hyper []  Hypo  Tricep  [x] Defer [] Normal [] Hyper []  Hypo  Brachioradialis [x] Defer [] Normal [] Hyper []  Hypo      Gait Analysis: The patient ambulated with the following assistive device: none; the patient presents with the following gait abnormalities: decreased step length, decreased base of support, and antalgic gait        Range of Motion:    Shoulder AROM/PROM Right Left Pain/Dysfunction with Movement   Shoulder Flexion (180º) NT NT    Shoulder Abduction (180º) NT NT    Shoulder Extension (60º) NT NT    Shoulder ER  at 90º (90º) NT NT    Shoulder IR at 90º (70º) NT NT    Functional ER NT NT    Functional IR NT NT        Strength:    U/E MMT Right Left Pain/Dysfunction with Movement   Cervical Side Bending 3+/5 3+/5 + trunk shift   Upper trapezial  3+/5 3+/5 + trunk shift   Shoulder Abduction  3+/5 3+/5 + trunk shift   Shoulder IR 3+/5 3+/5 + trunk shift   Shoulder ER   3+/5 3+/5 + trunk shift   Elbow Flexion  3+/5 3+/5 + trunk shift   Elbow Extension 3+/5 3+/5 + trunk shift   Wrist Extension 3+/5 3+/5    4th/5th Finger Intrinsics 3+/5 3+/5     and   L/E MMT Right  (spine) Left Pain/Dysfunction with Movement   Hip Flexion  3+/5 3+/5 + trunk shift   Knee Extension 3+/5 3+/5 + trunk shift   Knee Flexion 3+/5 3+/5 + trunk shift   Hip IR 3+/5 3+/5 + trunk shift   Hip ER 3+/5 3+/5 + trunk shift   Ankle DF 3+/5 3+/5 + trunk shift   Ankle PF 3+/5 3+/5 + trunk shift       Muscle Length:   defer    Joint Mobility:   defer    Special Tests:     Right  (spine) Left    Lumbar Distraction  [] Positive    [x] Negative ---   Lumbar Compression [] Positive    [x] Negative ---   SLUMP [] Positive    [x] Negative [] Positive    [x] Negative   Straight Leg Raise [] Positive    [x] Negative [] Positive    [x] Negative   ASIS Compression [] Positive    [x] Negative ---   ASIS Distraction  [] Positive    [x] Negative ---   Posterior Shear [] Positive    [x] Negative [] Positive    [x] Negative   TREVON [] Positive    [x] Negative [] Positive    [x] Negative       Palpation:  Tender with increased tone over bilateral SI joints, along upper/mid gluteals, piriformis muscle, obturator's, gemelli, quadratus femoris, hip flexors, piriformis muscle. Tender with increased tone over bilateral upper trapezial, levator scapula, latissimus dorsi, teres minor, infraspinatus, subscapularis, anterior chest muscle's, medial/lateral scapular muscle's.      FOTO:    Intake Outcome Measure for FOTO NOC Survey    Therapist reviewed FOTO scores for Angela Espinosa Dariela on 10/18/2023.   FOTO documents entered into [employer] - see Media section or FOTO account episode details..    Intake Score: unable to obtain FOTO today due to time constraints, will capture at next visit         TREATMENT     Total Treatment time (time-based codes) separate from  Evaluation: (13) minutes     Angela received the treatments listed below:   Angela received therapeutic exercises to develop strength, endurance, and core stabilization for 8 minutes including:    Intervention 10/18/2023  Parameters                                                            and   Angela received the following manual therapy techniques: Myofacial release and Soft tissue Mobilization were applied to the: bilateral upper quadrant for 5 minutes, including:    Manual Intervention 10/18/2023     Soft Tissue Mobilization x bilateral upper trapezius, levator scapulae , periscapular musculature, subscapularis     Joint Mobilizations     Mobilization with movement x Scapular tilts        Functional Dry Needling             Plan for Next Visit: Initiate hypermobility protocol, manual, further assessment PRN        PATIENT EDUCATION AND HOME EXERCISES     Education provided: (during treatment session) minutes  Home exercise program, and importance of stability without increasing pain symptoms.    Written Home Exercises Provided: yes.  Exercises were reviewed and Angela was able to demonstrate them prior to the end of the session.  Angela demonstrated good  understanding of the education provided. See EMR under Patient Instructions for exercises provided during therapy sessions.    ASSESSMENT     Angela is a 33 y.o. female referred to outpatient Physical Therapy with a medical diagnosis of Hypermobile Jori-Danlos syndrome. The patient presents with signs and symptoms consistent with diagnosis along with impairments which include weakness, impaired endurance, impaired functional mobility, gait instability, decreased upper extremity function, decreased lower extremity function, abnormal tone, and hypermobility .      Patient prognosis is Good, if patient is consistent and compliant with Physical Therapy and home exercise program.  Patient will benefit from skilled outpatient Physical Therapy to address the  deficits stated above and in the chart below, provide patient/family education, and to maximize patient's level of independence.     Plan of care discussed with patient: Yes  Patient's spiritual, cultural and educational needs considered and patient is agreeable to the plan of care and goals as stated below:     Anticipated Barriers for therapy: co-morbidities, chronicity of condition, adherence to treatment plan, coping style, and distance to travel for care      Medical Necessity is demonstrated by the following   History  Co-morbidities and personal factors that may impact the plan of care [] LOW: no personal factors / co-morbidities  [] MODERATE: 1-2 personal factors / co-morbidities  [x] HIGH: 3+ personal factors / co-morbidities    Moderate / High Support Documentation:   Past Medical History:   Diagnosis Date    Allergy     Anemia     hx of HOSEA on fergon in past    Anxiety     Arthritis     right shoulder    Depression     Fatigue     History of psychiatric care     Hypermobile Jori-Danlos syndrome 2019    Keloid cicatrix     PCOS (polycystic ovarian syndrome)     POTS (postural orthostatic tachycardia syndrome) 2019    Psychiatric problem     Therapy          Examination  Body Structures and Functions, activity limitations and participation restrictions that may impact the plan of care [] LOW: addressing 1-2 elements  [] MODERATE: 3+ elements  [x] HIGH: 4+ elements (please support below)    Moderate / High Support Documentation: See evaluation / objective measurements above and FOTO.     Clinical Presentation [] LOW: stable  [x] MODERATE: Evolving  [] HIGH: Unstable     Decision Making/ Complexity Score: moderate         Goals:  Reviewed: 10/18/2023      Short Term Goals: In 6 weeks  Progress Date   1.Patient to be educated on HEP. [] Progressing  [] MET   [] Not MET   [] Not tested    2.Patient to increase bilateral UE/LE strength by 1/2 grade, in order to improve endurance and increase ability to perform  all functional activities for increased time.  [] Progressing  [] MET   [] Not MET  [] Not tested    3.Patient to have pain less than 7/10 at worst, to improve QOL. [] Progressing  [] MET   [] Not MET  [] Not tested    4.Patient to score the FOTO, to improve QOL. [] Progressing  [] MET   [] Not MET  [] Not tested      Long Term Goals: In 12 weeks Progress Date   1.Patient to improve score on the FOTO predicted score or better, to improve QOL. [] Progressing  [] MET   [] Not MET  [] Not tested    2. Patient to increase bilateral UE/LE strength to 4/5 or greater, in order to improve endurance and increase ability to perform all functional activities for increased time. [] Progressing  [] MET   [] Not MET  [] Not tested    3. Patient to have decreased pain to 4/10 at worst, to improve QOL. [] Progressing  [] MET   [] Not MET  [] Not tested    4. Patient to perform daily activities including work activity with only mild increased symptoms. [] Progressing  [] MET   [] Not MET  [] Not tested      PLAN     Plan of care Certification: 10/18/2023  to 1/18/24.    Outpatient Physical Therapy 1 times weekly for 12 weeks to include any combination of the following interventions: Aquatic Therapy, electrical stimulation (PRN), Gait Training, Manual Therapy, Neuromuscular Re-ed, Patient Education/Self Care, Therapeutic Activites, Therapeutic Exercise, and Dry Needling    Daisha Enriquez PT

## 2023-10-19 ENCOUNTER — PATIENT MESSAGE (OUTPATIENT)
Dept: REHABILITATION | Facility: HOSPITAL | Age: 33
End: 2023-10-19
Payer: COMMERCIAL

## 2023-10-19 ENCOUNTER — PATIENT MESSAGE (OUTPATIENT)
Dept: DERMATOLOGY | Facility: CLINIC | Age: 33
End: 2023-10-19
Payer: COMMERCIAL

## 2023-10-19 DIAGNOSIS — L21.9 SEBORRHEIC DERMATITIS, UNSPECIFIED: ICD-10-CM

## 2023-10-19 RX ORDER — PIMECROLIMUS 10 MG/G
CREAM TOPICAL
Qty: 30 G | Refills: 6 | Status: SHIPPED | OUTPATIENT
Start: 2023-10-19 | End: 2023-12-26

## 2023-10-24 ENCOUNTER — TELEPHONE (OUTPATIENT)
Dept: DERMATOLOGY | Facility: CLINIC | Age: 33
End: 2023-10-24
Payer: COMMERCIAL

## 2023-10-24 NOTE — TELEPHONE ENCOUNTER
----- Message from Sue Vasquez MA sent at 10/23/2023  4:46 PM CDT -----  Regarding: FW: call back  Contact: [phone]    ----- Message -----  From: Shanna Major  Sent: 10/23/2023   1:04 PM CDT  To: [facility] Staff  Subject: call back                                        Caller  with Rx calling in regarding medication pimecrolimus (ELIDEL) 1 % cream please call to discuss further

## 2023-10-25 ENCOUNTER — PROCEDURE VISIT (OUTPATIENT)
Dept: OBSTETRICS AND GYNECOLOGY | Facility: CLINIC | Age: 33
End: 2023-10-25
Payer: COMMERCIAL

## 2023-10-25 VITALS
WEIGHT: 105.38 LBS | SYSTOLIC BLOOD PRESSURE: 102 MMHG | BODY MASS INDEX: 17.99 KG/M2 | HEIGHT: 64 IN | DIASTOLIC BLOOD PRESSURE: 60 MMHG

## 2023-10-25 DIAGNOSIS — N93.9 ABNORMAL UTERINE BLEEDING (AUB): ICD-10-CM

## 2023-10-25 PROCEDURE — 58555 HYSTEROSCOPY DX SEP PROC: CPT | Mod: S$GLB,,, | Performed by: OBSTETRICS & GYNECOLOGY

## 2023-10-25 PROCEDURE — 99499 UNLISTED E&M SERVICE: CPT | Mod: S$GLB,,, | Performed by: OBSTETRICS & GYNECOLOGY

## 2023-10-25 PROCEDURE — 96372 THER/PROPH/DIAG INJ SC/IM: CPT | Mod: S$GLB,,, | Performed by: OBSTETRICS & GYNECOLOGY

## 2023-10-25 PROCEDURE — 96372 PR INJECTION,THERAP/PROPH/DIAG2ST, IM OR SUBCUT: ICD-10-PCS | Mod: S$GLB,,, | Performed by: OBSTETRICS & GYNECOLOGY

## 2023-10-25 PROCEDURE — 99499 NO LOS: ICD-10-PCS | Mod: S$GLB,,, | Performed by: OBSTETRICS & GYNECOLOGY

## 2023-10-25 PROCEDURE — 58555 PR HYSTEROSCOPY,DX,SEP PROC: ICD-10-PCS | Mod: S$GLB,,, | Performed by: OBSTETRICS & GYNECOLOGY

## 2023-10-25 RX ORDER — KETOROLAC TROMETHAMINE 30 MG/ML
30 INJECTION, SOLUTION INTRAMUSCULAR; INTRAVENOUS
Status: COMPLETED | OUTPATIENT
Start: 2023-10-25 | End: 2023-10-25

## 2023-10-25 RX ADMIN — KETOROLAC TROMETHAMINE 30 MG: 30 INJECTION, SOLUTION INTRAMUSCULAR; INTRAVENOUS at 11:10

## 2023-10-25 NOTE — PROCEDURES
Procedures  OPERATIVE REPORT FOR OFFICE HYSTEROSCOPY                                                                               Date of Procedure: 10/25/2023                                                                               SURGEON: Marisol Allred MD                                                                                                               ASSISTANT: Ysabel Amezcua RN                                                                                                                  PREOPERATIVE DIAGNOSIS: Endometrial polyp                                                                                        POSTOPERATIVE DIAGNOSIS: Normal endometrial cavity                                                                               PROCEDURE:  Diagnostic Hysteroscopy                                                                               ANESTHESIA:  Cervical block: Lidocaine 1% 20 cc    BLOOD LOSS: minimal                                                                                                                               URINE OUTPUT: on call prior to procedure.                                                                                       IV FLUIDS:  none    COMPLICATIONS:  None    SPECIMINES: None      FINDINGS:  1) Normal tubal ostia bilaterally  normal uterine cavity  normal cervical canal  no evidence of fibroids or polyp      STATEMENT OF MEDICAL NECESSITY:  Patient is a 33 y.o. year old female who was found to have a possible endometrial polyp on pelvic US. Risks/benefits/alternatives/indications were discussed with the patient and she wished to proceed with the above stated procedure.                                                                                                                                                   PROCEDURE IN DETAIL:  After appropriate consents had been obtained and time out performed, the patient was taken to the  procedure room at which time a cervical and lower uterine block was performed using 20 cc of 1% lidocaine plain.  The patient was then placed in the lithotomy position, prepped in the appropriate sterile fashion for a vaginal procedure. A sterile speculum was put in place. A single tooth tenaculum was placed on the anterior lip of the cervix. The hysteroscope was placed into the endometrial cavity under direct visualization.  Both tubal ostia were clearly identified and the findings were noted above. No polyp was seen. The hysteroscope was removed. The tenaculum and speculum were then removed.     All counts were correct x 2 at the end of the procedure. The patient was out of lithotomy position. Minimal bleeding was noted. She tolerated the procedure well.    Marisol Allred MD  OB/GYN

## 2023-11-07 DIAGNOSIS — L21.9 SEBORRHEIC DERMATITIS, UNSPECIFIED: ICD-10-CM

## 2023-11-08 ENCOUNTER — CLINICAL SUPPORT (OUTPATIENT)
Dept: REHABILITATION | Facility: HOSPITAL | Age: 33
End: 2023-11-08
Payer: COMMERCIAL

## 2023-11-08 DIAGNOSIS — Z74.09 DECREASED STRENGTH, ENDURANCE, AND MOBILITY: Primary | ICD-10-CM

## 2023-11-08 DIAGNOSIS — M24.80 JOINT HYPEREXTENSIBILITY OF MULTIPLE SITES: ICD-10-CM

## 2023-11-08 DIAGNOSIS — R68.89 DECREASED STRENGTH, ENDURANCE, AND MOBILITY: Primary | ICD-10-CM

## 2023-11-08 DIAGNOSIS — R53.1 DECREASED STRENGTH, ENDURANCE, AND MOBILITY: Primary | ICD-10-CM

## 2023-11-08 DIAGNOSIS — R52 PAIN: ICD-10-CM

## 2023-11-08 PROCEDURE — 97110 THERAPEUTIC EXERCISES: CPT

## 2023-11-08 PROCEDURE — 97140 MANUAL THERAPY 1/> REGIONS: CPT

## 2023-11-08 PROCEDURE — 97112 NEUROMUSCULAR REEDUCATION: CPT

## 2023-11-08 NOTE — PROGRESS NOTES
[facility]   Physical Therapy Treatment Note        Name: Angela Lyle  Clinic Number: [id number]    Therapy Diagnosis:   Encounter Diagnoses   Name Primary?    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 She has continued to have generalized pain all over - more so today during exercise's in lower back at L/S junction.  Symptoms at lower back are improved post treatment session.    She was compliant with home exercise program.  Response to previous treatment: sore  Functional change: none noted    Pain: 4/10  Location: right shoulder bilateral , bilateral upper trapezial , Iliac crest area and left quadratus lumborum    OBJECTIVE     Objective Measures updated at progress report unless specified.       TREATMENT     Angela received the treatments listed below:      therapeutic exercises to develop strength, endurance, and core stabilization for 13 minutes including:    Intervention 11/8/2023  Parameters        Chin tucks x 1.5 min, 5 sec hold 1 sec rest   Scapula squeeze x 1.5 min, 5 sec hold 1 sec rest   Isometric adduction  x 3 min, 5 sec hold 1 sec rest   Supine clamshells x 3 min yellow band, 5 sec hold 1 sec rest                                      manual therapy techniques: Myofacial release and Soft tissue Mobilization were applied to the: bilateral upper and lower body for 15 minutes, including:    Manual Intervention 11/8/2023     Soft Tissue Mobilization x bilateral upper trapezius, periscapular musculature, subscapularis , gluteals, piriformis,    Joint Mobilizations x Sacral flexion   Mobilization with movement x Muscle energy techniques. Scapula tilts        Functional Dry Needling           neuromuscular re-education activities to improve: Balance, Coordination, Proprioception, and Posture for 10 minutes. The following activities were included:    Intervention 11/8/2023  Parameters   Shoulder Internal and External Rotation  x  with elbows at side move shoulders in a small range of ER (~30 degrees)/IR (~45 degrees), 1.5 min    Full Can to 90 degrees  x (or slightly less) up and down for 1 min     Bridging  x 1.5 min hold 5s/relax 1s    Supine Marches  x with abdominal bracing lift foot 1-2 in, hold 5s; 1.5 min   Prone hip extension  x 1 min hold each side for 5 sec/head in neutral                                    Angela participated in dynamic functional therapeutic activities to improve functional performance for 0  minutes, including:    Intervention 11/8/2023  Parameters                                                                Plan for Next Visit:    Level 2.0 MAT EX's  Bridges with ball squeeze, in top of bridge squeeze ball 5x then back down and rest 1s (goal 3 min)  Supine kick outs & pelvic neutral with abdominal bracing, hold leg for 5s, return to bent position and hold other leg out for 5 sec (OK to decrease amount leg kicks if chronic hip dislocator) (goal 3 min)  Prone Swimmer with arms at 80 ABD/90 elbow flex, hold opp UE/LE sides for 5s, then repeat on other side (goal 3 min)  Sit on therapy ball, hands on knees, Hold 3-5 min  Clamshell supine with green tubing (goal 3 min)  Ball squeeze (hip ADD) 3 min EVERY  DAY      Level 2 Neck, Mid Back and Upper Extremity Progression   Isometric Neck, 5s repeat all directions (forward, right side, backwards, left side for total of 4 min, add 20s per day until you reach 8 min  Chicken dance (shoulder abduction with elbows bent to 90 degrees), start at 1.5 min  (goal 3 min)  4 way wrist (flexion, extension, radial deviation, ulnar deviation), sitting in chair with 1-2 pillows on lap - standing for ulnar deviation, without weight - 1.5 min each  (goal 3 min each exercise)  Hand ball squeezes/tennis ball, hold 5s, rest 1s; 1.5 min  (goal 3 min)  Prone T, I, Y (90/90 position) with head supported in neutral position, hold 5s rest 1s total time 1.5 min  (goal 3 min) - alternate position over ball/quadruped        PATIENT EDUCATION AND HOME EXERCISES     Home Exercises Provided and Patient Education Provided     Education provided:   - extensive education during treatment session regarding positioning while sitting and supporting body in chair, technique and corrections with all interventions.    Written Home Exercises Provided: yes. Exercises were reviewed and Angela was able to demonstrate them prior to the end of the session.  Angela demonstrated good  understanding of the education provided. See EMR under Patient Instructions for exercises provided during therapy sessions      ASSESSMENT     Patient was able to tolerate progressions as noted and encouraged to participate with home exercise program and contact PT with any questions.    Angela Is progressing well towards her goals.   Pt prognosis is Good.     Pt will continue to benefit from skilled outpatient physical therapy to address the deficits listed in the problem list box on initial evaluation, provide pt/family education and to maximize pt's level of independence in the home and community environment.     Pt's spiritual, cultural and educational needs considered and pt agreeable to plan of care and goals.     Anticipated  barriers to physical therapy:  co-morbidities, chronicity of condition, adherence to treatment plan, coping style, and distance to travel for care    Goals:   Reviewed: 11/8/2023    Short Term Goals: In 6 weeks  Progress Date   1.Patient to be educated on HEP. [x] Progressing  [] MET   [] Not MET   [] Not tested     2.Patient to increase bilateral UE/LE strength by 1/2 grade, in order to improve endurance and increase ability to perform all functional activities for increased time.  [x] Progressing  [] MET   [] Not MET  [] Not tested     3.Patient to have pain less than 7/10 at worst, to improve QOL. [x] Progressing  [] MET   [] Not MET  [] Not tested     4.Patient to score the FOTO, to improve QOL. [x] Progressing  [] MET   [] Not MET  [] Not tested        Long Term Goals: In 12 weeks Progress Date   1.Patient to improve score on the FOTO predicted score or better, to improve QOL. [x] Progressing  [] MET   [] Not MET  [] Not tested     2. Patient to increase bilateral UE/LE strength to 4/5 or greater, in order to improve endurance and increase ability to perform all functional activities for increased time. [x] Progressing  [] MET   [] Not MET  [] Not tested     3. Patient to have decreased pain to 4/10 at worst, to improve QOL. [x] Progressing  [] MET   [] Not MET  [] Not tested     4. Patient to perform daily activities including work activity with only mild increased symptoms. [x] Progressing  [] MET   [] Not MET  [] Not tested          PLAN     Monitor response to today's treatment session and progress with Physical Therapy plan of care as indicated.    Plan of care Certification: 10/18/2023  to 1/18/24.     Outpatient Physical Therapy 1 times weekly for 12 weeks to include any combination of the following interventions: Aquatic Therapy, electrical stimulation (PRN), Gait Training, Manual Therapy, Neuromuscular Re-ed, Patient Education/Self Care, Therapeutic Activites, Therapeutic Exercise, and Dry  Dany Enriquez, PT

## 2023-11-09 RX ORDER — KETOCONAZOLE 20 MG/G
CREAM TOPICAL
Qty: 45 G | Refills: 3 | Status: SHIPPED | OUTPATIENT
Start: 2023-11-09 | End: 2023-12-26

## 2023-11-09 NOTE — TELEPHONE ENCOUNTER
Please see the attached refill request.    Last seen 10/2023       Seborrheic dermatitis, unspecified  -     pimecrolimus (ELIDEL) 1 % cream; Aaa bid prn flare to face, ear  Dispense: 30 g; Refill: 6  -     acetic acid-hydrocortisone (VOSOL-HC) otic solution; Apply 3-4 drops to ears bid prn flare  Dispense: 10 mL; Refill: 6  -     ketoconazole (NIZORAL) 2 % shampoo; Wash hair with medicated shampoo at least 2x/week - let sit on scalp at least 5 minutes prior to rinsing  Dispense: 120 mL; Refill: 6  Rotate otc medicated shampoo     Cherry angioma  These are benign vascular lesions that are inherited.  Treatment is not necessary.     Nevus  Discussed ABCDE's of nevi.  Monitor for new mole or moles that are becoming bigger, darker, irritated, or developing irregular borders. Brochure provided. Instructed patient to observe lesion(s) for changes and follow up in clinic if changes are noted. Patient to monitor skin at home for new or changing lesions.      Lentigo  This is a benign hyperpigmented sun induced lesion. Recommend daily sun protection/avoidance and use of at least SPF 30, broad spectrum sunscreen (OTC drug) will reduce the number of new lesions. Treatment of these benign lesions are considered cosmetic.  The nature of sun-induced photo-aging and skin cancers is discussed.  Sun avoidance, protective clothing, and the use of 30-SPF sunscreens is advised. Observe closely for skin damage/changes, and call if such occurs.     SK (seborrheic keratosis)  These are benign inherited growths without a malignant potential. Reassurance given to patient. No treatment is necessary.      History of dysplastic nevus  Area of previous atypical nevus/nevi examined. Site well healed with no signs of recurrence.     Total body skin examination performed today including at least 12 points as noted in physical examination. No lesions suspicious for atypical nevi noted. Monitor for new or changing nevi or pigment recurrence in the  scar              Follow up in about 1 year (around 10/16/2024) for TBSE.

## 2023-11-10 ENCOUNTER — LAB VISIT (OUTPATIENT)
Dept: LAB | Facility: OTHER | Age: 33
End: 2023-11-10
Attending: OBSTETRICS & GYNECOLOGY
Payer: COMMERCIAL

## 2023-11-10 DIAGNOSIS — Z01.82 ENCOUNTER FOR ALLERGY TESTING: ICD-10-CM

## 2023-11-10 DIAGNOSIS — Z11.9 SCREENING EXAMINATION FOR UNSPECIFIED INFECTIOUS DISEASE: ICD-10-CM

## 2023-11-10 DIAGNOSIS — Z11.8 SCREENING FOR CHLAMYDIAL DISEASE: Primary | ICD-10-CM

## 2023-11-10 DIAGNOSIS — Z11.3 SCREENING FOR GONORRHEA: ICD-10-CM

## 2023-11-10 DIAGNOSIS — Z11.59 ENCOUNTER FOR SCREENING FOR VIRAL DISEASE: ICD-10-CM

## 2023-11-10 DIAGNOSIS — Z31.49 INVESTIGATION AND TESTING FOR PROCREATION MANAGEMENT: ICD-10-CM

## 2023-11-10 LAB
ABO + RH BLD: NORMAL
ESTIMATED AVG GLUCOSE: 103 MG/DL (ref 68–131)
HBA1C MFR BLD: 5.2 % (ref 4–5.6)
HBV SURFACE AG SERPL QL IA: NORMAL
HCV AB SERPL QL IA: NORMAL
PROLACTIN SERPL IA-MCNC: 19 NG/ML (ref 5.2–26.5)
RPR SER QL: NORMAL
T4 FREE SERPL-MCNC: 1.13 NG/DL (ref 0.71–1.51)
THYROPEROXIDASE IGG SERPL-ACNC: <6 IU/ML
TSH SERPL DL<=0.005 MIU/L-ACNC: 1.14 UIU/ML (ref 0.4–4)

## 2023-11-10 PROCEDURE — 87491 CHLMYD TRACH DNA AMP PROBE: CPT | Performed by: OBSTETRICS & GYNECOLOGY

## 2023-11-10 PROCEDURE — 36415 COLL VENOUS BLD VENIPUNCTURE: CPT | Performed by: OBSTETRICS & GYNECOLOGY

## 2023-11-10 PROCEDURE — 86592 SYPHILIS TEST NON-TREP QUAL: CPT | Performed by: OBSTETRICS & GYNECOLOGY

## 2023-11-10 PROCEDURE — 83520 IMMUNOASSAY QUANT NOS NONAB: CPT | Performed by: OBSTETRICS & GYNECOLOGY

## 2023-11-10 PROCEDURE — 87538 HIV-2 PROBE&REVRSE TRNSCRIPJ: CPT | Performed by: OBSTETRICS & GYNECOLOGY

## 2023-11-10 PROCEDURE — 84443 ASSAY THYROID STIM HORMONE: CPT | Performed by: OBSTETRICS & GYNECOLOGY

## 2023-11-10 PROCEDURE — 86787 VARICELLA-ZOSTER ANTIBODY: CPT | Performed by: OBSTETRICS & GYNECOLOGY

## 2023-11-10 PROCEDURE — 83036 HEMOGLOBIN GLYCOSYLATED A1C: CPT | Performed by: OBSTETRICS & GYNECOLOGY

## 2023-11-10 PROCEDURE — 86803 HEPATITIS C AB TEST: CPT | Performed by: OBSTETRICS & GYNECOLOGY

## 2023-11-10 PROCEDURE — 84439 ASSAY OF FREE THYROXINE: CPT | Performed by: OBSTETRICS & GYNECOLOGY

## 2023-11-10 PROCEDURE — 86645 CMV ANTIBODY IGM: CPT | Performed by: OBSTETRICS & GYNECOLOGY

## 2023-11-10 PROCEDURE — 84146 ASSAY OF PROLACTIN: CPT | Performed by: OBSTETRICS & GYNECOLOGY

## 2023-11-10 PROCEDURE — 86762 RUBELLA ANTIBODY: CPT | Performed by: OBSTETRICS & GYNECOLOGY

## 2023-11-10 PROCEDURE — 87340 HEPATITIS B SURFACE AG IA: CPT | Performed by: OBSTETRICS & GYNECOLOGY

## 2023-11-10 PROCEDURE — 86376 MICROSOMAL ANTIBODY EACH: CPT | Performed by: OBSTETRICS & GYNECOLOGY

## 2023-11-10 PROCEDURE — 86901 BLOOD TYPING SEROLOGIC RH(D): CPT | Performed by: OBSTETRICS & GYNECOLOGY

## 2023-11-10 PROCEDURE — 80307 DRUG TEST PRSMV CHEM ANLYZR: CPT | Performed by: OBSTETRICS & GYNECOLOGY

## 2023-11-10 PROCEDURE — 87535 HIV-1 PROBE&REVERSE TRNSCRPJ: CPT | Performed by: OBSTETRICS & GYNECOLOGY

## 2023-11-12 PROBLEM — M24.9 HYPERMOBILITY OF JOINT: Status: ACTIVE | Noted: 2023-11-12

## 2023-11-12 PROBLEM — M24.80 JOINT HYPEREXTENSIBILITY OF MULTIPLE SITES: Status: ACTIVE | Noted: 2023-11-12

## 2023-11-12 PROBLEM — Z74.09 DECREASED STRENGTH, ENDURANCE, AND MOBILITY: Status: ACTIVE | Noted: 2023-11-12

## 2023-11-12 PROBLEM — R53.1 DECREASED STRENGTH, ENDURANCE, AND MOBILITY: Status: ACTIVE | Noted: 2023-11-12

## 2023-11-12 PROBLEM — R68.89 DECREASED STRENGTH, ENDURANCE, AND MOBILITY: Status: ACTIVE | Noted: 2023-11-12

## 2023-11-12 PROBLEM — R52 PAIN: Status: ACTIVE | Noted: 2023-11-12

## 2023-11-13 ENCOUNTER — LAB VISIT (OUTPATIENT)
Dept: LAB | Facility: OTHER | Age: 33
End: 2023-11-13
Attending: OBSTETRICS & GYNECOLOGY
Payer: COMMERCIAL

## 2023-11-13 DIAGNOSIS — Z11.9 SCREENING EXAMINATION FOR UNSPECIFIED INFECTIOUS DISEASE: ICD-10-CM

## 2023-11-13 DIAGNOSIS — Z31.49 INVESTIGATION AND TESTING FOR PROCREATION MANAGEMENT: ICD-10-CM

## 2023-11-13 DIAGNOSIS — Z11.59 ENCOUNTER FOR SCREENING FOR VIRAL DISEASE: ICD-10-CM

## 2023-11-13 DIAGNOSIS — Z11.3 SCREENING FOR GONORRHEA: ICD-10-CM

## 2023-11-13 DIAGNOSIS — Z01.82 ENCOUNTER FOR ALLERGY TESTING: ICD-10-CM

## 2023-11-13 DIAGNOSIS — Z11.8 SCREENING FOR CHLAMYDIAL DISEASE: ICD-10-CM

## 2023-11-13 LAB
AMPHETAMINES SERPL QL: NEGATIVE
BARBITURATES SERPL QL SCN: NEGATIVE
BENZODIAZ SERPL QL SCN: NEGATIVE
BZE SERPL QL: NEGATIVE
CARBOXYTHC SERPL QL SCN: NEGATIVE
CMV IGM SERPL IA-ACNC: <8 AU/ML
ETHANOL SERPL QL SCN: NEGATIVE
METHADONE SERPL QL SCN: NEGATIVE
OPIATES SERPL QL SCN: NEGATIVE
PCP SERPL QL SCN: NEGATIVE
PROPOXYPH SERPL QL: NEGATIVE
RUBV IGG SER-ACNC: 32.8 IU/ML
RUBV IGG SER-IMP: REACTIVE
VARICELLA INTERPRETATION: POSITIVE
VARICELLA ZOSTER IGG: 977.9 AU/ML

## 2023-11-13 PROCEDURE — 86644 CMV ANTIBODY: CPT | Performed by: OBSTETRICS & GYNECOLOGY

## 2023-11-13 PROCEDURE — 36415 COLL VENOUS BLD VENIPUNCTURE: CPT | Performed by: OBSTETRICS & GYNECOLOGY

## 2023-11-14 LAB — CMV IGG SERPL QL IA: NORMAL

## 2023-11-16 LAB
C TRACH DNA SPEC QL NAA+PROBE: NOT DETECTED
HIV 2 RNA SERPL QL NAA+PROBE: NON REACTIVE
HIV1 RNA SERPL QL NAA+PROBE: NON REACTIVE
MIS SERPL-MCNC: 4.3 NG/ML (ref 0.58–8.1)
N GONORRHOEA DNA SPEC QL NAA+PROBE: NOT DETECTED

## 2023-11-21 ENCOUNTER — CLINICAL SUPPORT (OUTPATIENT)
Dept: REHABILITATION | Facility: HOSPITAL | Age: 33
End: 2023-11-21
Payer: COMMERCIAL

## 2023-11-21 DIAGNOSIS — Z74.09 DECREASED STRENGTH, ENDURANCE, AND MOBILITY: Primary | ICD-10-CM

## 2023-11-21 DIAGNOSIS — R68.89 DECREASED STRENGTH, ENDURANCE, AND MOBILITY: Primary | ICD-10-CM

## 2023-11-21 DIAGNOSIS — M24.80 JOINT HYPEREXTENSIBILITY OF MULTIPLE SITES: ICD-10-CM

## 2023-11-21 DIAGNOSIS — R52 PAIN: ICD-10-CM

## 2023-11-21 DIAGNOSIS — R53.1 DECREASED STRENGTH, ENDURANCE, AND MOBILITY: Primary | ICD-10-CM

## 2023-11-21 PROCEDURE — 97112 NEUROMUSCULAR REEDUCATION: CPT

## 2023-11-21 PROCEDURE — 97110 THERAPEUTIC EXERCISES: CPT

## 2023-11-21 PROCEDURE — 97140 MANUAL THERAPY 1/> REGIONS: CPT

## 2023-11-21 NOTE — PROGRESS NOTES
[facility]   Physical Therapy Treatment Note        Name: Angela Lyle  Clinic Number: [id number]    Therapy Diagnosis:   Encounter Diagnoses   Name Primary?    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 She is having more back pain after driving back and forth from [place] yesterday. She rates her pain at 6/10 today.    She was compliant with home exercise program.  Response to previous treatment: sore  Functional change: none noted    Pain: 6/10  Location: right shoulder bilateral , bilateral upper trapezial , Iliac crest area and left quadratus lumborum    OBJECTIVE     Objective Measures updated at progress report unless specified.       TREATMENT     Angela received the treatments listed below:      therapeutic exercises to develop strength, endurance, and core stabilization for 13 minutes including:    Intervention 11/21/2023  Parameters        Chin tucks x 2 min 50 sec, 5 sec hold 1 sec rest   Scapula squeeze   (no shoulder extension) x 1.5 min, 5 sec hold 1 sec rest   Isometric adduction  x 3 min, 5 sec hold 1 sec rest   Supine clamshells x 3 min red band, 5 sec hold 1 sec rest                                     manual therapy techniques:  Myofacial release and Soft tissue Mobilization were applied to the: bilateral upper and lower body for 15 minutes, including:    Manual Intervention 11/21/2023     Soft Tissue Mobilization x bilateral upper trapezius, periscapular musculature, subscapularis , gluteals, piriformis,    Joint Mobilizations x Sacral flexion   Mobilization with movement x Muscle energy techniques. Scapula tilts        Functional Dry Needling           neuromuscular re-education activities to improve: Balance, Coordination, Proprioception, and Posture for 15 minutes. The following activities were included:    Intervention 11/21/2023  Parameters   Shoulder Internal and External Rotation  x  with elbows at side move shoulders in a small range of ER (~30 degrees)/IR (~45 degrees), 2 min 50 sec    Full Can to 90 degrees  x (or slightly less) up and down,  2 min 50 sec     Bridging  x 2 min 50 sec hold 5s/relax 1s    Supine Marches  x with abdominal bracing lift foot 1-2 in, hold 5s; 2 min 50 sec   Prone hip extension  x 2 min 50 sec hold each side for 5 sec/head in neutral                                    Angela participated in dynamic functional therapeutic activities to improve functional performance for 0  minutes, including:    Intervention 11/21/2023  Parameters                                                                Plan for Next Visit:    Level 2.0 MAT EX's  Bridges with ball squeeze, in top of bridge squeeze ball 5x then back down and rest 1s (goal 3 min)  Supine kick outs & pelvic neutral with abdominal bracing, hold leg for 5s, return to bent position and hold other leg out for 5 sec (OK to decrease amount leg kicks if chronic hip dislocator) (goal 3 min)  Prone Swimmer with arms at 80 ABD/90 elbow flex, hold opp UE/LE sides for 5s, then repeat on other side (goal 3 min)  Sit on therapy ball, hands on knees, Hold 3-5 min  Clamshell supine with green tubing (goal 3 min)  Ball squeeze (hip ADD) 3 min EVERY DAY      Level 2  Neck, Mid Back and Upper Extremity Progression   Isometric Neck, 5s repeat all directions (forward, right side, backwards, left side for total of 4 min, add 20s per day until you reach 8 min  Chicken dance (shoulder abduction with elbows bent to 90 degrees), start at 1.5 min  (goal 3 min)  4 way wrist (flexion, extension, radial deviation, ulnar deviation), sitting in chair with 1-2 pillows on lap - standing for ulnar deviation, without weight - 1.5 min each  (goal 3 min each exercise)  Hand ball squeezes/tennis ball, hold 5s, rest 1s; 1.5 min  (goal 3 min)  Prone T, I, Y (90/90 position) with head supported in neutral position, hold 5s rest 1s total time 1.5 min  (goal 3 min) - alternate position over ball/quadruped        PATIENT EDUCATION AND HOME EXERCISES     Home Exercises Provided and Patient Education Provided     Education provided:   - extensive education during treatment session regarding positioning while sitting and supporting body in chair, technique and corrections with all interventions.    Written Home Exercises Provided: yes. Exercises were reviewed and Angela was able to demonstrate them prior to the end of the session.  Angela demonstrated good  understanding of the education provided. See EMR under Patient Instructions for exercises provided during therapy sessions      ASSESSMENT     Patient was able to tolerate progressions as noted, cues and feedback for all interventions regarding avoidance of substitution patterns and cues for technique. Encouraged home exercise program and progressions 10 sec as able. Education on progressions as able, with handouts reviewed.    Angela Is progressing well towards her goals.   Pt prognosis is Good.     Pt will continue to benefit from skilled outpatient physical therapy to address the deficits listed in the problem list box on initial evaluation, provide pt/family education and to maximize pt's level of independence in the home and community environment.      Pt's spiritual, cultural and educational needs considered and pt agreeable to plan of care and goals.     Anticipated barriers to physical therapy:  co-morbidities, chronicity of condition, adherence to treatment plan, coping style, and distance to travel for care    Goals:   Reviewed: 11/21/2023    Short Term Goals: In 6 weeks  Progress Date   1.Patient to be educated on HEP. [x] Progressing  [] MET   [] Not MET   [] Not tested     2.Patient to increase bilateral UE/LE strength by 1/2 grade, in order to improve endurance and increase ability to perform all functional activities for increased time.  [x] Progressing  [] MET   [] Not MET  [] Not tested     3.Patient to have pain less than 7/10 at worst, to improve QOL. [x] Progressing  [] MET   [] Not MET  [] Not tested     4.Patient to score the FOTO, to improve QOL. [x] Progressing  [] MET   [] Not MET  [] Not tested        Long Term Goals: In 12 weeks Progress Date   1.Patient to improve score on the FOTO predicted score or better, to improve QOL. [x] Progressing  [] MET   [] Not MET  [] Not tested     2. Patient to increase bilateral UE/LE strength to 4/5 or greater, in order to improve endurance and increase ability to perform all functional activities for increased time. [x] Progressing  [] MET   [] Not MET  [] Not tested     3. Patient to have decreased pain to 4/10 at worst, to improve QOL. [x] Progressing  [] MET   [] Not MET  [] Not tested     4. Patient to perform daily activities including work activity with only mild increased symptoms. [x] Progressing  [] MET   [] Not MET  [] Not tested          PLAN     Monitor response to today's treatment session and progress with Physical Therapy plan of care as indicated.    Plan of care Certification: 10/18/2023  to 1/18/24.     Outpatient Physical Therapy 1 times weekly for 12 weeks to include any combination of the following interventions: Aquatic Therapy, electrical stimulation (PRN), Gait Training, Manual  Therapy, Neuromuscular Re-ed, Patient Education/Self Care, Therapeutic Activites, Therapeutic Exercise, and Dry Needling    Daisha Enriquez, PT

## 2023-12-01 ENCOUNTER — PATIENT MESSAGE (OUTPATIENT)
Dept: REHABILITATION | Facility: HOSPITAL | Age: 33
End: 2023-12-01
Payer: COMMERCIAL

## 2023-12-06 ENCOUNTER — PATIENT MESSAGE (OUTPATIENT)
Dept: REHABILITATION | Facility: HOSPITAL | Age: 33
End: 2023-12-06
Payer: COMMERCIAL

## 2023-12-12 ENCOUNTER — CLINICAL SUPPORT (OUTPATIENT)
Dept: REHABILITATION | Facility: HOSPITAL | Age: 33
End: 2023-12-12
Payer: COMMERCIAL

## 2023-12-12 DIAGNOSIS — R52 PAIN: ICD-10-CM

## 2023-12-12 DIAGNOSIS — R53.1 DECREASED STRENGTH, ENDURANCE, AND MOBILITY: Primary | ICD-10-CM

## 2023-12-12 DIAGNOSIS — M24.80 JOINT HYPEREXTENSIBILITY OF MULTIPLE SITES: ICD-10-CM

## 2023-12-12 DIAGNOSIS — Z74.09 DECREASED STRENGTH, ENDURANCE, AND MOBILITY: Primary | ICD-10-CM

## 2023-12-12 DIAGNOSIS — R68.89 DECREASED STRENGTH, ENDURANCE, AND MOBILITY: Primary | ICD-10-CM

## 2023-12-12 PROCEDURE — 97140 MANUAL THERAPY 1/> REGIONS: CPT

## 2023-12-12 PROCEDURE — 97112 NEUROMUSCULAR REEDUCATION: CPT

## 2023-12-12 PROCEDURE — 97110 THERAPEUTIC EXERCISES: CPT

## 2023-12-12 NOTE — PROGRESS NOTES
[facility]   Physical Therapy Treatment Note & PROGRESS NOTE       Name: Angela Lyle  Clinic Number: [id number]    Therapy Diagnosis:   Encounter Diagnoses   Name Primary?    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 We discussed progression and that if she is having a difficult day OK to perform decreased level of exercises and/or adjust time as needed. Discussed BFR and her right shoulder strength and patient agreeable to trial.    She was compliant with home exercise program.  Response to previous treatment: sore  Functional change: none noted    Pain: 6/10  Location: right shoulder bilateral , bilateral upper trapezial , Iliac crest area and left quadratus lumborum    OBJECTIVE     Objective Measures updated at progress report unless specified.     Strength:     U/E MMT Right Left Pain/Dysfunction with Movement 12/12/23   Cervical Side Bending 3+/5 3+/5 + trunk shift 4-/5, decreased trunk shift   Upper trapezial  3+/5 3+/5 + trunk shift 4-/5, decreased trunk shift   Shoulder Abduction 3+/5 3+/5 + trunk shift 4-/5, decreased trunk shift   Shoulder IR 3+/5 3+/5 + trunk  shift 4-/5, decreased trunk shift   Shoulder ER    3+/5 3+/5 + trunk shift 4-/5, decreased trunk shift   Elbow Flexion  3+/5 3+/5 + trunk shift 4-/5, decreased trunk shift   Elbow Extension 3+/5 3+/5 + trunk shift 4-/5, decreased trunk shift   Wrist Extension 3+/5 3+/5   4-/5, decreased trunk shift   4th/5th Finger Intrinsics 3+/5 3+/5   NT    and   L/E MMT Right  (spine) Left Pain/Dysfunction with Movement 12/12/23   Hip Flexion  3+/5 3+/5 + trunk shift 4-/5, decreased trunk shift   Knee Extension 3+/5 3+/5 + trunk shift 4-/5, decreased trunk shift   Knee Flexion 3+/5 3+/5 + trunk shift 4-/5, decreased trunk shift   Hip IR 3+/5 3+/5 + trunk shift 4-/5, decreased trunk shift   Hip ER 3+/5 3+/5 + trunk shift 4-/5, decreased trunk shift   Ankle DF 3+/5 3+/5 + trunk shift 4/5, decreased trunk shift   Ankle PF 3+/5 3+/5 + trunk shift 4/5, decreased trunk shift       FOTO:        TREATMENT     Angela received the treatments listed below:          therapeutic exercises to develop strength, endurance, and core stabilization for 15 minutes including:    Intervention 12/12/2023  Parameters        Chin tucks x 2 min 50 sec, 5 sec hold 1 sec rest   Scapula squeeze gentle shoulder extension x 2 min, 5 sec hold 1 sec rest  On towel rolls   Isometric adduction  x 3 min, 5 sec hold 1 sec rest   Supine clamshells x 3 min red band, 5 sec hold 1 sec rest                  Re-assessment x                  manual therapy techniques: Myofacial release and Soft tissue Mobilization were applied to the: bilateral upper and lower body for 15 minutes, including:    Manual Intervention 12/12/2023     Soft Tissue Mobilization x bilateral upper trapezius, periscapular musculature, subscapularis , gluteals, piriformis,    Joint Mobilizations x Sacral flexion   Mobilization with movement x Muscle energy techniques. Scapula tilts        Functional Dry Needling           neuromuscular re-education activities to improve: Balance, Coordination,  Proprioception, and Posture for 16 minutes. The following activities were included:    Blood flow restriction  Performed  Reps: 30, 15, 15, 15  (75 total) with 30s rest between reps   Supine ER/IR shoulder  x Body Weight   AROM biceps curls x Body Weight   Shoulder scaption  Body Weight     Patient received Blood Flow Restriction after being screened for contraindications, assessed for precautions, and educated in the benefits/risks associated with the use of blood flow restriction training.  Right Upper Extremity at 40-50% occlusion @ proximal shoulder -- Cuff Size: red  8 minutes or less of occlusion for each exercise was performed, with deflations between each exercise for a minimum of 2 minute.    Intervention 12/12/2023  Parameters   Shoulder Internal and External Rotation  WITH BFR Supine small ROM   Full Can to 90 degrees   (or slightly less) up and down,  2 min 50 sec     Bridging  - 2 min 50 sec hold 5s/relax 1s    Supine Marches  - with abdominal bracing lift foot 1-2 in, hold 5s; 2 min 50 sec   Prone hip extension  - 2 min 50 sec hold each side for 5 sec/head in neutral    Bicep curls  WITH BFR sitting        Shoulder scaption   Add BFR                    Angela participated in dynamic functional therapeutic activities to improve functional performance for 0  minutes, including:    Intervention 12/12/2023  Parameters                                                                Plan for Next Visit:    Level 2.0 MAT EX's  Bridges with ball squeeze, in top of bridge squeeze ball 5x then back down and rest 1s (goal 3 min)  Supine kick outs & pelvic neutral with abdominal bracing, hold leg for 5s, return to bent position and hold other leg out for 5 sec (OK to decrease amount leg kicks if chronic hip dislocator) (goal 3 min)  Prone Swimmer with arms at 80 ABD/90 elbow flex, hold opp UE/LE sides for 5s, then repeat on other side (goal 3 min)  Sit on therapy ball, hands on knees, Hold 3-5 min  Clamshell supine  with green tubing (goal 3 min)  Ball squeeze (hip ADD) 3 min EVERY DAY      Level 2 Neck, Mid Back and Upper Extremity Progression   Isometric Neck, 5s repeat all directions (forward, right side, backwards, left side for total of 4 min, add 20s per day until you reach 8 min  Chicken dance (shoulder abduction with elbows bent to 90 degrees), start at 1.5 min  (goal 3 min)  4 way wrist (flexion, extension, radial deviation, ulnar deviation), sitting in chair with 1-2 pillows on lap - standing for ulnar deviation, without weight - 1.5 min each  (goal 3 min each exercise)  Hand ball squeezes/tennis ball, hold 5s, rest 1s; 1.5 min  (goal 3 min)  Prone T, I, Y (90/90 position) with head supported in neutral position, hold 5s rest 1s total time 1.5 min  (goal 3 min) - alternate position over ball/quadruped        PATIENT EDUCATION AND HOME EXERCISES     Home Exercises Provided and Patient Education Provided     Education provided:   - extensive education during treatment session regarding positioning while sitting and supporting body in chair, technique and corrections with all interventions.    Written Home Exercises Provided: yes. Exercises were reviewed and Angela was able to demonstrate them prior to the end of the session.  Angela demonstrated good  understanding of the education provided. See EMR under Patient Instructions for exercises provided during therapy sessions      ASSESSMENT   Blood flow restriction was performed to the right upper extremity today. Patient tolerated this occlusion pressure very well with only reports of significant muscle fatigue but no pain.    Patient has made progress with strength testing and has reported increased tolerance to strengthening and home exercise program since starting therapy. She has made a small improvement on the FOTO and is reporting overall improvement in function. Encouraged home exercise program.   .     Angela Is progressing well towards her goals.   Pt prognosis  is Good.     Pt will continue to benefit from skilled outpatient physical therapy to address the deficits listed in the problem list box on initial evaluation, provide pt/family education and to maximize pt's level of independence in the home and community environment.     Pt's spiritual, cultural and educational needs considered and pt agreeable to plan of care and goals.     Anticipated barriers to physical therapy:  co-morbidities, chronicity of condition, adherence to treatment plan, coping style, and distance to travel for care    Goals:   Reviewed: 12/12/2023    Short Term Goals: In 6 weeks  Progress Date   1.Patient to be educated on HEP. [x] Progressing  [] MET   [] Not MET   [] Not tested     2.Patient to increase bilateral UE/LE strength by 1/2 grade, in order to improve endurance and increase ability to perform all functional activities for increased time.  [x] Progressing  [] MET   [] Not MET  [] Not tested     3.Patient to have pain less than 7/10 at worst, to improve QOL. [x] Progressing  [] MET   [] Not MET  [] Not tested     4.Patient to score the FOTO, to improve QOL. [x] Progressing  [] MET   [] Not MET  [] Not tested        Long Term Goals: In 12 weeks Progress Date   1.Patient to improve score on the FOTO predicted score or better, to improve QOL. [x] Progressing  [] MET   [] Not MET  [] Not tested     2. Patient to increase bilateral UE/LE strength to 4/5 or greater, in order to improve endurance and increase ability to perform all functional activities for increased time. [x] Progressing  [] MET   [] Not MET  [] Not tested     3. Patient to have decreased pain to 4/10 at worst, to improve QOL. [x] Progressing  [] MET   [] Not MET  [] Not tested     4. Patient to perform daily activities including work activity with only mild increased symptoms. [x] Progressing  [] MET   [] Not MET  [] Not tested          PLAN     Monitor response to today's treatment session and progress with Physical Therapy  plan of care as indicated.    Plan of care Certification: 10/18/2023  to 1/18/24.     Outpatient Physical Therapy 1 times weekly for 12 weeks to include any combination of the following interventions: Aquatic Therapy, electrical stimulation (PRN), Gait Training, Manual Therapy, Neuromuscular Re-ed, Patient Education/Self Care, Therapeutic Activites, Therapeutic Exercise, and Dry Needling    Daisha Enriquez PT

## 2023-12-15 ENCOUNTER — PATIENT MESSAGE (OUTPATIENT)
Dept: REHABILITATION | Facility: HOSPITAL | Age: 33
End: 2023-12-15
Payer: COMMERCIAL

## 2023-12-19 ENCOUNTER — PATIENT MESSAGE (OUTPATIENT)
Dept: OBSTETRICS AND GYNECOLOGY | Facility: CLINIC | Age: 33
End: 2023-12-19
Payer: COMMERCIAL

## 2023-12-26 ENCOUNTER — OFFICE VISIT (OUTPATIENT)
Dept: INTERNAL MEDICINE | Facility: CLINIC | Age: 33
End: 2023-12-26
Payer: COMMERCIAL

## 2023-12-26 VITALS
HEART RATE: 80 BPM | SYSTOLIC BLOOD PRESSURE: 111 MMHG | OXYGEN SATURATION: 100 % | WEIGHT: 99.19 LBS | BODY MASS INDEX: 16.93 KG/M2 | DIASTOLIC BLOOD PRESSURE: 58 MMHG | HEIGHT: 64 IN

## 2023-12-26 DIAGNOSIS — F32.5 MAJOR DEPRESSIVE DISORDER, SINGLE EPISODE IN FULL REMISSION: ICD-10-CM

## 2023-12-26 DIAGNOSIS — Q79.62 HYPERMOBILE EHLERS-DANLOS SYNDROME: ICD-10-CM

## 2023-12-26 DIAGNOSIS — Z00.00 ROUTINE HEALTH MAINTENANCE: Primary | ICD-10-CM

## 2023-12-26 PROCEDURE — 99385 PR PREVENTIVE VISIT,NEW,18-39: ICD-10-PCS | Mod: S$GLB,,, | Performed by: FAMILY MEDICINE

## 2023-12-26 PROCEDURE — 3078F PR MOST RECENT DIASTOLIC BLOOD PRESSURE < 80 MM HG: ICD-10-PCS | Mod: CPTII,S$GLB,, | Performed by: FAMILY MEDICINE

## 2023-12-26 PROCEDURE — 3074F PR MOST RECENT SYSTOLIC BLOOD PRESSURE < 130 MM HG: ICD-10-PCS | Mod: CPTII,S$GLB,, | Performed by: FAMILY MEDICINE

## 2023-12-26 PROCEDURE — 3074F SYST BP LT 130 MM HG: CPT | Mod: CPTII,S$GLB,, | Performed by: FAMILY MEDICINE

## 2023-12-26 PROCEDURE — 3008F BODY MASS INDEX DOCD: CPT | Mod: CPTII,S$GLB,, | Performed by: FAMILY MEDICINE

## 2023-12-26 PROCEDURE — 1159F MED LIST DOCD IN RCRD: CPT | Mod: CPTII,S$GLB,, | Performed by: FAMILY MEDICINE

## 2023-12-26 PROCEDURE — 3078F DIAST BP <80 MM HG: CPT | Mod: CPTII,S$GLB,, | Performed by: FAMILY MEDICINE

## 2023-12-26 PROCEDURE — 99385 PREV VISIT NEW AGE 18-39: CPT | Mod: S$GLB,,, | Performed by: FAMILY MEDICINE

## 2023-12-26 PROCEDURE — 99999 PR PBB SHADOW E&M-EST. PATIENT-LVL III: ICD-10-PCS | Mod: PBBFAC,,, | Performed by: FAMILY MEDICINE

## 2023-12-26 PROCEDURE — 3008F PR BODY MASS INDEX (BMI) DOCUMENTED: ICD-10-PCS | Mod: CPTII,S$GLB,, | Performed by: FAMILY MEDICINE

## 2023-12-26 PROCEDURE — 99999 PR PBB SHADOW E&M-EST. PATIENT-LVL III: CPT | Mod: PBBFAC,,, | Performed by: FAMILY MEDICINE

## 2023-12-26 PROCEDURE — 1159F PR MEDICATION LIST DOCUMENTED IN MEDICAL RECORD: ICD-10-PCS | Mod: CPTII,S$GLB,, | Performed by: FAMILY MEDICINE

## 2023-12-26 PROCEDURE — 3044F PR MOST RECENT HEMOGLOBIN A1C LEVEL <7.0%: ICD-10-PCS | Mod: CPTII,S$GLB,, | Performed by: FAMILY MEDICINE

## 2023-12-26 PROCEDURE — 3044F HG A1C LEVEL LT 7.0%: CPT | Mod: CPTII,S$GLB,, | Performed by: FAMILY MEDICINE

## 2023-12-26 RX ORDER — BETAXOLOL 10 MG/1
5 TABLET, FILM COATED ORAL DAILY
COMMUNITY

## 2023-12-26 NOTE — PROGRESS NOTES
Subjective:       Patient ID: Angela Lyle is a 33 y.o. female.    Chief Complaint: Establish Care    HPI  Came in today to establish care.  Has POTS and Jori Danlos.  Continues to follow-up with Cardiology who requested defer labs on annual basis.  She is wanting me to put in the orders for those as well as updated echo.  Her last echo was in 2019.  Was mostly normal at that time although did have some bowing of the mitral valve.    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      All of your core healthy metrics are met.      Social History     Social History Narrative        Works as L&D RN at [employer]    Lives with roommates in apartment in [place].       Family History   Problem Relation Age of Onset    Arthritis Mother         back surgery    Fibromyalgia Mother     Depression Mother     Hypertension Mother     Miscarriages / Stillbirths Mother     Hyperlipidemia Father     Hearing loss Father     Heart disease Father     Arthritis Maternal Grandmother     Hearing loss Maternal Grandmother     Hypertension Maternal Grandmother     Stroke Maternal Grandmother     Heart disease Paternal Grandfather     Diabetes Paternal Grandfather     Hearing loss Paternal Grandfather     Cancer Maternal Aunt     Early death Maternal Grandfather     Heart disease Maternal Grandfather     Hearing loss Paternal Grandmother     Stroke Paternal Grandmother     Amblyopia Neg Hx     Blindness Neg Hx     Cataracts Neg Hx     Glaucoma Neg Hx     Macular degeneration Neg Hx     Retinal detachment Neg Hx     Strabismus Neg Hx     Melanoma Neg Hx        Current Outpatient Medications:     acetic acid-hydrocortisone (VOSOL-HC) otic solution, Apply 3-4 drops to ears bid prn flare, Disp: 10 mL, Rfl: 6    betaxoloL (KERLONE) 10 mg Tab, Take 5 mg by mouth once daily., Disp: , Rfl:     ergocalciferol, vitamin D2, 2,000 unit Tab, Take 2,000 Units by mouth once daily., Disp: , Rfl:     ketoconazole (NIZORAL) 2 % shampoo, Wash hair with  "medicated shampoo at least 2x/week - let sit on scalp at least 5 minutes prior to rinsing, Disp: 120 mL, Rfl: 6    multivitamin capsule, Take 1 capsule by mouth once daily., Disp: , Rfl:     triamcinolone acetonide 0.1% (KENALOG) 0.1 % cream, Aaa qd- bid prn flare to hands. Not more than 2 weeks straight in the same location. Avoid use on face and groin, Disp: 30 g, Rfl: 3    inositoL-D chiro inositoL 2,000-50 mg PwPk, , Disp: , Rfl:     Review of Systems   Constitutional:  Negative for chills and fever.   Respiratory:  Negative for cough and shortness of breath.    Cardiovascular:  Negative for chest pain.   Gastrointestinal:  Negative for abdominal pain.   Skin:  Negative for rash.   Neurological:  Negative for dizziness.       Objective:   BP (!) 111/58 (BP Location: Left arm, Patient Position: Sitting)   Pulse 80   Ht 5' 4" (1.626 m)   Wt 45 kg (99 lb 3.3 oz)   SpO2 100%   BMI 17.03 kg/m²      Physical Exam  Vitals reviewed.   Constitutional:       Appearance: She is well-developed.   HENT:      Head: Normocephalic and atraumatic.   Eyes:      Conjunctiva/sclera: Conjunctivae normal.   Cardiovascular:      Rate and Rhythm: Normal rate.   Pulmonary:      Effort: Pulmonary effort is normal. No respiratory distress.   Skin:     General: Skin is warm and dry.      Findings: No rash.   Neurological:      Mental Status: She is alert and oriented to person, place, and time.      Coordination: Coordination normal.   Psychiatric:         Behavior: Behavior normal.         Assessment & Plan     Problem List Items Addressed This Visit          Psychiatric    Major depressive disorder, single episode in full remission    Current Assessment & Plan     Managing well without meds.            Immunology/Multi System    Hypermobile Jori-Danlos syndrome    Relevant Orders    Echo       Other    Routine health maintenance - Primary    Relevant Orders    CBC Auto Differential    IRON AND TIBC    FERRITIN    FOLATE    Lipid " Panel    Magnesium    TSH    VITAMIN B1    VITAMIN B12    VITAMIN B6    Misc Sendout Test, Blood vitamin D for osteopenia         Immunizations Administered on Date of Encounter - 12/26/2023       No immunizations on file.             No follow-ups on file.      Disclaimer:  This note may have been prepared using voice recognition software, it may have not been extensively proofed, as such there could be errors within the text such as sound alike errors.

## 2023-12-28 ENCOUNTER — CLINICAL SUPPORT (OUTPATIENT)
Dept: REHABILITATION | Facility: HOSPITAL | Age: 33
End: 2023-12-28
Payer: COMMERCIAL

## 2023-12-28 DIAGNOSIS — M24.80 JOINT HYPEREXTENSIBILITY OF MULTIPLE SITES: ICD-10-CM

## 2023-12-28 DIAGNOSIS — Z74.09 DECREASED STRENGTH, ENDURANCE, AND MOBILITY: Primary | ICD-10-CM

## 2023-12-28 DIAGNOSIS — R53.1 DECREASED STRENGTH, ENDURANCE, AND MOBILITY: Primary | ICD-10-CM

## 2023-12-28 DIAGNOSIS — R68.89 DECREASED STRENGTH, ENDURANCE, AND MOBILITY: Primary | ICD-10-CM

## 2023-12-28 DIAGNOSIS — R52 PAIN: ICD-10-CM

## 2023-12-28 PROCEDURE — 97140 MANUAL THERAPY 1/> REGIONS: CPT

## 2023-12-28 PROCEDURE — 97112 NEUROMUSCULAR REEDUCATION: CPT

## 2023-12-28 NOTE — PROGRESS NOTES
[facility]   Physical Therapy Treatment Note        Name: Angela Lyle  Clinic Number: [id number]    Therapy Diagnosis:   Encounter Diagnoses   Name Primary?    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  She is having more pain in her left shoulder today. She does report that she had no adverse events after BFR last treatment session. Reports that it feels easier to do exercises with the BFR.   Left hip pain - deep inside. Minimal relief post SI muscle energy techniques. Discussed next progression with home exercise program and patient will progress herself per book. [facility]    She was compliant with home exercise program.  Response to previous treatment: sore  Functional change: none noted    Pain: 5/10  Location: right shoulder bilateral , bilateral upper trapezial , Iliac crest area and left quadratus lumborum    OBJECTIVE     Objective Measures updated at progress report unless specified.     TREATMENT     Angela received the treatments listed below:        therapeutic exercises to develop strength, endurance, and core stabilization for 0 minutes  including:    Intervention 12/28/2023  Parameters        Chin tucks - 2 min 50 sec, 5 sec hold 1 sec rest   Scapula squeeze gentle shoulder extension - 2 min, 5 sec hold 1 sec rest  On towel rolls   Isometric adduction  - 3 min, 5 sec hold 1 sec rest   Supine clamshells - 3 min red band, 5 sec hold 1 sec rest                  Re-assessment                   manual therapy techniques: Myofacial release and Soft tissue Mobilization were applied to the: bilateral upper and lower body for 10 minutes, including:    Manual Intervention 12/28/2023     Soft Tissue Mobilization x bilateral upper trapezius, periscapular musculature, subscapularis , gluteals, piriformis,    Joint Mobilizations  Sacral flexion   Mobilization with movement x Muscle energy techniques. Scapular tilts        Functional Dry Needling           neuromuscular re-education activities to improve: Balance, Coordination, Proprioception, and Posture for 48 minutes. The following activities were included: each upper extremity with BFR    Blood flow restriction  Performed  Reps: 30, 15, 15, 15  (75 total) with 30s rest between reps   Supine ER/IR shoulder  x Body Weight   AROM biceps curls x Body Weight   Shoulder scaption x Body Weight     Patient received Blood Flow Restriction after being screened for contraindications, assessed for precautions, and educated in the benefits/risks associated with the use of blood flow restriction training.  Right Upper Extremity at 40-50% occlusion @ proximal shoulder -- Cuff Size: red  8 minutes or less of occlusion for each exercise was performed, with deflations between each exercise for a minimum of 2 minute.    Intervention 12/28/2023  Parameters   Shoulder Internal and External Rotation  WITH BFR Supine small ROM   Full Can to 90 degrees   (or slightly less) up and down,  2 min 50 sec     Bridging  - 2 min 50 sec hold 5s/relax 1s    Supine Marches  - with abdominal bracing lift foot 1-2 in, hold 5s; 2 min 50 sec    Prone hip extension  - 2 min 50 sec hold each side for 5 sec/head in neutral    Bicep curls  WITH BFR sitting        Shoulder scaption  WITH BFR sitting                    Angela participated in dynamic functional therapeutic activities to improve functional performance for 0  minutes, including:    Intervention 12/28/2023  Parameters                                                                Plan for Next Visit:    Level 2.0 MAT EX's  Bridges with ball squeeze, in top of bridge squeeze ball 5x then back down and rest 1s (goal 3 min)  Supine kick outs & pelvic neutral with abdominal bracing, hold leg for 5s, return to bent position and hold other leg out for 5 sec (OK to decrease amount leg kicks if chronic hip dislocator) (goal 3 min)  Prone Swimmer with arms at 80 ABD/90 elbow flex, hold opp UE/LE sides for 5s, then repeat on other side (goal 3 min)  Sit on therapy ball, hands on knees, Hold 3-5 min  Clamshell supine with green tubing (goal 3 min)  Ball squeeze (hip ADD) 3 min EVERY DAY      Level 2 Neck, Mid Back and Upper Extremity Progression   Isometric Neck, 5s repeat all directions (forward, right side, backwards, left side for total of 4 min, add 20s per day until you reach 8 min  Chicken dance (shoulder abduction with elbows bent to 90 degrees), start at 1.5 min  (goal 3 min)  4 way wrist (flexion, extension, radial deviation, ulnar deviation), sitting in chair with 1-2 pillows on lap - standing for ulnar deviation, without weight - 1.5 min each  (goal 3 min each exercise)  Hand ball squeezes/tennis ball, hold 5s, rest 1s; 1.5 min  (goal 3 min)  Prone T, I, Y (90/90 position) with head supported in neutral position, hold 5s rest 1s total time 1.5 min  (goal 3 min) - alternate position over ball/quadruped        PATIENT EDUCATION AND HOME EXERCISES     Home Exercises Provided and Patient Education Provided     Education provided:   - extensive education during treatment session regarding positioning  while sitting and supporting body in chair, technique and corrections with all interventions.    Written Home Exercises Provided: yes. Exercises were reviewed and Angela was able to demonstrate them prior to the end of the session.  Angela demonstrated good  understanding of the education provided. See EMR under Patient Instructions for exercises provided during therapy sessions      ASSESSMENT   Blood flow restriction was performed to the right upper extremity today. Patient tolerated this occlusion pressure very well with only reports of significant muscle fatigue but no pain.    Patient tolerated all treatment well and had no adverse reaction to BFR, she is continuing to have pain deep in her left hip. Cues as needed to complete all interventions. Encouraged home exercise program.  .     Angela Is progressing well towards her goals.   Pt prognosis is Good.     Pt will continue to benefit from skilled outpatient physical therapy to address the deficits listed in the problem list box on initial evaluation, provide pt/family education and to maximize pt's level of independence in the home and community environment.     Pt's spiritual, cultural and educational needs considered and pt agreeable to plan of care and goals.     Anticipated barriers to physical therapy:  co-morbidities, chronicity of condition, adherence to treatment plan, coping style, and distance to travel for care    Goals:   Reviewed: 12/28/2023    Short Term Goals: In 6 weeks  Progress Date   1.Patient to be educated on HEP. [x] Progressing  [] MET   [] Not MET   [] Not tested     2.Patient to increase bilateral UE/LE strength by 1/2 grade, in order to improve endurance and increase ability to perform all functional activities for increased time.  [x] Progressing  [] MET   [] Not MET  [] Not tested     3.Patient to have pain less than 7/10 at worst, to improve QOL. [x] Progressing  [] MET   [] Not MET  [] Not tested     4.Patient to score the FOTO,  to improve QOL. [x] Progressing  [] MET   [] Not MET  [] Not tested        Long Term Goals: In 12 weeks Progress Date   1.Patient to improve score on the FOTO predicted score or better, to improve QOL. [x] Progressing  [] MET   [] Not MET  [] Not tested     2. Patient to increase bilateral UE/LE strength to 4/5 or greater, in order to improve endurance and increase ability to perform all functional activities for increased time. [x] Progressing  [] MET   [] Not MET  [] Not tested     3. Patient to have decreased pain to 4/10 at worst, to improve QOL. [x] Progressing  [] MET   [] Not MET  [] Not tested     4. Patient to perform daily activities including work activity with only mild increased symptoms. [x] Progressing  [] MET   [] Not MET  [] Not tested          PLAN     Monitor response to today's treatment session and progress with Physical Therapy plan of care as indicated.    Plan of care Certification: 10/18/2023  to 1/18/24.     Outpatient Physical Therapy 1 times weekly for 12 weeks to include any combination of the following interventions: Aquatic Therapy, electrical stimulation (PRN), Gait Training, Manual Therapy, Neuromuscular Re-ed, Patient Education/Self Care, Therapeutic Activites, Therapeutic Exercise, and Dry Needling    Daisha Enriquez PT

## 2023-12-29 ENCOUNTER — PATIENT MESSAGE (OUTPATIENT)
Dept: REHABILITATION | Facility: HOSPITAL | Age: 33
End: 2023-12-29
Payer: COMMERCIAL

## 2024-01-10 ENCOUNTER — CLINICAL SUPPORT (OUTPATIENT)
Dept: REHABILITATION | Facility: HOSPITAL | Age: 34
End: 2024-01-10
Payer: COMMERCIAL

## 2024-01-10 DIAGNOSIS — R52 PAIN: ICD-10-CM

## 2024-01-10 DIAGNOSIS — M24.80 JOINT HYPEREXTENSIBILITY OF MULTIPLE SITES: ICD-10-CM

## 2024-01-10 DIAGNOSIS — Z74.09 DECREASED STRENGTH, ENDURANCE, AND MOBILITY: Primary | ICD-10-CM

## 2024-01-10 DIAGNOSIS — R53.1 DECREASED STRENGTH, ENDURANCE, AND MOBILITY: Primary | ICD-10-CM

## 2024-01-10 DIAGNOSIS — R68.89 DECREASED STRENGTH, ENDURANCE, AND MOBILITY: Primary | ICD-10-CM

## 2024-01-10 PROCEDURE — 97140 MANUAL THERAPY 1/> REGIONS: CPT

## 2024-01-10 PROCEDURE — 97110 THERAPEUTIC EXERCISES: CPT

## 2024-01-10 PROCEDURE — 97112 NEUROMUSCULAR REEDUCATION: CPT

## 2024-01-10 NOTE — PROGRESS NOTES
[facility]   Physical Therapy Treatment Note        Name: Angela Lyle  Clinic Number: [id number]    Therapy Diagnosis:   Encounter Diagnoses   Name Primary?    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    She was compliant with home exercise program.  Response to previous treatment: sore  Functional change: stronger    Pain: 2/10  Location: right shoulder bilateral , bilateral upper trapezial , Iliac crest area and left quadratus lumborum    OBJECTIVE     Objective Measures updated at progress report unless specified.     TREATMENT     Angela received the treatments listed below:        therapeutic exercises to develop strength, endurance, and core stabilization for 10 minutes including:    Intervention 1/10/2024  Parameters        Chin tucks - 2 min 50 sec, 5 sec hold 1 sec rest   Scapula squeeze gentle shoulder extension - 2 min, 5 sec hold 1 sec rest  On towel rolls   Isometric adduction  - 3 min, 5 sec hold 1 sec rest   Supine clamshells - 3 min red band, 5 sec hold 1 sec rest        Reviewed ball exercises L2  x For home exercise program         Re-assessment                   manual therapy  techniques: Myofacial release and Soft tissue Mobilization were applied to the: bilateral upper and lower body for 10 minutes, including:    Manual Intervention 1/10/2024     Soft Tissue Mobilization x bilateral upper trapezius, periscapular musculature, subscapularis , levator scapula muscle's.   Joint Mobilizations  Sacral flexion   Mobilization with movement x Scapular tilts        Functional Dry Needling           neuromuscular re-education activities to improve: Balance, Coordination, Proprioception, and Posture for 24 minutes. The following activities were included: bilateral upper extremity with BFR    Blood flow restriction  Performed  Reps: 30, 15, 15, 15  (75 total) with 30s rest between reps   Supine ER/IR shoulder  x Body Weight   AROM biceps curls x Body Weight   Shoulder scaption x Body Weight     Patient received Blood Flow Restriction after being screened for contraindications, assessed for precautions, and educated in the benefits/risks associated with the use of blood flow restriction training.    Bilateral Upper Extremity at 40-50% occlusion @ proximal shoulder -- Cuff Size: red  8 minutes or less of occlusion for each exercise was performed, with deflations between each exercise for a minimum of 2 minute.    Intervention 1/10/2024  Parameters   Shoulder Internal and External Rotation  WITH BFR Supine small ROM   Full Can to 90 degrees   (or slightly less) up and down,  2 min 50 sec     Bridging  - 2 min 50 sec hold 5s/relax 1s    Supine Marches  - with abdominal bracing lift foot 1-2 in, hold 5s; 2 min 50 sec   Prone hip extension  - 2 min 50 sec hold each side for 5 sec/head in neutral    Bicep curls  WITH BFR sitting        Shoulder scaption  WITH BFR sitting                    Angela participated in dynamic functional therapeutic activities to improve functional performance for 0  minutes, including:    Intervention 1/10/2024  Parameters                                                                 Plan for Next Visit:    Level 2.0 MAT EX's  Bridges with ball squeeze, in top of bridge squeeze ball 5x then back down and rest 1s (goal 3 min)  Supine kick outs & pelvic neutral with abdominal bracing, hold leg for 5s, return to bent position and hold other leg out for 5 sec (OK to decrease amount leg kicks if chronic hip dislocator) (goal 3 min)  Prone Swimmer with arms at 80 ABD/90 elbow flex, hold opp UE/LE sides for 5s, then repeat on other side (goal 3 min)  Sit on therapy ball, hands on knees, Hold 3-5 min  Clamshell supine with green tubing (goal 3 min)  Ball squeeze (hip ADD) 3 min EVERY DAY      Level 2 Neck, Mid Back and Upper Extremity Progression   Isometric Neck, 5s repeat all directions (forward, right side, backwards, left side for total of 4 min, add 20s per day until you reach 8 min  Chicken dance (shoulder abduction with elbows bent to 90 degrees), start at 1.5 min  (goal 3 min)  4 way wrist (flexion, extension, radial deviation, ulnar deviation), sitting in chair with 1-2 pillows on lap - standing for ulnar deviation, without weight - 1.5 min each  (goal 3 min each exercise)  Hand ball squeezes/tennis ball, hold 5s, rest 1s; 1.5 min  (goal 3 min)  Prone T, I, Y (90/90 position) with head supported in neutral position, hold 5s rest 1s total time 1.5 min  (goal 3 min) - alternate position over ball/quadruped        PATIENT EDUCATION AND HOME EXERCISES     Home Exercises Provided and Patient Education Provided     Education provided:   - extensive education during treatment session regarding positioning while sitting and supporting body in chair, technique and corrections with all interventions.    Written Home Exercises Provided: Patient instructed to cont prior HEP, and reviewed all questions regarding current exercises' and progressions. Exercises were reviewed and Angela was able to demonstrate them prior to the end of the session.  Angela demonstrated good  understanding of the  education provided. See EMR under Patient Instructions for exercises provided during therapy sessions      ASSESSMENT   Blood flow restriction was performed to the right upper extremity today. Patient tolerated this occlusion pressure very well with only reports of significant muscle fatigue but no pain.    Patient with excellent tolerance to BFR and progression with home exercise program. She is reporting improved strength and decreased pain levels. Cues during treatment for technique as indicated. Encouraged home exercise program and discussed plan of care going forward.  .     Angela Is progressing well towards her goals.   Pt prognosis is Good.     Pt will continue to benefit from skilled outpatient physical therapy to address the deficits listed in the problem list box on initial evaluation, provide pt/family education and to maximize pt's level of independence in the home and community environment.     Pt's spiritual, cultural and educational needs considered and pt agreeable to plan of care and goals.     Anticipated barriers to physical therapy:  co-morbidities, chronicity of condition, adherence to treatment plan, coping style, and distance to travel for care    Goals:   Reviewed: 1/10/2024    Short Term Goals: In 6 weeks  Progress Date   1.Patient to be educated on HEP. [x] Progressing  [] MET   [] Not MET   [] Not tested     2.Patient to increase bilateral UE/LE strength by 1/2 grade, in order to improve endurance and increase ability to perform all functional activities for increased time.  [x] Progressing  [] MET   [] Not MET  [] Not tested     3.Patient to have pain less than 7/10 at worst, to improve QOL. [x] Progressing  [] MET   [] Not MET  [] Not tested     4.Patient to score the FOTO, to improve QOL. [x] Progressing  [] MET   [] Not MET  [] Not tested        Long Term Goals: In 12 weeks Progress Date   1.Patient to improve score on the FOTO predicted score or better, to improve QOL. [x]  Progressing  [] MET   [] Not MET  [] Not tested     2. Patient to increase bilateral UE/LE strength to 4/5 or greater, in order to improve endurance and increase ability to perform all functional activities for increased time. [x] Progressing  [] MET   [] Not MET  [] Not tested     3. Patient to have decreased pain to 4/10 at worst, to improve QOL. [x] Progressing  [] MET   [] Not MET  [] Not tested     4. Patient to perform daily activities including work activity with only mild increased symptoms. [x] Progressing  [] MET   [] Not MET  [] Not tested          PLAN     Monitor response to today's treatment session and progress with Physical Therapy plan of care as indicated.    Plan of care Certification: 10/18/2023  to 1/18/24.     Outpatient Physical Therapy 1 times weekly for 12 weeks to include any combination of the following interventions: Aquatic Therapy, electrical stimulation (PRN), Gait Training, Manual Therapy, Neuromuscular Re-ed, Patient Education/Self Care, Therapeutic Activites, Therapeutic Exercise, and Dry Needling    Daisha Enriquez, PT

## 2024-01-17 ENCOUNTER — CLINICAL SUPPORT (OUTPATIENT)
Dept: REHABILITATION | Facility: HOSPITAL | Age: 34
End: 2024-01-17
Payer: COMMERCIAL

## 2024-01-17 DIAGNOSIS — R68.89 DECREASED STRENGTH, ENDURANCE, AND MOBILITY: Primary | ICD-10-CM

## 2024-01-17 DIAGNOSIS — M24.80 JOINT HYPEREXTENSIBILITY OF MULTIPLE SITES: ICD-10-CM

## 2024-01-17 DIAGNOSIS — R53.1 DECREASED STRENGTH, ENDURANCE, AND MOBILITY: Primary | ICD-10-CM

## 2024-01-17 DIAGNOSIS — Z74.09 DECREASED STRENGTH, ENDURANCE, AND MOBILITY: Primary | ICD-10-CM

## 2024-01-17 DIAGNOSIS — R52 PAIN: ICD-10-CM

## 2024-01-17 PROCEDURE — 97140 MANUAL THERAPY 1/> REGIONS: CPT

## 2024-01-17 PROCEDURE — 97110 THERAPEUTIC EXERCISES: CPT

## 2024-01-17 PROCEDURE — 97112 NEUROMUSCULAR REEDUCATION: CPT

## 2024-01-17 NOTE — PLAN OF CARE
[facility]  Physical Therapy Plan of Care Note       Name: Angela Espinosa [facility]  Clinic Number: [id number]    Therapy Diagnosis:   Encounter Diagnoses   Name Primary?    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  Evaluation Date: 10/18/2023  Authorization Period Expiration: 10/3/24  Plan of Care Expiration: 1/18/2023  Progress Note Due: 1/12/2023  Visit # / Visits authorized: 2/20 (+5)  FOTO: 3/3 (last performed on 1/17/2024)     Precautions: Standard and Falls, POTS, hypermobile EDS  Functional Level Prior to Evaluation:  see evaluation    SUBJECTIVE     Update: see daily note    OBJECTIVE     Update: see daily note    ASSESSMENT     Update: see daily note    Previous Short Term Goals Status:   see daily note  New Short Term Goals Status:   see daily note  Long Term Goal Status: continue per initial plan of care.  Reasons for Recertification of Therapy:   see daily note    GOALS  see daily note    PLAN     Updated Certification Period: 1/17/24 to 4/17/2024   Recommended Treatment Plan: 1 times per week for 12 weeks:  Aquatic Therapy, Electrical Stimulation PRN, Gait Training, Manual Therapy, Moist Heat/ Ice, Neuromuscular Re-ed, Patient Education, Self Care, Therapeutic Activities, and Therapeutic Exercise, FDN/PRN  Other Recommendations: None    Daisha Enriquez PT      Physician's Signature: ___________________________________________________

## 2024-01-17 NOTE — PROGRESS NOTES
[facility]   Physical Therapy Treatment Note & plan of care/PROGRESS NOTE       Name: Angela Lyle  Clinic Number: [id number]    Therapy Diagnosis:   Encounter Diagnoses   Name Primary?    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    She was compliant with home exercise program.  Response to previous treatment: stronger overall  Functional change: stronger overall     Pain: 2/10  Location: right shoulder bilateral , bilateral upper trapezial , Iliac crest area and left quadratus lumborum    OBJECTIVE     Objective Measures updated at progress report unless specified.     Strength:     U/E MMT Right Left Pain/Dysfunction with Movement 12/12/23 1/17/24   Cervical Side Bending 3+/5 3+/5 + trunk shift 4-/5, decreased trunk shift 4/5   Upper trapezial  3+/5 3+/5 + trunk shift 4-/5, decreased trunk shift 4/5   Shoulder Abduction 3+/5 3+/5 + trunk shift 4-/5, decreased trunk shift 4/5   Shoulder IR 3+/5 3+/5 + trunk shift 4-/5, decreased trunk shift 4-/5, 4/5   Shoulder ER    3+/5 3+/5 + trunk shift 4-/5, decreased trunk shift 4-/5, 4/5   Elbow Flexion  3+/5 3+/5 + trunk shift 4-/5, decreased trunk shift  4-/5   Elbow Extension 3+/5 3+/5 + trunk shift 4-/5, decreased trunk shift 4-/5   Wrist Extension 3+/5 3+/5   4-/5, decreased trunk shift NT   4th/5th Finger Intrinsics 3+/5 3+/5   NT NT    and   L/E MMT Right  (spine) Left Pain/Dysfunction with Movement 12/12/23 1/18/24   Hip Flexion  3+/5 3+/5 + trunk shift 4-/5, decreased trunk shift 4-/5   Knee Extension 3+/5 3+/5 + trunk shift 4-/5, decreased trunk shift 4/5   Knee Flexion 3+/5 3+/5 + trunk shift 4-/5, decreased trunk shift 4/5   Hip IR 3+/5 3+/5 + trunk shift 4-/5, decreased trunk shift 4/5   Hip ER 3+/5 3+/5 + trunk shift 4-/5, decreased trunk shift 4/5   Ankle DF 3+/5 3+/5 + trunk shift 4/5, decreased trunk shift NT   Ankle PF 3+/5 3+/5 + trunk shift 4/5, decreased trunk shift NT        FOTO:        TREATMENT     Angela received the treatments listed below:        therapeutic exercises to develop strength, endurance, and core stabilization for 15 minutes including:    Intervention 1/17/2024  Parameters        Chin tucks - 2 min 50 sec, 5 sec hold 1 sec rest   Scapula squeeze gentle shoulder extension - 2 min, 5 sec hold 1 sec rest  On towel rolls   Isometric adduction  - 3 min, 5 sec hold 1 sec rest   Supine clamshells - 3 min red band, 5 sec hold 1 sec rest        Reviewed HEP x Reviewed technique and time/progressions        Re-assessment x                  manual therapy techniques: Myofacial release and Soft tissue Mobilization were applied to the: bilateral upper and lower body for 10 minutes, including:    Manual Intervention 1/17/2024     Soft Tissue Mobilization x bilateral upper trapezius, periscapular musculature, subscapularis , levator scapula muscle's.   Joint Mobilizations  Sacral flexion   Mobilization with movement x Scapular tilts        Functional Dry Needling           neuromuscular re-education activities to improve: Balance, Coordination, Proprioception, and Posture for 24 minutes. The following activities were included: bilateral upper  extremity with BFR    Blood flow restriction  Performed  Reps: 30, 15, 15, 15  (75 total) with 30s rest between reps   Supine ER/IR shoulder  x Body Weight   AROM biceps curls x Body Weight   Shoulder scaption (alternating upper extremities) x Body Weight     Patient received Blood Flow Restriction after being screened for contraindications, assessed for precautions, and educated in the benefits/risks associated with the use of blood flow restriction training.    Bilateral Upper Extremity at 40-50% occlusion @ proximal shoulder -- Cuff Size: red  8 minutes or less of occlusion for each exercise was performed, with deflations between each exercise for a minimum of 2 minute.    Intervention 1/17/2024  Parameters   Shoulder Internal and External Rotation  WITH BFR Supine small ROM   Full Can to 90 degrees   (or slightly less) up and down,  2 min 50 sec     Bridging  - 2 min 50 sec hold 5s/relax 1s    Supine Marches  - with abdominal bracing lift foot 1-2 in, hold 5s; 2 min 50 sec   Prone hip extension  - 2 min 50 sec hold each side for 5 sec/head in neutral    Bicep curls  WITH BFR sitting        Shoulder scaption  WITH BFR sitting                    Angela participated in dynamic functional therapeutic activities to improve functional performance for 0  minutes, including:    Intervention 1/17/2024  Parameters                                                                Plan for Next Visit:    Level 2.0 MAT EX's  Bridges with ball squeeze, in top of bridge squeeze ball 5x then back down and rest 1s (goal 3 min)  Supine kick outs & pelvic neutral with abdominal bracing, hold leg for 5s, return to bent position and hold other leg out for 5 sec (OK to decrease amount leg kicks if chronic hip dislocator) (goal 3 min)  Prone Swimmer with arms at 80 ABD/90 elbow flex, hold opp UE/LE sides for 5s, then repeat on other side (goal 3 min)  Sit on therapy ball, hands on knees, Hold 3-5 min  Clamshell supine with green tubing  (goal 3 min)  Ball squeeze (hip ADD) 3 min EVERY DAY      Level 2 Neck, Mid Back and Upper Extremity Progression   Isometric Neck, 5s repeat all directions (forward, right side, backwards, left side for total of 4 min, add 20s per day until you reach 8 min  Chicken dance (shoulder abduction with elbows bent to 90 degrees), start at 1.5 min  (goal 3 min)  4 way wrist (flexion, extension, radial deviation, ulnar deviation), sitting in chair with 1-2 pillows on lap - standing for ulnar deviation, without weight - 1.5 min each  (goal 3 min each exercise)  Hand ball squeezes/tennis ball, hold 5s, rest 1s; 1.5 min  (goal 3 min)  Prone T, I, Y (90/90 position) with head supported in neutral position, hold 5s rest 1s total time 1.5 min  (goal 3 min) - alternate position over ball/quadruped        PATIENT EDUCATION AND HOME EXERCISES     Home Exercises Provided and Patient Education Provided     Education provided:   - extensive education during treatment session regarding positioning while sitting and supporting body in chair, technique and corrections with all interventions.    Written Home Exercises Provided: Patient instructed to cont prior HEP, and reviewed all questions regarding current exercises' and progressions. Exercises were reviewed and Angela was able to demonstrate them prior to the end of the session.  Angela demonstrated good  understanding of the education provided. See EMR under Patient Instructions for exercises provided during therapy sessions      ASSESSMENT   Blood flow restriction was performed to the right upper extremity today. Patient tolerated this occlusion pressure very well with only reports of significant muscle fatigue but no pain.    Patient with improvement in strength and is reporting more strength and improved function per FOTO and verbally. Cues for all interventions as needed. Encouraged home exercise program and discussed progressions with home exercise program.  .     Angela Is  progressing well towards her goals.   Pt prognosis is Good.     Pt will continue to benefit from skilled outpatient physical therapy to address the deficits listed in the problem list box on initial evaluation, provide pt/family education and to maximize pt's level of independence in the home and community environment.     Pt's spiritual, cultural and educational needs considered and pt agreeable to plan of care and goals.     Anticipated barriers to physical therapy:  co-morbidities, chronicity of condition, adherence to treatment plan, coping style, and distance to travel for care    Goals:     Reviewed: 1/17/2024      Short Term Goals: In 6 weeks  Progress Date   1.Patient to be educated on HEP. [] Progressing  [x] MET   [] Not MET   [] Not tested  1/17/2024   2.Patient to increase bilateral UE/LE strength by 1/2 grade, in order to improve endurance and increase ability to perform all functional activities for increased time.  [] Progressing  [x] MET   [] Not MET  [] Not tested  1/17/2024   3.Patient to have pain less than 7/10 at worst, to improve QOL. [x] Progressing  [] MET   [] Not MET  [] Not tested  1/17/2024   4.Patient to score the FOTO, to improve QOL. [] Progressing  [x] MET   [] Not MET  [] Not tested  1/17/2024      Long Term Goals: In 12 weeks Progress Date   1.Patient to improve score on the FOTO predicted score or better, to improve QOL. [x] Progressing  [] MET   [] Not MET  [] Not tested  1/17/2024   2. Patient to increase bilateral UE/LE strength to 4/5 or greater, in order to improve endurance and increase ability to perform all functional activities for increased time. [x] Progressing  [] MET   [] Not MET  [] Not tested  1/17/2024   3. Patient to have decreased pain to 4/10 at worst, to improve QOL. [x] Progressing  [] MET   [] Not MET  [] Not tested  1/17/2024   4. Patient to perform daily activities including work activity with only mild increased symptoms. [x] Progressing  [] MET   [] Not  MET  [] Not tested  1/17/2024        PLAN     Monitor response to today's treatment session and progress with Physical Therapy plan of care as indicated.    Updated Certification Period: 1/17/24 to 4/17/2024   Recommended Treatment Plan: 1 times per week for 12 weeks:  Aquatic Therapy, Electrical Stimulation PRN, Gait Training, Manual Therapy, Moist Heat/ Ice, Neuromuscular Re-ed, Patient Education, Self Care, Therapeutic Activities, and Therapeutic Exercise, FDN/PRN    Daisha Enriquez, PT

## 2024-01-22 ENCOUNTER — CLINICAL SUPPORT (OUTPATIENT)
Dept: REHABILITATION | Facility: HOSPITAL | Age: 34
End: 2024-01-22
Payer: COMMERCIAL

## 2024-01-22 DIAGNOSIS — R68.89 DECREASED STRENGTH, ENDURANCE, AND MOBILITY: Primary | ICD-10-CM

## 2024-01-22 DIAGNOSIS — R53.1 DECREASED STRENGTH, ENDURANCE, AND MOBILITY: Primary | ICD-10-CM

## 2024-01-22 DIAGNOSIS — Z74.09 DECREASED STRENGTH, ENDURANCE, AND MOBILITY: Primary | ICD-10-CM

## 2024-01-22 DIAGNOSIS — M24.80 JOINT HYPEREXTENSIBILITY OF MULTIPLE SITES: ICD-10-CM

## 2024-01-22 DIAGNOSIS — R52 PAIN: ICD-10-CM

## 2024-01-22 PROCEDURE — 97112 NEUROMUSCULAR REEDUCATION: CPT

## 2024-01-22 PROCEDURE — 97110 THERAPEUTIC EXERCISES: CPT

## 2024-01-22 NOTE — PROGRESS NOTES
[facility]   Physical Therapy Treatment Note        Name: Angela Lyle  Clinic Number: [id number]    Therapy Diagnosis:   Encounter Diagnoses   Name Primary?    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 She does feel like she is getting stronger. She has noted that she is having some pelvic floor issues - more difficulty. She has had pelvic floor therapy previously - made suggestions for follow up at [facility].    She was compliant with home exercise program.  Response to previous treatment: stronger overall  Functional change: stronger overall     Pain: 2/10  Location: right shoulder bilateral , bilateral upper trapezial , Iliac crest area and left quadratus lumborum    OBJECTIVE     Objective Measures updated at progress report unless specified.     TREATMENT     Angela received the treatments listed below:        therapeutic exercises to develop strength, endurance, and core stabilization for 8 minutes including:    Intervention 1/22/2024  Parameters        Chin tucks - 2 min 50 sec, 5 sec hold 1 sec rest   Scapula squeeze gentle shoulder extension - 2 min, 5 sec hold 1 sec rest  On towel rolls   Isometric adduction  - 3 min, 5 sec  hold 1 sec rest   Supine clamshells - 3 min red band, 5 sec hold 1 sec rest        Reviewed HEP x Performed ball exercises for home exercise program         Re-assessment                   manual therapy techniques: Myofacial release and Soft tissue Mobilization were applied to the: bilateral upper and lower body for 5 minutes, including:    Manual Intervention 1/22/2024     Soft Tissue Mobilization x bilateral upper trapezius, periscapular musculature, subscapularis , levator scapula muscle's.   Joint Mobilizations  Sacral flexion   Mobilization with movement x Scapular tilts        Functional Dry Needling           neuromuscular re-education activities to improve: Balance, Coordination, Proprioception, and Posture for 48 minutes. The following activities were included: bilateral upper extremity with BFR - single arm BFR    Blood flow restriction  Performed  Reps: 30, 15, 15, 15  (75 total) with 30s rest between reps   Supine ER/IR shoulder  x Body Weight   AROM biceps curls x Body Weight   Shoulder scaption (alternating upper extremities) x Body Weight     Patient received Blood Flow Restriction after being screened for contraindications, assessed for precautions, and educated in the benefits/risks associated with the use of blood flow restriction training.    Bilateral Upper Extremity at 40-50% occlusion @ proximal shoulder -- Cuff Size: red  8 minutes or less of occlusion for each exercise was performed, with deflations between each exercise for a minimum of 2 minute.    Intervention 1/22/2024  Parameters   Shoulder Internal and External Rotation  WITH BFR Supine small ROM single arm   Full Can to 90 degrees   (or slightly less) up and down,  2 min 50 sec     Bridging  - 2 min 50 sec hold 5s/relax 1s    Supine Marches  - with abdominal bracing lift foot 1-2 in, hold 5s; 2 min 50 sec   Prone hip extension  - 2 min 50 sec hold each side for 5 sec/head in neutral    Bicep curls  WITH BFR Sitting single arm         Shoulder scaption  WITH BFR Sitting single arm                    Angela participated in dynamic functional therapeutic activities to improve functional performance for 0  minutes, including:    Intervention 1/22/2024  Parameters                                                                Plan for Next Visit:    Level 2.0 MAT EX's  Bridges with ball squeeze, in top of bridge squeeze ball 5x then back down and rest 1s (goal 3 min)  Supine kick outs & pelvic neutral with abdominal bracing, hold leg for 5s, return to bent position and hold other leg out for 5 sec (OK to decrease amount leg kicks if chronic hip dislocator) (goal 3 min)  Prone Swimmer with arms at 80 ABD/90 elbow flex, hold opp UE/LE sides for 5s, then repeat on other side (goal 3 min)  Sit on therapy ball, hands on knees, Hold 3-5 min  Clamshell supine with green tubing (goal 3 min)  Ball squeeze (hip ADD) 3 min EVERY DAY      Level 2 Neck, Mid Back and Upper Extremity Progression   Isometric Neck, 5s repeat all directions (forward, right side, backwards, left side for total of 4 min, add 20s per day until you reach 8 min  Chicken dance (shoulder abduction with elbows bent to 90 degrees), start at 1.5 min  (goal 3 min)  4 way wrist (flexion, extension, radial deviation, ulnar deviation), sitting in chair with 1-2 pillows on lap - standing for ulnar deviation, without weight - 1.5 min each  (goal 3 min each exercise)  Hand ball squeezes/tennis ball, hold 5s, rest 1s; 1.5 min  (goal 3 min)  Prone T, I, Y (90/90 position) with head supported in neutral position, hold 5s rest 1s total time 1.5 min  (goal 3 min) - alternate position over ball/quadruped        PATIENT EDUCATION AND HOME EXERCISES     Home Exercises Provided and Patient Education Provided     Education provided:   - extensive education during treatment session regarding positioning while sitting and supporting body in chair, technique and corrections with all interventions.    Written  Home Exercises Provided: Patient instructed to cont prior HEP, and reviewed all questions regarding current exercises' and progressions. Exercises were reviewed and Angela was able to demonstrate them prior to the end of the session.  Angela demonstrated good  understanding of the education provided. See EMR under Patient Instructions for exercises provided during therapy sessions      ASSESSMENT   Blood flow restriction was performed to the right upper extremity today. Patient tolerated this occlusion pressure very well with only reports of significant muscle fatigue but no pain.    Patient with reports of improved function and less pain. She tolerated all treatment well and reviewed technique with ball marching, ball roll outs and bridges with lower extremities on ball for home exercise program. Discussed progressions for home exercise program.  .     Angela Is progressing well towards her goals.   Pt prognosis is Good.     Pt will continue to benefit from skilled outpatient physical therapy to address the deficits listed in the problem list box on initial evaluation, provide pt/family education and to maximize pt's level of independence in the home and community environment.     Pt's spiritual, cultural and educational needs considered and pt agreeable to plan of care and goals.     Anticipated barriers to physical therapy:  co-morbidities, chronicity of condition, adherence to treatment plan, coping style, and distance to travel for care    Goals:     Reviewed: 1/22/2024      Short Term Goals: In 6 weeks  Progress Date   1.Patient to be educated on HEP. [] Progressing  [x] MET   [] Not MET   [] Not tested  1/17/2024   2.Patient to increase bilateral UE/LE strength by 1/2 grade, in order to improve endurance and increase ability to perform all functional activities for increased time.  [] Progressing  [x] MET   [] Not MET  [] Not tested  1/17/2024   3.Patient to have pain less than 7/10 at worst, to improve QOL.  [x] Progressing  [] MET   [] Not MET  [] Not tested  1/17/2024   4.Patient to score the FOTO, to improve QOL. [] Progressing  [x] MET   [] Not MET  [] Not tested  1/17/2024      Long Term Goals: In 12 weeks Progress Date   1.Patient to improve score on the FOTO predicted score or better, to improve QOL. [x] Progressing  [] MET   [] Not MET  [] Not tested  1/17/2024   2. Patient to increase bilateral UE/LE strength to 4/5 or greater, in order to improve endurance and increase ability to perform all functional activities for increased time. [x] Progressing  [] MET   [] Not MET  [] Not tested  1/17/2024   3. Patient to have decreased pain to 4/10 at worst, to improve QOL. [x] Progressing  [] MET   [] Not MET  [] Not tested  1/17/2024   4. Patient to perform daily activities including work activity with only mild increased symptoms. [x] Progressing  [] MET   [] Not MET  [] Not tested  1/17/2024        PLAN     Monitor response to today's treatment session and progress with Physical Therapy plan of care as indicated.    Updated Certification Period: 1/17/24 to 4/17/2024   Recommended Treatment Plan: 1 times per week for 12 weeks:  Aquatic Therapy, Electrical Stimulation PRN, Gait Training, Manual Therapy, Moist Heat/ Ice, Neuromuscular Re-ed, Patient Education, Self Care, Therapeutic Activities, and Therapeutic Exercise, FDN/PRN    Daisha Enriquez, PT

## 2024-01-31 ENCOUNTER — CLINICAL SUPPORT (OUTPATIENT)
Dept: REHABILITATION | Facility: HOSPITAL | Age: 34
End: 2024-01-31
Payer: COMMERCIAL

## 2024-01-31 DIAGNOSIS — M24.80 JOINT HYPEREXTENSIBILITY OF MULTIPLE SITES: ICD-10-CM

## 2024-01-31 DIAGNOSIS — R68.89 DECREASED STRENGTH, ENDURANCE, AND MOBILITY: Primary | ICD-10-CM

## 2024-01-31 DIAGNOSIS — R52 PAIN: ICD-10-CM

## 2024-01-31 DIAGNOSIS — R53.1 DECREASED STRENGTH, ENDURANCE, AND MOBILITY: Primary | ICD-10-CM

## 2024-01-31 DIAGNOSIS — Z74.09 DECREASED STRENGTH, ENDURANCE, AND MOBILITY: Primary | ICD-10-CM

## 2024-01-31 PROCEDURE — 97140 MANUAL THERAPY 1/> REGIONS: CPT

## 2024-01-31 PROCEDURE — 97112 NEUROMUSCULAR REEDUCATION: CPT

## 2024-01-31 NOTE — PROGRESS NOTES
[facility]   Physical Therapy Treatment Note        Name: Angela Lyle  Clinic Number: [id number]    Therapy Diagnosis:   Encounter Diagnoses   Name Primary?    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 Rates it as a 3/10 when starting therapy and 5/10 yesterday.    She was compliant with home exercise program.  Response to previous treatment: stronger overall  Functional change: stronger overall     Pain: 3/10  Location: right shoulder bilateral , bilateral upper trapezial , Iliac crest area and left quadratus lumborum    OBJECTIVE     Objective Measures updated at progress report unless specified.     TREATMENT     Angela received the treatments listed below:        therapeutic exercises to develop strength, endurance, and core stabilization for 0 minutes including:    Intervention 1/31/2024  Parameters        Chin tucks - 2 min 50 sec, 5 sec hold 1 sec rest   Scapula squeeze gentle shoulder extension - 2 min, 5 sec hold 1 sec rest  On towel rolls   Isometric adduction  - 3 min, 5 sec hold 1 sec rest   Supine clamshells - 3 min red band, 5 sec hold 1 sec rest        Reviewed HEP - Performed ball  exercises for home exercise program         Re-assessment                   manual therapy techniques: Myofacial release and Soft tissue Mobilization were applied to the: bilateral upper and lower body for 15 minutes, including:    Manual Intervention 1/31/2024     Soft Tissue Mobilization x bilateral suboccipitals, upper trapezius, periscapular musculature, teres major and minor , subscapularis , levator scapula muscle's.   Joint Mobilizations x First rib mobility bilateral, gentle rib overpressure, facet rotation with gentle stretch.   Mobilization with movement x Scapular tilts        Functional Dry Needling           neuromuscular re-education activities to improve: Balance, Coordination, Proprioception, and Posture for 48 minutes. The following activities were included: bilateral upper extremity with BFR - single arm BFR    Blood flow restriction  Performed  Reps: 30, 15, 15, 15  (75 total) with 30s rest between reps   Supine ER/IR shoulder  x Yellow band   AROM biceps curls x #1 with right, 0# with left    Shoulder scaption (alternating upper extremities) x Body Weight     Patient received Blood Flow Restriction after being screened for contraindications, assessed for precautions, and educated in the benefits/risks associated with the use of blood flow restriction training.    Bilateral Upper Extremity at 40-50% occlusion @ proximal shoulder -- Cuff Size: red  8 minutes or less of occlusion for each exercise was performed, with deflations between each exercise for a minimum of 2 minute.    Intervention 1/31/2024  Parameters   Shoulder Internal and External Rotation  WITH BFR Supine small ROM single arm yellow   Full Can to 90 degrees   (or slightly less) up and down,  2 min 50 sec     Bridging  - 2 min 50 sec hold 5s/relax 1s    Supine Marches  - with abdominal bracing lift foot 1-2 in, hold 5s; 2 min 50 sec   Prone hip extension  - 2 min 50 sec hold each side for 5 sec/head in neutral    Bicep curls  WITH BFR  Sitting single arm 1# right 0# left         Shoulder scaption  WITH BFR Sitting single arm                    Angela participated in dynamic functional therapeutic activities to improve functional performance for 0  minutes, including:    Intervention 1/31/2024  Parameters                                                                Plan for Next Visit:    Level 2.0 MAT EX's  Bridges with ball squeeze, in top of bridge squeeze ball 5x then back down and rest 1s (goal 3 min)  Supine kick outs & pelvic neutral with abdominal bracing, hold leg for 5s, return to bent position and hold other leg out for 5 sec (OK to decrease amount leg kicks if chronic hip dislocator) (goal 3 min)  Prone Swimmer with arms at 80 ABD/90 elbow flex, hold opp UE/LE sides for 5s, then repeat on other side (goal 3 min)  Sit on therapy ball, hands on knees, Hold 3-5 min  Clamshell supine with green tubing (goal 3 min)  Ball squeeze (hip ADD) 3 min EVERY DAY      Level 2 Neck, Mid Back and Upper Extremity Progression   Isometric Neck, 5s repeat all directions (forward, right side, backwards, left side for total of 4 min, add 20s per day until you reach 8 min  Chicken dance (shoulder abduction with elbows bent to 90 degrees), start at 1.5 min  (goal 3 min)  4 way wrist (flexion, extension, radial deviation, ulnar deviation), sitting in chair with 1-2 pillows on lap - standing for ulnar deviation, without weight - 1.5 min each  (goal 3 min each exercise)  Hand ball squeezes/tennis ball, hold 5s, rest 1s; 1.5 min  (goal 3 min)  Prone T, I, Y (90/90 position) with head supported in neutral position, hold 5s rest 1s total time 1.5 min  (goal 3 min) - alternate position over ball/quadruped        PATIENT EDUCATION AND HOME EXERCISES     Home Exercises Provided and Patient Education Provided     Education provided:   - extensive education during treatment session regarding positioning while sitting and supporting body in chair, technique and  corrections with all interventions.    Written Home Exercises Provided: Patient instructed to cont prior HEP, and reviewed all questions regarding current exercises' and progressions. Exercises were reviewed and Angela was able to demonstrate them prior to the end of the session.  Angela demonstrated good  understanding of the education provided. See EMR under Patient Instructions for exercises provided during therapy sessions      ASSESSMENT   Blood flow restriction was performed to the right upper extremity today. Patient tolerated this occlusion pressure very well with only reports of significant muscle fatigue but no pain.    Patient with reports of mild increased pain - improved with manual prior to treatment and at end of treatment first rib mobility bilaterally. Encouraged home exercise program and issued kineiso-tape for posture if needed for workday.    .     Angela Is progressing well towards her goals.   Pt prognosis is Good.     Pt will continue to benefit from skilled outpatient physical therapy to address the deficits listed in the problem list box on initial evaluation, provide pt/family education and to maximize pt's level of independence in the home and community environment.     Pt's spiritual, cultural and educational needs considered and pt agreeable to plan of care and goals.     Anticipated barriers to physical therapy:  co-morbidities, chronicity of condition, adherence to treatment plan, coping style, and distance to travel for care    Goals:     Reviewed: 1/31/2024      Short Term Goals: In 6 weeks  Progress Date   1.Patient to be educated on HEP. [] Progressing  [x] MET   [] Not MET   [] Not tested  1/17/2024   2.Patient to increase bilateral UE/LE strength by 1/2 grade, in order to improve endurance and increase ability to perform all functional activities for increased time.  [] Progressing  [x] MET   [] Not MET  [] Not tested  1/17/2024   3.Patient to have pain less than 7/10 at worst,  to improve QOL. [x] Progressing  [] MET   [] Not MET  [] Not tested  1/17/2024   4.Patient to score the FOTO, to improve QOL. [] Progressing  [x] MET   [] Not MET  [] Not tested  1/17/2024      Long Term Goals: In 12 weeks Progress Date   1.Patient to improve score on the FOTO predicted score or better, to improve QOL. [x] Progressing  [] MET   [] Not MET  [] Not tested  1/17/2024   2. Patient to increase bilateral UE/LE strength to 4/5 or greater, in order to improve endurance and increase ability to perform all functional activities for increased time. [x] Progressing  [] MET   [] Not MET  [] Not tested  1/17/2024   3. Patient to have decreased pain to 4/10 at worst, to improve QOL. [x] Progressing  [] MET   [] Not MET  [] Not tested  1/17/2024   4. Patient to perform daily activities including work activity with only mild increased symptoms. [x] Progressing  [] MET   [] Not MET  [] Not tested  1/17/2024        PLAN     Monitor response to today's treatment session and progress with Physical Therapy plan of care as indicated.    Updated Certification Period: 1/17/24 to 4/17/2024   Recommended Treatment Plan: 1 times per week for 12 weeks:  Aquatic Therapy, Electrical Stimulation PRN, Gait Training, Manual Therapy, Moist Heat/ Ice, Neuromuscular Re-ed, Patient Education, Self Care, Therapeutic Activities, and Therapeutic Exercise, FDN/PRN    Daisha Enriquez PT

## 2024-02-06 ENCOUNTER — CLINICAL SUPPORT (OUTPATIENT)
Dept: REHABILITATION | Facility: HOSPITAL | Age: 34
End: 2024-02-06
Payer: COMMERCIAL

## 2024-02-06 DIAGNOSIS — M24.80 JOINT HYPEREXTENSIBILITY OF MULTIPLE SITES: ICD-10-CM

## 2024-02-06 DIAGNOSIS — Z74.09 DECREASED STRENGTH, ENDURANCE, AND MOBILITY: Primary | ICD-10-CM

## 2024-02-06 DIAGNOSIS — R53.1 DECREASED STRENGTH, ENDURANCE, AND MOBILITY: Primary | ICD-10-CM

## 2024-02-06 DIAGNOSIS — R68.89 DECREASED STRENGTH, ENDURANCE, AND MOBILITY: Primary | ICD-10-CM

## 2024-02-06 DIAGNOSIS — R52 PAIN: ICD-10-CM

## 2024-02-06 PROCEDURE — 97112 NEUROMUSCULAR REEDUCATION: CPT

## 2024-02-06 PROCEDURE — 97140 MANUAL THERAPY 1/> REGIONS: CPT

## 2024-02-06 NOTE — PROGRESS NOTES
[facility]   Physical Therapy Treatment Note        Name: Angela Lyle  Clinic Number: [id number]    Therapy Diagnosis:   Encounter Diagnoses   Name Primary?    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 Her right shoulder has been a little more bothersome this past week. She reports increased fatigue with new exercises with BFR but not painful.    She was compliant with home exercise program.  Response to previous treatment: stronger overall  Functional change: stronger overall     Pain: 3/10  Location: right shoulder bilateral , bilateral upper trapezial , Iliac crest area and left quadratus lumborum    OBJECTIVE     Objective Measures updated at progress report unless specified.     TREATMENT     Angela received the treatments listed below:        therapeutic exercises to develop strength, endurance, and core stabilization for 0 minutes including:    Intervention 2/6/2024  Parameters        Chin tucks - 2 min 50 sec, 5 sec hold 1 sec rest   Scapula squeeze gentle shoulder extension - 2 min, 5 sec hold 1 sec rest  On towel rolls   Isometric adduction  - 3 min, 5 sec hold 1 sec rest   Supine clamshells - 3 min red band, 5 sec hold 1 sec rest        Reviewed HEP -  Performed ball exercises for home exercise program         Re-assessment                   manual therapy techniques: Myofacial release and Soft tissue Mobilization were applied to the: bilateral upper and lower body for 15 minutes, including:    Manual Intervention 2/6/2024     Soft Tissue Mobilization x bilateral suboccipitals, upper trapezius, periscapular musculature, teres major and minor , subscapularis , levator scapula muscle's.   Joint Mobilizations x First rib mobility bilateral, gentle rib overpressure, facet rotation with gentle stretch.   Mobilization with movement x Scapular tilts        Functional Dry Needling           neuromuscular re-education activities to improve: Balance, Coordination, Proprioception, and Posture for 48 minutes. The following activities were included: bilateral upper extremity with BFR - single arm BFR    Blood flow restriction  Performed  Reps: 30, 15, 15, 15  (75 total) with 30s rest between reps   Supine ER/IR shoulder  x Yellow band   AROM biceps curls  #1 with right, 0# with left    Shoulder scaption (alternating upper extremities) x Body Weight   Serratus punch  x Body Weight      Patient received Blood Flow Restriction after being screened for contraindications, assessed for precautions, and educated in the benefits/risks associated with the use of blood flow restriction training.    Bilateral Upper Extremity at 40-50% occlusion @ proximal shoulder -- Cuff Size: red  8 minutes or less of occlusion for each exercise was performed, with deflations between each exercise for a minimum of 2 minute.    Intervention 2/6/2024  Parameters   Shoulder Internal and External Rotation  WITH BFR Supine small ROM single arm yellow   Full Can to 90 degrees   (or slightly less) up and down,  2 min 50 sec     Bridging  - 2 min 50 sec hold 5s/relax 1s    Supine Marches  - with abdominal bracing lift foot 1-2 in, hold 5s; 2 min 50 sec   Prone hip extension  - 2 min 50 sec hold each side for 5  sec/head in neutral    Bicep curls   Sitting single arm 1# right 0# left         Shoulder scaption  WITH BFR Sitting single arm   Serratus punch With BFR Supine single arm               Angela participated in dynamic functional therapeutic activities to improve functional performance for 0  minutes, including:    Intervention 2/6/2024  Parameters                                     Plan for Next Visit:    Level 2.0 MAT EX's  Bridges with ball squeeze, in top of bridge squeeze ball 5x then back down and rest 1s (goal 3 min)  Supine kick outs & pelvic neutral with abdominal bracing, hold leg for 5s, return to bent position and hold other leg out for 5 sec (OK to decrease amount leg kicks if chronic hip dislocator) (goal 3 min)  Prone Swimmer with arms at 80 ABD/90 elbow flex, hold opp UE/LE sides for 5s, then repeat on other side (goal 3 min)  Sit on therapy ball, hands on knees, Hold 3-5 min  Clamshell supine with green tubing (goal 3 min)  Ball squeeze (hip ADD) 3 min EVERY DAY      Level 2 Neck, Mid Back and Upper Extremity Progression   Isometric Neck, 5s repeat all directions (forward, right side, backwards, left side for total of 4 min, add 20s per day until you reach 8 min  Chicken dance (shoulder abduction with elbows bent to 90 degrees), start at 1.5 min  (goal 3 min)  4 way wrist (flexion, extension, radial deviation, ulnar deviation), sitting in chair with 1-2 pillows on lap - standing for ulnar deviation, without weight - 1.5 min each  (goal 3 min each exercise)  Hand ball squeezes/tennis ball, hold 5s, rest 1s; 1.5 min  (goal 3 min)  Prone T, I, Y (90/90 position) with head supported in neutral position, hold 5s rest 1s total time 1.5 min  (goal 3 min) - alternate position over ball/quadruped        PATIENT EDUCATION AND HOME EXERCISES     Home Exercises Provided and Patient Education Provided     Education provided:   - extensive education during treatment session regarding positioning while sitting and  supporting body in chair, technique and corrections with all interventions.    Written Home Exercises Provided: Patient instructed to cont prior HEP, and reviewed all questions regarding current exercises' and progressions. Exercises were reviewed and Angela was able to demonstrate them prior to the end of the session.  Angela demonstrated good  understanding of the education provided. See EMR under Patient Instructions for exercises provided during therapy sessions      ASSESSMENT   Blood flow restriction was performed to the right upper extremity today. Patient tolerated this occlusion pressure very well with only reports of significant muscle fatigue but no pain.    Patient with reports of mild increased muscle soreness with BFR serratus punch. Encouraged home exercise program and cues for interventions as needed.   .     Angela Is progressing well towards her goals.   Pt prognosis is Good.     Pt will continue to benefit from skilled outpatient physical therapy to address the deficits listed in the problem list box on initial evaluation, provide pt/family education and to maximize pt's level of independence in the home and community environment.     Pt's spiritual, cultural and educational needs considered and pt agreeable to plan of care and goals.     Anticipated barriers to physical therapy:  co-morbidities, chronicity of condition, adherence to treatment plan, coping style, and distance to travel for care    Goals:     Reviewed: 2/6/2024      Short Term Goals: In 6 weeks  Progress Date   1.Patient to be educated on HEP. [] Progressing  [x] MET   [] Not MET   [] Not tested  1/17/2024   2.Patient to increase bilateral UE/LE strength by 1/2 grade, in order to improve endurance and increase ability to perform all functional activities for increased time.  [] Progressing  [x] MET   [] Not MET  [] Not tested  1/17/2024   3.Patient to have pain less than 7/10 at worst, to improve QOL. [x] Progressing  [] MET   []  Not MET  [] Not tested  1/17/2024   4.Patient to score the FOTO, to improve QOL. [] Progressing  [x] MET   [] Not MET  [] Not tested  1/17/2024      Long Term Goals: In 12 weeks Progress Date   1.Patient to improve score on the FOTO predicted score or better, to improve QOL. [x] Progressing  [] MET   [] Not MET  [] Not tested  1/17/2024   2. Patient to increase bilateral UE/LE strength to 4/5 or greater, in order to improve endurance and increase ability to perform all functional activities for increased time. [x] Progressing  [] MET   [] Not MET  [] Not tested  1/17/2024   3. Patient to have decreased pain to 4/10 at worst, to improve QOL. [x] Progressing  [] MET   [] Not MET  [] Not tested  1/17/2024   4. Patient to perform daily activities including work activity with only mild increased symptoms. [x] Progressing  [] MET   [] Not MET  [] Not tested  1/17/2024        PLAN     Monitor response to today's treatment session and progress with Physical Therapy plan of care as indicated.    Updated Certification Period: 1/17/24 to 4/17/2024   Recommended Treatment Plan: 1 times per week for 12 weeks:  Aquatic Therapy, Electrical Stimulation PRN, Gait Training, Manual Therapy, Moist Heat/ Ice, Neuromuscular Re-ed, Patient Education, Self Care, Therapeutic Activities, and Therapeutic Exercise, FDN/PRN    Daisha Enriquez, PT

## 2024-02-14 ENCOUNTER — CLINICAL SUPPORT (OUTPATIENT)
Dept: REHABILITATION | Facility: HOSPITAL | Age: 34
End: 2024-02-14
Payer: COMMERCIAL

## 2024-02-14 DIAGNOSIS — Z74.09 DECREASED STRENGTH, ENDURANCE, AND MOBILITY: Primary | ICD-10-CM

## 2024-02-14 DIAGNOSIS — R53.1 DECREASED STRENGTH, ENDURANCE, AND MOBILITY: Primary | ICD-10-CM

## 2024-02-14 DIAGNOSIS — R52 PAIN: ICD-10-CM

## 2024-02-14 DIAGNOSIS — M24.80 JOINT HYPEREXTENSIBILITY OF MULTIPLE SITES: ICD-10-CM

## 2024-02-14 DIAGNOSIS — R68.89 DECREASED STRENGTH, ENDURANCE, AND MOBILITY: Primary | ICD-10-CM

## 2024-02-14 PROCEDURE — 97112 NEUROMUSCULAR REEDUCATION: CPT

## 2024-02-14 PROCEDURE — 97140 MANUAL THERAPY 1/> REGIONS: CPT

## 2024-02-14 PROCEDURE — 97110 THERAPEUTIC EXERCISES: CPT

## 2024-02-14 NOTE — PROGRESS NOTES
[facility]   Physical Therapy Treatment Note & PROGRESS NOTE       Name: Angela Lyle  Clinic Number: [id number]    Therapy Diagnosis:   Encounter Diagnoses   Name Primary?    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     SUBJECTIVE     Pt reports: Patient reports that she has had an episode of back pain that was very bad for about 5 min and then went away.  Has been more sore just from [place]. Does feel that she is getting stronger and has been fairly consistent with exercises. Discussed after assessment that left hip is weaker then right and consider BFR to hip once shoulder has improved to a point that she is functioning well and having less pain.    She was compliant with home exercise program.  Response to previous treatment: stronger overall  Functional change: stronger overall     Pain: 2/10  Location: right shoulder bilateral , bilateral upper trapezial , Iliac crest area and left quadratus lumborum    OBJECTIVE     Objective Measures updated at progress report unless specified.     Strength:     U/E MMT Right Left Pain/Dysfunction with Movement 12/12/23  Bilateral  1/17/24  Bilateral  2/14/24  Bilateral    Cervical Side Bending 3+/5 3+/5 + trunk shift 4-/5, decreased trunk shift  4/5 4+/5   Upper trapezial  3+/5 3+/5 + trunk shift 4-/5, decreased trunk shift 4/5 4+/5   Shoulder Abduction 3+/5 3+/5 + trunk shift 4-/5, decreased trunk shift 4/5 4+/5   Shoulder IR 3+/5 3+/5 + trunk shift 4-/5, decreased trunk shift 4-/5, 4/5 4/5   Shoulder ER    3+/5 3+/5 + trunk shift 4-/5, decreased trunk shift 4-/5, 4/5 4/5   Elbow Flexion  3+/5 3+/5 + trunk shift 4-/5, decreased trunk shift 4-/5 4+/5   Elbow Extension 3+/5 3+/5 + trunk shift 4-/5, decreased trunk shift 4-/5 4+/5   Wrist Extension 3+/5 3+/5   4-/5, decreased trunk shift NT 4+/5   4th/5th Finger Intrinsics 3+/5 3+/5   NT NT NT    and   L/E MMT Right  (spine) Left Pain/Dysfunction with Movement 12/12/23 1/18/24 2/14/24  Right / Left    Hip Flexion  3+/5 3+/5 + trunk shift 4-/5, decreased trunk shift 4-/5 4+/5, 4/5   Knee Extension 3+/5 3+/5 + trunk shift 4-/5, decreased trunk shift 4/5 4+/5   Knee Flexion 3+/5 3+/5 + trunk shift 4-/5, decreased trunk shift 4/5 4+/5   Hip IR 3+/5 3+/5 + trunk shift 4-/5, decreased trunk shift 4/5 4/5   Hip ER 3+/5 3+/5 + trunk shift 4-/5, decreased trunk shift 4/5 4/5   Ankle DF 3+/5 3+/5 + trunk shift 4/5, decreased trunk shift NT 4+/5   Ankle PF 3+/5 3+/5 + trunk shift 4/5, decreased trunk shift NT 4+/5      Bilateral upper extremities and lower extremities strength testing with NO TRUNK SHIFT     FOTO:         ELPIDIO Miller received the treatments listed below:        therapeutic exercises to develop strength, endurance, and core stabilization for 10 minutes including:    Intervention 2/14/2024  Parameters        Chin tucks - 2 min 50 sec, 5 sec hold 1 sec rest   Scapula squeeze gentle shoulder extension - 2 min, 5 sec hold 1 sec rest  On towel rolls   Isometric adduction  - 3 min, 5 sec hold 1 sec rest   Supine clamshells - 3 min red band, 5 sec hold 1 sec rest        Reviewed HEP - Performed ball exercises for home exercise program         Re-assessment/FOTO x                  manual therapy  techniques: Myofacial release and Soft tissue Mobilization were applied to the: bilateral upper and lower body for 10 minutes, including:    Manual Intervention 2/14/2024     Soft Tissue Mobilization x bilateral suboccipitals, left upper trapezial, levator scapula, subscapularis and bilateral sternocleidomastoid muscle's.   Joint Mobilizations x First rib mobility left    Mobilization with movement  Scapular tilts        Functional Dry Needling           neuromuscular re-education activities to improve: Balance, Coordination, Proprioception, and Posture for 48 minutes. The following activities were included: bilateral upper extremity with BFR - single arm BFR    Blood flow restriction  Performed  Reps: 30, 15, 15, 15  (75 total) with 30s rest between reps   Supine ER/IR shoulder  x Yellow band   AROM biceps curls  #1 with right, 0# with left    Shoulder scaption (alternating upper extremities) x Body Weight   Serratus punch  x Body Weight      Patient received Blood Flow Restriction after being screened for contraindications, assessed for precautions, and educated in the benefits/risks associated with the use of blood flow restriction training.    Bilateral Upper Extremity at 40-50% occlusion @ proximal shoulder -- Cuff Size: red  8 minutes or less of occlusion for each exercise was performed, with deflations between each exercise for a minimum of 2 minute.    Intervention 2/14/2024  Parameters   Shoulder Internal and External Rotation  WITH BFR Supine small ROM single arm yellow   Full Can to 90 degrees   (or slightly less) up and down,  2 min 50 sec     Bridging  - 2 min 50 sec hold 5s/relax 1s    Supine Marches  - with abdominal bracing lift foot 1-2 in, hold 5s; 2 min 50 sec   Prone hip extension  - 2 min 50 sec hold each side for 5 sec/head in neutral    Bicep curls   Sitting single arm 1# right 0# left         Shoulder scaption  WITH BFR Sitting single arm   Serratus punch With BFR Supine single arm                Angela participated in dynamic functional therapeutic activities to improve functional performance for 0  minutes, including:    Intervention 2/14/2024  Parameters                                     Plan for Next Visit:    Level 2.0 MAT EX's  Bridges with ball squeeze, in top of bridge squeeze ball 5x then back down and rest 1s (goal 3 min)  Supine kick outs & pelvic neutral with abdominal bracing, hold leg for 5s, return to bent position and hold other leg out for 5 sec (OK to decrease amount leg kicks if chronic hip dislocator) (goal 3 min)  Prone Swimmer with arms at 80 ABD/90 elbow flex, hold opp UE/LE sides for 5s, then repeat on other side (goal 3 min)  Sit on therapy ball, hands on knees, Hold 3-5 min  Clamshell supine with green tubing (goal 3 min)  Ball squeeze (hip ADD) 3 min EVERY DAY      Level 2 Neck, Mid Back and Upper Extremity Progression   Isometric Neck, 5s repeat all directions (forward, right side, backwards, left side for total of 4 min, add 20s per day until you reach 8 min  Chicken dance (shoulder abduction with elbows bent to 90 degrees), start at 1.5 min  (goal 3 min)  4 way wrist (flexion, extension, radial deviation, ulnar deviation), sitting in chair with 1-2 pillows on lap - standing for ulnar deviation, without weight - 1.5 min each  (goal 3 min each exercise)  Hand ball squeezes/tennis ball, hold 5s, rest 1s; 1.5 min  (goal 3 min)  Prone T, I, Y (90/90 position) with head supported in neutral position, hold 5s rest 1s total time 1.5 min  (goal 3 min) - alternate position over ball/quadruped        PATIENT EDUCATION AND HOME EXERCISES     Home Exercises Provided and Patient Education Provided     Education provided:   - extensive education during treatment session regarding positioning while sitting and supporting body in chair, technique and corrections with all interventions.    Written Home Exercises Provided: Patient instructed to cont prior HEP, and reviewed all questions  regarding current exercises' and progressions. Exercises were reviewed and Angela was able to demonstrate them prior to the end of the session.  Angela demonstrated good  understanding of the education provided. See EMR under Patient Instructions for exercises provided during therapy sessions      ASSESSMENT   Blood flow restriction was performed to the right upper extremity today. Patient tolerated this occlusion pressure very well with only reports of significant muscle fatigue but no pain.    Patient continues to make good progress with all objective measurements and had greatly improved her upper extremity strength and decreased overall pain levels as well. Patient should continue to benefit from progression with upper extremities and  lower extremities  strengthening and BFR, along with progression with home exercise program. Expect progress to be slower due to hypermobile EDS diagnosis.    Angela Is progressing well towards her goals.   Pt prognosis is Good.     Pt will continue to benefit from skilled outpatient physical therapy to address the deficits listed in the problem list box on initial evaluation, provide pt/family education and to maximize pt's level of independence in the home and community environment.     Pt's spiritual, cultural and educational needs considered and pt agreeable to plan of care and goals.     Anticipated barriers to physical therapy:  co-morbidities, chronicity of condition, adherence to treatment plan, coping style, and distance to travel for care    Goals:     Reviewed: 2/14/2024      Short Term Goals: In 6 weeks  Progress Date   1.Patient to be educated on HEP. [] Progressing  [x] MET   [] Not MET   [] Not tested  1/17/2024   2.Patient to increase bilateral UE/LE strength by 1/2 grade, in order to improve endurance and increase ability to perform all functional activities for increased time.  [] Progressing  [x] MET   [] Not MET  [] Not tested  1/17/2024   3.Patient to have  pain less than 7/10 at worst, to improve QOL. [x] Progressing  [] MET   [] Not MET  [] Not tested  2/14/2024   4.Patient to score the FOTO, to improve QOL. [] Progressing  [x] MET   [] Not MET  [] Not tested  1/17/2024      Long Term Goals: In 12 weeks Progress Date   1.Patient to improve score on the FOTO predicted score or better, to improve QOL. [x] Progressing  [] MET   [] Not MET  [] Not tested  2/14/2024   2. Patient to increase bilateral UE/LE strength to 4/5 or greater, in order to improve endurance and increase ability to perform all functional activities for increased time. [] Progressing  [x] MET   [] Not MET  [] Not tested  2/14/2024   3. Patient to have decreased pain to 4/10 at worst, to improve QOL. [x] Progressing  [] MET   [] Not MET  [] Not tested  2/14/2024   4. Patient to perform daily activities including work activity with only mild increased symptoms. [x] Progressing  [] MET   [] Not MET  [] Not tested  2/14/2024   5.Patient to increase bilateral UE/LE strength to 5/5 or greater, in order to improve endurance and increase ability to perform all functional activities for increased time. [x] Progressing  [] MET   [] Not MET  [] Not tested 2/14/2024        PLAN     Monitor response to today's treatment session and progress with Physical Therapy plan of care as indicated.     Note updated long term goal    Updated Certification Period: 1/17/24 to 4/17/2024   Recommended Treatment Plan: 1 times per week for 12 weeks:  Aquatic Therapy, Electrical Stimulation PRN, Gait Training, Manual Therapy, Moist Heat/ Ice, Neuromuscular Re-ed, Patient Education, Self Care, Therapeutic Activities, and Therapeutic Exercise, FDN/PRN    Daisha Enriquez, PT

## 2024-02-16 NOTE — PLAN OF CARE
[facility]   Physical Therapy Treatment Note & PROGRESS NOTE       Name: Angela Lyle  Clinic Number: [id number]    Therapy Diagnosis:   Encounter Diagnoses   Name Primary?    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     SUBJECTIVE     Pt reports: Patient reports that she has had an episode of back pain that was very bad for about 5 min and then went away.  Has been more sore just from [place]. Does feel that she is getting stronger and has been fairly consistent with exercises. Discussed after assessment that left hip is weaker then right and consider BFR to hip once shoulder has improved to a point that she is functioning well and having less pain.    She was compliant with home exercise program.  Response to previous treatment: stronger overall  Functional change: stronger overall     Pain: 2/10  Location: right shoulder bilateral , bilateral upper trapezial , Iliac crest area and left quadratus lumborum    OBJECTIVE     Objective Measures updated at progress report unless specified.     Strength:     U/E MMT Right Left Pain/Dysfunction with Movement 12/12/23  Bilateral  1/17/24  Bilateral  2/14/24  Bilateral    Cervical Side Bending 3+/5 3+/5 + trunk shift 4-/5, decreased trunk shift  4/5 4+/5   Upper trapezial  3+/5 3+/5 + trunk shift 4-/5, decreased trunk shift 4/5 4+/5   Shoulder Abduction 3+/5 3+/5 + trunk shift 4-/5, decreased trunk shift 4/5 4+/5   Shoulder IR 3+/5 3+/5 + trunk shift 4-/5, decreased trunk shift 4-/5, 4/5 4/5   Shoulder ER    3+/5 3+/5 + trunk shift 4-/5, decreased trunk shift 4-/5, 4/5 4/5   Elbow Flexion  3+/5 3+/5 + trunk shift 4-/5, decreased trunk shift 4-/5 4+/5   Elbow Extension 3+/5 3+/5 + trunk shift 4-/5, decreased trunk shift 4-/5 4+/5   Wrist Extension 3+/5 3+/5   4-/5, decreased trunk shift NT 4+/5   4th/5th Finger Intrinsics 3+/5 3+/5   NT NT NT    and   L/E MMT Right  (spine) Left Pain/Dysfunction with Movement 12/12/23 1/18/24 2/14/24  Right / Left    Hip Flexion  3+/5 3+/5 + trunk shift 4-/5, decreased trunk shift 4-/5 4+/5, 4/5   Knee Extension 3+/5 3+/5 + trunk shift 4-/5, decreased trunk shift 4/5 4+/5   Knee Flexion 3+/5 3+/5 + trunk shift 4-/5, decreased trunk shift 4/5 4+/5   Hip IR 3+/5 3+/5 + trunk shift 4-/5, decreased trunk shift 4/5 4/5   Hip ER 3+/5 3+/5 + trunk shift 4-/5, decreased trunk shift 4/5 4/5   Ankle DF 3+/5 3+/5 + trunk shift 4/5, decreased trunk shift NT 4+/5   Ankle PF 3+/5 3+/5 + trunk shift 4/5, decreased trunk shift NT 4+/5      Bilateral upper extremities and lower extremities strength testing with NO TRUNK SHIFT     FOTO:         ELPIDIO Miller received the treatments listed below:        therapeutic exercises to develop strength, endurance, and core stabilization for 10 minutes including:    Intervention 2/14/2024  Parameters        Chin tucks - 2 min 50 sec, 5 sec hold 1 sec rest   Scapula squeeze gentle shoulder extension - 2 min, 5 sec hold 1 sec rest  On towel rolls   Isometric adduction  - 3 min, 5 sec hold 1 sec rest   Supine clamshells - 3 min red band, 5 sec hold 1 sec rest        Reviewed HEP - Performed ball exercises for home exercise program         Re-assessment/FOTO x                  manual therapy  techniques: Myofacial release and Soft tissue Mobilization were applied to the: bilateral upper and lower body for 10 minutes, including:    Manual Intervention 2/14/2024     Soft Tissue Mobilization x bilateral suboccipitals, left upper trapezial, levator scapula, subscapularis and bilateral sternocleidomastoid muscle's.   Joint Mobilizations x First rib mobility left    Mobilization with movement  Scapular tilts        Functional Dry Needling           neuromuscular re-education activities to improve: Balance, Coordination, Proprioception, and Posture for 48 minutes. The following activities were included: bilateral upper extremity with BFR - single arm BFR    Blood flow restriction  Performed  Reps: 30, 15, 15, 15  (75 total) with 30s rest between reps   Supine ER/IR shoulder  x Yellow band   AROM biceps curls  #1 with right, 0# with left    Shoulder scaption (alternating upper extremities) x Body Weight   Serratus punch  x Body Weight      Patient received Blood Flow Restriction after being screened for contraindications, assessed for precautions, and educated in the benefits/risks associated with the use of blood flow restriction training.    Bilateral Upper Extremity at 40-50% occlusion @ proximal shoulder -- Cuff Size: red  8 minutes or less of occlusion for each exercise was performed, with deflations between each exercise for a minimum of 2 minute.    Intervention 2/14/2024  Parameters   Shoulder Internal and External Rotation  WITH BFR Supine small ROM single arm yellow   Full Can to 90 degrees   (or slightly less) up and down,  2 min 50 sec     Bridging  - 2 min 50 sec hold 5s/relax 1s    Supine Marches  - with abdominal bracing lift foot 1-2 in, hold 5s; 2 min 50 sec   Prone hip extension  - 2 min 50 sec hold each side for 5 sec/head in neutral    Bicep curls   Sitting single arm 1# right 0# left         Shoulder scaption  WITH BFR Sitting single arm   Serratus punch With BFR Supine single arm                Angela participated in dynamic functional therapeutic activities to improve functional performance for 0  minutes, including:    Intervention 2/14/2024  Parameters                                     Plan for Next Visit:    Level 2.0 MAT EX's  Bridges with ball squeeze, in top of bridge squeeze ball 5x then back down and rest 1s (goal 3 min)  Supine kick outs & pelvic neutral with abdominal bracing, hold leg for 5s, return to bent position and hold other leg out for 5 sec (OK to decrease amount leg kicks if chronic hip dislocator) (goal 3 min)  Prone Swimmer with arms at 80 ABD/90 elbow flex, hold opp UE/LE sides for 5s, then repeat on other side (goal 3 min)  Sit on therapy ball, hands on knees, Hold 3-5 min  Clamshell supine with green tubing (goal 3 min)  Ball squeeze (hip ADD) 3 min EVERY DAY      Level 2 Neck, Mid Back and Upper Extremity Progression   Isometric Neck, 5s repeat all directions (forward, right side, backwards, left side for total of 4 min, add 20s per day until you reach 8 min  Chicken dance (shoulder abduction with elbows bent to 90 degrees), start at 1.5 min  (goal 3 min)  4 way wrist (flexion, extension, radial deviation, ulnar deviation), sitting in chair with 1-2 pillows on lap - standing for ulnar deviation, without weight - 1.5 min each  (goal 3 min each exercise)  Hand ball squeezes/tennis ball, hold 5s, rest 1s; 1.5 min  (goal 3 min)  Prone T, I, Y (90/90 position) with head supported in neutral position, hold 5s rest 1s total time 1.5 min  (goal 3 min) - alternate position over ball/quadruped        PATIENT EDUCATION AND HOME EXERCISES     Home Exercises Provided and Patient Education Provided     Education provided:   - extensive education during treatment session regarding positioning while sitting and supporting body in chair, technique and corrections with all interventions.    Written Home Exercises Provided: Patient instructed to cont prior HEP, and reviewed all questions  regarding current exercises' and progressions. Exercises were reviewed and Angela was able to demonstrate them prior to the end of the session.  Angela demonstrated good  understanding of the education provided. See EMR under Patient Instructions for exercises provided during therapy sessions      ASSESSMENT   Blood flow restriction was performed to the right upper extremity today. Patient tolerated this occlusion pressure very well with only reports of significant muscle fatigue but no pain.    Patient continues to make good progress with all objective measurements and had greatly improved her upper extremity strength and decreased overall pain levels as well. Patient should continue to benefit from progression with upper extremities and  lower extremities  strengthening and BFR, along with progression with home exercise program. Expect progress to be slower due to hypermobile EDS diagnosis.    Angela Is progressing well towards her goals.   Pt prognosis is Good.     Pt will continue to benefit from skilled outpatient physical therapy to address the deficits listed in the problem list box on initial evaluation, provide pt/family education and to maximize pt's level of independence in the home and community environment.     Pt's spiritual, cultural and educational needs considered and pt agreeable to plan of care and goals.     Anticipated barriers to physical therapy:  co-morbidities, chronicity of condition, adherence to treatment plan, coping style, and distance to travel for care    Goals:     Reviewed: 2/14/2024      Short Term Goals: In 6 weeks  Progress Date   1.Patient to be educated on HEP. [] Progressing  [x] MET   [] Not MET   [] Not tested  1/17/2024   2.Patient to increase bilateral UE/LE strength by 1/2 grade, in order to improve endurance and increase ability to perform all functional activities for increased time.  [] Progressing  [x] MET   [] Not MET  [] Not tested  1/17/2024   3.Patient to have  pain less than 7/10 at worst, to improve QOL. [x] Progressing  [] MET   [] Not MET  [] Not tested  2/14/2024   4.Patient to score the FOTO, to improve QOL. [] Progressing  [x] MET   [] Not MET  [] Not tested  1/17/2024      Long Term Goals: In 12 weeks Progress Date   1.Patient to improve score on the FOTO predicted score or better, to improve QOL. [x] Progressing  [] MET   [] Not MET  [] Not tested  2/14/2024   2. Patient to increase bilateral UE/LE strength to 4/5 or greater, in order to improve endurance and increase ability to perform all functional activities for increased time. [] Progressing  [x] MET   [] Not MET  [] Not tested  2/14/2024   3. Patient to have decreased pain to 4/10 at worst, to improve QOL. [x] Progressing  [] MET   [] Not MET  [] Not tested  2/14/2024   4. Patient to perform daily activities including work activity with only mild increased symptoms. [x] Progressing  [] MET   [] Not MET  [] Not tested  2/14/2024   5.Patient to increase bilateral UE/LE strength to 5/5 or greater, in order to improve endurance and increase ability to perform all functional activities for increased time. [x] Progressing  [] MET   [] Not MET  [] Not tested 2/14/2024        PLAN     Monitor response to today's treatment session and progress with Physical Therapy plan of care as indicated.     Note updated long term goal    Updated Certification Period: 1/17/24 to 4/17/2024   Recommended Treatment Plan: 1 times per week for 12 weeks:  Aquatic Therapy, Electrical Stimulation PRN, Gait Training, Manual Therapy, Moist Heat/ Ice, Neuromuscular Re-ed, Patient Education, Self Care, Therapeutic Activities, and Therapeutic Exercise, FDN/PRN    Daisha Enriquez, PT

## 2024-02-20 ENCOUNTER — CLINICAL SUPPORT (OUTPATIENT)
Dept: REHABILITATION | Facility: HOSPITAL | Age: 34
End: 2024-02-20
Payer: COMMERCIAL

## 2024-02-20 DIAGNOSIS — R68.89 DECREASED STRENGTH, ENDURANCE, AND MOBILITY: Primary | ICD-10-CM

## 2024-02-20 DIAGNOSIS — M24.80 JOINT HYPEREXTENSIBILITY OF MULTIPLE SITES: ICD-10-CM

## 2024-02-20 DIAGNOSIS — R53.1 DECREASED STRENGTH, ENDURANCE, AND MOBILITY: Primary | ICD-10-CM

## 2024-02-20 DIAGNOSIS — Z74.09 DECREASED STRENGTH, ENDURANCE, AND MOBILITY: Primary | ICD-10-CM

## 2024-02-20 DIAGNOSIS — R52 PAIN: ICD-10-CM

## 2024-02-20 PROCEDURE — 97110 THERAPEUTIC EXERCISES: CPT

## 2024-02-20 PROCEDURE — 97140 MANUAL THERAPY 1/> REGIONS: CPT

## 2024-02-20 PROCEDURE — 97112 NEUROMUSCULAR REEDUCATION: CPT

## 2024-02-20 NOTE — PROGRESS NOTES
[facility]   Physical Therapy Treatment Note        Name: Angela Lyle  Clinic Number: [id number]    Therapy Diagnosis:   Encounter Diagnoses   Name Primary?    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     SUBJECTIVE     Pt reports: Patient reports that she has been pretty good this week - she did go to dentist and could tell she was tensing her neck up and was able to concentrate on trying to relax. Back has been doing OK this week.    She was compliant with home exercise program.  Response to previous treatment: stronger overall  Functional change: stronger overall     Pain: 2/10  Location: right shoulder bilateral , bilateral upper trapezial , Iliac crest area and left quadratus lumborum    OBJECTIVE     Objective Measures updated at progress report unless specified.     TREATMENT     Angela received the treatments listed below:        therapeutic exercises to develop strength, endurance, and core stabilization for 10 minutes including:    Intervention 2/20/2024  Parameters        Chin tucks - 2 min 50 sec, 5 sec hold 1 sec rest   Scapula squeeze gentle shoulder extension - 2 min, 5 sec hold 1 sec rest  On towel rolls   Isometric adduction  - 3 min, 5 sec hold 1 sec rest   Supine  clamshells - 3 min red band, 5 sec hold 1 sec rest        Reviewed HEP x         Re-assessment/FOTO                   manual therapy techniques: Myofacial release and Soft tissue Mobilization were applied to the: bilateral upper and lower body for 8 minutes, including:    Manual Intervention 2/20/2024     Soft Tissue Mobilization x bilateral suboccipitals, upper trapezial, levator scapula, subscapularis muscle's.   Joint Mobilizations x First rib mobility right     Mobilization with movement  Scapular tilts        Functional Dry Needling           neuromuscular re-education activities to improve: Balance, Coordination, Proprioception, and Posture for 24 minutes. The following activities were included: bilateral upper extremity with BFR - double arm BFR    Blood flow restriction  Performed  Reps: 30, 15, 15, 15  (75 total) with 30s rest between reps   Supine  ER/IR shoulder  x Yellow band   AROM biceps curls  #1 with right, 0# with left    Shoulder scaption (alternating upper extremities) x Body Weight   Serratus punch  x Body Weight      Patient received Blood Flow Restriction after being screened for contraindications, assessed for precautions, and educated in the benefits/risks associated with the use of blood flow restriction training.    Bilateral Upper Extremity at 40-50% occlusion @ proximal shoulder -- Cuff Size: red  8 minutes or less of occlusion for each exercise was performed, with deflations between each exercise for a minimum of 2 minute.    Intervention 2/20/2024  Parameters   Shoulder Internal and External Rotation  WITH BFR Supine small ROM double arm yellow   Full Can to 90 degrees   (or slightly less) up and down,  2 min 50 sec     Bridging  - 2 min 50 sec hold 5s/relax 1s    Supine Marches  - with abdominal bracing lift foot 1-2 in, hold 5s; 2 min 50 sec   Prone hip extension  - 2 min 50 sec hold each side for 5 sec/head in neutral    Bicep curls   Sitting single arm 1# right 0# left          Shoulder scaption  WITH BFR Sitting double arm   Serratus punch With BFR Supine alternating arm               Angela participated in dynamic functional therapeutic activities to improve functional performance for 0  minutes, including:    Intervention 2/20/2024  Parameters                                     Plan for Next Visit:    Level 2.0 MAT EX's  Bridges with ball squeeze, in top of bridge squeeze ball 5x then back down and rest 1s (goal 3 min)  Supine kick outs & pelvic neutral with abdominal bracing, hold leg for 5s, return to bent position and hold other leg out for 5 sec (OK to decrease amount leg kicks if chronic hip dislocator) (goal 3 min)  Prone Swimmer with arms at 80 ABD/90 elbow flex, hold opp UE/LE sides for 5s, then repeat on other side (goal 3 min)  Sit on therapy ball, hands on knees, Hold 3-5 min  Clamshell supine with green tubing (goal 3 min)  Ball squeeze (hip ADD) 3 min EVERY DAY      Level 2 Neck, Mid Back and Upper Extremity Progression   Isometric Neck, 5s repeat all directions (forward, right side, backwards, left side for total of 4 min, add 20s per day until you reach 8 min  Chicken dance (shoulder abduction with elbows bent to 90 degrees), start at 1.5 min  (goal 3 min)  4 way wrist (flexion, extension, radial deviation, ulnar deviation), sitting in chair with 1-2 pillows on lap - standing for ulnar deviation, without weight - 1.5 min each  (goal 3 min each exercise)  Hand ball squeezes/tennis ball, hold 5s, rest 1s; 1.5 min  (goal 3 min)  Prone T, I, Y (90/90 position) with head supported in neutral position, hold 5s rest 1s total time 1.5 min  (goal 3 min) - alternate position over ball/quadruped        PATIENT EDUCATION AND HOME EXERCISES     Home Exercises Provided and Patient Education Provided     Education provided:   - extensive education during treatment session regarding positioning while sitting and supporting body in chair, technique and corrections with all  interventions.    Written Home Exercises Provided: Patient instructed to cont prior HEP, and reviewed all questions regarding current exercises' and progressions. Exercises were reviewed and Angela was able to demonstrate them prior to the end of the session.  Angela demonstrated good  understanding of the education provided. See EMR under Patient Instructions for exercises provided during therapy sessions      ASSESSMENT   Blood flow restriction was performed to the right upper extremity today. Patient tolerated this occlusion pressure very well with only reports of significant muscle fatigue but no pain.    Patient was able to tolerate double arm BFR today and was able to progress with home exercise program. Some difficulty with several exercises and reviewed form and alternate positioning to complete along with modifications of time as needed. Encouraged home exercise program and progression.     Angela Is progressing well towards her goals.   Pt prognosis is Good.     Pt will continue to benefit from skilled outpatient physical therapy to address the deficits listed in the problem list box on initial evaluation, provide pt/family education and to maximize pt's level of independence in the home and community environment.     Pt's spiritual, cultural and educational needs considered and pt agreeable to plan of care and goals.     Anticipated barriers to physical therapy:  co-morbidities, chronicity of condition, adherence to treatment plan, coping style, and distance to travel for care    Goals:     Reviewed: 2/20/2024      Short Term Goals: In 6 weeks  Progress Date   1.Patient to be educated on HEP. [] Progressing  [x] MET   [] Not MET   [] Not tested  1/17/2024   2.Patient to increase bilateral UE/LE strength by 1/2 grade, in order to improve endurance and increase ability to perform all functional activities for increased time.  [] Progressing  [x] MET   [] Not MET  [] Not tested  1/17/2024   3.Patient to have  pain less than 7/10 at worst, to improve QOL. [x] Progressing  [] MET   [] Not MET  [] Not tested  2/14/2024   4.Patient to score the FOTO, to improve QOL. [] Progressing  [x] MET   [] Not MET  [] Not tested  1/17/2024      Long Term Goals: In 12 weeks Progress Date   1.Patient to improve score on the FOTO predicted score or better, to improve QOL. [x] Progressing  [] MET   [] Not MET  [] Not tested  2/14/2024   2. Patient to increase bilateral UE/LE strength to 4/5 or greater, in order to improve endurance and increase ability to perform all functional activities for increased time. [] Progressing  [x] MET   [] Not MET  [] Not tested  2/14/2024   3. Patient to have decreased pain to 4/10 at worst, to improve QOL. [x] Progressing  [] MET   [] Not MET  [] Not tested  2/14/2024   4. Patient to perform daily activities including work activity with only mild increased symptoms. [x] Progressing  [] MET   [] Not MET  [] Not tested  2/14/2024   5.Patient to increase bilateral UE/LE strength to 5/5 or greater, in order to improve endurance and increase ability to perform all functional activities for increased time. [x] Progressing  [] MET   [] Not MET  [] Not tested 2/14/2024        PLAN     Monitor response to today's treatment session and progress with Physical Therapy plan of care as indicated.     Note updated long term goal    Updated Certification Period: 1/17/24 to 4/17/2024   Recommended Treatment Plan: 1 times per week for 12 weeks:  Aquatic Therapy, Electrical Stimulation PRN, Gait Training, Manual Therapy, Moist Heat/ Ice, Neuromuscular Re-ed, Patient Education, Self Care, Therapeutic Activities, and Therapeutic Exercise, FDN/PRN    Daisha Enriquez, PT

## 2024-02-27 ENCOUNTER — CLINICAL SUPPORT (OUTPATIENT)
Dept: REHABILITATION | Facility: HOSPITAL | Age: 34
End: 2024-02-27
Payer: COMMERCIAL

## 2024-02-27 DIAGNOSIS — M24.80 JOINT HYPEREXTENSIBILITY OF MULTIPLE SITES: ICD-10-CM

## 2024-02-27 DIAGNOSIS — R52 PAIN: ICD-10-CM

## 2024-02-27 DIAGNOSIS — R53.1 DECREASED STRENGTH, ENDURANCE, AND MOBILITY: Primary | ICD-10-CM

## 2024-02-27 DIAGNOSIS — R68.89 DECREASED STRENGTH, ENDURANCE, AND MOBILITY: Primary | ICD-10-CM

## 2024-02-27 DIAGNOSIS — Z74.09 DECREASED STRENGTH, ENDURANCE, AND MOBILITY: Primary | ICD-10-CM

## 2024-02-27 PROCEDURE — 97140 MANUAL THERAPY 1/> REGIONS: CPT

## 2024-02-27 PROCEDURE — 97112 NEUROMUSCULAR REEDUCATION: CPT

## 2024-02-27 NOTE — PROGRESS NOTES
[facility]   Physical Therapy Treatment Note        Name: Angela Lyle  Clinic Number: [id number]    Therapy Diagnosis:   Encounter Diagnoses   Name Primary?    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     SUBJECTIVE     Pt reports: Patient reports that she traveled to [place] and flew - so she carried a backpack - although lightweight it did seem to aggravate her shoulders and neck. Discussed home exercise program and answered all questions regarding progressions and starting point (may be one rep may be 5 reps).  She is continuing to feel stronger overall.    She was compliant with home exercise program.  Response to previous treatment: stronger overall  Functional change: stronger overall     Pain: 2/10  Location: right shoulder bilateral , bilateral upper trapezial , Iliac crest area and left quadratus lumborum    OBJECTIVE     Objective Measures updated at progress report unless specified.     TREATMENT     Angela received the treatments listed below:        therapeutic exercises to develop strength, endurance, and core stabilization for 3 minutes including:    Intervention 2/27/2024  Parameters        Chin tucks - 2 min 50 sec, 5 sec hold 1 sec rest   Scapula squeeze gentle shoulder  extension - 2 min, 5 sec hold 1 sec rest  On towel rolls   Isometric adduction  - 3 min, 5 sec hold 1 sec rest   Supine clamshells - 3 min red band, 5 sec hold 1 sec rest        Reviewed HEP x I's/T's/Y's        Re-assessment/FOTO                   manual therapy techniques: Myofacial release and Soft tissue Mobilization were applied to the: bilateral upper and lower body for 23 minutes, including:    Manual Intervention 2/27/2024     Soft Tissue Mobilization x bilateral suboccipitals, upper trapezial, levator scapula, subscapularis muscle's. Pre and post treatment session. Post treatment session also suboccipital release, sternocleidomastoid extensive on right.   Joint Mobilizations x First rib mobility bilateral Pre and post treatment session, gentle thoracic mobility    Mobilization with movement x Scapular tilts bilateral         Functional Dry Needling           neuromuscular re-education activities to improve: Balance, Coordination, Proprioception, and Posture for 24 minutes. The following activities were included: bilateral upper extremity with BFR - double arm BFR    Blood flow restriction  Performed  Reps: 30, 15, 15, 15  (75 total) with 30s rest between reps   Supine  ER/IR shoulder  x Yellow band   AROM biceps curls  #1 with right, 0# with left    Shoulder scaption (alternating upper extremities) x Body Weight   Serratus punch  x Body Weight      Patient received Blood Flow Restriction after being screened for contraindications, assessed for precautions, and educated in the benefits/risks associated with the use of blood flow restriction training.    Bilateral Upper Extremity at 40-50% occlusion @ proximal shoulder -- Cuff Size: red  8 minutes or less of occlusion for each exercise was performed, with deflations between each exercise for a minimum of 2 minute.    Intervention 2/27/2024  Parameters   Shoulder Internal and External Rotation  WITH BFR Supine small ROM double arm yellow   Full Can to 90  degrees   (or slightly less) up and down,  2 min 50 sec     Bridging  - 2 min 50 sec hold 5s/relax 1s    Supine Marches  - with abdominal bracing lift foot 1-2 in, hold 5s; 2 min 50 sec   Prone hip extension  - 2 min 50 sec hold each side for 5 sec/head in neutral    Bicep curls   Sitting single arm 1# right 0# left         Shoulder scaption  WITH BFR Sitting double arm   Serratus punch With BFR Supine alternating arm               Angela participated in dynamic functional therapeutic activities to improve functional performance for 0  minutes, including:    Intervention 2/27/2024  Parameters                                     Plan for Next Visit:    Level 2.0 MAT EX's  Bridges with ball squeeze, in top of bridge squeeze ball 5x then back down and rest 1s (goal 3 min)  Supine kick outs & pelvic neutral with abdominal bracing, hold leg for 5s, return to bent position and hold other leg out for 5 sec (OK to decrease amount leg kicks if chronic hip dislocator) (goal 3 min)  Prone Swimmer with arms at 80 ABD/90 elbow flex, hold opp UE/LE sides for 5s, then repeat on other side (goal 3 min)  Sit on therapy ball, hands on knees, Hold 3-5 min  Clamshell supine with green tubing (goal 3 min)  Ball squeeze (hip ADD) 3 min EVERY DAY      Level 2 Neck, Mid Back and Upper Extremity Progression   Isometric Neck, 5s repeat all directions (forward, right side, backwards, left side for total of 4 min, add 20s per day until you reach 8 min  Chicken dance (shoulder abduction with elbows bent to 90 degrees), start at 1.5 min  (goal 3 min)  4 way wrist (flexion, extension, radial deviation, ulnar deviation), sitting in chair with 1-2 pillows on lap - standing for ulnar deviation, without weight - 1.5 min each  (goal 3 min each exercise)  Hand ball squeezes/tennis ball, hold 5s, rest 1s; 1.5 min  (goal 3 min)  Prone T, I, Y (90/90 position) with head supported in neutral position, hold 5s rest 1s total time 1.5 min  (goal 3 min) -  alternate position over ball/quadruped        PATIENT EDUCATION AND HOME EXERCISES     Home Exercises Provided and Patient Education Provided     Education provided:   - extensive education during treatment session regarding positioning while sitting and supporting body in chair, technique and corrections with all interventions.    Written Home Exercises Provided: Patient instructed to cont prior HEP, and reviewed all questions regarding current exercises' and progressions. Exercises were reviewed and Angela was able to demonstrate them prior to the end of the session.  Angela demonstrated good  understanding of the education provided. See EMR under Patient Instructions for exercises provided during therapy sessions      ASSESSMENT   Blood flow restriction was performed to the right upper extremity today. Patient tolerated this occlusion pressure very well with only reports of significant muscle fatigue but no pain.    Patient was able to tolerate double arm BFR today and was able to progress with home exercise program. She did present with increased soft tissue mobility over right upper quadrant  post BFR complaints of increased tone over right upper quadrant and over sternocleidomastoid. Imporved with manual interventions. Encouraged progressions of home exercise program as able and discussed modifications of reps/time as needed.    Angela Is progressing well towards her goals.   Pt prognosis is Good.     Pt will continue to benefit from skilled outpatient physical therapy to address the deficits listed in the problem list box on initial evaluation, provide pt/family education and to maximize pt's level of independence in the home and community environment.     Pt's spiritual, cultural and educational needs considered and pt agreeable to plan of care and goals.     Anticipated barriers to physical therapy:  co-morbidities, chronicity of condition, adherence to treatment plan, coping style, and distance to travel  for care    Goals:     Reviewed: 2/27/2024      Short Term Goals: In 6 weeks  Progress Date   1.Patient to be educated on HEP. [] Progressing  [x] MET   [] Not MET   [] Not tested  1/17/2024   2.Patient to increase bilateral UE/LE strength by 1/2 grade, in order to improve endurance and increase ability to perform all functional activities for increased time.  [] Progressing  [x] MET   [] Not MET  [] Not tested  1/17/2024   3.Patient to have pain less than 7/10 at worst, to improve QOL. [x] Progressing  [] MET   [] Not MET  [] Not tested  2/14/2024   4.Patient to score the FOTO, to improve QOL. [] Progressing  [x] MET   [] Not MET  [] Not tested  1/17/2024      Long Term Goals: In 12 weeks Progress Date   1.Patient to improve score on the FOTO predicted score or better, to improve QOL. [x] Progressing  [] MET   [] Not MET  [] Not tested  2/14/2024   2. Patient to increase bilateral UE/LE strength to 4/5 or greater, in order to improve endurance and increase ability to perform all functional activities for increased time. [] Progressing  [x] MET   [] Not MET  [] Not tested  2/14/2024   3. Patient to have decreased pain to 4/10 at worst, to improve QOL. [x] Progressing  [] MET   [] Not MET  [] Not tested  2/14/2024   4. Patient to perform daily activities including work activity with only mild increased symptoms. [x] Progressing  [] MET   [] Not MET  [] Not tested  2/14/2024   5.Patient to increase bilateral UE/LE strength to 5/5 or greater, in order to improve endurance and increase ability to perform all functional activities for increased time. [x] Progressing  [] MET   [] Not MET  [] Not tested 2/14/2024        PLAN     Monitor response to today's treatment session and progress with Physical Therapy plan of care as indicated.     Note updated long term goal    Updated Certification Period: 1/17/24 to 4/17/2024   Recommended Treatment Plan: 1 times per week for 12 weeks:  Aquatic Therapy, Electrical Stimulation  PRN, Gait Training, Manual Therapy, Moist Heat/ Ice, Neuromuscular Re-ed, Patient Education, Self Care, Therapeutic Activities, and Therapeutic Exercise, FDN/PRN    Daisha Enriquez, PT

## 2024-03-04 ENCOUNTER — PATIENT MESSAGE (OUTPATIENT)
Dept: REHABILITATION | Facility: HOSPITAL | Age: 34
End: 2024-03-04
Payer: COMMERCIAL

## 2024-03-04 ENCOUNTER — PATIENT MESSAGE (OUTPATIENT)
Dept: OBSTETRICS AND GYNECOLOGY | Facility: CLINIC | Age: 34
End: 2024-03-04
Payer: COMMERCIAL

## 2024-03-04 DIAGNOSIS — Z32.01 POSITIVE PREGNANCY TEST: Primary | ICD-10-CM

## 2024-03-05 ENCOUNTER — TELEPHONE (OUTPATIENT)
Dept: OBSTETRICS AND GYNECOLOGY | Facility: CLINIC | Age: 34
End: 2024-03-05
Payer: COMMERCIAL

## 2024-03-05 NOTE — TELEPHONE ENCOUNTER
Left message to patient to call the office at [phone] to schedule prenatal navigator appointment.

## 2024-03-05 NOTE — TELEPHONE ENCOUNTER
----- Message from Marisol Allred MD sent at 3/4/2024  5:17 PM CST -----  Patient needs ob navigator appt

## 2024-03-07 ENCOUNTER — TELEPHONE (OUTPATIENT)
Dept: INTERNAL MEDICINE | Facility: CLINIC | Age: 34
End: 2024-03-07
Payer: COMMERCIAL

## 2024-03-07 ENCOUNTER — OFFICE VISIT (OUTPATIENT)
Dept: URGENT CARE | Facility: CLINIC | Age: 34
End: 2024-03-07
Payer: COMMERCIAL

## 2024-03-07 ENCOUNTER — CLINICAL SUPPORT (OUTPATIENT)
Dept: REHABILITATION | Facility: HOSPITAL | Age: 34
End: 2024-03-07
Payer: COMMERCIAL

## 2024-03-07 ENCOUNTER — PATIENT MESSAGE (OUTPATIENT)
Dept: INTERNAL MEDICINE | Facility: CLINIC | Age: 34
End: 2024-03-07
Payer: COMMERCIAL

## 2024-03-07 VITALS
HEART RATE: 73 BPM | RESPIRATION RATE: 18 BRPM | OXYGEN SATURATION: 100 % | DIASTOLIC BLOOD PRESSURE: 72 MMHG | TEMPERATURE: 98 F | BODY MASS INDEX: 16.93 KG/M2 | SYSTOLIC BLOOD PRESSURE: 110 MMHG | WEIGHT: 99.19 LBS | HEIGHT: 64 IN

## 2024-03-07 DIAGNOSIS — R53.1 DECREASED STRENGTH, ENDURANCE, AND MOBILITY: Primary | ICD-10-CM

## 2024-03-07 DIAGNOSIS — R52 PAIN: ICD-10-CM

## 2024-03-07 DIAGNOSIS — R68.89 DECREASED STRENGTH, ENDURANCE, AND MOBILITY: Primary | ICD-10-CM

## 2024-03-07 DIAGNOSIS — Z74.09 DECREASED STRENGTH, ENDURANCE, AND MOBILITY: Primary | ICD-10-CM

## 2024-03-07 DIAGNOSIS — J02.9 SORE THROAT: ICD-10-CM

## 2024-03-07 DIAGNOSIS — M24.80 JOINT HYPEREXTENSIBILITY OF MULTIPLE SITES: ICD-10-CM

## 2024-03-07 DIAGNOSIS — R19.7 DIARRHEA, UNSPECIFIED TYPE: Primary | ICD-10-CM

## 2024-03-07 DIAGNOSIS — Z3A.01 LESS THAN 8 WEEKS GESTATION OF PREGNANCY: ICD-10-CM

## 2024-03-07 LAB
B-HCG UR QL: POSITIVE
CTP QC/QA: YES
CTP QC/QA: YES
MOLECULAR STREP A: NEGATIVE

## 2024-03-07 PROCEDURE — 97110 THERAPEUTIC EXERCISES: CPT

## 2024-03-07 PROCEDURE — 81025 URINE PREGNANCY TEST: CPT | Mod: S$GLB,,, | Performed by: NURSE PRACTITIONER

## 2024-03-07 PROCEDURE — 99214 OFFICE O/P EST MOD 30 MIN: CPT | Mod: S$GLB,,, | Performed by: NURSE PRACTITIONER

## 2024-03-07 PROCEDURE — 97112 NEUROMUSCULAR REEDUCATION: CPT

## 2024-03-07 PROCEDURE — 87651 STREP A DNA AMP PROBE: CPT | Mod: QW,S$GLB,, | Performed by: NURSE PRACTITIONER

## 2024-03-07 PROCEDURE — 97140 MANUAL THERAPY 1/> REGIONS: CPT

## 2024-03-07 RX ORDER — DICYCLOMINE HYDROCHLORIDE 20 MG/1
20 TABLET ORAL
Status: COMPLETED | OUTPATIENT
Start: 2024-03-07 | End: 2024-03-07

## 2024-03-07 RX ORDER — PROGESTERONE 200 MG/1
200 CAPSULE ORAL NIGHTLY
Qty: 30 CAPSULE | Refills: 4 | Status: SHIPPED | OUTPATIENT
Start: 2024-03-07 | End: 2025-03-07

## 2024-03-07 RX ADMIN — DICYCLOMINE HYDROCHLORIDE 20 MG: 20 TABLET ORAL at 06:03

## 2024-03-07 NOTE — PROGRESS NOTES
[facility]   Physical Therapy Treatment Note        Name: Angela Lyle  Clinic Number: [id number]    Therapy Diagnosis:   Encounter Diagnoses   Name Primary?    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     SUBJECTIVE     Pt reports: Patient reports that she is newly pregnant, due to this BFR is contraindicated.   We discussed future therapy appointment's due to travel time/fatigue and progression with exercise program. Patient will consider her options. Encouraged continued upper extremities strengthneing along with core stability and discussed future modification with use of therapy ball to accommodate  belly as it grows.    She was compliant with home exercise program.  Response to previous treatment: stronger overall  Functional change: stronger overall     Pain: 2/10  Location: right shoulder bilateral , bilateral upper trapezial , Iliac crest area and left quadratus lumborum    OBJECTIVE     Objective Measures updated at progress report unless specified.     TREATMENT     Angela received the treatments listed below:        therapeutic exercises to develop strength, endurance, and core stabilization for 15 minutes including:    Intervention 3/7/2024  Parameters        Chin tucks  2 min  50 sec, 5 sec hold 1 sec rest   Scapula squeeze gentle shoulder extension  2 min, 5 sec hold 1 sec rest  On towel rolls   Isometric adduction  x 3 min, 5 sec hold 1 sec rest   Supine clamshells x 3 min black band, 5 sec hold 1 sec rest        Reviewed HEP  I's/T's/Y's        Re-assessment/FOTO     Hand ball squeezes/tennis ball,  x hold 5s, rest 1s; 1.5 min   4 way wrist x  (flexion, extension, radial deviation, ulnar deviation), sitting in chair with 1-2 pillows on lap - standing for ulnar deviation, without weight - 1.5 min each       manual therapy techniques: Myofacial release and Soft tissue Mobilization were applied to the: bilateral upper and lower body for 10 minutes, including:    Manual Intervention 3/7/2024     Soft Tissue Mobilization x bilateral suboccipitals, upper trapezial, levator scapula, subscapularis muscle's. Pre and post treatment session.    Joint Mobilizations      Mobilization with movement x Scapular tilts bilateral         Functional Dry Needling           neuromuscular re-education activities to improve: Balance, Coordination, Proprioception, and Posture for 25 minutes. The following activities were included:       Intervention 3/7/2024  Parameters   Bridging with kick outs x 3 min hold 5s/alt sides   Supine Marches  - with abdominal bracing lift foot 1-2 in, hold 5s; 2 min 50 sec   Prone hip extension  - 2 min 50 sec hold each side for 5 sec/head in neutral    Bicep curls   Sitting single arm 1# right 0# left         Shoulder scaption  x Sitting double arm 3 min   Serratus punch  Supine alternating arm   Dying bug with pelvic neutral and abdominal bracing x 3 min   Prone swimmer  x 3 min   Shoulder Internal and External Rotation  x 3 min with elbows at side move shoulders in a small range of ER (~30 degrees)/IR (~45 degrees)   Prone T, I, Y  x (90/90 position) with head supported in neutral position, no hold; 30 sec   Ball modifications x      Angela participated in dynamic functional  therapeutic activities to improve functional performance for 0  minutes, including:    Intervention 3/7/2024  Parameters                                     Plan for Next Visit:    Level 3 Ball Ex's (all 1min start)              (These will be your Monday & Thursday ex's, along with Level 3 mat ex's.  YOU should also be starting Level one neck, mid back and UE program)  Seated on therapy ball kickouts - hold 5s then switch legs  Ball Roll outs from sitting, raise heel off ground slowly, then repeat on other side.  Hugging the ball with kick outs, hold leg out for 5s then switch legs  Bridging with legs on ball and arms folded across chest hold 5s, rest 1s (goal 3 min)    Level 2 Neck, Mid Back and Upper Extremity Progression   Isometric Neck, 5s repeat all directions (forward, right side, backwards, left side for total of 4 min, add 20s per day until you reach 8 min  Chicken dance (shoulder abduction with elbows bent to 90 degrees), start at 1.5 min  (goal 3 min)            PATIENT EDUCATION AND HOME EXERCISES     Home Exercises Provided and Patient Education Provided     Education provided:   - as above    Written Home Exercises Provided: Patient instructed to cont prior HEP, and reviewed all questions regarding current exercises' and progressions. Exercises were reviewed and Angela was able to demonstrate them prior to the end of the session.  Angela demonstrated good  understanding of the education provided. See EMR under Patient Instructions for exercises provided during therapy sessions      ASSESSMENT   Patient was able to progress with strengthening for core and upper extremities stability and strength with good tolerance and no reports of pain. Modification of plan may be needed but patient should continue to progress with all activities to improve overall function. Encouraged home exercise program.    Angela Is progressing well towards her goals.   Pt prognosis is Good.     Pt will continue to benefit from  skilled outpatient physical therapy to address the deficits listed in the problem list box on initial evaluation, provide pt/family education and to maximize pt's level of independence in the home and community environment.     Pt's spiritual, cultural and educational needs considered and pt agreeable to plan of care and goals.     Anticipated barriers to physical therapy:  co-morbidities, chronicity of condition, adherence to treatment plan, coping style, and distance to travel for care    Goals:     Reviewed: 3/7/2024      Short Term Goals: In 6 weeks  Progress Date   1.Patient to be educated on HEP. [] Progressing  [x] MET   [] Not MET   [] Not tested  1/17/2024   2.Patient to increase bilateral UE/LE strength by 1/2 grade, in order to improve endurance and increase ability to perform all functional activities for increased time.  [] Progressing  [x] MET   [] Not MET  [] Not tested  1/17/2024   3.Patient to have pain less than 7/10 at worst, to improve QOL. [x] Progressing  [] MET   [] Not MET  [] Not tested  2/14/2024   4.Patient to score the FOTO, to improve QOL. [] Progressing  [x] MET   [] Not MET  [] Not tested  1/17/2024      Long Term Goals: In 12 weeks Progress Date   1.Patient to improve score on the FOTO predicted score or better, to improve QOL. [x] Progressing  [] MET   [] Not MET  [] Not tested  2/14/2024   2. Patient to increase bilateral UE/LE strength to 4/5 or greater, in order to improve endurance and increase ability to perform all functional activities for increased time. [] Progressing  [x] MET   [] Not MET  [] Not tested  2/14/2024   3. Patient to have decreased pain to 4/10 at worst, to improve QOL. [x] Progressing  [] MET   [] Not MET  [] Not tested  2/14/2024   4. Patient to perform daily activities including work activity with only mild increased symptoms. [x] Progressing  [] MET   [] Not MET  [] Not tested  2/14/2024   5.Patient to increase bilateral UE/LE strength to 5/5 or greater, in  order to improve endurance and increase ability to perform all functional activities for increased time. [x] Progressing  [] MET   [] Not MET  [] Not tested 2/14/2024        PLAN     Monitor response to today's treatment session and progress with Physical Therapy plan of care as indicated.     Note updated long term goal    Updated Certification Period: 1/17/24 to 4/17/2024   Recommended Treatment Plan: 1 times per week for 12 weeks:  Aquatic Therapy, Electrical Stimulation PRN, Gait Training, Manual Therapy, Moist Heat/ Ice, Neuromuscular Re-ed, Patient Education, Self Care, Therapeutic Activities, and Therapeutic Exercise, FDN/PRN    Daisha Enriquez, PT

## 2024-03-07 NOTE — PROGRESS NOTES
"Subjective:      Patient ID: Angela Lyle is a 33 y.o. female.    Vitals:  height is 5' 4" (1.626 m) and weight is 45 kg (99 lb 3.3 oz). Her temperature is 98.4 °F (36.9 °C). Her blood pressure is 110/72 and her pulse is 73. Her respiration is 18 and oxygen saturation is 100%.     Chief Complaint: Diarrhea    Pt states she has been having diarrhea for the past 11 days. (FEBRUARY 26). Pt did admit that she eat raw seafood and also did find out about being pregnant. Pt also has bene having sore throat which has gotten better. Pt states she does notice bean shaped yellow pieces in her stool. Pt has been trying to eat jose but everything seemed to come right back out.  Pt reports receiving IV Fluids from Remedy room, 2 liters of Lactated Ringers. Pt states she is positive for pregnancy at home    Diarrhea   This is a new problem. The stool consistency is described as Watery and mucous. Associated symptoms include vomiting. Pertinent negatives include no abdominal pain, arthralgias, bloating, chills, coughing, fever, headaches, increased  flatus, myalgias, sweats, URI or weight loss. She has tried nothing for the symptoms.     Constitution: Negative for chills, sweating, fatigue and fever.   HENT:  Negative for ear pain, congestion and sore throat.    Neck: Negative for neck pain and neck stiffness.   Cardiovascular:  Negative for chest pain, leg swelling, palpitations and sob on exertion.   Eyes:  Negative for eye pain, eye redness and vision loss.   Respiratory:  Negative for cough, sputum production and shortness of breath.    Gastrointestinal:  Positive for vomiting and diarrhea. Negative for abdominal pain and nausea.   Genitourinary:  Negative for dysuria, frequency, urgency, flank pain and hematuria.   Musculoskeletal:  Negative for pain, trauma, joint pain and muscle ache.   Skin:  Negative for color change and rash.   Neurological:  Negative for dizziness, headaches and disorientation. "   Psychiatric/Behavioral:  Negative for disorientation.       Objective:     Physical Exam   Constitutional: She is oriented to person, place, and time. She appears well-developed.  Non-toxic appearance. No distress. normal  HENT:   Head: Normocephalic and atraumatic.   Ears:   Right Ear: Hearing, tympanic membrane, external ear and ear canal normal.   Left Ear: Hearing, tympanic membrane, external ear and ear canal normal.   Nose: Nose normal. No congestion.   Mouth/Throat: Mucous membranes are moist. Posterior oropharyngeal erythema present.   Eyes: Conjunctivae, EOM and lids are normal. Pupils are equal, round, and reactive to light.   Neck: Trachea normal and phonation normal. Neck supple. No thyromegaly present.   Cardiovascular: Normal rate, regular rhythm, S1 normal and S2 normal.   Pulmonary/Chest: She has no decreased breath sounds. She has no wheezes. She has no rhonchi.   Abdominal: Normal appearance. Soft. Bowel sounds are increased. flat abdomen There is no abdominal tenderness. There is no rebound, no guarding, no left CVA tenderness and no right CVA tenderness.   Musculoskeletal: Normal range of motion.         General: Normal range of motion.      Right lower leg: No edema.      Left lower leg: No edema.   Lymphadenopathy:     She has no cervical adenopathy.   Neurological: She is alert and oriented to person, place, and time.   Skin: Skin is warm, dry and intact.   Psychiatric: Her speech is normal and behavior is normal. Judgment and thought content normal.   Nursing note and vitals reviewed.        Results for orders placed or performed in visit on 03/07/24   POCT Strep A, Molecular   Result Value Ref Range    Molecular Strep A, POC Negative Negative     Acceptable Yes    POCT urine pregnancy   Result Value Ref Range    POC Preg Test, Ur Positive (A) Negative     Acceptable Yes              Assessment:     1. Diarrhea, unspecified type    2. Sore throat    3. Less  than 8 weeks gestation of pregnancy        Plan:       Diarrhea, unspecified type  -     Stool culture; Future; Expected date: 03/07/2024  -     Stool Exam-Ova,Cysts,Parasites; Future; Expected date: 03/07/2024  -     Clostridium difficile EIA; Future; Expected date: 03/07/2024  -     dicyclomine tablet 20 mg    Sore throat  -     POCT Strep A, Molecular    Less than 8 weeks gestation of pregnancy  -     POCT urine pregnancy  -     Ambulatory referral/consult to Obstetrics / Gynecology      Patient Instructions   Discharge instructions for Nausea and Diarrhea  See Clinical Reference Discharge Instructions    Pt to obtain stool for testing, collection kit given to pt.  Pt can use over the counter [employer] Tamara products Unisom and Vitamin B6.  Pt can take Imodium for Diarrhea for 24 hours only.  Pt has notified OB and Internal Medicine about Diarrhea and Pregnancy, pt is to follow up.      What care is needed at home?   Call your regular doctor to let them know you were in the ED. Make a follow-up appointment if you were told to.  Drink small amounts of fluid every 15 to 30 minutes. Good fluids to drink are water, broth, and oral electrolyte solutions. Sugar-free or very low sugar sports drinks are also OK.  Try to eat a small amount of food. Good foods to eat are potatoes, noodles, rice, oatmeal, crackers, soup, soft vegetables, and bananas. Avoid fatty foods and dairy products.  Wash your hands often. This will help keep others healthy. It is easy to spread diarrhea from one person to the next.  Stay home from school or work until you have stopped having diarrhea. Do not cook food for others while you have diarrhea.  If you were given any medicines, make sure to take them as you were told.      1) See orders for this visit as documented in the electronic medical record.  2) Symptomatic therapy suggested: use acetaminophen/ibuprofen every 6-8 hours prn pain or fever, push fluids.   3) Call or return to clinic prn  if these symptoms worsen or fail to improve as anticipated.    Discussed results/diagnosis/plan with patient in clinic.  We had shared decision making for patient's treatment. Patient verbalized understanding and in agreement with current treatment plan.     Patient was instructed to return for re-evaluation with urgent care or PCP for continued outpatient workup and management if symptoms do not improve/worsening symptoms. Strict ED versus clinic precautions given in depth.    Discharge and follow-up instructions given verbally/printed with the patient who expressed understanding. The instructions and results are also available on [facility].      - You must understand that you have received an Urgent Care treatment only and that you may be released before all of your medical problems are known or treated.   - You, the patient, will arrange for follow up care as instructed.   - Follow up with your PCP or specialty clinic as directed in the next 1-2 weeks if not improved or as needed.  You can call [phone] to schedule an appointment with the appropriate provider.   - If your condition worsens or fails to improve we recommend that you receive another evaluation at the ER immediately or contact your PCP to discuss your concerns or return here.        MORENITA Draper

## 2024-03-08 ENCOUNTER — LAB VISIT (OUTPATIENT)
Dept: LAB | Facility: HOSPITAL | Age: 34
End: 2024-03-08
Attending: OBSTETRICS & GYNECOLOGY
Payer: COMMERCIAL

## 2024-03-08 DIAGNOSIS — Z32.01 POSITIVE PREGNANCY TEST: ICD-10-CM

## 2024-03-08 LAB
HCG INTACT+B SERPL-ACNC: 5990 MIU/ML
PROGEST SERPL-MCNC: 40.5 NG/ML

## 2024-03-08 PROCEDURE — 84144 ASSAY OF PROGESTERONE: CPT | Performed by: OBSTETRICS & GYNECOLOGY

## 2024-03-08 PROCEDURE — 84702 CHORIONIC GONADOTROPIN TEST: CPT | Performed by: OBSTETRICS & GYNECOLOGY

## 2024-03-08 PROCEDURE — 36415 COLL VENOUS BLD VENIPUNCTURE: CPT | Mod: PO | Performed by: OBSTETRICS & GYNECOLOGY

## 2024-03-08 NOTE — PATIENT INSTRUCTIONS
Discharge instructions for Nausea and Diarrhea  See Clinical Reference Discharge Instructions    Pt to obtain stool for testing, collection kit given to pt.  Pt can use over the counter [employer] Tamara products Unisom and Vitamin B6.  Pt can take Imodium for Diarrhea for 24 hours only.  Pt has notified OB and Internal Medicine about Diarrhea and Pregnancy, pt is to follow up.      What care is needed at home?   Call your regular doctor to let them know you were in the ED. Make a follow-up appointment if you were told to.  Drink small amounts of fluid every 15 to 30 minutes. Good fluids to drink are water, broth, and oral electrolyte solutions. Sugar-free or very low sugar sports drinks are also OK.  Try to eat a small amount of food. Good foods to eat are potatoes, noodles, rice, oatmeal, crackers, soup, soft vegetables, and bananas. Avoid fatty foods and dairy products.  Wash your hands often. This will help keep others healthy. It is easy to spread diarrhea from one person to the next.  Stay home from school or work until you have stopped having diarrhea. Do not cook food for others while you have diarrhea.  If you were given any medicines, make sure to take them as you were told.      1) See orders for this visit as documented in the electronic medical record.  2) Symptomatic therapy suggested: use acetaminophen/ibuprofen every 6-8 hours prn pain or fever, push fluids.   3) Call or return to clinic prn if these symptoms worsen or fail to improve as anticipated.    Discussed results/diagnosis/plan with patient in clinic.  We had shared decision making for patient's treatment. Patient verbalized understanding and in agreement with current treatment plan.     Patient was instructed to return for re-evaluation with urgent care or PCP for continued outpatient workup and management if symptoms do not improve/worsening symptoms. Strict ED versus clinic precautions given in depth.    Discharge and follow-up instructions  given verbally/printed with the patient who expressed understanding. The instructions and results are also available on [facility].      - You must understand that you have received an Urgent Care treatment only and that you may be released before all of your medical problems are known or treated.   - You, the patient, will arrange for follow up care as instructed.   - Follow up with your PCP or specialty clinic as directed in the next 1-2 weeks if not improved or as needed.  You can call [phone] to schedule an appointment with the appropriate provider.   - If your condition worsens or fails to improve we recommend that you receive another evaluation at the ER immediately or contact your PCP to discuss your concerns or return here.        MORENITA Draper

## 2024-03-11 ENCOUNTER — TELEPHONE (OUTPATIENT)
Dept: OBSTETRICS AND GYNECOLOGY | Facility: CLINIC | Age: 34
End: 2024-03-11
Payer: COMMERCIAL

## 2024-03-11 DIAGNOSIS — Z32.01 POSITIVE PREGNANCY TEST: Primary | ICD-10-CM

## 2024-03-11 NOTE — TELEPHONE ENCOUNTER
Left message to patient to call the office at [phone].      Please let her know that the b-hcg level looks great. We can check another one today or tomorrow if she would like. Please offer to schedule.

## 2024-03-11 NOTE — TELEPHONE ENCOUNTER
----- Message from Mairsol Allred MD sent at 3/11/2024  8:22 AM CDT -----  Please let her know that the b-hcg level looks great. We can check another one today or tomorrow if she would like. Please offer to schedule.

## 2024-03-12 ENCOUNTER — LAB VISIT (OUTPATIENT)
Dept: LAB | Facility: HOSPITAL | Age: 34
End: 2024-03-12
Attending: OBSTETRICS & GYNECOLOGY
Payer: COMMERCIAL

## 2024-03-12 DIAGNOSIS — Z32.01 POSITIVE PREGNANCY TEST: ICD-10-CM

## 2024-03-12 LAB — HCG INTACT+B SERPL-ACNC: NORMAL MIU/ML

## 2024-03-12 PROCEDURE — 84702 CHORIONIC GONADOTROPIN TEST: CPT | Performed by: OBSTETRICS & GYNECOLOGY

## 2024-03-12 PROCEDURE — 36415 COLL VENOUS BLD VENIPUNCTURE: CPT | Mod: PO | Performed by: OBSTETRICS & GYNECOLOGY

## 2024-03-13 ENCOUNTER — PATIENT MESSAGE (OUTPATIENT)
Dept: INTERNAL MEDICINE | Facility: CLINIC | Age: 34
End: 2024-03-13
Payer: COMMERCIAL

## 2024-03-13 DIAGNOSIS — Z00.00 ROUTINE HEALTH MAINTENANCE: Primary | ICD-10-CM

## 2024-03-14 NOTE — TELEPHONE ENCOUNTER
Pended CMP order. Will contact pt to schedule lab/echo appt after the order is signed. Forward to

## 2024-03-15 ENCOUNTER — CLINICAL SUPPORT (OUTPATIENT)
Dept: OBSTETRICS AND GYNECOLOGY | Facility: CLINIC | Age: 34
End: 2024-03-15
Payer: COMMERCIAL

## 2024-03-15 ENCOUNTER — PATIENT MESSAGE (OUTPATIENT)
Dept: OBSTETRICS AND GYNECOLOGY | Facility: CLINIC | Age: 34
End: 2024-03-15

## 2024-03-15 DIAGNOSIS — N91.2 AMENORRHEA: Primary | ICD-10-CM

## 2024-03-15 PROCEDURE — 99999 PR PBB SHADOW E&M-EST. PATIENT-LVL II: CPT | Mod: PBBFAC,,,

## 2024-03-15 NOTE — PROGRESS NOTES
Spoke with patient for a total of 30 minutes during virtual visit.  Updated chart to reflect up to date patient demographics.  Allergies, medications, pharmacy, medical/family history and OB history updated.  Patient was guided through expectations of care during pregnancy.  Pregnancy confirmation, dating u/s & first routine OB appts scheduled.  Education provided & questions answered. Encouraged to send message or call office with any questions/concerns. Verbalized understanding.     Discussed with pt:    taking PNV   C/o occ nausea/no vomiting   C/o occ cramping/no spotting  Precautions discussed  Referred to [email or web address] for Preg A to Z guide & class schedule   Discussed benefits of breastfeeding   Discussed need for pediatrician  Getting over cold now  Had diarrhea for about 2 wks but better now  Requesting to see midwives- number given to call to sched appt [phone]

## 2024-03-21 ENCOUNTER — CLINICAL SUPPORT (OUTPATIENT)
Dept: REHABILITATION | Facility: HOSPITAL | Age: 34
End: 2024-03-21
Payer: COMMERCIAL

## 2024-03-21 DIAGNOSIS — M24.80 JOINT HYPEREXTENSIBILITY OF MULTIPLE SITES: ICD-10-CM

## 2024-03-21 DIAGNOSIS — R68.89 DECREASED STRENGTH, ENDURANCE, AND MOBILITY: Primary | ICD-10-CM

## 2024-03-21 DIAGNOSIS — R53.1 DECREASED STRENGTH, ENDURANCE, AND MOBILITY: Primary | ICD-10-CM

## 2024-03-21 DIAGNOSIS — Z74.09 DECREASED STRENGTH, ENDURANCE, AND MOBILITY: Primary | ICD-10-CM

## 2024-03-21 DIAGNOSIS — R52 PAIN: ICD-10-CM

## 2024-03-21 PROCEDURE — 97112 NEUROMUSCULAR REEDUCATION: CPT

## 2024-03-21 PROCEDURE — 97140 MANUAL THERAPY 1/> REGIONS: CPT

## 2024-03-21 PROCEDURE — 97110 THERAPEUTIC EXERCISES: CPT

## 2024-03-21 NOTE — PROGRESS NOTES
[facility]   Physical Therapy Treatment Note & PROGRESS NOTE       Name: Angela Lyle  Clinic Number: [id number]    Therapy Diagnosis:   Encounter Diagnoses   Name Primary?    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     SUBJECTIVE     Pt reports: Patient reports she has been sick and tired this past week. She has been able to do her core stability exercises 2x per week but not as much of her upper extremities exercises. Her right shoulder is bothering her today. She does report that she feels better after treatment today.      She was compliant with home exercise program.  Response to previous treatment: stronger overall  Functional change: stronger overall     Pain: 4/10  Location: right shoulder bilateral , bilateral upper trapezial , Iliac crest area and left quadratus lumborum    OBJECTIVE     Objective Measures updated at progress report unless specified.        Strength:     U/E MMT Right Left Pain/Dysfunction with Movement 12/12/23  Bilateral  1/17/24  Bilateral  2/14/24  Bilateral  3/21/24   Cervical Side Bending 3+/5 3+/5 + trunk shift 4-/5, decreased trunk shift 4/5 4+/5 4+/5   Upper trapezial  3+/5 3+/5 + trunk shift 4-/5, decreased trunk shift 4/5 4+/5 4+/5   Shoulder  Abduction 3+/5 3+/5 + trunk shift 4-/5, decreased trunk shift 4/5 4+/5 4+/5   Shoulder IR 3+/5 3+/5 + trunk shift 4-/5, decreased trunk shift 4-/5, 4/5 4/5 4+/5   Shoulder ER    3+/5 3+/5 + trunk shift 4-/5, decreased trunk shift 4-/5, 4/5 4/5 4+/5   Elbow Flexion  3+/5 3+/5 + trunk shift 4-/5, decreased trunk shift 4-/5 4+/5 4+/5   Elbow Extension 3+/5 3+/5 + trunk shift 4-/5, decreased trunk shift 4-/5 4+/5 4+/5   Wrist Extension 3+/5 3+/5   4-/5, decreased trunk shift NT 4+/5 4+/5   4th/5th Finger Intrinsics 3+/5 3+/5   NT NT NT 4-/5    and   L/E MMT Right  (spine) Left Pain/Dysfunction with Movement 12/12/23 1/18/24 2/14/24  Right / Left  3/21/24   Hip Flexion  3+/5 3+/5 + trunk shift 4-/5, decreased trunk shift 4-/5 4+/5, 4/5 4+/5   Knee Extension 3+/5 3+/5 + trunk shift 4-/5, decreased trunk shift 4/5 4+/5 4+/5   Knee Flexion 3+/5 3+/5 + trunk shift 4-/5, decreased trunk shift 4/5 4+/5 4+/5   Hip IR 3+/5 3+/5 + trunk shift 4-/5, decreased trunk shift 4/5 4/5 4/5   Hip ER 3+/5 3+/5 + trunk shift 4-/5, decreased trunk shift 4/5 4/5 4/5   Ankle DF 3+/5 3+/5 + trunk shift 4/5, decreased trunk shift NT 4+/5 4+/5   Ankle PF 3+/5 3+/5 + trunk shift 4/5, decreased trunk shift NT 4+/5 4+/5      Bilateral upper extremities and lower extremities strength testing with NO TRUNK SHIFT     FOTO:      ELPIDIO Miller received the treatments listed below:        therapeutic exercises to develop strength, endurance, and core stabilization for 27 minutes including:    Intervention 3/21/2024  Parameters        Chin tucks x 3 min, 5 sec hold 1 sec rest   Scapula squeeze gentle shoulder extension  2 min, 5 sec hold 1 sec rest  On towel rolls   Isometric adduction  x 3 min, 5 sec hold 1 sec rest   Supine clamshells x 3 min black band, 5 sec hold 1 sec rest        Reviewed HEP  I's/T's/Y's        Re-assessment/FOTO x         Hand ball squeezes/tennis ball,  x hold 5s, rest 1s; 3 min   4 way wrist -  (flexion, extension, radial  "deviation, ulnar deviation), sitting in chair with 1-2 pillows on lap - standing for ulnar deviation, without weight - 1.5 min each       manual therapy techniques: Myofacial release and Soft tissue Mobilization were applied to the: bilateral upper and lower body for 15 minutes, including:    Manual Intervention 3/21/2024     Soft Tissue Mobilization x bilateral suboccipitals, upper trapezial, levator scapula, subscapularis muscle's. Pre and post treatment session.    Joint Mobilizations x  First rib bilateral pre and post treatment session   Mobilization with movement x Scapular tilts bilateral    Muscle energy techniques  - "Shotgun"   Functional Dry Needling           neuromuscular re-education activities to improve: Balance, Coordination, Proprioception, and Posture for 15 minutes. The following activities were included:       Intervention 3/21/2024  Parameters   Bridging with kick outs x 3 min hold 5s/alt sides   Supine Marches  - with abdominal bracing lift foot 1-2 in, hold 5s; 2 min 50 sec   Prone hip extension  - 2 min 50 sec hold each side for 5 sec/head in neutral    Bicep curls   Sitting single arm 1# right 0# left    Chicken dance  x (shoulder abduction with elbows bent to 90 degrees),  1.5 min   Shoulder scaption  x Sitting double arm 3 min   Serratus punch  Supine alternating arm   Dying bug with pelvic neutral and abdominal bracing - 3 min   Prone swimmer  LE's x 3 min   Shoulder Internal and External Rotation  x 3 min with elbows at side move shoulders in a small range of ER (~30 degrees)/IR (~45 degrees)   Prone T, I, (NO Y today) x (90/90 position) with head supported in neutral position, no hold; 30 sec   Ball modifications       Angela participated in dynamic functional therapeutic activities to improve functional performance for 0  minutes, including:    Intervention 3/21/2024  Parameters                                     Plan for Next Visit:    Level 3 Ball Ex's (all 1min start)              " (These will be your Monday & Thursday ex's, along with Level 3 mat ex's.  YOU should also be starting Level one neck, mid back and UE program)  Seated on therapy ball kickouts - hold 5s then switch legs  Ball Roll outs from sitting, raise heel off ground slowly, then repeat on other side.  Hugging the ball with kick outs, hold leg out for 5s then switch legs  Bridging with legs on ball and arms folded across chest hold 5s, rest 1s (goal 3 min)    Level 2 Neck, Mid Back and Upper Extremity Progression   Isometric Neck, 5s repeat all directions (forward, right side, backwards, left side for total of 4 min, add 20s per day until you reach 8 min        PATIENT EDUCATION AND HOME EXERCISES     Home Exercises Provided and Patient Education Provided     Education provided:   - as above    Written Home Exercises Provided: Patient instructed to cont prior HEP, and reviewed all questions regarding current exercises' and progressions. Exercises were reviewed and Angela was able to demonstrate them prior to the end of the session.  Angela demonstrated good  understanding of the education provided. See EMR under Patient Instructions for exercises provided during therapy sessions      ASSESSMENT   Patient has been able to maintain strength gains without BFR (as now contraindicated due to her pregnancy) and has been able to progress and continue with home exercise program. Her FOTO score remains unchanged from last visit. Patient is noted with increased trunk stability during all strength testing today. Encouraged home exercise program.    Angela Is progressing well towards her goals.   Pt prognosis is Good.     Pt will continue to benefit from skilled outpatient physical therapy to address the deficits listed in the problem list box on initial evaluation, provide pt/family education and to maximize pt's level of independence in the home and community environment.     Pt's spiritual, cultural and educational needs considered and  pt agreeable to plan of care and goals.     Anticipated barriers to physical therapy:  co-morbidities, chronicity of condition, adherence to treatment plan, coping style, and distance to travel for care    Goals:     Reviewed: 3/21/2024      Short Term Goals: In 6 weeks  Progress Date   1.Patient to be educated on HEP. [] Progressing  [x] MET   [] Not MET   [] Not tested  1/17/2024   2.Patient to increase bilateral UE/LE strength by 1/2 grade, in order to improve endurance and increase ability to perform all functional activities for increased time.  [] Progressing  [x] MET   [] Not MET  [] Not tested  1/17/2024   3.Patient to have pain less than 7/10 at worst, to improve QOL. [x] Progressing  [] MET   [] Not MET  [] Not tested  3/21/2024   4.Patient to score the FOTO, to improve QOL. [] Progressing  [x] MET   [] Not MET  [] Not tested  1/17/2024      Long Term Goals: In 12 weeks Progress Date   1.Patient to improve score on the FOTO predicted score or better, to improve QOL. [x] Progressing  [] MET   [] Not MET  [] Not tested  3/21/2024   2. Patient to increase bilateral UE/LE strength to 4/5 or greater, in order to improve endurance and increase ability to perform all functional activities for increased time. [] Progressing  [x] MET   [] Not MET  [] Not tested  2/14/2024   3. Patient to have decreased pain to 4/10 at worst, to improve QOL. [x] Progressing  [] MET   [] Not MET  [] Not tested  3/21/2024   4. Patient to perform daily activities including work activity with only mild increased symptoms. [x] Progressing  [] MET   [] Not MET  [] Not tested  3/21/2024   5.Patient to increase bilateral UE/LE strength to 5/5 or greater, in order to improve endurance and increase ability to perform all functional activities for increased time. [x] Progressing  [] MET   [] Not MET  [] Not tested 3/21/2024        PLAN     Monitor response to today's treatment session and progress with Physical Therapy plan of care as  indicated.     Note updated long term goal    Updated Certification Period: 1/17/24 to 4/17/2024   Recommended Treatment Plan: 1 times per week for 12 weeks:  Aquatic Therapy, Electrical Stimulation PRN, Gait Training, Manual Therapy, Moist Heat/ Ice, Neuromuscular Re-ed, Patient Education, Self Care, Therapeutic Activities, and Therapeutic Exercise, FDN/PRN    Daisha Enriquez, PT

## 2024-03-25 NOTE — PLAN OF CARE
[facility]   Physical Therapy Treatment Note & PROGRESS NOTE       Name: Angela Lyle  Clinic Number: [id number]    Therapy Diagnosis:   Encounter Diagnoses   Name Primary?    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     SUBJECTIVE     Pt reports: Patient reports she has been sick and tired this past week. She has been able to do her core stability exercises 2x per week but not as much of her upper extremities exercises. Her right shoulder is bothering her today. She does report that she feels better after treatment today.      She was compliant with home exercise program.  Response to previous treatment: stronger overall  Functional change: stronger overall     Pain: 4/10  Location: right shoulder bilateral , bilateral upper trapezial , Iliac crest area and left quadratus lumborum    OBJECTIVE     Objective Measures updated at progress report unless specified.        Strength:     U/E MMT Right Left Pain/Dysfunction with Movement 12/12/23  Bilateral  1/17/24  Bilateral  2/14/24  Bilateral  3/21/24   Cervical Side Bending 3+/5 3+/5 + trunk shift 4-/5, decreased trunk shift 4/5 4+/5 4+/5   Upper trapezial  3+/5 3+/5 + trunk shift 4-/5, decreased trunk shift 4/5 4+/5 4+/5   Shoulder  Abduction 3+/5 3+/5 + trunk shift 4-/5, decreased trunk shift 4/5 4+/5 4+/5   Shoulder IR 3+/5 3+/5 + trunk shift 4-/5, decreased trunk shift 4-/5, 4/5 4/5 4+/5   Shoulder ER    3+/5 3+/5 + trunk shift 4-/5, decreased trunk shift 4-/5, 4/5 4/5 4+/5   Elbow Flexion  3+/5 3+/5 + trunk shift 4-/5, decreased trunk shift 4-/5 4+/5 4+/5   Elbow Extension 3+/5 3+/5 + trunk shift 4-/5, decreased trunk shift 4-/5 4+/5 4+/5   Wrist Extension 3+/5 3+/5   4-/5, decreased trunk shift NT 4+/5 4+/5   4th/5th Finger Intrinsics 3+/5 3+/5   NT NT NT 4-/5    and   L/E MMT Right  (spine) Left Pain/Dysfunction with Movement 12/12/23 1/18/24 2/14/24  Right / Left  3/21/24   Hip Flexion  3+/5 3+/5 + trunk shift 4-/5, decreased trunk shift 4-/5 4+/5, 4/5 4+/5   Knee Extension 3+/5 3+/5 + trunk shift 4-/5, decreased trunk shift 4/5 4+/5 4+/5   Knee Flexion 3+/5 3+/5 + trunk shift 4-/5, decreased trunk shift 4/5 4+/5 4+/5   Hip IR 3+/5 3+/5 + trunk shift 4-/5, decreased trunk shift 4/5 4/5 4/5   Hip ER 3+/5 3+/5 + trunk shift 4-/5, decreased trunk shift 4/5 4/5 4/5   Ankle DF 3+/5 3+/5 + trunk shift 4/5, decreased trunk shift NT 4+/5 4+/5   Ankle PF 3+/5 3+/5 + trunk shift 4/5, decreased trunk shift NT 4+/5 4+/5      Bilateral upper extremities and lower extremities strength testing with NO TRUNK SHIFT     FOTO:      ELPIDIO Miller received the treatments listed below:        therapeutic exercises to develop strength, endurance, and core stabilization for 27 minutes including:    Intervention 3/21/2024  Parameters        Chin tucks x 3 min, 5 sec hold 1 sec rest   Scapula squeeze gentle shoulder extension  2 min, 5 sec hold 1 sec rest  On towel rolls   Isometric adduction  x 3 min, 5 sec hold 1 sec rest   Supine clamshells x 3 min black band, 5 sec hold 1 sec rest        Reviewed HEP  I's/T's/Y's        Re-assessment/FOTO x         Hand ball squeezes/tennis ball,  x hold 5s, rest 1s; 3 min   4 way wrist -  (flexion, extension, radial  "deviation, ulnar deviation), sitting in chair with 1-2 pillows on lap - standing for ulnar deviation, without weight - 1.5 min each       manual therapy techniques: Myofacial release and Soft tissue Mobilization were applied to the: bilateral upper and lower body for 15 minutes, including:    Manual Intervention 3/21/2024     Soft Tissue Mobilization x bilateral suboccipitals, upper trapezial, levator scapula, subscapularis muscle's. Pre and post treatment session.    Joint Mobilizations x  First rib bilateral pre and post treatment session   Mobilization with movement x Scapular tilts bilateral    Muscle energy techniques  - "Shotgun"   Functional Dry Needling           neuromuscular re-education activities to improve: Balance, Coordination, Proprioception, and Posture for 15 minutes. The following activities were included:       Intervention 3/21/2024  Parameters   Bridging with kick outs x 3 min hold 5s/alt sides   Supine Marches  - with abdominal bracing lift foot 1-2 in, hold 5s; 2 min 50 sec   Prone hip extension  - 2 min 50 sec hold each side for 5 sec/head in neutral    Bicep curls   Sitting single arm 1# right 0# left    Chicken dance  x (shoulder abduction with elbows bent to 90 degrees),  1.5 min   Shoulder scaption  x Sitting double arm 3 min   Serratus punch  Supine alternating arm   Dying bug with pelvic neutral and abdominal bracing - 3 min   Prone swimmer  LE's x 3 min   Shoulder Internal and External Rotation  x 3 min with elbows at side move shoulders in a small range of ER (~30 degrees)/IR (~45 degrees)   Prone T, I, (NO Y today) x (90/90 position) with head supported in neutral position, no hold; 30 sec   Ball modifications       Angela participated in dynamic functional therapeutic activities to improve functional performance for 0  minutes, including:    Intervention 3/21/2024  Parameters                                     Plan for Next Visit:    Level 3 Ball Ex's (all 1min start)              " (These will be your Monday & Thursday ex's, along with Level 3 mat ex's.  YOU should also be starting Level one neck, mid back and UE program)  Seated on therapy ball kickouts - hold 5s then switch legs  Ball Roll outs from sitting, raise heel off ground slowly, then repeat on other side.  Hugging the ball with kick outs, hold leg out for 5s then switch legs  Bridging with legs on ball and arms folded across chest hold 5s, rest 1s (goal 3 min)    Level 2 Neck, Mid Back and Upper Extremity Progression   Isometric Neck, 5s repeat all directions (forward, right side, backwards, left side for total of 4 min, add 20s per day until you reach 8 min        PATIENT EDUCATION AND HOME EXERCISES     Home Exercises Provided and Patient Education Provided     Education provided:   - as above    Written Home Exercises Provided: Patient instructed to cont prior HEP, and reviewed all questions regarding current exercises' and progressions. Exercises were reviewed and Angela was able to demonstrate them prior to the end of the session.  Angela demonstrated good  understanding of the education provided. See EMR under Patient Instructions for exercises provided during therapy sessions      ASSESSMENT   Patient has been able to maintain strength gains without BFR (as now contraindicated due to her pregnancy) and has been able to progress and continue with home exercise program. Her FOTO score remains unchanged from last visit. Patient is noted with increased trunk stability during all strength testing today. Encouraged home exercise program.    Angela Is progressing well towards her goals.   Pt prognosis is Good.     Pt will continue to benefit from skilled outpatient physical therapy to address the deficits listed in the problem list box on initial evaluation, provide pt/family education and to maximize pt's level of independence in the home and community environment.     Pt's spiritual, cultural and educational needs considered and  pt agreeable to plan of care and goals.     Anticipated barriers to physical therapy:  co-morbidities, chronicity of condition, adherence to treatment plan, coping style, and distance to travel for care    Goals:     Reviewed: 3/21/2024      Short Term Goals: In 6 weeks  Progress Date   1.Patient to be educated on HEP. [] Progressing  [x] MET   [] Not MET   [] Not tested  1/17/2024   2.Patient to increase bilateral UE/LE strength by 1/2 grade, in order to improve endurance and increase ability to perform all functional activities for increased time.  [] Progressing  [x] MET   [] Not MET  [] Not tested  1/17/2024   3.Patient to have pain less than 7/10 at worst, to improve QOL. [x] Progressing  [] MET   [] Not MET  [] Not tested  3/21/2024   4.Patient to score the FOTO, to improve QOL. [] Progressing  [x] MET   [] Not MET  [] Not tested  1/17/2024      Long Term Goals: In 12 weeks Progress Date   1.Patient to improve score on the FOTO predicted score or better, to improve QOL. [x] Progressing  [] MET   [] Not MET  [] Not tested  3/21/2024   2. Patient to increase bilateral UE/LE strength to 4/5 or greater, in order to improve endurance and increase ability to perform all functional activities for increased time. [] Progressing  [x] MET   [] Not MET  [] Not tested  2/14/2024   3. Patient to have decreased pain to 4/10 at worst, to improve QOL. [x] Progressing  [] MET   [] Not MET  [] Not tested  3/21/2024   4. Patient to perform daily activities including work activity with only mild increased symptoms. [x] Progressing  [] MET   [] Not MET  [] Not tested  3/21/2024   5.Patient to increase bilateral UE/LE strength to 5/5 or greater, in order to improve endurance and increase ability to perform all functional activities for increased time. [x] Progressing  [] MET   [] Not MET  [] Not tested 3/21/2024        PLAN     Monitor response to today's treatment session and progress with Physical Therapy plan of care as  indicated.     Note updated long term goal    Updated Certification Period: 1/17/24 to 4/17/2024   Recommended Treatment Plan: 1 times per week for 12 weeks:  Aquatic Therapy, Electrical Stimulation PRN, Gait Training, Manual Therapy, Moist Heat/ Ice, Neuromuscular Re-ed, Patient Education, Self Care, Therapeutic Activities, and Therapeutic Exercise, FDN/PRN    Daisha Enriquez, PT

## 2024-03-26 DIAGNOSIS — Z34.90 PREGNANCY, UNSPECIFIED GESTATIONAL AGE: Primary | ICD-10-CM

## 2024-04-01 PROBLEM — Z00.00 ROUTINE HEALTH MAINTENANCE: Status: RESOLVED | Noted: 2023-12-26 | Resolved: 2024-04-01

## 2024-04-02 ENCOUNTER — INITIAL PRENATAL (OUTPATIENT)
Dept: OBSTETRICS AND GYNECOLOGY | Facility: CLINIC | Age: 34
End: 2024-04-02
Payer: COMMERCIAL

## 2024-04-02 ENCOUNTER — HOSPITAL ENCOUNTER (OUTPATIENT)
Dept: PERINATAL CARE | Facility: OTHER | Age: 34
Discharge: HOME | End: 2024-04-02
Attending: ADVANCED PRACTICE MIDWIFE
Payer: COMMERCIAL

## 2024-04-02 VITALS
BODY MASS INDEX: 17.86 KG/M2 | DIASTOLIC BLOOD PRESSURE: 64 MMHG | SYSTOLIC BLOOD PRESSURE: 109 MMHG | WEIGHT: 104.06 LBS

## 2024-04-02 DIAGNOSIS — Z34.90 PREGNANCY, UNSPECIFIED GESTATIONAL AGE: ICD-10-CM

## 2024-04-02 DIAGNOSIS — O09.511 AMA (ADVANCED MATERNAL AGE) PRIMIGRAVIDA 35+, FIRST TRIMESTER: Primary | ICD-10-CM

## 2024-04-02 DIAGNOSIS — G90.A POSTURAL ORTHOSTATIC TACHYCARDIA SYNDROME: ICD-10-CM

## 2024-04-02 DIAGNOSIS — Q79.62 HYPERMOBILE EHLERS-DANLOS SYNDROME: ICD-10-CM

## 2024-04-02 PROCEDURE — 99999 PR PBB SHADOW E&M-EST. PATIENT-LVL III: CPT | Mod: PBBFAC,,, | Performed by: ADVANCED PRACTICE MIDWIFE

## 2024-04-02 PROCEDURE — 88175 CYTOPATH C/V AUTO FLUID REDO: CPT | Performed by: PATHOLOGY

## 2024-04-02 PROCEDURE — 87086 URINE CULTURE/COLONY COUNT: CPT | Performed by: ADVANCED PRACTICE MIDWIFE

## 2024-04-02 PROCEDURE — 76801 OB US < 14 WKS SINGLE FETUS: CPT | Mod: 26,,, | Performed by: OBSTETRICS & GYNECOLOGY

## 2024-04-02 PROCEDURE — 0500F INITIAL PRENATAL CARE VISIT: CPT | Mod: CPTII,S$GLB,, | Performed by: ADVANCED PRACTICE MIDWIFE

## 2024-04-02 PROCEDURE — 76801 OB US < 14 WKS SINGLE FETUS: CPT

## 2024-04-02 PROCEDURE — 88141 CYTOPATH C/V INTERPRET: CPT | Mod: ,,, | Performed by: PATHOLOGY

## 2024-04-02 PROCEDURE — 87624 HPV HI-RISK TYP POOLED RSLT: CPT | Performed by: ADVANCED PRACTICE MIDWIFE

## 2024-04-02 PROCEDURE — 87491 CHLMYD TRACH DNA AMP PROBE: CPT | Performed by: ADVANCED PRACTICE MIDWIFE

## 2024-04-02 NOTE — PROGRESS NOTES
Angela Lyle is a 33 y.o. , presents today for amenorrhea.      Patient reports has not seen any other provider for this pregnancy. However, serum quant and progesterone were obtained by Dr. Allred early March after patient contacted her via [employer] patient portal. Hcg increasing appropriately. Progesterone wnl. She continues Progesterone, low dose ASA and daily PNV.    HPI: Reports amenorrhea since Patient's last menstrual period was 2024..   Now ? 8 + 4/7 weeks. Reports mild nausea controlled with Unisom. Has noticed breast tenderness. Denies vaginal bleeding since LMP.    SOCIAL HISTORY: Denies emotional/mental/physical/sexual violence or abuse. Feels safe at home. Accompanied today by Haritha falk. Donor home insemination. Discussed use of alcohol, tobacco, and illicit substances - pt denies use during pregnancy.    PAP HISTORY: last pap , result wnl. Neg HPV . Denies any history of abnormal pap smear or STDs.     Reports Hx of Jori Danlos and POTs. She has been seen regularly by interval med. Last  echo was in .  Was mostly normal at that time although did have some bowing of the mitral valve per internal med note in EPIC.  Describes her Jori Danlos condition as moderate. Does she PT regularly in [place]. 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     Review of patient's allergies indicates:   Allergen Reactions    Sulfa (sulfonamide antibiotics) Hives and Itching    Sulfamethoxazole-trimethoprim      Other reaction(s): Itching     Past Medical History:   Diagnosis Date    Allergy     Anemia     hx of HOSEA on fergon in past    Anxiety     Arthritis     right shoulder    Depression     Fatigue     History of psychiatric care     Hypermobile Jori-Danlos syndrome 2019    Keloid cicatrix     PCOS (polycystic ovarian syndrome)     POTS (postural orthostatic tachycardia syndrome) 2019    Psychiatric problem     Routine health maintenance 2023    Therapy       Past Surgical History:   Procedure Laterality Date    SHOULDER SURGERY Right 2017    TONSILLECTOMY      wisdom teeth removal       Past Surgical History:   Procedure Laterality Date    SHOULDER SURGERY Right 2017    TONSILLECTOMY      wisdom teeth removal       OB History    Para Term  AB Living   1             SAB IAB Ectopic Multiple Live Births                  # Outcome Date GA Lbr Mark/2nd Weight Sex Delivery Anes PTL Lv   1 Current              Social History     Socioeconomic History    Marital status:    Occupational History    Occupation: Labor & Delivery RN   Tobacco Use    Smoking status: Never     Passive exposure: Never    Smokeless tobacco: Never   Substance and Sexual Activity    Alcohol use: Not Currently     Alcohol/week: 5.0 standard drinks of alcohol     Types: 5 Glasses of wine per week     Comment: ocassional    Drug use: No    Sexual activity: Yes     Partners: Female     Birth control/protection: None   Other Topics Concern    Are you pregnant or think you may be? No    Breast-feeding No   Social History Narrative         Social Determinants of Health     Financial Resource Strain: Low Risk  (2023)    Overall Financial Resource Strain (CARDIA)     Difficulty of Paying Living Expenses: Not hard at all   Food Insecurity: No Food Insecurity (2023)    Hunger Vital Sign     Worried About Running Out of Food in the Last Year: Never true     Ran Out of Food in the Last Year: Never true   Transportation Needs: No Transportation Needs (2023)    PRAPARE - Transportation     Lack of Transportation (Medical): No     Lack of Transportation (Non-Medical): No   Physical Activity: Insufficiently Active (2023)    Exercise Vital Sign     Days of Exercise per Week: 3 days     Minutes of Exercise per Session: 40 min   Stress: Stress Concern Present (2023)    [employer] [place] [employer] - Occupational Stress Questionnaire     Feeling  of Stress : To some extent   Social Connections: Unknown (12/18/2023)    Social Connection and Isolation Panel [NHANES]     Frequency of Communication with Friends and Family: Once a week     Frequency of Social Gatherings with Friends and Family: Once a week     Active Member of Clubs or Organizations: No     Attends Club or Organization Meetings: Never     Marital Status:    Housing Stability: Low Risk  (12/18/2023)    Housing Stability Vital Sign     Unable to Pay for Housing in the Last Year: No     Number of Places Lived in the Last Year: 1     Unstable Housing in the Last Year: No     Family History   Problem Relation Age of Onset    Arthritis Mother         back surgery    Fibromyalgia Mother     Depression Mother     Hypertension Mother     Miscarriages / Stillbirths Mother     Hyperlipidemia Father     Hearing loss Father     Heart disease Father     Arthritis Maternal Grandmother     Hearing loss Maternal Grandmother     Hypertension Maternal Grandmother     Stroke Maternal Grandmother     Heart disease Paternal Grandfather     Diabetes Paternal Grandfather     Hearing loss Paternal Grandfather     Cancer Maternal Aunt     Early death Maternal Grandfather     Heart disease Maternal Grandfather     Hearing loss Paternal Grandmother     Stroke Paternal Grandmother     Amblyopia Neg Hx     Blindness Neg Hx     Cataracts Neg Hx     Glaucoma Neg Hx     Macular degeneration Neg Hx     Retinal detachment Neg Hx     Strabismus Neg Hx     Melanoma Neg Hx      Social History     Substance and Sexual Activity   Sexual Activity Yes    Partners: Female    Birth control/protection: None       GENETIC SCREENING   Patient's age 35 years or older as of estimated date of delivery? yes  Neural tube defect (meningomyelocele, spina bifida, or anencephaly)? no  Down syndrome? no  Dorian-Sachs (Ashkenazi Yazidism, Cajun, Lao Qatari)? no  Canavan disease (Ashkenazi Yazidism)? no  Familial dysautonomia (Ashkenazi Yazidism)?  no  Sickle cell disease or trait ()? no  Hemophilia or other blood disorders? no  Cystic fibrosis? no  Muscular dystrophy? no  Lenoir's chorea? no  Thalassemia (Italian, Greek, Mediterranean, or  background) MCV less than 80? no  Congenital heart defect? no  Mental retardation/autism? no   If Yes, was person tested for Fragile X?   Other inherited genetic or chromosomal disorder? YES, Jori Danos.  Maternal metabolic disorder (e.g. type 1 diabetes, PKU)? no  Patient or baby's father had a child with birth defects not listed above? no  Recurrent pregnancy loss or a stillbirth: no  Medications (including supplements, vitamins, herbs or OTC drugs)/illicit/recreational drugs/alcohol since last menstrual period? Yes.    If yes, agent(s) and strength/dose: PNV, low dose ASA, Unisom  List any other genetic risks: no  Comments/counseling: M consult and US for NT, discussed and placed. Desires Nuchal translucency and MT21 plus AFP when appropriate.    INFECTION HISTORY  Live with someone with TB or exposed to TB: no  Patient or partner has history of genital herpes: no  Rash or viral illness since last menstrual period: no  Patient or partner has hepatitis B or C: no  History of STD, gonorrhea, chlamydia, HPV, HIV, syphilis (list all that apply): no  List other infections: no  Additional comments: n/a      ROS:  Constitutional/Gen: Denies fevers, chills, malaise, or weight loss.  Psych: Denies depression, anxiety  Eyes: Denies changes in vision or scotomata  Ears, nose, mouth, throat: Denies sinus tenderness, swelling, or dentition problems  CV/vasc: Denies heart palpitations or edema  Resp: Denies SOB or dyspnea  Breasts: Denies mass, nipple discharge, or trauma.   GI: Denies constipation or diarrhea.  : Denies vaginal discharge, dysuria or pelvic pain.   MS: Does have some issues with muscle weakness, sees PT regularly, soreness, or changes in ROM    OBJECTIVE:  /64   Wt 47.2 kg (104 lb 0.9 oz)    LMP 2024   BMI 17.86 kg/m²   Constitutional/Gen: NAD, appears stated age, well groomed  Neck: supple, no masses or enlargement  Head: normocephalic  Skin: warm and dry w/o rash  Lung: normal resp effort, CTAB  Heart: normal HR, RRR   Back: negative CVAT  Breasts: bilaterally--no masses, tenderness, skin changes, or nipple discharge noted  Abdomen: soft, nontender, no masses, and bowel sounds normal, no enlargement  External genitalia: no lesions or discharge, normal hair distribution  Urethral meatus: normal size and location, no lesions or prolapse  Vagina: normal appearance, no lesions, no discharge, no evidence cystocele or rectocele.  Cervix: normal appearance, no discharge, no lesions, negative CMT. Cervix is long and closed.   Uterus: nontender, mobile, approx 8 week size, smooth contour. PAP and HPV obtained.  Adnexa: no masses or tenderness  Anus/Perineum: normal appearance, with no lesions or discharge. Internal exam deferred.  Extremities: FROM, with no edema or tenderness.  Neurologic: A&O x 4, non-focal, cranial nerves 2-12 grossly intact  Psych: affect appropriate and without signs of mood, thought or memory difficulty appreciated    ASSESSMENT:  33 y.o. female  with amenorrhea  Likely at 8w+d via sure LMP  Patient Active Problem List   Diagnosis    Adult BMI <19 kg/sq m    Major depressive disorder, single episode in full remission    Tendinitis of right rotator cuff    Right shoulder pain    Shoulder instability    Left hip pain    Chronic pain    Cervical pain    Fatigue    Chest pain of unknown etiology    Sleep stage dysfunction    RODERICK (obstructive sleep apnea)    Neck pain    Excessive daytime sleepiness    Idiopathic hypersomnia    Hypermobile Jori-Danlos syndrome    PCOS (polycystic ovarian syndrome)    Decreased strength, endurance, and mobility    Joint hyperextensibility of multiple sites    Pain    AMA (advanced maternal age) primigravida 35+, first trimester    Postural  orthostatic tachycardia syndrome    Pregnancy       PLAN:  Amenorrhea  -- + UPT in office, Patient's last menstrual period was 02/02/2024. --> Estimated Date of Delivery: ? 11/8/2024  -- Dating US ordered/scheduled (has scheduled today).  -- Routine serum and urine prenatal labs ordered. Plans to have done next week along with MT21.    Physical exam today.   --Discussed ASCCP guidelines. Pap done with HPV.   Discuss connected MOM at next visit  Pregnancy education and couseling; handouts and booklet provided  -- Oriented to practice and anticipated prenatal course of care and how to contact us  -- Reviewed ABC guidelines and admission, exclusion, and transfer criteria  -- Precautions/warning signs reviewed  -- Common complaints of pregnancy  -- Routine prenatal labs including HIV  -- Ultrasounds  -- Childbirth education/hospital/ABC facilities  -- Nutrition, prepregnant BMI, and recommended weight gain  -- Toxoplasmosis precautions (Cats/Raw Meat)  -- Sexual activity and exercise  -- Environmental/Work hazards  -- Travel  -- Tobacco, as well as alcohol and drug use  -- Use of any medications (Including supplements, Vitamins, Herbs, or OTC Drugs)  -- Domestic violence screen neg    Nausea  in pregnancy  -- Education regarding lifestyle and dietary modifications  -- Reviewed use of B6/Unisom prn. May continue. Pt will notify us if no relief/worsening symptoms, will consider alternative therapies prn    AMA  MFM referral placed along with separate request for NT. At this point, anatomy US and 32 week growth planned though MFM consult may dictate further interventions.    History Jori Danlos- MFM referral placed.    History of POTS-MFM referral placed.    Reviewed genetic testing options.    -- Reviewed available first trimester and/or second  trimester screening options. Reviewed risk of false positive/negative results and recommendation of referral to MFM in event of a positive result, for NIPT, US, and/or  amniocentesis.  -- Patient has increased genetic screening.     Birth Center Risk Assessment: Unclear if will remain CNM candidate. MFM referral placed.    Reviewed warning signs, precautions, and how/when to contact us.     RTC x 4-5 weeks, call or present sooner prn.     Updated Medication List:  Current Outpatient Medications   Medication Sig Dispense Refill    betaxoloL (KERLONE) 10 mg Tab Take 5 mg by mouth once daily.      ergocalciferol, vitamin D2, 2,000 unit Tab Take 2,000 Units by mouth once daily.      inositoL-D chiro inositoL 2,000-50 mg PwPk       multivitamin capsule Take 1 capsule by mouth once daily.      prenatal vit no.124/iron/folic (PRENATAL VITAMIN ORAL) Take 1 Dose by mouth Daily.      progesterone (PROMETRIUM) 200 MG capsule Place 1 capsule (200 mg total) vaginally nightly. 30 capsule 4    acetic acid-hydrocortisone (VOSOL-HC) otic solution Apply 3-4 drops to ears bid prn flare (Patient not taking: Reported on 3/7/2024) 10 mL 6    ketoconazole (NIZORAL) 2 % shampoo Wash hair with medicated shampoo at least 2x/week - let sit on scalp at least 5 minutes prior to rinsing (Patient not taking: Reported on 4/2/2024) 120 mL 6     No current facility-administered medications for this visit.         Margaux Cowan CNM

## 2024-04-03 LAB
BACTERIA UR CULT: NORMAL
BACTERIA UR CULT: NORMAL
C TRACH DNA SPEC QL NAA+PROBE: NOT DETECTED
N GONORRHOEA DNA SPEC QL NAA+PROBE: NOT DETECTED

## 2024-04-09 LAB
FINAL PATHOLOGIC DIAGNOSIS: NORMAL
Lab: NORMAL

## 2024-04-11 ENCOUNTER — LAB VISIT (OUTPATIENT)
Dept: LAB | Facility: OTHER | Age: 34
End: 2024-04-11
Attending: ADVANCED PRACTICE MIDWIFE
Payer: COMMERCIAL

## 2024-04-11 ENCOUNTER — CLINICAL SUPPORT (OUTPATIENT)
Dept: REHABILITATION | Facility: HOSPITAL | Age: 34
End: 2024-04-11
Payer: COMMERCIAL

## 2024-04-11 DIAGNOSIS — R68.89 DECREASED STRENGTH, ENDURANCE, AND MOBILITY: Primary | ICD-10-CM

## 2024-04-11 DIAGNOSIS — R53.1 DECREASED STRENGTH, ENDURANCE, AND MOBILITY: Primary | ICD-10-CM

## 2024-04-11 DIAGNOSIS — Z74.09 DECREASED STRENGTH, ENDURANCE, AND MOBILITY: Primary | ICD-10-CM

## 2024-04-11 DIAGNOSIS — R52 PAIN: ICD-10-CM

## 2024-04-11 DIAGNOSIS — O09.511 AMA (ADVANCED MATERNAL AGE) PRIMIGRAVIDA 35+, FIRST TRIMESTER: ICD-10-CM

## 2024-04-11 DIAGNOSIS — M24.80 JOINT HYPEREXTENSIBILITY OF MULTIPLE SITES: ICD-10-CM

## 2024-04-11 LAB
ABO + RH BLD: NORMAL
ALBUMIN SERPL BCP-MCNC: 3.9 G/DL (ref 3.5–5.2)
ALP SERPL-CCNC: 37 U/L (ref 55–135)
ALT SERPL W/O P-5'-P-CCNC: 22 U/L (ref 10–44)
ANION GAP SERPL CALC-SCNC: 8 MMOL/L (ref 8–16)
AST SERPL-CCNC: 20 U/L (ref 10–40)
BASOPHILS # BLD AUTO: 0.02 K/UL (ref 0–0.2)
BASOPHILS NFR BLD: 0.3 % (ref 0–1.9)
BILIRUB SERPL-MCNC: 1.1 MG/DL (ref 0.1–1)
BLD GP AB SCN CELLS X3 SERPL QL: NORMAL
BUN SERPL-MCNC: 8 MG/DL (ref 6–20)
CALCIUM SERPL-MCNC: 9.6 MG/DL (ref 8.7–10.5)
CHLORIDE SERPL-SCNC: 105 MMOL/L (ref 95–110)
CO2 SERPL-SCNC: 23 MMOL/L (ref 23–29)
CREAT SERPL-MCNC: 0.7 MG/DL (ref 0.5–1.4)
DIFFERENTIAL METHOD BLD: ABNORMAL
EOSINOPHIL # BLD AUTO: 0.1 K/UL (ref 0–0.5)
EOSINOPHIL NFR BLD: 0.7 % (ref 0–8)
ERYTHROCYTE [DISTWIDTH] IN BLOOD BY AUTOMATED COUNT: 12.8 % (ref 11.5–14.5)
EST. GFR  (NO RACE VARIABLE): >60 ML/MIN/1.73 M^2
GLUCOSE SERPL-MCNC: 82 MG/DL (ref 70–110)
HAV IGM SERPL QL IA: NORMAL
HBV CORE IGM SERPL QL IA: NORMAL
HBV SURFACE AG SERPL QL IA: NORMAL
HCT VFR BLD AUTO: 34.9 % (ref 37–48.5)
HCV AB SERPL QL IA: NEGATIVE
HGB BLD-MCNC: 11.8 G/DL (ref 12–16)
HGB S BLD QL SOLY: NEGATIVE
HIV 1+2 AB+HIV1 P24 AG SERPL QL IA: NEGATIVE
IMM GRANULOCYTES # BLD AUTO: 0.03 K/UL (ref 0–0.04)
IMM GRANULOCYTES NFR BLD AUTO: 0.4 % (ref 0–0.5)
LYMPHOCYTES # BLD AUTO: 1.8 K/UL (ref 1–4.8)
LYMPHOCYTES NFR BLD: 27.2 % (ref 18–48)
MCH RBC QN AUTO: 29.9 PG (ref 27–31)
MCHC RBC AUTO-ENTMCNC: 33.8 G/DL (ref 32–36)
MCV RBC AUTO: 88 FL (ref 82–98)
MONOCYTES # BLD AUTO: 0.5 K/UL (ref 0.3–1)
MONOCYTES NFR BLD: 8.1 % (ref 4–15)
NEUTROPHILS # BLD AUTO: 4.2 K/UL (ref 1.8–7.7)
NEUTROPHILS NFR BLD: 63.3 % (ref 38–73)
NRBC BLD-RTO: 0 /100 WBC
PLATELET # BLD AUTO: 227 K/UL (ref 150–450)
PMV BLD AUTO: 9.9 FL (ref 9.2–12.9)
POTASSIUM SERPL-SCNC: 3.9 MMOL/L (ref 3.5–5.1)
PROT SERPL-MCNC: 7.2 G/DL (ref 6–8.4)
RBC # BLD AUTO: 3.95 M/UL (ref 4–5.4)
SODIUM SERPL-SCNC: 136 MMOL/L (ref 136–145)
SPECIMEN OUTDATE: NORMAL
TREPONEMA PALLIDUM IGG+IGM AB [PRESENCE] IN SERUM OR PLASMA BY IMMUNOASSAY: NONREACTIVE
WBC # BLD AUTO: 6.69 K/UL (ref 3.9–12.7)

## 2024-04-11 PROCEDURE — 80074 ACUTE HEPATITIS PANEL: CPT | Performed by: ADVANCED PRACTICE MIDWIFE

## 2024-04-11 PROCEDURE — 97140 MANUAL THERAPY 1/> REGIONS: CPT

## 2024-04-11 PROCEDURE — 97110 THERAPEUTIC EXERCISES: CPT

## 2024-04-11 PROCEDURE — 85025 COMPLETE CBC W/AUTO DIFF WBC: CPT | Performed by: ADVANCED PRACTICE MIDWIFE

## 2024-04-11 PROCEDURE — 86593 SYPHILIS TEST NON-TREP QUANT: CPT | Performed by: ADVANCED PRACTICE MIDWIFE

## 2024-04-11 PROCEDURE — 86762 RUBELLA ANTIBODY: CPT | Performed by: ADVANCED PRACTICE MIDWIFE

## 2024-04-11 PROCEDURE — 36415 COLL VENOUS BLD VENIPUNCTURE: CPT | Performed by: ADVANCED PRACTICE MIDWIFE

## 2024-04-11 PROCEDURE — 87389 HIV-1 AG W/HIV-1&-2 AB AG IA: CPT | Performed by: ADVANCED PRACTICE MIDWIFE

## 2024-04-11 PROCEDURE — 85660 RBC SICKLE CELL TEST: CPT | Performed by: ADVANCED PRACTICE MIDWIFE

## 2024-04-11 PROCEDURE — 97112 NEUROMUSCULAR REEDUCATION: CPT

## 2024-04-11 PROCEDURE — 86850 RBC ANTIBODY SCREEN: CPT | Performed by: ADVANCED PRACTICE MIDWIFE

## 2024-04-11 PROCEDURE — 80053 COMPREHEN METABOLIC PANEL: CPT | Performed by: ADVANCED PRACTICE MIDWIFE

## 2024-04-11 NOTE — PROGRESS NOTES
[facility]   Physical Therapy Treatment Note        Name: Angela Lyle  Clinic Number: [id number]    Therapy Diagnosis:   Encounter Diagnoses   Name Primary?    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     SUBJECTIVE     Pt reports: Patient reports she had increased pain at work over the past week. She has been able to complete her LB/Core/SI stability program but has not been able to complete her upper extremity exercises. Encouraged some exercises as able even if regresses to maintain strength that was gained.  Physical Therapy indicates pain over left LB and hip and then bilateral upper trapezial and scapular muscle's.  All improved post treatment session. Encouraged and reminded patient of mechanics while at work - hanging IV's/reaching/lifting if all to same side needs to be aware of how she is moving/reaching.      She was compliant with home exercise program.  Response to previous treatment: stronger overall  Functional change: stronger overall     Pain: 3-4/10 current, 7/10 after work  Location: right shoulder bilateral , bilateral upper trapezial , Iliac crest area and left quadratus lumborum    OBJECTIVE     Objective Measures updated at progress report unless specified.  "    TREATMENT     Angela received the treatments listed below:        therapeutic exercises to develop strength, endurance, and core stabilization for 12 minutes including:    Intervention 3/21/2024  Parameters        Chin tucks x 3 min, 5 sec hold 1 sec rest   Scapula squeeze gentle shoulder extension x 3 min, 5 sec hold 1 sec rest  On towel rolls   Isometric adduction  x 3 min, 5 sec hold 1 sec rest   Supine clamshells x 3 min black band, 5 sec hold 1 sec rest        Reviewed HEP  I's/T's/Y's        Re-assessment/FOTO          Hand ball squeezes/tennis ball,  - hold 5s, rest 1s; 3 min   4 way wrist -  (flexion, extension, radial deviation, ulnar deviation), sitting in chair with 1-2 pillows on lap - standing for ulnar deviation, without weight - 1.5 min each       manual therapy techniques: Myofacial release and Soft tissue Mobilization were applied to the: bilateral upper and lower body for 23 minutes, including:    Manual Intervention 3/21/2024     Soft Tissue Mobilization x bilateral suboccipitals, upper trapezial, levator scapula, subscapularis muscle's. Pre and post treatment session. Also iliac crest, upper gluteals, tensor fasciae latae and deep hip rotators   Joint Mobilizations x  First rib bilateral pre and post treatment session   Mobilization with movement x Scapular tilts bilateral    Muscle energy techniques  x "Shotgun"   Functional Dry Needling           neuromuscular re-education activities to improve: Balance, Coordination, Proprioception, and Posture for 15 minutes. The following activities were included:       Intervention 3/21/2024  Parameters   Bridging with kick outs x 3 min hold 5s/alt sides (alternating 3 each side then regular bridge with hold)   Supine Marches  x with abdominal bracing lift foot 1-2 in, hold 5s; 3 min   Prone hip extension  - 2 min 50 sec hold each side for 5 sec/head in neutral    Bicep curls   Sitting single arm 1# right 0# left    Chicken dance  x (shoulder abduction " with elbows bent to 90 degrees),  3 min   Shoulder scaption  x Sitting  3 min   Serratus punch  Supine alternating arm   Dying bug with pelvic neutral and abdominal bracing - 3 min   Prone swimmer  LE's  3 min   Shoulder Internal and External Rotation  x 3 min with elbows at side move shoulders in a small range of ER (~30 degrees)/IR (~45 degrees)   Prone T, I, (NO Y today)  (90/90 position) with head supported in neutral position, no hold; 30 sec   Ball modifications       Angela participated in dynamic functional therapeutic activities to improve functional performance for 0  minutes, including:    Intervention 3/21/2024  Parameters                                     Plan for Next Visit:    Level 3 Ball Ex's (all 1min start)              (These will be your Monday & Thursday ex's, along with Level 3 mat ex's.  YOU should also be starting Level one neck, mid back and UE program)  Seated on therapy ball kickouts - hold 5s then switch legs  Ball Roll outs from sitting, raise heel off ground slowly, then repeat on other side.  Hugging the ball with kick outs, hold leg out for 5s then switch legs  Bridging with legs on ball and arms folded across chest hold 5s, rest 1s (goal 3 min)    Level 2 Neck, Mid Back and Upper Extremity Progression   Isometric Neck, 5s repeat all directions (forward, right side, backwards, left side for total of 4 min, add 20s per day until you reach 8 min        PATIENT EDUCATION AND HOME EXERCISES     Home Exercises Provided and Patient Education Provided     Education provided:   - as above (see subjective)    Written Home Exercises Provided: Patient instructed to cont prior HEP, and reviewed all questions regarding current exercises' and progressions. Exercises were reviewed and Angela was able to demonstrate them prior to the end of the session.  Angela demonstrated good  understanding of the education provided. See EMR under Patient Instructions for exercises provided during therapy  sessions      ASSESSMENT     Patient with reports of increased pain post work shift, education per subjective and patient was able to complete treatment session as indicated. Encouraged home exercise program, core stability and attention to body mechanics.    Angela Is progressing well towards her goals.   Pt prognosis is Good.     Pt will continue to benefit from skilled outpatient physical therapy to address the deficits listed in the problem list box on initial evaluation, provide pt/family education and to maximize pt's level of independence in the home and community environment.     Pt's spiritual, cultural and educational needs considered and pt agreeable to plan of care and goals.     Anticipated barriers to physical therapy:  co-morbidities, chronicity of condition, adherence to treatment plan, coping style, and distance to travel for care    Goals:     Reviewed: 3/21/2024      Short Term Goals: In 6 weeks  Progress Date   1.Patient to be educated on HEP. [] Progressing  [x] MET   [] Not MET   [] Not tested  1/17/2024   2.Patient to increase bilateral UE/LE strength by 1/2 grade, in order to improve endurance and increase ability to perform all functional activities for increased time.  [] Progressing  [x] MET   [] Not MET  [] Not tested  1/17/2024   3.Patient to have pain less than 7/10 at worst, to improve QOL. [x] Progressing  [] MET   [] Not MET  [] Not tested  3/21/2024   4.Patient to score the FOTO, to improve QOL. [] Progressing  [x] MET   [] Not MET  [] Not tested  1/17/2024      Long Term Goals: In 12 weeks Progress Date   1.Patient to improve score on the FOTO predicted score or better, to improve QOL. [x] Progressing  [] MET   [] Not MET  [] Not tested  3/21/2024   2. Patient to increase bilateral UE/LE strength to 4/5 or greater, in order to improve endurance and increase ability to perform all functional activities for increased time. [] Progressing  [x] MET   [] Not MET  [] Not tested   2/14/2024   3. Patient to have decreased pain to 4/10 at worst, to improve QOL. [x] Progressing  [] MET   [] Not MET  [] Not tested  3/21/2024   4. Patient to perform daily activities including work activity with only mild increased symptoms. [x] Progressing  [] MET   [] Not MET  [] Not tested  3/21/2024   5.Patient to increase bilateral UE/LE strength to 5/5 or greater, in order to improve endurance and increase ability to perform all functional activities for increased time. [x] Progressing  [] MET   [] Not MET  [] Not tested 3/21/2024        PLAN     Monitor response to today's treatment session and progress with Physical Therapy plan of care as indicated.     Note updated long term goal    Updated Certification Period: 1/17/24 to 4/17/2024   Recommended Treatment Plan: 1 times per week for 12 weeks:  Aquatic Therapy, Electrical Stimulation PRN, Gait Training, Manual Therapy, Moist Heat/ Ice, Neuromuscular Re-ed, Patient Education, Self Care, Therapeutic Activities, and Therapeutic Exercise, FDN/PRN    Daisha Enriquez, PT

## 2024-04-12 LAB
RUBV IGG SER-ACNC: 34.6 IU/ML
RUBV IGG SER-IMP: REACTIVE

## 2024-04-18 ENCOUNTER — PATIENT MESSAGE (OUTPATIENT)
Dept: INTERNAL MEDICINE | Facility: CLINIC | Age: 34
End: 2024-04-18
Payer: COMMERCIAL

## 2024-04-18 DIAGNOSIS — Z00.00 ROUTINE HEALTH MAINTENANCE: Primary | ICD-10-CM

## 2024-04-23 ENCOUNTER — CLINICAL SUPPORT (OUTPATIENT)
Dept: REHABILITATION | Facility: HOSPITAL | Age: 34
End: 2024-04-23
Payer: COMMERCIAL

## 2024-04-23 DIAGNOSIS — R52 PAIN: ICD-10-CM

## 2024-04-23 DIAGNOSIS — R53.1 DECREASED STRENGTH, ENDURANCE, AND MOBILITY: Primary | ICD-10-CM

## 2024-04-23 DIAGNOSIS — R68.89 DECREASED STRENGTH, ENDURANCE, AND MOBILITY: Primary | ICD-10-CM

## 2024-04-23 DIAGNOSIS — Z74.09 DECREASED STRENGTH, ENDURANCE, AND MOBILITY: Primary | ICD-10-CM

## 2024-04-23 DIAGNOSIS — M24.80 JOINT HYPEREXTENSIBILITY OF MULTIPLE SITES: ICD-10-CM

## 2024-04-23 PROCEDURE — 97140 MANUAL THERAPY 1/> REGIONS: CPT

## 2024-04-23 PROCEDURE — 97112 NEUROMUSCULAR REEDUCATION: CPT

## 2024-04-23 PROCEDURE — 97110 THERAPEUTIC EXERCISES: CPT

## 2024-04-23 NOTE — PROGRESS NOTES
[facility]   Physical Therapy Treatment Note & plan of care / PROGRESS NOTE       Name: Angela Lyle  Clinic Number: [id number]    Therapy Diagnosis:   Encounter Diagnoses   Name Primary?    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     SUBJECTIVE     Pt reports: Patient reports she has been doing OK, some shoulder and upper trapezial pain after work at times. She has been able to complete her LB/Core/SI stability program but has not been able to complete her upper extremities program as much. Encouraged doing something even if the beginning exercises.      She was compliant with home exercise program.  Response to previous treatment: stronger overall  Functional change: stronger overall     Pain: 3-4/10 current, 7/10 after work  Location: right shoulder bilateral , bilateral upper trapezial , Iliac crest area and left quadratus lumborum    OBJECTIVE     Objective Measures updated at progress report unless specified.        Strength:     U/E MMT Right Left Pain/Dysfunction with Movement 12/12/23  Bilateral  1/17/24  Bilateral  2/14/24  Bilateral  3/21/24 4/23/24   Cervical Side Bending 3+/5 3+/5 + trunk shift 4-/5, decreased trunk shift 4/5 4+/5 4+/5 4+/5   Upper trapezial  3+/5 3+/5 +  trunk shift 4-/5, decreased trunk shift 4/5 4+/5 4+/5 4+/5   Shoulder Abduction 3+/5 3+/5 + trunk shift 4-/5, decreased trunk shift 4/5 4+/5 4+/5 4+/5   Shoulder IR 3+/5 3+/5 + trunk shift 4-/5, decreased trunk shift 4-/5, 4/5 4/5 4+/5 4+/5   Shoulder ER    3+/5 3+/5 + trunk shift 4-/5, decreased trunk shift 4-/5, 4/5 4/5 4+/5 4+/5   Elbow Flexion  3+/5 3+/5 + trunk shift 4-/5, decreased trunk shift 4-/5 4+/5 4+/5 4+/5   Elbow Extension 3+/5 3+/5 + trunk shift 4-/5, decreased trunk shift 4-/5 4+/5 4+/5 4+/5   Wrist Extension 3+/5 3+/5   4-/5, decreased trunk shift NT 4+/5 4+/5 4+/5   4th/5th Finger Intrinsics 3+/5 3+/5   NT NT NT 4-/5 NT    and   L/E MMT Right  (spine) Left Pain/Dysfunction with Movement 12/12/23 1/18/24 2/14/24  Right / Left  3/21/24 4/23/24   Hip Flexion  3+/5 3+/5 + trunk shift 4-/5, decreased trunk shift 4-/5 4+/5, 4/5 4+/5 4+/5   Knee Extension 3+/5 3+/5 + trunk shift 4-/5, decreased trunk shift 4/5 4+/5 4+/5 4+/5   Knee Flexion 3+/5 3+/5 + trunk shift 4-/5, decreased trunk shift 4/5 4+/5 4+/5 4+/5   Hip IR 3+/5 3+/5 + trunk shift 4-/5, decreased trunk shift 4/5 4/5 4/5 4+/5   Hip ER 3+/5 3+/5 + trunk shift 4-/5, decreased trunk shift 4/5 4/5 4/5 4+/5   Ankle DF 3+/5 3+/5 + trunk shift 4/5, decreased trunk shift NT 4+/5 4+/5 NT   Ankle PF 3+/5 3+/5 + trunk shift 4/5, decreased trunk shift NT 4+/5 4+/5 NT      Bilateral upper extremities and lower extremities strength testing with NO TRUNK SHIFT     FOTO:        TREATMENT     Angela received the treatments listed below:        therapeutic exercises to develop strength, endurance, and core stabilization for 15 minutes including:    Intervention 4/23/2024  Parameters        Chin tucks - 3 min, 5 sec hold 1 sec rest   Scapula squeeze gentle shoulder extension - 3 min, 5 sec hold 1 sec rest  On towel rolls   Isometric adduction  x 3 min, 5 sec hold 1 sec rest   Supine clamshells x 3 min black band, 5 sec hold 1 sec rest        Reviewed HEP x        "  Re-assessment/FOTO x         Hand ball squeezes/tennis ball,  - hold 5s, rest 1s; 3 min   4 way wrist -  (flexion, extension, radial deviation, ulnar deviation), sitting in chair with 1-2 pillows on lap - standing for ulnar deviation, without weight - 1.5 min each       manual therapy techniques: Myofacial release and Soft tissue Mobilization were applied to the: bilateral upper and lower body for 23 minutes, including:    Manual Intervention 4/23/2024     Soft Tissue Mobilization x bilateral suboccipitals, upper trapezial, levator scapula, subscapularis muscle's. Pre and post treatment session. Also iliac crest, upper gluteals, tensor fasciae latae and deep hip rotators   Joint Mobilizations -  First rib bilateral pre and post treatment session   Mobilization with movement x Scapular tilts bilateral    Muscle energy techniques  - "Shotgun"   Functional Dry Needling           neuromuscular re-education activities to improve: Balance, Coordination, Proprioception, and Posture for 19 minutes. The following activities were included:       Intervention 4/23/2024  Parameters   Bridging with kick outs x 3 min hold 5s/alt sides (alternating 3 each side then regular bridge with hold)   Supine Marches  - with abdominal bracing lift foot 1-2 in, hold 5s; 3 min   Prone hip extension  - 2 min 50 sec hold each side for 5 sec/head in neutral    Bicep curls   Sitting single arm 1# right 0# left    Chicken dance  x (shoulder abduction with elbows bent to 90 degrees),  3 min   Shoulder scaption  x Sitting  1.5 min   Serratus punch  Supine alternating arm   Dying bug with pelvic neutral and abdominal bracing x 3 min   Prone swimmer  LE's x 1.5 min   Shoulder Internal and External Rotation  x 1.5 min with elbows at side move shoulders in a small range of ER (~30 degrees)/IR (~45 degrees)   Prone T, I, Y  x I's/T's/Y's - performed 10x each   Ball modifications I, Y, T x      Angela participated in dynamic functional therapeutic " activities to improve functional performance for 0  minutes, including:    Intervention 4/23/2024  Parameters                                     Plan for Next Visit:    Level 3 Ball Ex's (all 1min start)              (These will be your Monday & Thursday ex's, along with Level 3 mat ex's.  YOU should also be starting Level one neck, mid back and UE program)  Seated on therapy ball kickouts - hold 5s then switch legs  Ball Roll outs from sitting, raise heel off ground slowly, then repeat on other side.  Hugging the ball with kick outs, hold leg out for 5s then switch legs  Bridging with legs on ball and arms folded across chest hold 5s, rest 1s (goal 3 min)    Level 2 Neck, Mid Back and Upper Extremity Progression   Isometric Neck, 5s repeat all directions (forward, right side, backwards, left side for total of 4 min, add 20s per day until you reach 8 min        PATIENT EDUCATION AND HOME EXERCISES     Home Exercises Provided and Patient Education Provided     Education provided:   - as above (see subjective)    Written Home Exercises Provided: Patient instructed to cont prior HEP, and reviewed all questions regarding current exercises' and progressions. Exercises were reviewed and Angela was able to demonstrate them prior to the end of the session.  Angela demonstrated good  understanding of the education provided. See EMR under Patient Instructions for exercises provided during therapy sessions      ASSESSMENT     Patient has been able to maintain strength gains with recent fatigue due to pregnancy. She is reporting some increased pain at end of work shifts but has been able to maintain core/SI home exercise program on fairly regular basis. Encouraged home exercise program as able and cues during treatment as needed for correct technique. Reviewed self mobility for first rib as well.  Patient should benefit from continued Physical Therapy treatment to address all goals outlined below.    Angela Is progressing well  towards her goals.   Pt prognosis is Good.     Pt will continue to benefit from skilled outpatient physical therapy to address the deficits listed in the problem list box on initial evaluation, provide pt/family education and to maximize pt's level of independence in the home and community environment.     Pt's spiritual, cultural and educational needs considered and pt agreeable to plan of care and goals.     Anticipated barriers to physical therapy:  co-morbidities, chronicity of condition, adherence to treatment plan, coping style, and distance to travel for care    Goals:     Reviewed: 4/23/2024      Short Term Goals: In 6 weeks  Progress Date   1.Patient to be educated on HEP. [] Progressing  [x] MET   [] Not MET   [] Not tested  1/17/2024   2.Patient to increase bilateral UE/LE strength by 1/2 grade, in order to improve endurance and increase ability to perform all functional activities for increased time.  [] Progressing  [x] MET   [] Not MET  [] Not tested  1/17/2024   3.Patient to have pain less than 7/10 at worst, to improve QOL. [x] Progressing  [] MET   [] Not MET  [] Not tested  3/21/2024   4.Patient to score the FOTO, to improve QOL. [] Progressing  [x] MET   [] Not MET  [] Not tested  1/17/2024      Long Term Goals: In 12 weeks Progress Date   1.Patient to improve score on the FOTO predicted score or better, to improve QOL. [x] Progressing  [] MET   [] Not MET  [] Not tested  4/23/2024   2. Patient to increase bilateral UE/LE strength to 4/5 or greater, in order to improve endurance and increase ability to perform all functional activities for increased time. [] Progressing  [x] MET   [] Not MET  [] Not tested  2/14/2024   3. Patient to have decreased pain to 4/10 at worst, to improve QOL. [x] Progressing  [] MET   [] Not MET  [] Not tested  4/23/2024   4. Patient to perform daily activities including work activity with only mild increased symptoms. [x] Progressing  [] MET   [] Not MET  [] Not tested   4/23/2024   5.Patient to increase bilateral UE/LE strength to 5/5 or greater, in order to improve endurance and increase ability to perform all functional activities for increased time. [x] Progressing  [] MET   [] Not MET  [] Not tested 4/23/2024        PLAN     Monitor response to today's treatment session and progress with Physical Therapy plan of care as indicated.     Updated Certification Period: 4/23/24 to 7/23/24   Recommended Treatment Plan: 1 times every other week for 12 weeks:  Aquatic Therapy, Gait Training, Manual Therapy, Moist Heat/ Ice, Neuromuscular Re-ed, Patient Education, Self Care, Therapeutic Activities, and Therapeutic Exercise    Daisha Enriquez, PT

## 2024-04-26 ENCOUNTER — PATIENT MESSAGE (OUTPATIENT)
Dept: INTERNAL MEDICINE | Facility: CLINIC | Age: 34
End: 2024-04-26
Payer: COMMERCIAL

## 2024-04-28 NOTE — PLAN OF CARE
[facility]  Physical Therapy Plan of Care Note       Name: Angela Espinosa [facility]  Clinic Number: [id number]    Therapy Diagnosis:   Encounter Diagnoses   Name Primary?    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  Evaluation Date: 10/18/2023  Authorization Period Expiration: 12/31/2024  Plan of Care Expiration: 4/17/2024  Progress Note Due: 4/17/2024  Visit # / Visits authorized: 12/20 (+5)  FOTO: 5/3 (last performed on 3/21/2024)       Precautions: Standard and Falls, POTS, hypermobile EDS  Functional Level Prior to Evaluation:  see evaluation    SUBJECTIVE     Update: see daily note    OBJECTIVE     Update: see daily note    ASSESSMENT     Update: see daily note    Previous Short Term Goals Status:   see daily note  New Short Term Goals Status:   see daily note  Long Term Goal Status: continue per initial plan of care.  Reasons for Recertification of Therapy:   see daily note    GOALS  see daily note    PLAN     Updated Certification Period: 4/23/24 to 7/23/24   Recommended Treatment Plan: 1 times every other week for 12 weeks:  Aquatic Therapy, Gait Training, Manual Therapy, Moist Heat/ Ice, Neuromuscular Re-ed, Patient Education, Self Care, Therapeutic Activities, and Therapeutic Exercise  Other Recommendations: None    Daisha Enriquez PT      Physician's Signature: ___________________________________________________

## 2024-04-30 ENCOUNTER — OFFICE VISIT (OUTPATIENT)
Dept: MATERNAL FETAL MEDICINE | Facility: CLINIC | Age: 34
End: 2024-04-30
Payer: COMMERCIAL

## 2024-04-30 ENCOUNTER — PROCEDURE VISIT (OUTPATIENT)
Dept: MATERNAL FETAL MEDICINE | Facility: CLINIC | Age: 34
End: 2024-04-30
Payer: COMMERCIAL

## 2024-04-30 VITALS
DIASTOLIC BLOOD PRESSURE: 75 MMHG | HEIGHT: 64 IN | WEIGHT: 101.63 LBS | SYSTOLIC BLOOD PRESSURE: 113 MMHG | BODY MASS INDEX: 17.35 KG/M2

## 2024-04-30 DIAGNOSIS — O09.511 AMA (ADVANCED MATERNAL AGE) PRIMIGRAVIDA 35+, FIRST TRIMESTER: ICD-10-CM

## 2024-04-30 DIAGNOSIS — G90.A POSTURAL ORTHOSTATIC TACHYCARDIA SYNDROME: ICD-10-CM

## 2024-04-30 DIAGNOSIS — G47.33 OSA (OBSTRUCTIVE SLEEP APNEA): ICD-10-CM

## 2024-04-30 DIAGNOSIS — Z36.89 ENCOUNTER FOR FETAL ANATOMIC SURVEY: Primary | ICD-10-CM

## 2024-04-30 DIAGNOSIS — O09.521 MULTIGRAVIDA OF ADVANCED MATERNAL AGE IN FIRST TRIMESTER: Primary | ICD-10-CM

## 2024-04-30 DIAGNOSIS — Q79.62 HYPERMOBILE EHLERS-DANLOS SYNDROME: ICD-10-CM

## 2024-04-30 DIAGNOSIS — F32.5 MAJOR DEPRESSIVE DISORDER, SINGLE EPISODE IN FULL REMISSION: ICD-10-CM

## 2024-04-30 PROBLEM — M24.80 JOINT HYPEREXTENSIBILITY OF MULTIPLE SITES: Status: RESOLVED | Noted: 2023-11-12 | Resolved: 2024-04-30

## 2024-04-30 PROBLEM — R52 PAIN: Status: RESOLVED | Noted: 2023-11-12 | Resolved: 2024-04-30

## 2024-04-30 PROBLEM — R07.9 CHEST PAIN OF UNKNOWN ETIOLOGY: Status: RESOLVED | Noted: 2019-03-27 | Resolved: 2024-04-30

## 2024-04-30 PROBLEM — O09.529 AMA (ADVANCED MATERNAL AGE) MULTIGRAVIDA 35+: Status: ACTIVE | Noted: 2024-04-30

## 2024-04-30 PROBLEM — M25.319 SHOULDER INSTABILITY: Status: RESOLVED | Noted: 2017-06-06 | Resolved: 2024-04-30

## 2024-04-30 PROBLEM — R53.1 DECREASED STRENGTH, ENDURANCE, AND MOBILITY: Status: RESOLVED | Noted: 2023-11-12 | Resolved: 2024-04-30

## 2024-04-30 PROBLEM — M54.2 CERVICAL PAIN: Status: RESOLVED | Noted: 2018-07-31 | Resolved: 2024-04-30

## 2024-04-30 PROBLEM — R68.89 DECREASED STRENGTH, ENDURANCE, AND MOBILITY: Status: RESOLVED | Noted: 2023-11-12 | Resolved: 2024-04-30

## 2024-04-30 PROBLEM — E28.2 PCOS (POLYCYSTIC OVARIAN SYNDROME): Status: RESOLVED | Noted: 2023-08-15 | Resolved: 2024-04-30

## 2024-04-30 PROBLEM — M54.2 NECK PAIN: Status: RESOLVED | Noted: 2020-05-26 | Resolved: 2024-04-30

## 2024-04-30 PROBLEM — R53.83 FATIGUE: Status: RESOLVED | Noted: 2019-03-26 | Resolved: 2024-04-30

## 2024-04-30 PROBLEM — M25.552 LEFT HIP PAIN: Status: RESOLVED | Noted: 2018-02-05 | Resolved: 2024-04-30

## 2024-04-30 PROBLEM — Z74.09 DECREASED STRENGTH, ENDURANCE, AND MOBILITY: Status: RESOLVED | Noted: 2023-11-12 | Resolved: 2024-04-30

## 2024-04-30 PROCEDURE — 1159F MED LIST DOCD IN RCRD: CPT | Mod: CPTII,S$GLB,, | Performed by: OBSTETRICS & GYNECOLOGY

## 2024-04-30 PROCEDURE — 76815 OB US LIMITED FETUS(S): CPT | Mod: S$GLB,,, | Performed by: OBSTETRICS & GYNECOLOGY

## 2024-04-30 PROCEDURE — 99999 PR PBB SHADOW E&M-EST. PATIENT-LVL III: CPT | Mod: PBBFAC,,, | Performed by: OBSTETRICS & GYNECOLOGY

## 2024-04-30 PROCEDURE — 1160F RVW MEDS BY RX/DR IN RCRD: CPT | Mod: CPTII,S$GLB,, | Performed by: OBSTETRICS & GYNECOLOGY

## 2024-04-30 PROCEDURE — 99214 OFFICE O/P EST MOD 30 MIN: CPT | Mod: S$GLB,,, | Performed by: OBSTETRICS & GYNECOLOGY

## 2024-04-30 PROCEDURE — 3078F DIAST BP <80 MM HG: CPT | Mod: CPTII,S$GLB,, | Performed by: OBSTETRICS & GYNECOLOGY

## 2024-04-30 PROCEDURE — 3074F SYST BP LT 130 MM HG: CPT | Mod: CPTII,S$GLB,, | Performed by: OBSTETRICS & GYNECOLOGY

## 2024-04-30 PROCEDURE — 3008F BODY MASS INDEX DOCD: CPT | Mod: CPTII,S$GLB,, | Performed by: OBSTETRICS & GYNECOLOGY

## 2024-04-30 RX ORDER — AMOXICILLIN 500 MG
CAPSULE ORAL DAILY
COMMUNITY

## 2024-04-30 RX ORDER — MAGNESIUM 250 MG
350 TABLET ORAL ONCE
COMMUNITY

## 2024-04-30 RX ORDER — ASPIRIN 81 MG/1
81 TABLET ORAL DAILY
COMMUNITY

## 2024-04-30 NOTE — ASSESSMENT & PLAN NOTE
Jori-Danlos hypermobility-type is thankfully not associated with the dreaded mortality-inducing sequela of the vascular type, though the disorder is associated with complications in pregnancy. There is an increased risk of spontaneous  delivery, though unfortunately screening protocols or interventions have not been demonstrated to reduce that risk absent a history of  birth. Discomforts of pregnancy associated with joint laxity, including sciatica and carpal tunnel syndrome, are more pronounced in hypermobility-type patients. Supportive care, including pregnancy support belts and braces, are first line therapy. Many patients benefit from early care with physical therapy.    Recommendations:  1. Consider transvaginal cervical length at time of midtrimester anatomy screen  2. Supportive care as needed with early referral to physical therapy for symptoms related to joint laxity

## 2024-04-30 NOTE — ASSESSMENT & PLAN NOTE
Sleep breathing disorders are an independent risk factor for adverse pregnancy outcomes. The physiology of pregnancy can cause sleep apnea in a patient without the diagnosis at baseline (gestational RODERICK), while those with preexisting chronic obstructive sleep apnea can see oxygenation during sleep worsen. Such disorders present early to mid pregnancy are associated in a dose-dependent manner with preeclampsia, gestational diabetes, fetal growth restriction and  delivery, while the diagnosis at time of delivery is a associated with venous thromboembolism and cardiomyopathy. Maternal morbidity is increased five-fold, independent of underlying obesity. Management of these disorders is multidisciplinary, and CPAP is the standard treatment for mild, moderate and severe chronic RODERICK. While evidence for treatment in pregnancy to protect against adverse  outcomes are scant, CPAP remains the recommended modality as it has been shown to improve short term outcomes like maternal blood pressure.    Diagnosed in    Previously used CPAP  Currently using palate expander     Recommendations:  Continue ASA 81 mg daily   Continue palate expander, can consider CPAP if needed   Anesthesiology consultation, 3rd trimester

## 2024-04-30 NOTE — PROGRESS NOTES
MATERNAL-FETAL MEDICINE   CONSULT NOTE    Provider requesting consultation: Margaux Cowan CNM     SUBJECTIVE:     Ms. Miss Angela Lyle is a 34 y.o.  female with IUP at 12w4d who is seen in consultation by KYM for evaluation and management of:  Problem   [place]cadia (Advanced Maternal Age) Multigravida 35+   Postural Orthostatic Tachycardia Syndrome   Sahil (Obstructive Sleep Apnea)   Hypermobile Jori-Danlos Syndrome   Major Depressive Disorder, Single Episode in Full Remission   [place]cadia (Advanced Maternal Age) Primigravida 35+, First Trimester (Resolved)   Decreased Strength, Endurance, and Mobility (Resolved)   Joint Hyperextensibility of Multiple Sites (Resolved)   Pain (Resolved)   Pcos (Polycystic Ovarian Syndrome) (Resolved)   Idiopathic Hypersomnia (Resolved)   Excessive Daytime Sleepiness (Resolved)   Neck Pain (Resolved)   Sleep Stage Dysfunction (Resolved)   Chest Pain of Unknown Etiology (Resolved)   Fatigue (Resolved)   Cervical Pain (Resolved)   Left Hip Pain (Resolved)   Shoulder Instability (Resolved)   Tendinitis of Right Rotator Cuff (Resolved)   Right Shoulder Pain (Resolved)        Medication List with Changes/Refills   Current Medications    ASPIRIN (ECOTRIN) 81 MG EC TABLET    Take 81 mg by mouth once daily.    BETAXOLOL (KERLONE) 10 MG TAB    Take 5 mg by mouth once daily.    ERGOCALCIFEROL, VITAMIN D2, 2,000 UNIT TAB    Take 2,000 Units by mouth once daily.    INOSITOL-D CHIRO INOSITOL 2,000-50 MG PWPK    Use as directed in the mouth or throat 2 (two) times a day.    MAGNESIUM 250 MG TAB    Take 350 mg by mouth once.    OMEGA-3 FATTY ACIDS/FISH OIL (FISH OIL-OMEGA-3 FATTY ACIDS) 300-1,000 MG CAPSULE    Take by mouth once daily.    PRENATAL VIT NO.124/IRON/FOLIC (PRENATAL VITAMIN ORAL)    Take 1 Dose by mouth Daily.    PROGESTERONE (PROMETRIUM) 200 MG CAPSULE    Place 1 capsule (200 mg total) vaginally nightly.   Discontinued Medications    ACETIC ACID-HYDROCORTISONE (VOSOL-HC) OTIC SOLUTION     Apply 3-4 drops to ears bid prn flare    KETOCONAZOLE (NIZORAL) 2 % SHAMPOO    Wash hair with medicated shampoo at least 2x/week - let sit on scalp at least 5 minutes prior to rinsing    MULTIVITAMIN CAPSULE    Take 1 capsule by mouth once daily.     Review of patient's allergies indicates:   Allergen Reactions    Sulfa (sulfonamide antibiotics) Hives and Itching    Sulfamethoxazole-trimethoprim      Other reaction(s): Itching     PMH:  Past Medical History:   Diagnosis Date    Allergy     Anemia     hx of HOSEA on fergon in past    Anxiety     Arthritis     right shoulder    Cervical pain 2018    Decreased strength, endurance, and mobility 2023    Depression     Excessive daytime sleepiness     Fatigue     History of psychiatric care     Hypermobile Jori-Danlos syndrome 2019    Idiopathic hypersomnia     Keloid cicatrix     Left hip pain 2018    Neck pain 2020    Pain 2023    PCOS (polycystic ovarian syndrome)     POTS (postural orthostatic tachycardia syndrome) 2019    Psychiatric problem     Right shoulder pain 2016    Routine health maintenance 2023    Shoulder instability 2017    Sleep stage dysfunction     Tendinitis of right rotator cuff 2016    Therapy      PObHx:  OB History    Para Term  AB Living   1             SAB IAB Ectopic Multiple Live Births                  # Outcome Date GA Lbr Mark/2nd Weight Sex Type Anes PTL Lv   1 Current              PSH:  Past Surgical History:   Procedure Laterality Date    SHOULDER SURGERY Right 2017    TONSILLECTOMY      wisdom teeth removal         Family history:family history includes Arthritis in her maternal grandmother and mother; Cancer in her maternal aunt; Depression in her mother; Diabetes in her paternal grandfather; Early death in her maternal grandfather; Fibromyalgia in her mother; Hearing loss in her father, maternal grandmother, paternal grandfather, and paternal grandmother; Heart  "disease in her father, maternal grandfather, and paternal grandfather; Hyperlipidemia in her father; Hypertension in her maternal grandmother and mother; Miscarriages / Stillbirths in her mother; Stroke in her maternal grandmother and paternal grandmother.    Social history: reports that she has never smoked. She has never been exposed to tobacco smoke. She has never used smokeless tobacco. She reports that she does not currently use alcohol after a past usage of about 5.0 standard drinks of alcohol per week. She reports that she does not use drugs.    Genetic history:  The patient denies any inherited genetic diseases or birth defects in herself or her partner's personal history or family.    Objective:   /75 (BP Location: Left arm, Patient Position: Sitting, BP Method: Large (Automatic))   Ht 5' 4" (1.626 m)   Wt 46.1 kg (101 lb 10.1 oz)   LMP 2024   BMI 17.45 kg/m²     Ultrasound performed. See viewpoint for full ultrasound report.  1. A grijalva living IUP is present.   2. Size is consistent with established dating.   3. Limited early anatomy appears unremarkable.     Significant labs/imaging:  Cell free DNA: low risk    ASSESSMENT/PLAN:     34 y.o.  female with IUP at 12w4d     AMA (advanced maternal age) multigravida 35+  The patient was counseled regarding advanced maternal age. The risk of fetal aneuploidy increases with age. We reviewed these risks at length, and we discussed the screening options available for risk assessment for fetal aneuploidy during pregnancy.  We also discussed the available diagnostic tests (CVS and amniocentesis).  Patients of advanced age are also at increased risks of pregnancy complications, including miscarriage, preeclampsia, abnormalities with placentation, and  delivery.  These risks increase with increasing maternal age and comorbidities.      The patient received genetic counseling about diagnostic and screening options, including:   Cell free " DNA   First trimester risk-assessment with nuchal translucency and serum markers (between 11 and 14 weeks) with second trimester lab draw between 15-20 weeks, ideally prior to anatomic survey.   Chorionic villus sampling (CVS).   Amniocentesis.   Second trimester quad screening or integrated screening.    Recommendations:  cfDNA negative   Continue current ASA 81 mg   Targeted anatomical ultrasound at 18-20 weeks   Fetal growth ultrasound at 32-34 weeks   Consider delivery at [id number]-33v, but no later than 40w6d     Major depressive disorder, single episode in full remission  - Pre-Pregnancy: None    - Wellbutrin last used in college   - Current Regimen (2024): None    Hypermobile Jori-Danlos syndrome  Jori-Danlos hypermobility-type is thankfully not associated with the dreaded mortality-inducing sequela of the vascular type, though the disorder is associated with complications in pregnancy. There is an increased risk of spontaneous  delivery, though unfortunately screening protocols or interventions have not been demonstrated to reduce that risk absent a history of  birth. Discomforts of pregnancy associated with joint laxity, including sciatica and carpal tunnel syndrome, are more pronounced in hypermobility-type patients. Supportive care, including pregnancy support belts and braces, are first line therapy. Many patients benefit from early care with physical therapy.    Recommendations:  1. Consider transvaginal cervical length at time of midtrimester anatomy screen  2. Supportive care as needed with early referral to physical therapy for symptoms related to joint laxity    Postural orthostatic tachycardia syndrome  - Current Regimen (2024): Betaxolol     Recommendation:   Continue current regimen.    RODERICK (obstructive sleep apnea)  Sleep breathing disorders are an independent risk factor for adverse pregnancy outcomes. The physiology of pregnancy can cause sleep apnea in a patient  without the diagnosis at baseline (gestational RODERICK), while those with preexisting chronic obstructive sleep apnea can see oxygenation during sleep worsen. Such disorders present early to mid pregnancy are associated in a dose-dependent manner with preeclampsia, gestational diabetes, fetal growth restriction and  delivery, while the diagnosis at time of delivery is a associated with venous thromboembolism and cardiomyopathy. Maternal morbidity is increased five-fold, independent of underlying obesity. Management of these disorders is multidisciplinary, and CPAP is the standard treatment for mild, moderate and severe chronic RODERICK. While evidence for treatment in pregnancy to protect against adverse  outcomes are scant, CPAP remains the recommended modality as it has been shown to improve short term outcomes like maternal blood pressure.    Diagnosed in 2020   Previously used CPAP  Currently using palate expander     Recommendations:  Continue ASA 81 mg daily   Continue palate expander, can consider CPAP if needed   Anesthesiology consultation, 3rd trimester       FOLLOW UP:   Midtrimester ultrasound to be scheduled per [facility]  F/u on 2024 for US visit      30 minutes of total time spent on the encounter, which includes face to face time and non-face to face time preparing to see the patient (eg, review of tests), obtaining and/or reviewing separately obtained history, documenting clinical information in the electronic or other health record, independently interpreting results (not separately reported) and communicating results to the patient/family/caregiver, or care coordination (not separately reported).      Sumaya Rader  Maternal-Fetal Medicine    Electronically Signed by Sumaya Rader 2024

## 2024-04-30 NOTE — ASSESSMENT & PLAN NOTE
Advanced Maternal Age   The patient was counseled regarding advanced maternal age. The risk of fetal aneuploidy increases with age. We reviewed these risks at length, and we discussed the screening options available for risk assessment for fetal aneuploidy during pregnancy.  We also discussed the available diagnostic tests (CVS and amniocentesis).  Patients of advanced age are also at increased risks of pregnancy complications, including miscarriage, preeclampsia, abnormalities with placentation, and  delivery.  These risks increase with increasing maternal age and comorbidities.      The patient received genetic counseling about diagnostic and screening options, including:   Cell free DNA   First trimester risk-assessment with nuchal translucency and serum markers (between 11 and 14 weeks) with second trimester lab draw between 15-20 weeks, ideally prior to anatomic survey.   Chorionic villus sampling (CVS).   Amniocentesis.   Second trimester quad screening or integrated screening.    Recommendations:  cfDNA negative   Continue current ASA 81 mg   Targeted anatomical ultrasound at 18-20 weeks   Fetal growth ultrasound at 32-34 weeks   Consider delivery at 93d1m-43c, but no later than 40w6d

## 2024-05-02 ENCOUNTER — HOSPITAL ENCOUNTER (OUTPATIENT)
Dept: CARDIOLOGY | Facility: HOSPITAL | Age: 34
Discharge: HOME | End: 2024-05-02
Attending: FAMILY MEDICINE
Payer: COMMERCIAL

## 2024-05-02 VITALS — HEIGHT: 64 IN | BODY MASS INDEX: 17.24 KG/M2 | WEIGHT: 101 LBS

## 2024-05-02 DIAGNOSIS — Q79.62 HYPERMOBILE EHLERS-DANLOS SYNDROME: ICD-10-CM

## 2024-05-02 LAB
ASCENDING AORTA: 2.92 CM
AV INDEX (PROSTH): 0.94
AV MEAN GRADIENT: 2 MMHG
AV PEAK GRADIENT: 4 MMHG
AV VALVE AREA BY VELOCITY RATIO: 2.9 CM²
AV VALVE AREA: 2.86 CM²
AV VELOCITY RATIO: 0.95
BSA FOR ECHO PROCEDURE: 1.44 M2
CV ECHO LV RWT: 0.35 CM
DOP CALC AO PEAK VEL: 1.06 M/S
DOP CALC AO VTI: 19.95 CM
DOP CALC LVOT AREA: 3 CM2
DOP CALC LVOT DIAMETER: 1.97 CM
DOP CALC LVOT PEAK VEL: 1.01 M/S
DOP CALC LVOT STROKE VOLUME: 57.09 CM3
DOP CALCLVOT PEAK VEL VTI: 18.74 CM
E WAVE DECELERATION TIME: 141.36 MSEC
E/A RATIO: 1.43
E/E' RATIO: 5.33 M/S
ECHO LV POSTERIOR WALL: 0.74 CM (ref 0.6–1.1)
FRACTIONAL SHORTENING: 38 % (ref 28–44)
INTERVENTRICULAR SEPTUM: 0.7 CM (ref 0.6–1.1)
LA MAJOR: 3.7 CM
LA MINOR: 3.94 CM
LA WIDTH: 2.35 CM
LEFT ATRIUM SIZE: 3 CM
LEFT ATRIUM VOLUME INDEX MOD: 13.5 ML/M2
LEFT ATRIUM VOLUME INDEX: 15.7 ML/M2
LEFT ATRIUM VOLUME MOD: 19.76 CM3
LEFT ATRIUM VOLUME: 22.87 CM3
LEFT INTERNAL DIMENSION IN SYSTOLE: 2.6 CM (ref 2.1–4)
LEFT VENTRICLE DIASTOLIC VOLUME INDEX: 54.36 ML/M2
LEFT VENTRICLE DIASTOLIC VOLUME: 79.37 ML
LEFT VENTRICLE MASS INDEX: 61 G/M2
LEFT VENTRICLE SYSTOLIC VOLUME INDEX: 16.9 ML/M2
LEFT VENTRICLE SYSTOLIC VOLUME: 24.67 ML
LEFT VENTRICULAR INTERNAL DIMENSION IN DIASTOLE: 4.22 CM (ref 3.5–6)
LEFT VENTRICULAR MASS: 88.93 G
LV LATERAL E/E' RATIO: 4.44 M/S
LV SEPTAL E/E' RATIO: 6.67 M/S
MV PEAK A VEL: 0.56 M/S
MV PEAK E VEL: 0.8 M/S
MV STENOSIS PRESSURE HALF TIME: 41 MS
MV VALVE AREA P 1/2 METHOD: 5.37 CM2
RA MAJOR: 2.98 CM
RA PRESSURE ESTIMATED: 3 MMHG
RA WIDTH: 2.58 CM
SINUS: 2.8 CM
STJ: 2.42 CM
TDI LATERAL: 0.18 M/S
TDI SEPTAL: 0.12 M/S
TDI: 0.15 M/S
TRICUSPID ANNULAR PLANE SYSTOLIC EXCURSION: 2.08 CM
Z-SCORE OF LEFT VENTRICULAR DIMENSION IN END DIASTOLE: -0.4
Z-SCORE OF LEFT VENTRICULAR DIMENSION IN END SYSTOLE: -0.38

## 2024-05-02 PROCEDURE — 93306 TTE W/DOPPLER COMPLETE: CPT | Mod: 26,,, | Performed by: INTERNAL MEDICINE

## 2024-05-02 PROCEDURE — 93306 TTE W/DOPPLER COMPLETE: CPT

## 2024-05-03 ENCOUNTER — PATIENT MESSAGE (OUTPATIENT)
Dept: INTERNAL MEDICINE | Facility: CLINIC | Age: 34
End: 2024-05-03
Payer: COMMERCIAL

## 2024-05-07 ENCOUNTER — INITIAL PRENATAL (OUTPATIENT)
Dept: OBSTETRICS AND GYNECOLOGY | Facility: CLINIC | Age: 34
End: 2024-05-07
Payer: COMMERCIAL

## 2024-05-07 VITALS
BODY MASS INDEX: 17.45 KG/M2 | SYSTOLIC BLOOD PRESSURE: 99 MMHG | HEART RATE: 90 BPM | DIASTOLIC BLOOD PRESSURE: 70 MMHG | WEIGHT: 101.63 LBS

## 2024-05-07 DIAGNOSIS — G90.A POSTURAL ORTHOSTATIC TACHYCARDIA SYNDROME: ICD-10-CM

## 2024-05-07 DIAGNOSIS — Q79.62 HYPERMOBILE EHLERS-DANLOS SYNDROME: ICD-10-CM

## 2024-05-07 DIAGNOSIS — O09.522 MULTIGRAVIDA OF ADVANCED MATERNAL AGE IN SECOND TRIMESTER: Primary | ICD-10-CM

## 2024-05-07 DIAGNOSIS — F32.5 MAJOR DEPRESSIVE DISORDER, SINGLE EPISODE IN FULL REMISSION: ICD-10-CM

## 2024-05-07 DIAGNOSIS — R74.8 ELEVATED VITAMIN B12 LEVEL: ICD-10-CM

## 2024-05-07 PROBLEM — R79.89 ELEVATED VITAMIN B12 LEVEL: Status: ACTIVE | Noted: 2024-05-07

## 2024-05-07 PROCEDURE — 0502F SUBSEQUENT PRENATAL CARE: CPT | Mod: CPTII,S$GLB,, | Performed by: ADVANCED PRACTICE MIDWIFE

## 2024-05-07 PROCEDURE — 99999 PR PBB SHADOW E&M-EST. PATIENT-LVL III: CPT | Mod: PBBFAC,,, | Performed by: ADVANCED PRACTICE MIDWIFE

## 2024-05-07 NOTE — PROGRESS NOTES
34 y.o.,  at 13w4d by US at 12+4    Patient presents today for initial prenatal visit.    Complaints today: none.  Doing well overall. Recently saw her primary/internal med provider. Routine labs obtained. Elevated B12 level. She has contacted them to discuss. Plan repeat in 4 weeks.  TWG: -2 lbs     Denies LOF, VB, or cramping/abdominal pain. Reports nausea/vomiting improving.     PHYSICAL EXAM  BP 99/70   Pulse 90   Wt 46.1 kg (101 lb 10.1 oz)   LMP 2024   BMI 17.45 kg/m²   GENERAL: No acute distress  HEENT: Normocephalic, atruamatic  NEURO: Alert and oriented x3  PULMONARY: Non-labored respiration; no tachypnea  ABD: Soft, gravid, nontender  S=D  + fhts 150s    ASSESSMENT AND PLAN  Multigravida of advanced maternal age in second trimester  -     Connected MOM Enrollment Order placed and discussed.         Has seen MFM/Consult, neg MT 21 (patient has on Avail[facility] yulia. We will request report). Targeted anatomy US with cervical length has been scheduled).         AFP at next visit - saw MFM see note         Growth at 32 weeks    Postural orthostatic tachycardia syndrome- Stable, Treated with Betaxolol. Has been seen by MFM. Recent Cardiology visit, Normal ECHO, EJF 65%    Major depressive disorder, single episode in full remission- EPDS q trimester.    Hypermobile Jori-Danlos syndrome- stable, Has seen MFM. Cervical length with anatomy US recommended. Continue PT PRN.   Discussed ABC delivery. If POTS remains stable and she is not symptomatic, remains ABC candidate.     Adult BMI <19 kg/sq m - healthy diet, good nutrition reinforced.       Patient was oriented to practice and progression of routine prenatal care. Reviewed New OB Pregnancy packet & questions answered.  Initial labs and dating u/s reviewed.  Counseled today on proper weight gain based on the [place] [employer] [facility]'s recommendations based on her pre-pregnancy weight.   Prepregnancy BMI 17 (prepregnancy)    -- Discussed IOM recommended  weight gain of:  Weight category Pre pre BMI  Recommended TWG   Underweight Less than 18.5 28-40    Normal Weight 18.5-24.9  25-35    Overweight 25-29.9  15-25    Obese   30 and greater  11-20     Review exercise precautions in pregnancy, along with recommendations. Patient does  exercise regularly.   Discussed diet, along with substances & foods to avoid in pregnancy (i.e. sushi, fish that are high in mercury, deli meat, and unpasteurized cheeses).   Discussed prenatal vitamin options (i.e. stool softener, DHA).    Education regarding warning signs.    Vaccine Counseling:   Education regarding vaccine recommendations in pregnancy; reviewed risks/benefits to this, and questions answered:     Elevated B12- repeat in 4 week.     AFP next visit. Need MT21 report/requested. Report viewed on patient's phone.  B12 next visit  Keep targeted MFM anatomy US with cervical length at 19-20 weeks. Growth planned at 32 weeks.  Continue PT/PRN. Continue Betaxolox (for POTS). Good po hydration/diet reinforced.    Follow up: 4-6 wks, call or present sooner prn.             Margaux Cowan, MIRYAM, APRN

## 2024-05-08 ENCOUNTER — CLINICAL SUPPORT (OUTPATIENT)
Dept: REHABILITATION | Facility: HOSPITAL | Age: 34
End: 2024-05-08
Payer: COMMERCIAL

## 2024-05-08 DIAGNOSIS — R52 PAIN: ICD-10-CM

## 2024-05-08 DIAGNOSIS — Z74.09 DECREASED STRENGTH, ENDURANCE, AND MOBILITY: Primary | ICD-10-CM

## 2024-05-08 DIAGNOSIS — R53.1 DECREASED STRENGTH, ENDURANCE, AND MOBILITY: Primary | ICD-10-CM

## 2024-05-08 DIAGNOSIS — M24.80 JOINT HYPEREXTENSIBILITY OF MULTIPLE SITES: ICD-10-CM

## 2024-05-08 DIAGNOSIS — R68.89 DECREASED STRENGTH, ENDURANCE, AND MOBILITY: Primary | ICD-10-CM

## 2024-05-08 PROCEDURE — 97112 NEUROMUSCULAR REEDUCATION: CPT

## 2024-05-08 PROCEDURE — 97140 MANUAL THERAPY 1/> REGIONS: CPT

## 2024-05-08 PROCEDURE — 97110 THERAPEUTIC EXERCISES: CPT

## 2024-05-08 NOTE — PROGRESS NOTES
[facility]   Physical Therapy Treatment Note       Name: Angela Lyle  Clinic Number: [id number]    Therapy Diagnosis:   Encounter Diagnoses   Name Primary?    Decreased strength, endurance, and mobility Yes    Joint hyperextensibility of multiple sites     Pain            Physician: Popeye Perez DO    Visit Date: 5/8/2024    Physician: Popeye Perez DO      Physician Orders: PT Eval and Treat  Medical Diagnosis from Referral: Hypermobile Jori-Danlos syndrome  Evaluation Date: 10/18/2023  Authorization Period Expiration: 12/31/2024  Plan of Care Expiration: 7/23/2024  Progress Note Due: 5/23/2024  Visit # / Visits authorized: 13/20 (+5)  FOTO: 6/3 (last performed on 4/23/2024)       Precautions: Standard and Falls, POTS, hypermobile EDS  PTA Visit #: 0/5       Time In: 4:10 pm  Time Out: 5:00 pm  Total Billable Time: 49 minutes  Billing reflects one on one time spent with patient.     SUBJECTIVE     Pt reports: Patient reports she has been continuing to do pretty well with her core exercises but has not been able to do much upper extremities exercises. Encouraged hand gripping in car, or wrist exercises in car and to try and do a few of the shoulder/scapular exercises during the day - does not have to be all at once.      She was compliant with home exercise program.  Response to previous treatment: stronger overall  Functional change: stronger overall     Pain: 3-4/10 current, 7/10 after work  Location: right shoulder bilateral , bilateral upper trapezial , Iliac crest area and left quadratus lumborum    OBJECTIVE     Objective Measures updated at progress report unless specified.     TREATMENT     Angela received the treatments listed below:        therapeutic exercises to develop strength, endurance, and core stabilization for 11 minutes including:    Intervention 5/8/2024  Parameters        Chin tucks - 3 min, 5 sec hold 1 sec rest   Scapula squeeze gentle  "shoulder extension - 3 min, 5 sec hold 1 sec rest  On towel rolls   Isometric adduction  - 3 min, 5 sec hold 1 sec rest   Supine clamshells - 3 min black band, 5 sec hold 1 sec rest        Reviewed HEP          Re-assessment/FOTO     Neck isometrics x 4 min 20 sec; 5 sec - all 4 directions   Hand ball squeezes/tennis ball,  - hold 5s, rest 1s; 3 min   4 way wrist x  (flexion, extension, radial deviation, ulnar deviation), sitting in chair with 1-2 pillows on lap - standing for ulnar deviation, without weight - 1.5 min each       manual therapy techniques: Myofacial release and Soft tissue Mobilization were applied to the: bilateral upper and lower body for 23 minutes, including:    Manual Intervention 5/8/2024     Soft Tissue Mobilization x bilateral suboccipitals, upper trapezial, levator scapula, subscapularis muscle's. Pre and post treatment session. Also iliac crest, upper gluteals, tensor fasciae latae and deep hip rotators   Joint Mobilizations x  First rib bilateral    Mobilization with movement x Scapular tilts bilateral    Muscle energy techniques  - "Shotgun"   Functional Dry Needling           neuromuscular re-education activities to improve: Balance, Coordination, Proprioception, and Posture for 15 minutes. The following activities were included:       Intervention 5/8/2024  Parameters   Bridging with kick outs - 3 min hold 5s/alt sides (alternating 3 each side then regular bridge with hold)   Supine Marches  - with abdominal bracing lift foot 1-2 in, hold 5s; 3 min   Prone hip extension  - 2 min 50 sec hold each side for 5 sec/head in neutral    Bicep curls   Sitting single arm 1# right 0# left    Chicken dance  x (shoulder abduction with elbows bent to 90 degrees),  3 min   Shoulder scaption  x Sitting  3 min   Serratus punch  Supine alternating arm   Dying bug with pelvic neutral and abdominal bracing - 3 min   Prone swimmer  LE's - 1.5 min   Shoulder Internal and External Rotation  x 3 min with " elbows at side move shoulders in a small range of ER (~30 degrees)/IR (~45 degrees)   Prone T, I, Y  x I's/T's/Y's - performed 10-15x each   Ball modifications I, Y, T -      Angela participated in dynamic functional therapeutic activities to improve functional performance for 0  minutes, including:    Intervention 5/8/2024  Parameters                                     Plan for Next Visit:    Level 3 Ball Ex's (all 1min start)              (These will be your Monday & Thursday ex's, along with Level 3 mat ex's.  YOU should also be starting Level one neck, mid back and UE program)  Seated on therapy ball kickouts - hold 5s then switch legs  Ball Roll outs from sitting, raise heel off ground slowly, then repeat on other side.  Hugging the ball with kick outs, hold leg out for 5s then switch legs  Bridging with legs on ball and arms folded across chest hold 5s, rest 1s (goal 3 min)    Level 2 Neck, Mid Back and Upper Extremity Progression   Isometric Neck, 5s repeat all directions (forward, right side, backwards, left side for total of 4 min, add 20s per day until you reach 8 min        PATIENT EDUCATION AND HOME EXERCISES     Home Exercises Provided and Patient Education Provided     Education provided:   - as above (see subjective)    Written Home Exercises Provided: Patient instructed to cont prior HEP, and reviewed all questions regarding current exercises' and progressions. Exercises were reviewed and Angela was able to demonstrate them prior to the end of the session.  Angela demonstrated good  understanding of the education provided. See EMR under Patient Instructions for exercises provided during therapy sessions      ASSESSMENT     Patient has been able to continue with core and SI stability program and was encouraged to participate with upper extremities strengthening along with ideas of how to incorporate into her day. She was encouraged with all activities today and did report some decreased symptoms  after manual interventions. Cues as needed during treatment session for correct technique and encouraged partial activities as able/tolerated.    Angela Is progressing well towards her goals.   Pt prognosis is Good.     Pt will continue to benefit from skilled outpatient physical therapy to address the deficits listed in the problem list box on initial evaluation, provide pt/family education and to maximize pt's level of independence in the home and community environment.     Pt's spiritual, cultural and educational needs considered and pt agreeable to plan of care and goals.     Anticipated barriers to physical therapy:  co-morbidities, chronicity of condition, adherence to treatment plan, coping style, and distance to travel for care    Goals:     Reviewed: 5/8/2024      Short Term Goals: In 6 weeks  Progress Date   1.Patient to be educated on HEP. [] Progressing  [x] MET   [] Not MET   [] Not tested  1/17/2024   2.Patient to increase bilateral UE/LE strength by 1/2 grade, in order to improve endurance and increase ability to perform all functional activities for increased time.  [] Progressing  [x] MET   [] Not MET  [] Not tested  1/17/2024   3.Patient to have pain less than 7/10 at worst, to improve QOL. [x] Progressing  [] MET   [] Not MET  [] Not tested  3/21/2024   4.Patient to score the FOTO, to improve QOL. [] Progressing  [x] MET   [] Not MET  [] Not tested  1/17/2024      Long Term Goals: In 12 weeks Progress Date   1.Patient to improve score on the FOTO predicted score or better, to improve QOL. [x] Progressing  [] MET   [] Not MET  [] Not tested  4/23/2024   2. Patient to increase bilateral UE/LE strength to 4/5 or greater, in order to improve endurance and increase ability to perform all functional activities for increased time. [] Progressing  [x] MET   [] Not MET  [] Not tested  2/14/2024   3. Patient to have decreased pain to 4/10 at worst, to improve QOL. [x] Progressing  [] MET   [] Not MET  []  Not tested  4/23/2024   4. Patient to perform daily activities including work activity with only mild increased symptoms. [x] Progressing  [] MET   [] Not MET  [] Not tested  4/23/2024   5.Patient to increase bilateral UE/LE strength to 5/5 or greater, in order to improve endurance and increase ability to perform all functional activities for increased time. [x] Progressing  [] MET   [] Not MET  [] Not tested 4/23/2024        PLAN     Monitor response to today's treatment session and progress with Physical Therapy plan of care as indicated.     Updated Certification Period: 4/23/24 to 7/23/24   Recommended Treatment Plan: 1 times every other week for 12 weeks:  Aquatic Therapy, Gait Training, Manual Therapy, Moist Heat/ Ice, Neuromuscular Re-ed, Patient Education, Self Care, Therapeutic Activities, and Therapeutic Exercise    Daisha Enriquez, PT

## 2024-05-10 ENCOUNTER — PATIENT MESSAGE (OUTPATIENT)
Dept: ADMINISTRATIVE | Facility: OTHER | Age: 34
End: 2024-05-10
Payer: COMMERCIAL

## 2024-05-14 ENCOUNTER — PATIENT MESSAGE (OUTPATIENT)
Dept: OBSTETRICS AND GYNECOLOGY | Facility: CLINIC | Age: 34
End: 2024-05-14
Payer: COMMERCIAL

## 2024-05-14 ENCOUNTER — TELEPHONE (OUTPATIENT)
Dept: OBSTETRICS AND GYNECOLOGY | Facility: CLINIC | Age: 34
End: 2024-05-14
Payer: COMMERCIAL

## 2024-05-14 NOTE — TELEPHONE ENCOUNTER
I called pt with mat21 results. Pt has a [facility] account so she already seen her results and know the gender of the baby. Pt is having a baby boy.

## 2024-05-20 ENCOUNTER — LAB VISIT (OUTPATIENT)
Dept: LAB | Facility: OTHER | Age: 34
End: 2024-05-20
Attending: INTERNAL MEDICINE
Payer: COMMERCIAL

## 2024-05-20 DIAGNOSIS — I73.9 PERIPHERAL VASCULAR DISEASE, UNSPECIFIED: ICD-10-CM

## 2024-05-20 DIAGNOSIS — R00.0 TACHYCARDIA, UNSPECIFIED: ICD-10-CM

## 2024-05-20 DIAGNOSIS — E86.0 DEHYDRATION: ICD-10-CM

## 2024-05-20 DIAGNOSIS — E55.9 AVITAMINOSIS D: ICD-10-CM

## 2024-05-20 DIAGNOSIS — G90.9 DISORDER OF AUTONOMIC NERVOUS SYSTEM: Primary | ICD-10-CM

## 2024-05-20 DIAGNOSIS — R07.89 OTHER CHEST PAIN: ICD-10-CM

## 2024-05-20 DIAGNOSIS — R53.82 CHRONIC FATIGUE: ICD-10-CM

## 2024-05-20 DIAGNOSIS — R41.89 AKINETIC MUTISM: ICD-10-CM

## 2024-05-20 DIAGNOSIS — Z79.899 ENCOUNTER FOR LONG-TERM (CURRENT) USE OF OTHER MEDICATIONS: ICD-10-CM

## 2024-05-20 DIAGNOSIS — R00.2 PALPITATIONS: ICD-10-CM

## 2024-05-20 DIAGNOSIS — R42 DIZZINESS AND GIDDINESS: ICD-10-CM

## 2024-05-20 DIAGNOSIS — I95.1 ORTHOSTATIC HYPOTENSION: ICD-10-CM

## 2024-05-20 DIAGNOSIS — R06.00 DYSPNEA: ICD-10-CM

## 2024-05-20 PROCEDURE — 36415 COLL VENOUS BLD VENIPUNCTURE: CPT | Performed by: INTERNAL MEDICINE

## 2024-05-20 PROCEDURE — 82306 VITAMIN D 25 HYDROXY: CPT | Performed by: INTERNAL MEDICINE

## 2024-05-21 LAB — 25(OH)D3+25(OH)D2 SERPL-MCNC: 84 NG/ML (ref 30–96)

## 2024-05-22 ENCOUNTER — CLINICAL SUPPORT (OUTPATIENT)
Dept: REHABILITATION | Facility: HOSPITAL | Age: 34
End: 2024-05-22
Payer: COMMERCIAL

## 2024-05-22 DIAGNOSIS — Z74.09 DECREASED STRENGTH, ENDURANCE, AND MOBILITY: Primary | ICD-10-CM

## 2024-05-22 DIAGNOSIS — M24.80 JOINT HYPEREXTENSIBILITY OF MULTIPLE SITES: ICD-10-CM

## 2024-05-22 DIAGNOSIS — R53.1 DECREASED STRENGTH, ENDURANCE, AND MOBILITY: Primary | ICD-10-CM

## 2024-05-22 DIAGNOSIS — R68.89 DECREASED STRENGTH, ENDURANCE, AND MOBILITY: Primary | ICD-10-CM

## 2024-05-22 DIAGNOSIS — R52 PAIN: ICD-10-CM

## 2024-05-22 PROCEDURE — 97140 MANUAL THERAPY 1/> REGIONS: CPT

## 2024-05-22 PROCEDURE — 97112 NEUROMUSCULAR REEDUCATION: CPT

## 2024-05-22 PROCEDURE — 97110 THERAPEUTIC EXERCISES: CPT

## 2024-05-22 NOTE — PROGRESS NOTES
[facility]   Physical Therapy Treatment Note       Name: Angela Lyle  Clinic Number: [id number]    Therapy Diagnosis:   Encounter Diagnoses   Name Primary?    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     SUBJECTIVE     Pt reports: Patient reports pain in upper extremities and shoulder blades/upper trapezial muscle's today. Better after manual treatment. She has been starting to feel a little better overall but increased stress with trying to purchase new home.      She was compliant with home exercise program.  Response to previous treatment: stronger overall  Functional change: stronger overall     Pain: 4/10 current  Location: right shoulder bilateral , bilateral upper trapezial , Iliac crest area and left quadratus lumborum    OBJECTIVE     Objective Measures updated at progress report unless specified.     TREATMENT     Angela received the treatments listed below:        therapeutic exercises to develop strength, endurance, and core stabilization for 14 minutes including:    Intervention 5/22/2024  Parameters        Chin tucks - 3 min, 5 sec hold 1 sec rest   Scapula squeeze gentle shoulder extension - 3 min, 5 sec hold 1 sec rest  On towel rolls   Isometric adduction  - 3 min, 5  "sec hold 1 sec rest   Supine clamshells - 3 min black band, 5 sec hold 1 sec rest        Reviewed HEP          Re-assessment/FOTO     Neck isometrics x 4 min 20 sec; 5 sec - all 4 directions   Hand ball squeezes/tennis ball,  x hold 5s, rest 1s; 2 min   4 way wrist x  (flexion, extension, radial deviation, ulnar deviation), sitting in chair with 1-2 pillows on lap - standing for ulnar deviation, without weight - 2 min each       manual therapy techniques: Myofacial release and Soft tissue Mobilization were applied to the: bilateral upper and lower body for 25 minutes, including:    Manual Intervention 5/22/2024     Soft Tissue Mobilization x bilateral suboccipitals, upper trapezial, levator scapula, subscapularis muscle's right side> left . Sternocleidomastoid bilateral, and infraspinatus/latissimus dorsi on left side>right. Extensive all areas   Joint Mobilizations x  First rib bilateral    Mobilization with movement x Scapular tilts bilateral    Muscle energy techniques  - "Shotgun"   Functional Dry Needling           neuromuscular re-education activities to improve: Balance, Coordination, Proprioception, and Posture for 12 minutes. The following activities were included:       Intervention 5/22/2024  Parameters   Bridging with kick outs - 3 min hold 5s/alt sides (alternating 3 each side then regular bridge with hold)   Supine Marches  - with abdominal bracing lift foot 1-2 in, hold 5s; 3 min   Prone hip extension  - 2 min 50 sec hold each side for 5 sec/head in neutral    Bicep curls   Sitting single arm 1# right 0# left    Chicken dance  x (shoulder abduction with elbows bent to 90 degrees),  3 min   Shoulder scaption  x Sitting  3 min   Serratus punch  Supine alternating arm   Dying bug with pelvic neutral and abdominal bracing - 3 min   Prone swimmer  LE's - 1.5 min   Shoulder Internal and External Rotation  x 3 min with elbows at side move shoulders in a small range of ER (~30 degrees)/IR (~45 degrees) "   Prone T, I, Y  - I's/T's/Y's - performed 10-15x each   Ball modifications I, Y, T x 10x I, 7x T 5x each     Angela participated in dynamic functional therapeutic activities to improve functional performance for 0  minutes, including:    Intervention 5/22/2024  Parameters                                     Plan for Next Visit:    Level 3 Ball Ex's (all 1min start)              (These will be your Monday & Thursday ex's, along with Level 3 mat ex's.  YOU should also be starting Level one neck, mid back and UE program)  Seated on therapy ball kickouts - hold 5s then switch legs  Ball Roll outs from sitting, raise heel off ground slowly, then repeat on other side.  Hugging the ball with kick outs, hold leg out for 5s then switch legs  Bridging with legs on ball and arms folded across chest hold 5s, rest 1s (goal 3 min)    Level 2 Neck, Mid Back and Upper Extremity Progression   Isometric Neck, 5s repeat all directions (forward, right side, backwards, left side for total of 4 min, add 20s per day until you reach 8 min        PATIENT EDUCATION AND HOME EXERCISES     Home Exercises Provided and Patient Education Provided     Education provided:   - as above (see subjective)    Written Home Exercises Provided: Patient instructed to cont prior HEP, and reviewed all questions regarding current exercises' and progressions. Exercises were reviewed and Angela was able to demonstrate them prior to the end of the session.  Angela demonstrated good  understanding of the education provided. See EMR under Patient Instructions for exercises provided during therapy sessions      ASSESSMENT     Patient has been able to continue with core and SI stability program and was encouraged to participate with upper extremities strengthening as she starts to feel improvements in her fatigue. Improved symptoms after manual today and tolerated modified position of I, Y, T in standing.     Angela Is progressing well towards her goals.   Pt  prognosis is Good.     Pt will continue to benefit from skilled outpatient physical therapy to address the deficits listed in the problem list box on initial evaluation, provide pt/family education and to maximize pt's level of independence in the home and community environment.     Pt's spiritual, cultural and educational needs considered and pt agreeable to plan of care and goals.     Anticipated barriers to physical therapy:  co-morbidities, chronicity of condition, adherence to treatment plan, coping style, and distance to travel for care    Goals:     Reviewed: 5/22/2024      Short Term Goals: In 6 weeks  Progress Date   1.Patient to be educated on HEP. [] Progressing  [x] MET   [] Not MET   [] Not tested  1/17/2024   2.Patient to increase bilateral UE/LE strength by 1/2 grade, in order to improve endurance and increase ability to perform all functional activities for increased time.  [] Progressing  [x] MET   [] Not MET  [] Not tested  1/17/2024   3.Patient to have pain less than 7/10 at worst, to improve QOL. [x] Progressing  [] MET   [] Not MET  [] Not tested  3/21/2024   4.Patient to score the FOTO, to improve QOL. [] Progressing  [x] MET   [] Not MET  [] Not tested  1/17/2024      Long Term Goals: In 12 weeks Progress Date   1.Patient to improve score on the FOTO predicted score or better, to improve QOL. [x] Progressing  [] MET   [] Not MET  [] Not tested  4/23/2024   2. Patient to increase bilateral UE/LE strength to 4/5 or greater, in order to improve endurance and increase ability to perform all functional activities for increased time. [] Progressing  [x] MET   [] Not MET  [] Not tested  2/14/2024   3. Patient to have decreased pain to 4/10 at worst, to improve QOL. [x] Progressing  [] MET   [] Not MET  [] Not tested  4/23/2024   4. Patient to perform daily activities including work activity with only mild increased symptoms. [x] Progressing  [] MET   [] Not MET  [] Not tested  4/23/2024   5.Patient  to increase bilateral UE/LE strength to 5/5 or greater, in order to improve endurance and increase ability to perform all functional activities for increased time. [x] Progressing  [] MET   [] Not MET  [] Not tested 4/23/2024        PLAN     Monitor response to today's treatment session and progress with Physical Therapy plan of care as indicated.     Updated Certification Period: 4/23/24 to 7/23/24   Recommended Treatment Plan: 1 times every other week for 12 weeks:  Aquatic Therapy, Gait Training, Manual Therapy, Moist Heat/ Ice, Neuromuscular Re-ed, Patient Education, Self Care, Therapeutic Activities, and Therapeutic Exercise    Daisha Enriquez, PT

## 2024-05-24 ENCOUNTER — PATIENT MESSAGE (OUTPATIENT)
Dept: OTHER | Facility: OTHER | Age: 34
End: 2024-05-24
Payer: COMMERCIAL

## 2024-05-31 ENCOUNTER — PATIENT MESSAGE (OUTPATIENT)
Dept: OTHER | Facility: OTHER | Age: 34
End: 2024-05-31
Payer: COMMERCIAL

## 2024-06-05 ENCOUNTER — CLINICAL SUPPORT (OUTPATIENT)
Dept: REHABILITATION | Facility: HOSPITAL | Age: 34
End: 2024-06-05
Payer: COMMERCIAL

## 2024-06-05 DIAGNOSIS — R68.89 DECREASED STRENGTH, ENDURANCE, AND MOBILITY: Primary | ICD-10-CM

## 2024-06-05 DIAGNOSIS — M24.80 JOINT HYPEREXTENSIBILITY OF MULTIPLE SITES: ICD-10-CM

## 2024-06-05 DIAGNOSIS — R53.1 DECREASED STRENGTH, ENDURANCE, AND MOBILITY: Primary | ICD-10-CM

## 2024-06-05 DIAGNOSIS — R52 PAIN: ICD-10-CM

## 2024-06-05 DIAGNOSIS — Z74.09 DECREASED STRENGTH, ENDURANCE, AND MOBILITY: Primary | ICD-10-CM

## 2024-06-05 PROCEDURE — 97112 NEUROMUSCULAR REEDUCATION: CPT

## 2024-06-05 PROCEDURE — 97140 MANUAL THERAPY 1/> REGIONS: CPT

## 2024-06-05 PROCEDURE — 97110 THERAPEUTIC EXERCISES: CPT

## 2024-06-05 NOTE — PROGRESS NOTES
[facility]   Physical Therapy Treatment Note/ PROGRESS NOTE       Name: Angela Lyle  Clinic Number: [id number]    Therapy Diagnosis:   Encounter Diagnoses   Name Primary?    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     SUBJECTIVE     Pt reports: Patient reports pain in her right upper trapezial and lower cervical spine. Better after manual. Rates pain at 2/10 today, at worst 6/10 in past few weeks. Since last seen did have increased pain in right hip and then LB after standing hip extension over ball.      She was compliant with home exercise program.  Response to previous treatment: stronger overall  Functional change: stronger overall     Pain: 2/10 current  Location: right shoulder bilateral , bilateral upper trapezial , Iliac crest area and left quadratus lumborum    OBJECTIVE     Objective Measures updated at progress report unless specified.        Strength:     U/E MMT Right Left Pain/Dysfunction with Movement 12/12/23  Bilateral  1/17/24  Bilateral  2/14/24  Bilateral  3/21/24 4/23/24 6/5/24   Cervical Side Bending 3+/5 3+/5 + trunk shift 4-/5, decreased trunk shift 4/5 4+/5 4+/5 4+/5 4+/5-5/5   Upper trapezial  3+/5 3+/5 + trunk shift 4-/5, decreased trunk shift 4/5 4+/5 4+/5  4+/5 4+/5-5/5   Shoulder Abduction 3+/5 3+/5 + trunk shift 4-/5, decreased trunk shift 4/5 4+/5 4+/5 4+/5 4+/5-5/5   Shoulder IR 3+/5 3+/5 + trunk shift 4-/5, decreased trunk shift 4-/5, 4/5 4/5 4+/5 4+/5 4+/5-5/5   Shoulder ER    3+/5 3+/5 + trunk shift 4-/5, decreased trunk shift 4-/5, 4/5 4/5 4+/5 4+/5 4+/5-5/5   Elbow Flexion  3+/5 3+/5 + trunk shift 4-/5, decreased trunk shift 4-/5 4+/5 4+/5 4+/5 4+/5-5/5   Elbow Extension 3+/5 3+/5 + trunk shift 4-/5, decreased trunk shift 4-/5 4+/5 4+/5 4+/5 4+/5-5/5   Wrist Extension 3+/5 3+/5   4-/5, decreased trunk shift NT 4+/5 4+/5 4+/5 4+/5-5/5   4th/5th Finger Intrinsics 3+/5 3+/5   NT NT NT 4-/5 NT 4/5    and   L/E MMT Right  (spine) Left Pain/Dysfunction with Movement 12/12/23 1/18/24 2/14/24  Right / Left  3/21/24 4/23/24 6/5/24   Hip Flexion  3+/5 3+/5 + trunk shift 4-/5, decreased trunk shift 4-/5 4+/5, 4/5 4+/5 4+/5 4+/5   Knee Extension 3+/5 3+/5 + trunk shift 4-/5, decreased trunk shift 4/5 4+/5 4+/5 4+/5 4+/5   Knee Flexion 3+/5 3+/5 + trunk shift 4-/5, decreased trunk shift 4/5 4+/5 4+/5 4+/5 4+/5   Hip IR 3+/5 3+/5 + trunk shift 4-/5, decreased trunk shift 4/5 4/5 4/5 4+/5 4+/5   Hip ER 3+/5 3+/5 + trunk shift 4-/5, decreased trunk shift 4/5 4/5 4/5 4+/5 4+/5   Ankle DF 3+/5 3+/5 + trunk shift 4/5, decreased trunk shift NT 4+/5 4+/5 NT 4+/5 -5/5   Ankle PF 3+/5 3+/5 + trunk shift 4/5, decreased trunk shift NT 4+/5 4+/5 NT 4+/5 -5/5      Bilateral upper extremities and lower extremities strength testing with NO TRUNK SHIFT     FOTO:       TREATMENT     Angela received the treatments listed below:        therapeutic exercises to develop strength, endurance, and core stabilization for 27 minutes including:    Intervention 6/5/2024  Parameters        Chin tucks - 3 min, 5 sec hold 1 sec rest   Scapula squeeze gentle shoulder extension - 3 min, 5 sec hold 1 sec rest  On towel rolls   Isometric adduction  - 3 min, 5 sec hold 1 sec rest   Supine clamshells - 3 min  "black band, 5 sec hold 1 sec rest        Reviewed HEP          Re-assessment/FOTO x    Neck isometrics x 4 min 30 sec; 5 sec - all 4 directions   Hand ball squeezes/tennis ball,  x hold 5s, rest 1s; 2.5 min   4 way wrist x  (flexion, extension, radial deviation, ulnar deviation), sitting in chair with 1-2 pillows on lap - standing for ulnar deviation, without weight - 2.5 min each       manual therapy techniques: Myofacial release and Soft tissue Mobilization were applied to the: bilateral upper and lower body for 15 minutes, including:    Manual Intervention 6/5/2024     Soft Tissue Mobilization x bilateral suboccipitals, upper trapezial, levator scapula, subscapularis muscle's right side> left . Sternocleidomastoid bilateral, and infraspinatus/latissimus dorsi on left side>right. Extensive all areas   Joint Mobilizations x  First rib right,    Mobilization with movement x Scapular tilts bilateral    Muscle energy techniques   "Shotgun"   Functional Dry Needling           neuromuscular re-education activities to improve: Balance, Coordination, Proprioception, and Posture for 8 minutes. The following activities were included:       Intervention 6/5/2024  Parameters   Bridging with kick outs x 3 min hold 5s/alt sides (alternating 4 each side then 2-3 regular bridge with hold)   Supine Marches   with abdominal bracing lift foot 1-2 in, hold 5s; 3 min   Prone hip extension   2 min 50 sec hold each side for 5 sec/head in neutral    Bicep curls   Sitting single arm 1# right 0# left    Chicken dance  x (shoulder abduction with elbows bent to 90 degrees),  3 min   Shoulder scaption  x 1/2 #, Sitting  1 min   Serratus punch  Supine alternating arm   Dying bug with pelvic neutral and abdominal bracing  3 min   Prone swimmer  LE's  1.5 min   Shoulder Internal and External Rotation  x 1 min, yellow band;  with elbows at side move shoulders in a small range of ER (~30 degrees)/IR (~45 degrees)   Prone T, I, Y   I's/T's/Y's - " performed 10-15x each   Ball modifications I, Y, T - 10x I, 7x T 5x each     Angela participated in dynamic functional therapeutic activities to improve functional performance for 0  minutes, including:    Intervention 6/5/2024  Parameters                                     Plan for Next Visit:    Level 3 Ball Ex's (all 1min start)              (These will be your Monday & Thursday ex's, along with Level 3 mat ex's.  YOU should also be starting Level one neck, mid back and UE program)  Seated on therapy ball kickouts - hold 5s then switch legs  Ball Roll outs from sitting, raise heel off ground slowly, then repeat on other side.  Hugging the ball with kick outs, hold leg out for 5s then switch legs  Bridging with legs on ball and arms folded across chest hold 5s, rest 1s (goal 3 min)    Level 2 Neck, Mid Back and Upper Extremity Progression   Isometric Neck, 5s repeat all directions (forward, right side, backwards, left side for total of 4 min, add 20s per day until you reach 8 min        PATIENT EDUCATION AND HOME EXERCISES     Home Exercises Provided and Patient Education Provided     Education provided:   - as above (see subjective)    Written Home Exercises Provided: Patient instructed to cont prior HEP, and reviewed all questions regarding current exercises' and progressions. Exercises were reviewed and Angela was able to demonstrate them prior to the end of the session.  Angela demonstrated good  understanding of the education provided. See EMR under Patient Instructions for exercises provided during therapy sessions      ASSESSMENT     Patient has continued to make progress with her strength and was able to stabilize her core with growing belly. She has also made improvements with her function per FOTO. Patient should benefit from continued therapy to address all goals and progress with upper extremity strength and continued core progression as tolerated with pregnancy.    Angela Is progressing well towards  her goals.   Pt prognosis is Good.     Pt will continue to benefit from skilled outpatient physical therapy to address the deficits listed in the problem list box on initial evaluation, provide pt/family education and to maximize pt's level of independence in the home and community environment.     Pt's spiritual, cultural and educational needs considered and pt agreeable to plan of care and goals.     Anticipated barriers to physical therapy:  co-morbidities, chronicity of condition, adherence to treatment plan, coping style, and distance to travel for care    Goals:     Reviewed: 6/5/2024      Short Term Goals: In 6 weeks  Progress Date   1.Patient to be educated on HEP. [] Progressing  [x] MET   [] Not MET   [] Not tested  1/17/2024   2.Patient to increase bilateral UE/LE strength by 1/2 grade, in order to improve endurance and increase ability to perform all functional activities for increased time.  [] Progressing  [x] MET   [] Not MET  [] Not tested  1/17/2024   3.Patient to have pain less than 7/10 at worst, to improve QOL. [x] Progressing  [] MET   [] Not MET  [] Not tested  6/5/2024   4.Patient to score the FOTO, to improve QOL. [] Progressing  [x] MET   [] Not MET  [] Not tested  1/17/2024      Long Term Goals: In 12 weeks Progress Date   1.Patient to improve score on the FOTO predicted score or better, to improve QOL. [] Progressing  [x] MET   [] Not MET  [] Not tested  6/5/2024   2. Patient to increase bilateral UE/LE strength to 4/5 or greater, in order to improve endurance and increase ability to perform all functional activities for increased time. [] Progressing  [x] MET   [] Not MET  [] Not tested  2/14/2024   3. Patient to have decreased pain to 4/10 at worst, to improve QOL. [x] Progressing  [] MET   [] Not MET  [] Not tested  6/5/2024   4. Patient to perform daily activities including work activity with only mild increased symptoms. [x] Progressing  [] MET   [] Not MET  [] Not tested  6/5/2024    5.Patient to increase bilateral UE/LE strength to 5/5 or greater, in order to improve endurance and increase ability to perform all functional activities for increased time. [x] Progressing  [] MET   [] Not MET  [] Not tested 6/5/2024        PLAN     Monitor response to today's treatment session and progress with Physical Therapy plan of care as indicated.     Updated Certification Period: 4/23/24 to 7/23/24   Recommended Treatment Plan: 1 times every other week for 12 weeks:  Aquatic Therapy, Gait Training, Manual Therapy, Moist Heat/ Ice, Neuromuscular Re-ed, Patient Education, Self Care, Therapeutic Activities, and Therapeutic Exercise    Daisha Enriquez, PT

## 2024-06-10 ENCOUNTER — PATIENT MESSAGE (OUTPATIENT)
Dept: INTERNAL MEDICINE | Facility: CLINIC | Age: 34
End: 2024-06-10
Payer: COMMERCIAL

## 2024-06-18 ENCOUNTER — PROCEDURE VISIT (OUTPATIENT)
Dept: MATERNAL FETAL MEDICINE | Facility: CLINIC | Age: 34
End: 2024-06-18
Payer: COMMERCIAL

## 2024-06-18 ENCOUNTER — LAB VISIT (OUTPATIENT)
Dept: LAB | Facility: OTHER | Age: 34
End: 2024-06-18
Payer: COMMERCIAL

## 2024-06-18 ENCOUNTER — ROUTINE PRENATAL (OUTPATIENT)
Dept: OBSTETRICS AND GYNECOLOGY | Facility: CLINIC | Age: 34
End: 2024-06-18
Payer: COMMERCIAL

## 2024-06-18 VITALS
WEIGHT: 105.69 LBS | BODY MASS INDEX: 18.15 KG/M2 | SYSTOLIC BLOOD PRESSURE: 104 MMHG | DIASTOLIC BLOOD PRESSURE: 70 MMHG | HEART RATE: 66 BPM

## 2024-06-18 DIAGNOSIS — R74.8 ELEVATED VITAMIN B12 LEVEL: ICD-10-CM

## 2024-06-18 DIAGNOSIS — Z36.89 ENCOUNTER FOR ULTRASOUND TO ASSESS FETAL GROWTH: ICD-10-CM

## 2024-06-18 DIAGNOSIS — Z3A.19 19 WEEKS GESTATION OF PREGNANCY: Primary | ICD-10-CM

## 2024-06-18 DIAGNOSIS — Q79.62 HYPERMOBILE EHLERS-DANLOS SYNDROME: ICD-10-CM

## 2024-06-18 DIAGNOSIS — Z36.2 ENCOUNTER FOR FOLLOW-UP ULTRASOUND OF FETAL ANATOMY: Primary | ICD-10-CM

## 2024-06-18 DIAGNOSIS — O09.511 AMA (ADVANCED MATERNAL AGE) PRIMIGRAVIDA 35+, FIRST TRIMESTER: ICD-10-CM

## 2024-06-18 DIAGNOSIS — Z3A.19 19 WEEKS GESTATION OF PREGNANCY: ICD-10-CM

## 2024-06-18 DIAGNOSIS — Z36.89 ENCOUNTER FOR FETAL ANATOMIC SURVEY: ICD-10-CM

## 2024-06-18 LAB — VIT B12 SERPL-MCNC: 856 PG/ML (ref 210–950)

## 2024-06-18 PROCEDURE — 76817 TRANSVAGINAL US OBSTETRIC: CPT | Mod: S$GLB,,, | Performed by: OBSTETRICS & GYNECOLOGY

## 2024-06-18 PROCEDURE — 36415 COLL VENOUS BLD VENIPUNCTURE: CPT

## 2024-06-18 PROCEDURE — 76811 OB US DETAILED SNGL FETUS: CPT | Mod: S$GLB,,, | Performed by: OBSTETRICS & GYNECOLOGY

## 2024-06-18 PROCEDURE — 0502F SUBSEQUENT PRENATAL CARE: CPT | Mod: CPTII,S$GLB,,

## 2024-06-18 PROCEDURE — 82607 VITAMIN B-12: CPT

## 2024-06-18 PROCEDURE — 99999 PR PBB SHADOW E&M-EST. PATIENT-LVL III: CPT | Mod: PBBFAC,,,

## 2024-06-18 NOTE — PROGRESS NOTES
Reason for Visit   Routine Prenatal Visit    34 y.o.,  at 19w4d    Complaints today: having some nose bleeds. Concerned about lack of weight gain. Doing well overall.     Denies contractions, vaginal bleeding, leaking amniotic fluid.  Reports active fetal movement    TW lbs   Allergies: Sulfa (sulfonamide antibiotics) and Sulfamethoxazole-trimethoprim    PHYSICAL EXAM  GENERAL: No acute distress  HEENT: Normocephalic, atruamatic  NEURO: Alert and oriented x3  PSYCH: Calm, normal mood and affect  PULMONARY: Non-labored respiration; no tachypnea  ABD: Soft, gravid, nontender  FH = umbilicus    ASSESSMENT AND PLAN  19 weeks gestation of pregnancy  -     VITAMIN B12; Future; Expected date: 2024      Anatomy ultrasound not yet resulted; normal but incomplete per patient.   Reviewed exercise/diet recommendations in pregnancy.  Discussed ways to fit in extra calories/nutrition.   Encouraged prenatal education classes - schedule given.  Education regarding  labor warning s/s.  Reviewed warning signs, normal FM, and how/when to call.  Confirmed after-hours number given to patient.    Follow-up: 4 weeks, call or present sooner JACOB Sanchez, MIRYAM, APRN

## 2024-06-19 ENCOUNTER — CLINICAL SUPPORT (OUTPATIENT)
Dept: REHABILITATION | Facility: HOSPITAL | Age: 34
End: 2024-06-19
Payer: COMMERCIAL

## 2024-06-19 DIAGNOSIS — R68.89 DECREASED STRENGTH, ENDURANCE, AND MOBILITY: Primary | ICD-10-CM

## 2024-06-19 DIAGNOSIS — M24.80 JOINT HYPEREXTENSIBILITY OF MULTIPLE SITES: ICD-10-CM

## 2024-06-19 DIAGNOSIS — R53.1 DECREASED STRENGTH, ENDURANCE, AND MOBILITY: Primary | ICD-10-CM

## 2024-06-19 DIAGNOSIS — R52 PAIN: ICD-10-CM

## 2024-06-19 DIAGNOSIS — Z74.09 DECREASED STRENGTH, ENDURANCE, AND MOBILITY: Primary | ICD-10-CM

## 2024-06-19 PROCEDURE — 97112 NEUROMUSCULAR REEDUCATION: CPT

## 2024-06-19 PROCEDURE — 97140 MANUAL THERAPY 1/> REGIONS: CPT

## 2024-06-19 NOTE — PROGRESS NOTES
[facility]   Physical Therapy Treatment Note        Name: Angela Lyle  Clinic Number: [id number]    Therapy Diagnosis:   Encounter Diagnoses   Name Primary?    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     SUBJECTIVE     Pt reports: Patient reports she has had an awful week at work and has not been able to do any exercises the past 2 weeks. Her fatigue with her pregnancy is back as well. Her shoulders continue to be problematic.  Encouraged patient to perform something for core - even if just ball squeeze/ bands, and PPT.     She was compliant with home exercise program.  Response to previous treatment: stronger overall  Functional change: stronger overall     Pain: 2/10 current  Location: right shoulder bilateral , bilateral upper trapezial , Iliac crest area and left quadratus lumborum    OBJECTIVE     Objective Measures updated at progress report unless specified.     TREATMENT     Angela received the treatments listed below:        therapeutic exercises to develop strength, endurance, and core stabilization for  minutes including:    Intervention 6/19/2024  Parameters        Chin tucks - 3 min, 5 sec hold 1 sec rest   Scapula squeeze gentle shoulder extension - 3 min, 5 sec hold 1  "sec rest  On towel rolls   Reviewed HEP          Re-assessment/FOTO     Neck isometrics - 4 min 30 sec; 5 sec - all 4 directions   Hand ball squeezes/tennis ball,  - hold 5s, rest 1s; 2.5 min   4 way wrist -  (flexion, extension, radial deviation, ulnar deviation), sitting in chair with 1-2 pillows on lap - standing for ulnar deviation, without weight - 2.5 min each       manual therapy techniques: Myofacial release and Soft tissue Mobilization were applied to the: bilateral upper and lower body for 25 minutes, including:    Manual Intervention 6/19/2024     Soft Tissue Mobilization x bilateral suboccipitals, upper trapezial, levator scapula, subscapularis muscle's right side> left . Sternocleidomastoid bilateral, and infraspinatus/latissimus dorsi on left side>right. Extensive all areas   Joint Mobilizations x  First rib bilaterally, then after treatment on left    Mobilization with movement x Scapular tilts bilateral    Muscle energy techniques   "Shotgun"   Functional Dry Needling           neuromuscular re-education activities to improve: Balance, Coordination, Proprioception, and Posture for 18 minutes. The following activities were included:       Intervention 6/19/2024  Parameters   Bridging with kick outs x 3 min hold 5s/alt sides (alternating 4 each side then 2-3 regular bridge with hold)   Supine Marches   with abdominal bracing lift foot 1-2 in, hold 5s; 3 min   Prone hip extension   2 min 50 sec hold each side for 5 sec/head in neutral    Bicep curls   Sitting single arm 1# right 0# left    Chicken dance  x (shoulder abduction with elbows bent to 90 degrees),  3 min   Shoulder scaption  x No weight  #, Sitting  3 min   Serratus punch  Supine alternating arm   Dying bug with pelvic neutral and abdominal bracing  3 min   Prone swimmer  LE's  1.5 min   Shoulder Internal and External Rotation  x 1 min each, yellow band;  with elbows at side move shoulders in a small range of ER (~30 degrees)/IR (~45 degrees) "   Prone T, I, Y   I's/T's/Y's - performed 10-15x each   Ball modifications I, Y, T - 10x I, 7x T 5x each   Isometric adduction  x 3 min, 5 sec hold 1 sec rest   Supine clamshells x 3 min black band, 5 sec hold 1 sec rest     Angela participated in dynamic functional therapeutic activities to improve functional performance for 0  minutes, including:    Intervention 6/19/2024  Parameters                                     Plan for Next Visit:    Level 3 Ball Ex's (all 1min start)              (These will be your Monday & Thursday ex's, along with Level 3 mat ex's.  YOU should also be starting Level one neck, mid back and UE program)  Seated on therapy ball kickouts - hold 5s then switch legs  Ball Roll outs from sitting, raise heel off ground slowly, then repeat on other side.  Hugging the ball with kick outs, hold leg out for 5s then switch legs  Bridging with legs on ball and arms folded across chest hold 5s, rest 1s (goal 3 min)    Level 2 Neck, Mid Back and Upper Extremity Progression   Isometric Neck, 5s repeat all directions (forward, right side, backwards, left side for total of 4 min, add 20s per day until you reach 8 min        PATIENT EDUCATION AND HOME EXERCISES     Home Exercises Provided and Patient Education Provided     Education provided:   - Sitting in neutral position - avoid hanging into extension.    Written Home Exercises Provided: Patient instructed to cont prior HEP, and reviewed all questions regarding current exercises' and progressions. Exercises were reviewed and Angela was able to demonstrate them prior to the end of the session.  Angela demonstrated good  understanding of the education provided. See EMR under Patient Instructions for exercises provided during therapy sessions      ASSESSMENT     Patient with decreased tolerance to all activity today, encouraged home exercise program of some type even if a regression of program. Discussed sitting posture as baby starts to grow.    Angela  Is progressing well towards her goals.   Pt prognosis is Good.     Pt will continue to benefit from skilled outpatient physical therapy to address the deficits listed in the problem list box on initial evaluation, provide pt/family education and to maximize pt's level of independence in the home and community environment.     Pt's spiritual, cultural and educational needs considered and pt agreeable to plan of care and goals.     Anticipated barriers to physical therapy:  co-morbidities, chronicity of condition, adherence to treatment plan, coping style, and distance to travel for care    Goals:     Reviewed: 6/19/2024      Short Term Goals: In 6 weeks  Progress Date   1.Patient to be educated on HEP. [] Progressing  [x] MET   [] Not MET   [] Not tested  1/17/2024   2.Patient to increase bilateral UE/LE strength by 1/2 grade, in order to improve endurance and increase ability to perform all functional activities for increased time.  [] Progressing  [x] MET   [] Not MET  [] Not tested  1/17/2024   3.Patient to have pain less than 7/10 at worst, to improve QOL. [x] Progressing  [] MET   [] Not MET  [] Not tested  6/5/2024   4.Patient to score the FOTO, to improve QOL. [] Progressing  [x] MET   [] Not MET  [] Not tested  1/17/2024      Long Term Goals: In 12 weeks Progress Date   1.Patient to improve score on the FOTO predicted score or better, to improve QOL. [] Progressing  [x] MET   [] Not MET  [] Not tested  6/5/2024   2. Patient to increase bilateral UE/LE strength to 4/5 or greater, in order to improve endurance and increase ability to perform all functional activities for increased time. [] Progressing  [x] MET   [] Not MET  [] Not tested  2/14/2024   3. Patient to have decreased pain to 4/10 at worst, to improve QOL. [x] Progressing  [] MET   [] Not MET  [] Not tested  6/5/2024   4. Patient to perform daily activities including work activity with only mild increased symptoms. [x] Progressing  [] MET   [] Not  MET  [] Not tested 6/5/2024   5.Patient to increase bilateral UE/LE strength to 5/5 or greater, in order to improve endurance and increase ability to perform all functional activities for increased time. [x] Progressing  [] MET   [] Not MET  [] Not tested 6/5/2024        PLAN     Monitor response to today's treatment session and progress with Physical Therapy plan of care as indicated.     Updated Certification Period: 4/23/24 to 7/23/24   Recommended Treatment Plan: 1 times every other week for 12 weeks:  Aquatic Therapy, Gait Training, Manual Therapy, Moist Heat/ Ice, Neuromuscular Re-ed, Patient Education, Self Care, Therapeutic Activities, and Therapeutic Exercise    Daisha Enriquez, PT

## 2024-06-21 ENCOUNTER — PATIENT MESSAGE (OUTPATIENT)
Dept: OTHER | Facility: OTHER | Age: 34
End: 2024-06-21
Payer: COMMERCIAL

## 2024-07-01 ENCOUNTER — PATIENT MESSAGE (OUTPATIENT)
Dept: OBSTETRICS AND GYNECOLOGY | Facility: CLINIC | Age: 34
End: 2024-07-01
Payer: COMMERCIAL

## 2024-07-03 ENCOUNTER — PATIENT MESSAGE (OUTPATIENT)
Dept: MATERNAL FETAL MEDICINE | Facility: CLINIC | Age: 34
End: 2024-07-03
Payer: COMMERCIAL

## 2024-07-03 DIAGNOSIS — R39.9 UTI SYMPTOMS: Primary | ICD-10-CM

## 2024-07-05 ENCOUNTER — LAB VISIT (OUTPATIENT)
Dept: LAB | Facility: OTHER | Age: 34
End: 2024-07-05
Payer: COMMERCIAL

## 2024-07-05 DIAGNOSIS — R39.9 UTI SYMPTOMS: ICD-10-CM

## 2024-07-05 PROCEDURE — 87086 URINE CULTURE/COLONY COUNT: CPT

## 2024-07-06 LAB — BACTERIA UR CULT: NO GROWTH

## 2024-07-15 ENCOUNTER — CLINICAL SUPPORT (OUTPATIENT)
Dept: REHABILITATION | Facility: HOSPITAL | Age: 34
End: 2024-07-15
Payer: COMMERCIAL

## 2024-07-15 DIAGNOSIS — R52 PAIN: ICD-10-CM

## 2024-07-15 DIAGNOSIS — Z74.09 DECREASED STRENGTH, ENDURANCE, AND MOBILITY: Primary | ICD-10-CM

## 2024-07-15 DIAGNOSIS — R68.89 DECREASED STRENGTH, ENDURANCE, AND MOBILITY: Primary | ICD-10-CM

## 2024-07-15 DIAGNOSIS — R53.1 DECREASED STRENGTH, ENDURANCE, AND MOBILITY: Primary | ICD-10-CM

## 2024-07-15 DIAGNOSIS — M24.80 JOINT HYPEREXTENSIBILITY OF MULTIPLE SITES: ICD-10-CM

## 2024-07-15 PROCEDURE — 97112 NEUROMUSCULAR REEDUCATION: CPT

## 2024-07-15 PROCEDURE — 97140 MANUAL THERAPY 1/> REGIONS: CPT

## 2024-07-15 NOTE — PROGRESS NOTES
[facility]   Physical Therapy Treatment Note        Name: Angela Lyle  Clinic Number: [id number]    Therapy Diagnosis:   Encounter Diagnoses   Name Primary?    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     SUBJECTIVE     Pt reports: Patient reports she has been having more pain and discomfort since last seen - more difficulty driving in car and riding as well for longer trips. She has not been exercising as much due to pain and discomfort with pregnancy.  Better after manual today.    She was compliant with home exercise program.  Response to previous treatment: stronger overall  Functional change: stronger overall     Pain: 2/10 current  Location: right shoulder bilateral , bilateral upper trapezial , Iliac crest area and left quadratus lumborum    OBJECTIVE     Objective Measures updated at progress report unless specified.     TREATMENT     Angela received the treatments listed below:        therapeutic exercises to develop strength, endurance, and core stabilization for  minutes including:    Intervention 7/15/2024  Parameters        Chin tucks - 3 min, 5 sec hold 1 sec rest   Scapula squeeze gentle shoulder extension - 3 min, 5 sec hold 1 sec rest  On towel rolls   Reviewed HEP       "    Re-assessment/FOTO     Neck isometrics - 4 min 30 sec; 5 sec - all 4 directions   Hand ball squeezes/tennis ball,  - hold 5s, rest 1s; 2.5 min   4 way wrist -  (flexion, extension, radial deviation, ulnar deviation), sitting in chair with 1-2 pillows on lap - standing for ulnar deviation, without weight - 2.5 min each       manual therapy techniques: Myofacial release and Soft tissue Mobilization were applied to the: bilateral upper and lower body for 25 minutes, including:    Manual Intervention 7/15/2024     Soft Tissue Mobilization x Bilateral upper trapezial, levator scapula, subscapularis muscle's right side> left . Bilateral upper and mid gluteals, piriformis and deep hip rotators, tensor fasciae latae, along SI ligaments,    Joint Mobilizations x  First rib bilaterally, then after treatment on left    Mobilization with movement x Scapular tilts bilateral    Muscle energy techniques   "Shotgun"   Functional Dry Needling           neuromuscular re-education activities to improve: Balance, Coordination, Proprioception, and Posture for 15 minutes. The following activities were included:       Intervention 7/15/2024  Parameters   Bridging with kick outs/ regular Bridge x 3 min - 5 regular bridges then 1 kick out each side hold 3 sec   Supine Marches   with abdominal bracing lift foot 1-2 in, hold 5s; 3 min   Prone hip extension   2 min 50 sec hold each side for 5 sec/head in neutral    Bicep curls   Sitting single arm 1# right 0# left    Chicken dance  - (shoulder abduction with elbows bent to 90 degrees),  3 min   Shoulder scaption  - No weight  #, Sitting  3 min   Serratus punch  Supine alternating arm   Dying bug with pelvic neutral and abdominal bracing  3 min   Prone swimmer  LE's  1.5 min   Shoulder Internal and External Rotation  - 1 min each, yellow band;  with elbows at side move shoulders in a small range of ER (~30 degrees)/IR (~45 degrees)   Prone T, I, Y   I's/T's/Y's - performed 10-15x each "   Ball modifications I, Y, T - 10x I, 7x T 5x each   Isometric adduction  x 3 min, 5 sec hold 1 sec rest   Supine clamshells x 3 min black band, 5 sec hold 1 sec rest   TA x 3 min, 5 sec hold 1 sec rest   PPT x 3 min, 5 sec hold 1 sec rest   Gluteal sets  x 3 min, 5 sec hold 1 sec rest     Angela participated in dynamic functional therapeutic activities to improve functional performance for 0  minutes, including:    Intervention 7/15/2024  Parameters                                     Plan for Next Visit:    Level 3 Ball Ex's (all 1min start)              (These will be your Monday & Thursday ex's, along with Level 3 mat ex's.  YOU should also be starting Level one neck, mid back and UE program)  Seated on therapy ball kickouts - hold 5s then switch legs  Ball Roll outs from sitting, raise heel off ground slowly, then repeat on other side.  Hugging the ball with kick outs, hold leg out for 5s then switch legs  Bridging with legs on ball and arms folded across chest hold 5s, rest 1s (goal 3 min)    Level 2 Neck, Mid Back and Upper Extremity Progression   Isometric Neck, 5s repeat all directions (forward, right side, backwards, left side for total of 4 min, add 20s per day until you reach 8 min        PATIENT EDUCATION AND HOME EXERCISES     Home Exercises Provided and Patient Education Provided     Education provided:   - Sitting in neutral position - avoid hanging into extension.    Written Home Exercises Provided: Patient instructed to cont prior HEP, and reviewed all questions regarding current exercises' and progressions. Exercises were reviewed and Angela was able to demonstrate them prior to the end of the session.  Angela demonstrated good  understanding of the education provided. See EMR under Patient Instructions for exercises provided during therapy sessions      ASSESSMENT     Patient tolerated all treatment well and did report improvement in symptoms at end of treatment session. Encouraged home exercise  "program "snacking" exercises throughout day and TA activation prior to onset of LB discomfort.    Angela Is progressing well towards her goals.   Pt prognosis is Good.     Pt will continue to benefit from skilled outpatient physical therapy to address the deficits listed in the problem list box on initial evaluation, provide pt/family education and to maximize pt's level of independence in the home and community environment.     Pt's spiritual, cultural and educational needs considered and pt agreeable to plan of care and goals.     Anticipated barriers to physical therapy:  co-morbidities, chronicity of condition, adherence to treatment plan, coping style, and distance to travel for care    Goals:     Reviewed: 7/15/2024      Short Term Goals: In 6 weeks  Progress Date   1.Patient to be educated on HEP. [] Progressing  [x] MET   [] Not MET   [] Not tested  1/17/2024   2.Patient to increase bilateral UE/LE strength by 1/2 grade, in order to improve endurance and increase ability to perform all functional activities for increased time.  [] Progressing  [x] MET   [] Not MET  [] Not tested  1/17/2024   3.Patient to have pain less than 7/10 at worst, to improve QOL. [x] Progressing  [] MET   [] Not MET  [] Not tested  6/5/2024   4.Patient to score the FOTO, to improve QOL. [] Progressing  [x] MET   [] Not MET  [] Not tested  1/17/2024      Long Term Goals: In 12 weeks Progress Date   1.Patient to improve score on the FOTO predicted score or better, to improve QOL. [] Progressing  [x] MET   [] Not MET  [] Not tested  6/5/2024   2. Patient to increase bilateral UE/LE strength to 4/5 or greater, in order to improve endurance and increase ability to perform all functional activities for increased time. [] Progressing  [x] MET   [] Not MET  [] Not tested  2/14/2024   3. Patient to have decreased pain to 4/10 at worst, to improve QOL. [x] Progressing  [] MET   [] Not MET  [] Not tested  6/5/2024   4. Patient to perform daily " activities including work activity with only mild increased symptoms. [x] Progressing  [] MET   [] Not MET  [] Not tested 6/5/2024   5.Patient to increase bilateral UE/LE strength to 5/5 or greater, in order to improve endurance and increase ability to perform all functional activities for increased time. [x] Progressing  [] MET   [] Not MET  [] Not tested 6/5/2024        PLAN     Monitor response to today's treatment session and progress with Physical Therapy plan of care as indicated.     Updated Certification Period: 4/23/24 to 7/23/24   Recommended Treatment Plan: 1 times every other week for 12 weeks:  Aquatic Therapy, Gait Training, Manual Therapy, Moist Heat/ Ice, Neuromuscular Re-ed, Patient Education, Self Care, Therapeutic Activities, and Therapeutic Exercise    Daisha Enriquez, PT

## 2024-07-16 ENCOUNTER — ROUTINE PRENATAL (OUTPATIENT)
Dept: OBSTETRICS AND GYNECOLOGY | Facility: CLINIC | Age: 34
End: 2024-07-16
Payer: COMMERCIAL

## 2024-07-16 ENCOUNTER — PATIENT MESSAGE (OUTPATIENT)
Dept: OBSTETRICS AND GYNECOLOGY | Facility: CLINIC | Age: 34
End: 2024-07-16

## 2024-07-16 ENCOUNTER — PROCEDURE VISIT (OUTPATIENT)
Dept: MATERNAL FETAL MEDICINE | Facility: CLINIC | Age: 34
End: 2024-07-16
Payer: COMMERCIAL

## 2024-07-16 VITALS
BODY MASS INDEX: 18.35 KG/M2 | SYSTOLIC BLOOD PRESSURE: 103 MMHG | HEART RATE: 101 BPM | DIASTOLIC BLOOD PRESSURE: 66 MMHG | WEIGHT: 106.94 LBS

## 2024-07-16 DIAGNOSIS — O09.511 AMA (ADVANCED MATERNAL AGE) PRIMIGRAVIDA 35+, FIRST TRIMESTER: ICD-10-CM

## 2024-07-16 DIAGNOSIS — G90.A POSTURAL ORTHOSTATIC TACHYCARDIA SYNDROME: ICD-10-CM

## 2024-07-16 DIAGNOSIS — Z3A.23 23 WEEKS GESTATION OF PREGNANCY: Primary | ICD-10-CM

## 2024-07-16 DIAGNOSIS — O09.513 AMA (ADVANCED MATERNAL AGE) PRIMIGRAVIDA 35+, THIRD TRIMESTER: ICD-10-CM

## 2024-07-16 DIAGNOSIS — Z36.2 ENCOUNTER FOR FOLLOW-UP ULTRASOUND OF FETAL ANATOMY: ICD-10-CM

## 2024-07-16 DIAGNOSIS — Q79.62 HYPERMOBILE EHLERS-DANLOS SYNDROME: ICD-10-CM

## 2024-07-16 PROCEDURE — 0502F SUBSEQUENT PRENATAL CARE: CPT | Mod: CPTII,S$GLB,,

## 2024-07-16 PROCEDURE — 76816 OB US FOLLOW-UP PER FETUS: CPT | Mod: S$GLB,,, | Performed by: OBSTETRICS & GYNECOLOGY

## 2024-07-16 PROCEDURE — 99999 PR PBB SHADOW E&M-EST. PATIENT-LVL III: CPT | Mod: PBBFAC,,,

## 2024-07-16 NOTE — PROGRESS NOTES
Routine Prenatal Visit    34 y.o. female  at 23w4d, by Estimated Date of Delivery: 24    Complaints today: Had pelvic pressure/fullness and tightness across lower abdomen, but has resolved. Has had some painful bowel movements and bleeding -- had anal fissure in the past. Symptoms are improving and she doesn't feel like she needs treatment at this time. Doing well today otherwise.   Denies UCs, LOF, or VB. Reports daily FM.  Repeat anatomy scan immediately after visit today.     Reviewed TW lbs    PHYSICAL EXAM  LMP 2024     GENERAL: No acute distress  HEENT: Normocephalic, atraumatic  NEURO: Alert and oriented x3  PSYCH: Normal mood and affect  PULMONARY: Non-labored respiration; no tachypnea  ABD: Soft, gravid, nontender    FH 24        ASSESSMENT AND PLAN  23 weeks gestation of pregnancy      Reviewed upcoming 28wk labs, (B POS) and orders placed.  Education regarding warning signs of PreEclampsia, reviewed normal FM,  labor precautions, and how/when to call.    Follow-up: 4 weeks, call or present sooner PRN    SAVANAH Tadeo CNM

## 2024-07-17 ENCOUNTER — PATIENT MESSAGE (OUTPATIENT)
Dept: OBSTETRICS AND GYNECOLOGY | Facility: CLINIC | Age: 34
End: 2024-07-17
Payer: COMMERCIAL

## 2024-07-19 ENCOUNTER — PATIENT MESSAGE (OUTPATIENT)
Dept: OTHER | Facility: OTHER | Age: 34
End: 2024-07-19
Payer: COMMERCIAL

## 2024-07-26 ENCOUNTER — PATIENT MESSAGE (OUTPATIENT)
Dept: OBSTETRICS AND GYNECOLOGY | Facility: CLINIC | Age: 34
End: 2024-07-26
Payer: COMMERCIAL

## 2024-08-02 ENCOUNTER — CLINICAL SUPPORT (OUTPATIENT)
Dept: REHABILITATION | Facility: HOSPITAL | Age: 34
End: 2024-08-02
Payer: COMMERCIAL

## 2024-08-02 ENCOUNTER — PATIENT MESSAGE (OUTPATIENT)
Dept: OTHER | Facility: OTHER | Age: 34
End: 2024-08-02
Payer: COMMERCIAL

## 2024-08-02 DIAGNOSIS — Z74.09 DECREASED STRENGTH, ENDURANCE, AND MOBILITY: Primary | ICD-10-CM

## 2024-08-02 DIAGNOSIS — R68.89 DECREASED STRENGTH, ENDURANCE, AND MOBILITY: Primary | ICD-10-CM

## 2024-08-02 DIAGNOSIS — R52 PAIN: ICD-10-CM

## 2024-08-02 DIAGNOSIS — R53.1 DECREASED STRENGTH, ENDURANCE, AND MOBILITY: Primary | ICD-10-CM

## 2024-08-02 DIAGNOSIS — M24.80 JOINT HYPEREXTENSIBILITY OF MULTIPLE SITES: ICD-10-CM

## 2024-08-02 PROCEDURE — 97140 MANUAL THERAPY 1/> REGIONS: CPT

## 2024-08-02 PROCEDURE — 97110 THERAPEUTIC EXERCISES: CPT

## 2024-08-02 PROCEDURE — 97112 NEUROMUSCULAR REEDUCATION: CPT

## 2024-08-05 ENCOUNTER — PATIENT MESSAGE (OUTPATIENT)
Dept: OBSTETRICS AND GYNECOLOGY | Facility: CLINIC | Age: 34
End: 2024-08-05

## 2024-08-05 ENCOUNTER — ROUTINE PRENATAL (OUTPATIENT)
Dept: OBSTETRICS AND GYNECOLOGY | Facility: CLINIC | Age: 34
End: 2024-08-05
Payer: COMMERCIAL

## 2024-08-05 VITALS
BODY MASS INDEX: 18.66 KG/M2 | DIASTOLIC BLOOD PRESSURE: 62 MMHG | WEIGHT: 108.69 LBS | SYSTOLIC BLOOD PRESSURE: 101 MMHG | HEART RATE: 75 BPM

## 2024-08-05 DIAGNOSIS — Z34.03 ENCOUNTER FOR SUPERVISION OF NORMAL FIRST PREGNANCY IN THIRD TRIMESTER: Primary | ICD-10-CM

## 2024-08-05 PROCEDURE — 0502F SUBSEQUENT PRENATAL CARE: CPT | Mod: S$GLB,,, | Performed by: ADVANCED PRACTICE MIDWIFE

## 2024-08-05 PROCEDURE — 99999 PR PBB SHADOW E&M-EST. PATIENT-LVL III: CPT | Mod: PBBFAC,,, | Performed by: ADVANCED PRACTICE MIDWIFE

## 2024-08-15 ENCOUNTER — PATIENT MESSAGE (OUTPATIENT)
Dept: OBSTETRICS AND GYNECOLOGY | Facility: CLINIC | Age: 34
End: 2024-08-15
Payer: COMMERCIAL

## 2024-08-16 ENCOUNTER — PATIENT MESSAGE (OUTPATIENT)
Dept: REHABILITATION | Facility: HOSPITAL | Age: 34
End: 2024-08-16
Payer: COMMERCIAL

## 2024-08-16 ENCOUNTER — PATIENT MESSAGE (OUTPATIENT)
Dept: OTHER | Facility: OTHER | Age: 34
End: 2024-08-16
Payer: COMMERCIAL

## 2024-08-20 ENCOUNTER — LAB VISIT (OUTPATIENT)
Dept: LAB | Facility: OTHER | Age: 34
End: 2024-08-20
Payer: COMMERCIAL

## 2024-08-20 ENCOUNTER — ROUTINE PRENATAL (OUTPATIENT)
Dept: OBSTETRICS AND GYNECOLOGY | Facility: CLINIC | Age: 34
End: 2024-08-20
Payer: COMMERCIAL

## 2024-08-20 ENCOUNTER — PATIENT MESSAGE (OUTPATIENT)
Dept: OBSTETRICS AND GYNECOLOGY | Facility: CLINIC | Age: 34
End: 2024-08-20
Payer: COMMERCIAL

## 2024-08-20 VITALS
HEART RATE: 80 BPM | DIASTOLIC BLOOD PRESSURE: 62 MMHG | SYSTOLIC BLOOD PRESSURE: 109 MMHG | BODY MASS INDEX: 19.34 KG/M2 | WEIGHT: 112.63 LBS

## 2024-08-20 DIAGNOSIS — Z3A.28 28 WEEKS GESTATION OF PREGNANCY: Primary | ICD-10-CM

## 2024-08-20 DIAGNOSIS — Q79.62 HYPERMOBILE EHLERS-DANLOS SYNDROME: ICD-10-CM

## 2024-08-20 DIAGNOSIS — Z3A.23 23 WEEKS GESTATION OF PREGNANCY: ICD-10-CM

## 2024-08-20 DIAGNOSIS — G47.33 OSA (OBSTRUCTIVE SLEEP APNEA): ICD-10-CM

## 2024-08-20 DIAGNOSIS — M24.80 JOINT HYPEREXTENSIBILITY OF MULTIPLE SITES: ICD-10-CM

## 2024-08-20 DIAGNOSIS — O09.523 MULTIGRAVIDA OF ADVANCED MATERNAL AGE IN THIRD TRIMESTER: ICD-10-CM

## 2024-08-20 DIAGNOSIS — F32.5 MAJOR DEPRESSIVE DISORDER, SINGLE EPISODE IN FULL REMISSION: ICD-10-CM

## 2024-08-20 DIAGNOSIS — G90.A POSTURAL ORTHOSTATIC TACHYCARDIA SYNDROME: ICD-10-CM

## 2024-08-20 LAB
BASOPHILS # BLD AUTO: 0.02 K/UL (ref 0–0.2)
BASOPHILS NFR BLD: 0.3 % (ref 0–1.9)
DIFFERENTIAL METHOD BLD: ABNORMAL
EOSINOPHIL # BLD AUTO: 0 K/UL (ref 0–0.5)
EOSINOPHIL NFR BLD: 0.4 % (ref 0–8)
ERYTHROCYTE [DISTWIDTH] IN BLOOD BY AUTOMATED COUNT: 12.8 % (ref 11.5–14.5)
GLUCOSE SERPL-MCNC: 123 MG/DL (ref 70–140)
HCT VFR BLD AUTO: 32.3 % (ref 37–48.5)
HGB BLD-MCNC: 10.9 G/DL (ref 12–16)
IMM GRANULOCYTES # BLD AUTO: 0.07 K/UL (ref 0–0.04)
IMM GRANULOCYTES NFR BLD AUTO: 0.9 % (ref 0–0.5)
LYMPHOCYTES # BLD AUTO: 1.3 K/UL (ref 1–4.8)
LYMPHOCYTES NFR BLD: 16.8 % (ref 18–48)
MCH RBC QN AUTO: 31.5 PG (ref 27–31)
MCHC RBC AUTO-ENTMCNC: 33.7 G/DL (ref 32–36)
MCV RBC AUTO: 93 FL (ref 82–98)
MONOCYTES # BLD AUTO: 0.5 K/UL (ref 0.3–1)
MONOCYTES NFR BLD: 6.7 % (ref 4–15)
NEUTROPHILS # BLD AUTO: 5.7 K/UL (ref 1.8–7.7)
NEUTROPHILS NFR BLD: 74.9 % (ref 38–73)
NRBC BLD-RTO: 0 /100 WBC
PLATELET # BLD AUTO: 181 K/UL (ref 150–450)
PMV BLD AUTO: 10.2 FL (ref 9.2–12.9)
RBC # BLD AUTO: 3.46 M/UL (ref 4–5.4)
WBC # BLD AUTO: 7.6 K/UL (ref 3.9–12.7)

## 2024-08-20 PROCEDURE — 82950 GLUCOSE TEST: CPT

## 2024-08-20 PROCEDURE — 85025 COMPLETE CBC W/AUTO DIFF WBC: CPT

## 2024-08-20 PROCEDURE — 36415 COLL VENOUS BLD VENIPUNCTURE: CPT

## 2024-08-20 PROCEDURE — 0502F SUBSEQUENT PRENATAL CARE: CPT | Mod: CPTII,S$GLB,, | Performed by: ADVANCED PRACTICE MIDWIFE

## 2024-08-20 PROCEDURE — 99999 PR PBB SHADOW E&M-EST. PATIENT-LVL III: CPT | Mod: PBBFAC,,, | Performed by: ADVANCED PRACTICE MIDWIFE

## 2024-08-20 NOTE — PROGRESS NOTES
34 y.o. female  at 28w4d by Estimated Date of Delivery: 24    Complaints today: Reports some pelvic pain - seeing pelvic PT, taking tylenol, using heating pad and magnesium.    Denies UCs, LOF, VB. Reports active fetal movement.    PHYSICAL EXAM  /62   Pulse 80   Wt 51.1 kg (112 lb 10.5 oz)   LMP 2024   BMI 19.34 kg/m²     GENERAL: No acute distress  HEENT: Normocephalic, atraumatic  NEURO: Alert and oriented x3  PULMONARY: Non-labored respiration; no tachypnea  ABD: Soft, gravid, nontender.    ASSESSMENT AND PLAN  28 weeks gestation of pregnancy    Multigravida of advanced maternal age in third trimester    Hypermobile Jori-Danlos syndrome    Major depressive disorder, single episode in full remission      Extensive conversation re: PTL warning s/s, along with common discomforts of pregnancy and methods to alleviate.   Completing 28wk labs today.   Blood type: (B POS). Rhogam not indicated.    Reviewed warning signs, normal FKCs,  labor precautions and how/when to call.    Follow-up: 2-3 weeks, call or present sooner PRN.    Leah Monge, MIRYAM, APRN

## 2024-08-23 ENCOUNTER — PATIENT MESSAGE (OUTPATIENT)
Dept: OBSTETRICS AND GYNECOLOGY | Facility: CLINIC | Age: 34
End: 2024-08-23
Payer: COMMERCIAL

## 2024-08-26 ENCOUNTER — CLINICAL SUPPORT (OUTPATIENT)
Dept: REHABILITATION | Facility: HOSPITAL | Age: 34
End: 2024-08-26
Attending: ADVANCED PRACTICE MIDWIFE
Payer: COMMERCIAL

## 2024-08-26 DIAGNOSIS — Z34.03 ENCOUNTER FOR SUPERVISION OF NORMAL FIRST PREGNANCY IN THIRD TRIMESTER: ICD-10-CM

## 2024-08-26 PROCEDURE — 97161 PT EVAL LOW COMPLEX 20 MIN: CPT

## 2024-08-26 PROCEDURE — 97140 MANUAL THERAPY 1/> REGIONS: CPT

## 2024-08-26 PROCEDURE — 97530 THERAPEUTIC ACTIVITIES: CPT

## 2024-08-26 NOTE — PLAN OF CARE
"[facility]   Physical Therapy Initial Evaluation        Date: 2024   Name: Angela Lyle  Clinic Number: [id number]    Therapy Diagnosis:   Encounter Diagnosis   Name Primary?    Encounter for supervision of normal first pregnancy in third trimester      Physician: Norma Castillo, *    Physician Orders: PT Eval and Treat   Medical Diagnosis from Referral: Encounter for supervision of normal first pregnancy in third trimester   Evaluation Date: 2024  Authorization Period Expiration: 24  Plan of Care Expiration: 24  Progress Note Due: 24  Visit # / Visits authorized:    FOTO: 1/3    Precautions: Standard and pregnancy, EDS   scoliosis     Time In: 1348  Time Out: 1439  Total Appointment Time (timed & untimed codes): 51 minutes    SUBJECTIVE       Date of onset: 2.5 weeks ago     History of current condition - Angela reports: She is 29 weeks pregnant. Has EDS and has done physical therapy for that.  She was seeing a therapist in [place] but has been having issues getting to [place] for treatment.  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  She reports having pain in her low back and left hip.       OB/GYN History:    planning on having a vaginal delivery   Hysterectomy? No  Oophorectomy? No  Using vaginal estrogen cream: No  Sexually active? Yes  Pelvic Pain with: with intercourse and with vaginal exam  Pelvic pressure? Earlier in the pregnancy but has resolved now    Bladder/Bowel History:   Frequency of urination:   Daytime: every 2 hours           Nighttime: 1x  Difficulty initiating urine stream: No  Do you use the bathroom "just in case"? No  Urine stream: weak   Complete emptying: No  Bladder leakage: No  Urinary Urgency: No.  Able to delay the urge for at least 15 minute(s).    Frequency of bowel movements: once a day  Difficulty initiating BM: No  Quality/Shape of BM: [place] Stool Chart type 4  Does Patient Feel " Empty after BM? Yes  Bowel Urgency: No.  Able to delay the urge for at least 15 minute(s).  Colon leakage: No      Form of protection: none reported    Habitus: well developed, well nourished  Abuse/Neglect: Pt denies a history of physical or emotional abuse at this visit.       Pain:  Location: She reports having pain in her low back and left hip.     Description: sharp, deep, burning  Aggravating Factors/Activities that cause symptoms: standing, walking, prolonged sitting, stairs, inclines, rolling over in bed    Easing Factors: rest, SI belt,      Medical History: Angela  has a past medical history of Allergy, Anemia, Anxiety, Arthritis, Cervical pain (07/31/2018), Decreased strength, endurance, and mobility (11/12/2023), Depression, Excessive daytime sleepiness, Fatigue, History of psychiatric care, Hypermobile Jori-Danlos syndrome (2019), Idiopathic hypersomnia, Keloid cicatrix, Left hip pain (02/05/2018), Neck pain (05/26/2020), Pain (11/12/2023), PCOS (polycystic ovarian syndrome), POTS (postural orthostatic tachycardia syndrome) (2019), Psychiatric problem, Right shoulder pain (07/25/2016), Routine health maintenance (12/26/2023), Shoulder instability (06/06/2017), Sleep stage dysfunction, Tendinitis of right rotator cuff (07/25/2016), and Therapy.     Surgical History: Angela Lyle  has a past surgical history that includes Tonsillectomy; wisdom teeth removal (2010); and Shoulder surgery (Right, 06/2017).    Medications: Angela has a current medication list which includes the following prescription(s): aspirin, betaxolol, ergocalciferol (vitamin d2), inositol-d chiro inositol, magnesium, fish oil-omega-3 fatty acids, prenatal vit no.124/iron/folic, and progesterone.    Allergies:   Review of patient's allergies indicates:   Allergen Reactions    Sulfa (sulfonamide antibiotics) Hives and Itching    Sulfamethoxazole-trimethoprim      Other reaction(s): Itching        Imaging: None reported for  this condition     Prior Therapy/Previous treatment included: Has gone to pelvic physical therapy 2x before.    Social History/Living Arrangements: lives with their family  Current exercise: not currently due to pain   Occupation: NICU nurse   Prior Level of Function: Pt was independent with all ADLs and iADLs without pain, no reports of incontinence of bowel or bladder.  Current Level of Function: pain with mobility, activities of daily living      Constitutional Symptoms Review: The patient denies having any constitutional symptoms.     Flags Screening  Red Flags:The patient was screened for red flags and none were identified.      Pts goals: decrease pain with activities of daily living and work duties     OBJECTIVE   ORTHO SCREEN  Posture in sitting: slouched , sits squarely , and and wearing sacroiliac joint belt   Posture in standing: pain posturing evident , forward head, forward and rounded shoulders , and sway back  Pelvic alignment:  right leg appears shorter in supine    SI Joint Palpation: Tenderness to the left SI joint palpation.    Lumbar Active Range of Motion:      ROM    Percentage  Pain    Flexion  80  Yes right hip     Extension  75  No    Left Side Bending  75 _lateral shift No    Right Side Bending  75  No                 LE Strength Testing:   Hip Left Right   Flexion 4/5 4+/5   Abduction 4/5 4/5   Adduction 4+/5 4+/5   Extension 4-/5 4-/5   External Rot 4+/5 4+/5   Internal Rot 4+/5 4+/5       Gait Analysis: antalgic , decreased stride length , and wide base of support        Balance Reactions:    Static standing: good   Dynamic standing:  good       Special Tests for Load Transfer Assessment Between the Trunk and Lower Extremities:    Single Leg Stance Test:    Right: impaired balance reactions   Left:  pain reported              Intake Outcome Measures for FOTO Survey    Therapist reviewed FOTO scores for Angela Lyle on 8/26/2024.     FOTO report- see Media section in Epic or  account episode details in FOTO.      Intake Score:            TREATMENT        Treatment Time In: 1415  Treatment Time Out: 1439  Total Treatment time (time-based codes) separate from Evaluation: 24 minutes      Manual Therapy to develop flexibility and extensibility for 8 minutes including:   [x] Pubic clearing x 3 bouts.  Left posterior rotated ilium correction x 3 bouts.    [x] Discussed soft tissue mobilization to hip musculature to help with pain       Therapeutic Activity Patient participated in dynamic functional therapeutic activities to improve functional performance for 16 minutes. Including: Education as described below.     [x] Pelvic anatomy edu and impact on current level of function   [x] HEP building  [x] Sacroiliac joint belt fitting edu   [x] Worked on standing posture using the wall for feedback.  Pt instructed to stand against the wall keeping the heels of her feet against the wall.  She was instructed to flatten her back against the wall. Once in position she was instructed to take a few steps away from the wall and try to maintain this upright position.           Written Home Exercises and Education Provided: yes. Exercises were reviewed and Angela was able to demonstrate them prior to the end of the session.  Angela demonstrated good  understanding of the education provided. See EMR under Patient Instructions for exercises and education provided during therapy sessions.      ASSESSMENT     Angela is a 34 y.o. female referred to outpatient Physical Therapy with a medical diagnosis of pain during pregnancy. Pt presents with pain with activities of daily living and work related duties that started about 2 weeks ago.  She is currently pregnant and is 20 weeks along.  She also reports a history of scoliosis and EDS.   Examination reveals lumbopelvic dysfunction, muscular weakness, coordination deficits and pain with gait and transfers. She is expected to benefit from skilled intervention to work  towards decreasing pain in order to improve ability to participate in ADLs and social activities.      Pt prognosis is Good.   Pt will benefit from skilled outpatient Physical Therapy to address the deficits stated above and in the chart below, provide pt/family education, and to maximize pt's level of independence.     Plan of care discussed with patient: Yes  Pt's spiritual, cultural and educational needs considered and patient is agreeable to the plan of care and goals as stated below:     Anticipated Barriers for therapy: pregnant and will be delivering soon     Medical Necessity is demonstrated by the following  History  Co-morbidities and personal factors that may impact the plan of care [x] LOW: no personal factors / co-morbidities  [] MODERATE: 1-2 personal factors / co-morbidities  [] HIGH: 3+ personal factors / co-morbidities    Moderate / High Support Documentation:   Co-morbidities affecting plan of care: EDS     Personal Factors:   no deficits     Examination  Body Structures and Functions, activity limitations and participation restrictions that may impact the plan of care [x] LOW: addressing 1-2 elements  [] MODERATE: 3+ elements  [] HIGH: 4+ elements (please support below)    Moderate / High Support Documentation: lumbopelvic dysfunction      Clinical Presentation [x] LOW: stable  [] MODERATE: Evolving  [] HIGH: Unstable     Decision Making/ Complexity Score: low         Goals:  Short Term Goals: 4 weeks   Patient to demonstrate good body mechanics with sit <--> stand and side lying <--> sitting transfers to demonstrate improved functional mobility.    Pt to demonstrate being able to correctly and consistently relax and lengthen her pelvic floor muscle(s) in preparation for labor and delivery.    Pt to demonstrate proper body mechanics with lifting, bending and squatting to decrease strain on lumbopelvic structures during pregnancy.    Pt to report being able to complete a half day at work without  significant increase in pain to demonstrate a return towards prior level of function.   Pt to demonstrate an improved score in the FOTO PFDI Pain survey  to at least 85 to demonstrate improving ability to participate in activities of daily living and work duties.       Long Term Goals: 12 weeks ,   Pt to be discharged with home plan for carry over after discharge.    Pt will be trained and compliant with postural strategies in sitting and standing to improve alignment and decrease pain and muscle fatigue.  Pt to report being able to complete a full day at work without significant increase in pain to demonstrate a return towards prior level of function.   Pt to demonstrate an improved score in the FOTO PFDI Pain survey  to at least 79 to demonstrate improving ability to participate in activities of daily living and work duties.     Patient to be able to consistently roll over in bed with minimal discomfort to demonstrate improved mobility.     PLAN     Plan of care Certification: 8/26/2024 to 11-12-24.    Outpatient Physical Therapy 1-2 times weekly for 12 weeks to include the following interventions: therapeutic exercises, therapeutic activity, neuromuscular re-education, gait training, manual therapy, modalities PRN, patient/family education, and self care/home management.     Allison Mcintosh, PT

## 2024-08-30 ENCOUNTER — PATIENT MESSAGE (OUTPATIENT)
Dept: OTHER | Facility: OTHER | Age: 34
End: 2024-08-30
Payer: COMMERCIAL

## 2024-08-30 DIAGNOSIS — Z77.011 EXPOSURE TO LEAD: Primary | ICD-10-CM

## 2024-09-06 ENCOUNTER — ROUTINE PRENATAL (OUTPATIENT)
Dept: OBSTETRICS AND GYNECOLOGY | Facility: CLINIC | Age: 34
End: 2024-09-06
Payer: COMMERCIAL

## 2024-09-06 ENCOUNTER — LAB VISIT (OUTPATIENT)
Dept: LAB | Facility: OTHER | Age: 34
End: 2024-09-06
Attending: ADVANCED PRACTICE MIDWIFE
Payer: COMMERCIAL

## 2024-09-06 ENCOUNTER — CLINICAL SUPPORT (OUTPATIENT)
Dept: OBSTETRICS AND GYNECOLOGY | Facility: CLINIC | Age: 34
End: 2024-09-06
Payer: COMMERCIAL

## 2024-09-06 VITALS
HEART RATE: 69 BPM | WEIGHT: 112.63 LBS | BODY MASS INDEX: 19.34 KG/M2 | DIASTOLIC BLOOD PRESSURE: 61 MMHG | SYSTOLIC BLOOD PRESSURE: 100 MMHG

## 2024-09-06 DIAGNOSIS — Q79.62 HYPERMOBILE EHLERS-DANLOS SYNDROME: ICD-10-CM

## 2024-09-06 DIAGNOSIS — Z23 NEED FOR TDAP VACCINATION: Primary | ICD-10-CM

## 2024-09-06 DIAGNOSIS — F32.5 MAJOR DEPRESSIVE DISORDER, SINGLE EPISODE IN FULL REMISSION: ICD-10-CM

## 2024-09-06 DIAGNOSIS — O09.523 MULTIGRAVIDA OF ADVANCED MATERNAL AGE IN THIRD TRIMESTER: ICD-10-CM

## 2024-09-06 DIAGNOSIS — Z3A.28 28 WEEKS GESTATION OF PREGNANCY: ICD-10-CM

## 2024-09-06 DIAGNOSIS — G90.A POSTURAL ORTHOSTATIC TACHYCARDIA SYNDROME: ICD-10-CM

## 2024-09-06 DIAGNOSIS — Z77.011 EXPOSURE TO LEAD: ICD-10-CM

## 2024-09-06 DIAGNOSIS — Z3A.31 31 WEEKS GESTATION OF PREGNANCY: Primary | ICD-10-CM

## 2024-09-06 LAB
HIV 1+2 AB+HIV1 P24 AG SERPL QL IA: NEGATIVE
TREPONEMA PALLIDUM IGG+IGM AB [PRESENCE] IN SERUM OR PLASMA BY IMMUNOASSAY: NONREACTIVE

## 2024-09-06 PROCEDURE — 99999 PR PBB SHADOW E&M-EST. PATIENT-LVL III: CPT | Mod: PBBFAC,,, | Performed by: ADVANCED PRACTICE MIDWIFE

## 2024-09-06 PROCEDURE — 83655 ASSAY OF LEAD: CPT

## 2024-09-06 PROCEDURE — 86593 SYPHILIS TEST NON-TREP QUANT: CPT | Performed by: ADVANCED PRACTICE MIDWIFE

## 2024-09-06 PROCEDURE — 36415 COLL VENOUS BLD VENIPUNCTURE: CPT

## 2024-09-06 PROCEDURE — 87389 HIV-1 AG W/HIV-1&-2 AB AG IA: CPT | Performed by: ADVANCED PRACTICE MIDWIFE

## 2024-09-06 RX ORDER — TETANUS TOXOID, REDUCED DIPHTHERIA TOXOID AND ACELLULAR PERTUSSIS VACCINE, ADSORBED 5; 2.5; 8; 8; 2.5 [IU]/.5ML; [IU]/.5ML; UG/.5ML; UG/.5ML; UG/.5ML
0.5 SUSPENSION INTRAMUSCULAR ONCE
Qty: 0.5 ML | Refills: 0 | Status: SHIPPED | OUTPATIENT
Start: 2024-09-06 | End: 2024-09-06

## 2024-09-06 NOTE — LETTER
September 6, 2024    Angela Lyle  [address] [place] [address]              [facility] [place] [facility]  [address]  [place] [facility] [address]  Phone: [phone]  Fax: [phone]    To Whom It May Concern:    Ms. Lyle is currently under our care for pregnancy.  Estimated Date of Delivery of 11/8/24. Now, 31 weeks.   Angela has been experiencing musculoskeletal discomforts mildly affecting mobility. She is not experiencing any weakness/gait instability. She does have a history of a connective tissue disorder as well. Angela would like to continue working. If possible please provide patient assignments in close proximity to each other.        Sincerely,    Ruth Bailon CNM

## 2024-09-06 NOTE — PROGRESS NOTES
34 y.o.,  at 31w0d    Presents today for routine OB appt. Has been seeing pelvic floor therapy with and is still having lower back pain and left sided hip and sciatic pain. Not experiencing weakness or uneven gait. Requesting work note be completed with accommodations. Currently wearing pelvic support garment.     TW lbs   Denies UCs, LOF, VB. Denies HA, visual disturbances, or upper abdominal pain; no nausea/vomiting. Reports good fetal movement.    PHYSICAL EXAM  GENERAL: No acute distress  HEENT: Normocephalic, atraumatic  NEURO: Alert and oriented x3  PULMONARY: Non-labored respiration; no tachypnea  ABD: Soft, gravid, nontender.      ASSESSMENT AND PLAN  Multigravida of advanced maternal age in third trimester    31 weeks gestation of pregnancy    Postural orthostatic tachycardia syndrome    Hypermobile Jori-Danlos syndrome    Major depressive disorder, single episode in full remission      Reviewed 28wk labs; HIV and TPA not drawn previously, pt states she will get those done prior to next visit.   Would like TDAP, order placed today.   Education regarding recommendations for RSV vaccine in pregnancy, discussed recommended timing between 32-36w EGA. Pt plans on receiving vaccine in a few weeks.   Anesthesia consult placed for Jori-Danlos Syndrome.   Next growth Ultrasound scheduled for 24.   Reviewed warning s/s for PreE, PTL, FMC, and discussed how/when to call.    Birth Center Risk Assessment: 0- Meets birth center guidelines    CNM management in [facility]  CNM management on L&D  Consultation with OB to develop  plan of care  Collaborative CNM/OB management with delivery on L&D  Permanent referral of care to MD      Follow-up: 2 weeks, call or present sooner JACOB Bailon CNM, APRN

## 2024-09-09 LAB
CITY: NORMAL
COUNTY: NORMAL
GUARDIAN FIRST NAME: NORMAL
GUARDIAN LAST NAME: NORMAL
LEAD BLD-MCNC: 1.1 MCG/DL
PHONE #: NORMAL
POSTAL CODE: NORMAL
RACE: NORMAL
STATE OF RESIDENCE: NORMAL
STREET ADDRESS: NORMAL

## 2024-09-13 ENCOUNTER — PATIENT MESSAGE (OUTPATIENT)
Dept: OTHER | Facility: OTHER | Age: 34
End: 2024-09-13
Payer: COMMERCIAL

## 2024-09-16 ENCOUNTER — OFFICE VISIT (OUTPATIENT)
Dept: GASTROENTEROLOGY | Facility: CLINIC | Age: 34
End: 2024-09-16
Payer: COMMERCIAL

## 2024-09-16 VITALS
BODY MASS INDEX: 19.35 KG/M2 | HEART RATE: 60 BPM | SYSTOLIC BLOOD PRESSURE: 115 MMHG | HEIGHT: 64 IN | WEIGHT: 113.31 LBS | DIASTOLIC BLOOD PRESSURE: 73 MMHG

## 2024-09-16 DIAGNOSIS — R68.81 EARLY SATIETY: Primary | ICD-10-CM

## 2024-09-16 DIAGNOSIS — K63.8219 SMALL INTESTINAL BACTERIAL OVERGROWTH: ICD-10-CM

## 2024-09-16 PROCEDURE — 99204 OFFICE O/P NEW MOD 45 MIN: CPT | Mod: S$GLB,,, | Performed by: INTERNAL MEDICINE

## 2024-09-16 PROCEDURE — 3008F BODY MASS INDEX DOCD: CPT | Mod: CPTII,S$GLB,, | Performed by: INTERNAL MEDICINE

## 2024-09-16 PROCEDURE — 3078F DIAST BP <80 MM HG: CPT | Mod: CPTII,S$GLB,, | Performed by: INTERNAL MEDICINE

## 2024-09-16 PROCEDURE — 1159F MED LIST DOCD IN RCRD: CPT | Mod: CPTII,S$GLB,, | Performed by: INTERNAL MEDICINE

## 2024-09-16 PROCEDURE — 99999 PR PBB SHADOW E&M-EST. PATIENT-LVL III: CPT | Mod: PBBFAC,,, | Performed by: INTERNAL MEDICINE

## 2024-09-16 PROCEDURE — 3074F SYST BP LT 130 MM HG: CPT | Mod: CPTII,S$GLB,, | Performed by: INTERNAL MEDICINE

## 2024-09-16 NOTE — PATIENT INSTRUCTIONS
GASTROPARESIS DIET TIPS  Introduction  Gastroparesis means stomach (gastro) paralysis (paresis). The word gastroparesis is used when a patients stomach empties too slowly. Everyones stomach is unique, so the ability of the stomach to empty can vary from one patient to another. Most patients are able to swallow and empty their saliva (about 1 quart per day) and also empty the natural stomach juices they make (about 2-3 quarts per day). Symptoms can vary from week-to-week or even day-to-day. The guidelines presented here are designed to give tips for diet modification. In addition, lots of suggestions are provided for food and drinks. The suggestions are based on experience and not science, as there are no studies that have been done that demonstrate what foods are better tolerated than others by patients with gastroparesis. It is recommended that anyone with gastroparesis see a doctor and  Registered Dietitian to seek advice on how to maximize their nutritional status.    The Basics  Volume  The larger the meal, the slower the stomach will empty. It is important to decrease the amount of food eaten at a meal, but in order to meet nutrient needs, patients will have to eat more often. Smaller, more frequent meals (6-8 or more if necessary) may allow patients to meet their needs.  Liquids Versus Solids  If decreasing the meal size and increasing the number of meals does not work, the next step is to switch over to more liquid-type calories. Liquids are better tolerated than solids. Liquids empty the stomach more easily than solids do. Pureed foods may be better tolerated also.  Fiber  Fiber (found in many fruits, vegetables and grains) may act to slow stomach  emptying and fill the stomach up quickly, hence nutrient needs may not be met.  For patients who have had a bezoar (an indigestible, concretion of foods and/or medications) in the past, a fiber restriction (including avoidance of over-the-counter fiber/bulking  medicines) is worthwhile.  Fat  Although fat may slow stomach emptying in some patients, many can consume fat especially in the form of liquids. Although many clinicians restrict fat, my experience is that fat in the liquid form (as part of beverages such as whole milk, milkshakes, nutritional supplements, etc.) can be well tolerated by many. To take fat out of the diet of a patient diet that is seriously malnourished is to remove a valuable source of calories. Unless a fat-containing food or fluid causes problems, fat should not be limited. It is often well tolerated, pleasurable, and it provides a great source of calories small amounts.  Medications  There are quite a few medications that can delay stomach emptying -ask your doctor if any of the medications you are on could be slowing down your stomach emptying.  Getting Started  ·  Eat at least six small meals per day; avoid large meals.  ·  Avoid solid foods high in fat or adding too much fat (see list below) to foods, however, liquid beverages containing fat are often tolerated just fine.  ·  Eat nutritious foods first before filling up on empty calories (i.e., candy, cakes, pastries, etc.)  ·  Chew foods well; especially meats (meats may be more tolerated if ground or puréed.)  ·  Avoid high fiber foods because they may be more difficult for your stomach to empty or may cause bezoar formation. A bezoar is a mixture of food fibers that may get stuck in the stomach causing it to not empty even more poorly.  ·  Sit up while eating and for at least 1 hour after finishing; consider taking a quiet walk after meals.  ·  If you have diabetes, keep your blood sugar under control. Let your doctor  know if your blood sugar runs >200 on a regular basis.  Try Blenderized Food  Any food can be blenderized, but solid foods will need to be thinned with some type of liquid.  ·  Meats, fish, poultry and ham: Blend with broths, water, milk, vegetable or  V-8® juice, tomato  sauce, gravies.  ·  Vegetables: Blend with water, tomato juice, broths, strained baby  vegetables.  ·  Starches: potatoes, pasta: Blend with soups, broth, milk, water, gravies;  add strained baby meats, etc to add protein if needed. Consider using hot  cereals such as cream of wheat or rice, grits, etc as your starch at lunch  and dinner.  ·  Fruits: Blend with their own juices, other fruit juices, water, strained baby  fruits.  ·  Cereals: Make with caloric beverage such as whole milk, soy or rice milk,  juice, Ensure® or equivalent, etc., instead of water. Add sugars, honey,  molasses, syrups, or other flavorings, butter or margarine for extra calories.  ·  Mixed dishes: Lasagna, macaroni and cheese, spaghetti, chili, stews, hearty  soups, chop suey - add adequate liquid of your choice, blend well and strain.  Always clean the  well. Any food left in the  for > 1-2 hours  could cause food poisoning. If you do not have a , strained baby foods will work and can be thinned down if needed with milk, soy or rice milk, water, broth, etc.    Getting your Calories  When getting enough calories is a daily struggle  ·  High calorie drinks are better than water (provides calories AND fluid); use  peach, pear or papaya nectar, cranberry juice, orange juice, Hawaiian Punch®, Hi C®, lemonade, Raj-Aid®, etc.  ·  Fortify milk by adding dry milk powder - 1-cup powder to 1-quart milk.  ·  Use whole milk or evaporated milk (if tolerated) instead of skim or 2% for  drinking and preparing cream type soups, custards, puddings, and milkshakes.  ·  Add instant breakfast, protein powder, dry milk powder, or other flavored  powders or syrups to whole milk or juices.  ·  Make custards and puddings with eggs or egg substitutes (such as  Eggbeaters®).  ·  Try adding ice cream, sherbets, or sorbets to ready-made liquid nutritional  supplements such as Nutra-shakes®, Ensure® or Boost® or others.  FOOD SUGGESTIONS FOR  GASTROPARESIS  STARCHES  Breads  White bread (including French/Italian)  Bagels (plain or egg)  English muffin  Plain roll  Veronica bread  Tortilla (flour, corn)  Pancake  Waffle  Cereals  Quick oats (plain)  Grits  Cream of Wheat  Cream of Rice  Puffed wheat and rice cereals such as:  (Cheerios®, Sugar Pops®, Kix®, Rice Krispies®, Fruit Loops®, Special K®, Cocoa Crispies®, cornflakes, Cocoa Puffs®)  Grains/Potatoes  Rice (plain) - any Pasta, macaroni (plain)  Bulgur wheat  Barley  Potatoes (no skin, plain)  (all kinds-sweet, yams, etc.)  French fries (baked)  Crackers  Arrowroot  Breadsticks  Matzoh  Chandni toast  Oyster  Pretzels  Saltines  Soda  Zwieback  MEATS - GROUND OR PUREED  Beef  Baby beef  Chipped beef  Flank steak  Tenderloin  Plate skirt steak  Round (bottom or top)  Rump  Veal  Leg  Loin  Rib  Shank  Shoulder  Pork  Lean pork  Tenderloin  Pork chops  97% fat-free ham  Poultry (skinless)  Chicken  Turkey (all)  Wild Game  Venison  Rabbit  Squirrel  Pheasant (no skin)  Fish/Shellfish  (fresh or frozen, plain, no breading)  Crab  Lobster  Shrimp  Clams  Scallops  Oysters  Tuna (in water)  Cheese  Cottage cheese  Grated Parmesan  Other  Eggs (no creamed or fried), egg white, egg substitute  Tofu  Strained baby meats (all)  VEGETABLES (Cooked, and if necessary, blenderized/strained)  Beets  Tomato sauce  Tomato juice  Tomato paste or Carrots   Strained baby  Mushrooms  Vegetable juice  puree vegetables  FRUITS AND JUICES (Cooked and, if necessary, blenderized/strained)  Fruits  Applesauce  Banana  Peaches (canned)  Pears (canned)  Strained baby fruits (all)  Juices (all)  Apple  Apple cider  Cranberry (sweetened)  Cranberry (low calorie)  Grape  Grapefruit  [place] Sol  Nectars (apricot, peach, pear)  Orange-grapefruit  Orange  Pineapple-orange  Papaya  Pineapple  Prune  MILK PRODUCTS  (if tolerated)  OTHER  CARBOHYDRATES  SOUPS FAT (if tolerated)  Buttermilk  Yogurt (frozen)  Evaporated milk  Smooth  yogurts  (without fruit pieces)  Milk powder  Milk - any as tolerated  Rolando food cake  Animal crackers  Custard/pudding  Gelatin/ Jell-O®  Tamara snaps  Prasanth crackers  Popsicles  Plain sherbet  Vanilla wafers  Broth  Bouillon  Strained creamed  soups (with milk or water)  Cream cheese  Mayonnaise  Margarine  Butter  Vegetable oils  Smooth peanut  butter - small amounts  BEVERAGES SEASONINGS/GRAVIES SWEETS  Hot cocoa (made with water or milk)  Raj-Aid®  Lemonade  Marks® and similar powdered  products  Gatorade® or Powerade®  Soft drinks  Coffee  Tea  Cranberry sauce (smooth  Fat-free gravies  Pao McButter®, Butter Buds®  Mustard  Ketchup  Vegetable oil spray  Soy sauce  Teriyaki sauce  Tabasco® sauce  Vanilla and other extracts  Vinegar  Gum  Gum drops  Hard candy  Jelly beans  Lemon drops  Rolled candy (such as  Lifesavers®)  Marshmallows  Seedless jams and jellies  The following foods have been associated with bezoar* formation; avoid if you  have been told you have had a bezoar.  Apples  Berries  Coconuts  Figs  Oranges  Persimmons  Stella sprouts  Green beans  Legumes  Potato peels  Sauerkraut  *A Bezoar is a mixture of food residues that can accumulate in a stomach that does  not empty well.  ADDITIONAL RESOURCES  ¨  Gastroparesis & Dysmotilities Association: www.digestivedistress.com  o www.GInutrition.[place].edu o Scroll down to Articles in Practical         Gastroenterology  o August 2005 (article on gastroparesis)  ¨  Find more extensive diet suggestions for gastroparesis atwww.FooPets.com  o Click on Services à Digestive Health à Health and Prevention (in the  left-hand column) à scroll down to Stomach Paralysis - Gastroparesis Diet  Tips and Recipes

## 2024-09-16 NOTE — PROGRESS NOTES
[facility] Consultation Note    Reason for Consult:  The primary encounter diagnosis was Early satiety. A diagnosis of Small intestinal bacterial overgrowth was also pertinent to this visit.    PCP:   No, Primary Doctor   No address on file    Referring MD:  Self, Aaareferral  No address on file    Initial History of Present Illness (HPI):  This is a 34 y.o. female here for evaluation of POTS, EDS, currently pregnant at 32W.  POTS dx in 2019/2020 - started on beta blocker, was on midodrine  -gets IV fluids prn (once a month)    EDS Dx soon after, saw Dr Palacio  -shoulder surgery in 2017    Saw allergy for testing (mast cell markers, etc) not on treatments currently    Does have early satiety, worse with pregnancy. Has gained about 10-15 lbs    Was treated for SIBO a few years ago with Dr De Paz at [facility] before she moved    Abdominal pain - no  Reflux - occasional  Dysphagia - no   Bowel habits - normal  GI bleeding - none  NSAID usage - none        ROS:  Constitutional: No fevers, chills, No weight loss  ENT: No allergies  CV: + tachy  Pulm: No cough, + shortness of breath  Ophtho: No vision changes  GI: see HPI  Derm: No rash  Heme: No lymphadenopathy, No bruising  MSK: No arthritis  : No dysuria, No hematuria  Endo: No hot or cold intolerance  Neuro: No syncope, No seizure  Psych: No anxiety, No depression    Medical History:  has a past medical history of Allergy, Anemia, Anxiety, Arthritis, Cervical pain (07/31/2018), Decreased strength, endurance, and mobility (11/12/2023), Depression, Excessive daytime sleepiness, Fatigue, History of psychiatric care, Hypermobile Jori-Danlos syndrome (2019), Idiopathic hypersomnia, Keloid cicatrix, Left hip pain (02/05/2018), Neck pain (05/26/2020), Pain (11/12/2023), PCOS (polycystic ovarian syndrome), POTS (postural orthostatic tachycardia syndrome) (2019), Psychiatric problem, Right shoulder pain (07/25/2016), Routine health maintenance  (12/26/2023), Shoulder instability (06/06/2017), Sleep stage dysfunction, Tendinitis of right rotator cuff (07/25/2016), and Therapy.    Surgical History:  has a past surgical history that includes Tonsillectomy; wisdom teeth removal (2010); and Shoulder surgery (Right, 06/2017).    Family History: family history includes Arthritis in her maternal grandmother and mother; Cancer in her maternal aunt; Depression in her mother; Diabetes in her paternal grandfather; Early death in her maternal grandfather; Fibromyalgia in her mother; Hearing loss in her father, maternal grandmother, paternal grandfather, and paternal grandmother; Heart disease in her father, maternal grandfather, and paternal grandfather; Hyperlipidemia in her father; Hypertension in her maternal grandmother and mother; Miscarriages / Stillbirths in her mother; Stroke in her maternal grandmother and paternal grandmother..     Social History:  reports that she has never smoked. She has never been exposed to tobacco smoke. She has never used smokeless tobacco. She reports that she does not currently use alcohol after a past usage of about 5.0 standard drinks of alcohol per week. She reports that she does not use drugs.    Review of patient's allergies indicates:   Allergen Reactions    Sulfa (sulfonamide antibiotics) Hives and Itching    Sulfamethoxazole-trimethoprim      Other reaction(s): Itching       Medication List with Changes/Refills   Current Medications    ASPIRIN (ECOTRIN) 81 MG EC TABLET    Take 81 mg by mouth once daily.    BETAXOLOL (KERLONE) 10 MG TAB    Take 5 mg by mouth once daily.    CALCIUM CARBONATE (CALCIUM 500 ORAL)        ERGOCALCIFEROL, VITAMIN D2, 2,000 UNIT TAB    Take 2,000 Units by mouth once daily.    FISH OIL-DHA-EPA ORAL    once daily.    INOSITOL-D CHIRO INOSITOL 2,000-50 MG PWPK    Use as directed in the mouth or throat 2 (two) times a day.    LACTOBAC NO.41/BIFIDOBACT NO.7 (PROBIOTIC-10 ORAL)    once daily.    MAGNESIUM  "250 MG TAB    Take 350 mg by mouth once.    OMEGA-3 FATTY ACIDS/FISH OIL (FISH OIL-OMEGA-3 FATTY ACIDS) 300-1,000 MG CAPSULE    Take by mouth once daily.    PRENATAL VIT NO.124/IRON/FOLIC (PRENATAL VITAMIN ORAL)    Take 1 Dose by mouth Daily.    PROGESTERONE (PROMETRIUM) 200 MG CAPSULE    Place 1 capsule (200 mg total) vaginally nightly.         Objective Findings:    Vital Signs:  /73   Pulse 60   Ht 5' 4" (1.626 m)   Wt 51.4 kg (113 lb 5.1 oz)   LMP 02/02/2024   BMI 19.45 kg/m²   Body mass index is 19.45 kg/m².    Physical Exam:  General Appearance: Well appearing in no acute distress  Head:   Normocephalic, without obvious abnormality  Eyes:    No scleral icterus, EOMI  ENT: Neck supple, Lips, mucosa, and tongue normal; teeth and gums normal  Lungs: CTA bilaterally in anterior and posterior fields, no wheezes, no crackles.  Heart:  Regular rate and rhythm, S1, S2 normal, no murmurs heard  Abdomen: Soft, non tender, non distended with positive bowel sounds in all four quadrants. No hepatosplenomegaly, ascites, or mass  Extremities: 2+ pulses, no clubbing, cyanosis or edema  Skin: No rash  Neurologic: CN II-XII intact      Labs:  Lab Results   Component Value Date    WBC 7.60 08/20/2024    HGB 10.9 (L) 08/20/2024    HCT 32.3 (L) 08/20/2024     08/20/2024    CHOL 149 05/02/2024    TRIG 50 05/02/2024    HDL 84 (H) 05/02/2024    ALT 22 04/11/2024    AST 20 04/11/2024     04/11/2024    K 3.9 04/11/2024     04/11/2024    CREATININE 0.7 04/11/2024    BUN 8 04/11/2024    CO2 23 04/11/2024    TSH 0.528 05/02/2024    HGBA1C 5.2 11/10/2023       No results found for: "HPYLORINTERP"  No results found for: "HPYLORIANTIG"        Imaging:    Endoscopy:    EGD in late 20's - wnl        Assessment:  1. Early satiety    2. Small intestinal bacterial overgrowth           Recommendations:  1. Will start with gastric emptying and repeat breath testing after she has delivered baby  2. Can repeat tryptase, " histamine levels after pregnancy if symptoms flareup    No follow-ups on file.      Order summary:         Thank you so much for allowing me to participate in the care of Angela Rascon MD

## 2024-09-17 ENCOUNTER — PROCEDURE VISIT (OUTPATIENT)
Dept: MATERNAL FETAL MEDICINE | Facility: CLINIC | Age: 34
End: 2024-09-17
Payer: COMMERCIAL

## 2024-09-17 DIAGNOSIS — Z36.89 ENCOUNTER FOR ULTRASOUND TO ASSESS FETAL GROWTH: ICD-10-CM

## 2024-09-17 DIAGNOSIS — O09.511 AMA (ADVANCED MATERNAL AGE) PRIMIGRAVIDA 35+, FIRST TRIMESTER: ICD-10-CM

## 2024-09-17 PROCEDURE — 76816 OB US FOLLOW-UP PER FETUS: CPT | Mod: S$GLB,,, | Performed by: OBSTETRICS & GYNECOLOGY

## 2024-09-19 ENCOUNTER — CLINICAL SUPPORT (OUTPATIENT)
Dept: REHABILITATION | Facility: HOSPITAL | Age: 34
End: 2024-09-19
Payer: COMMERCIAL

## 2024-09-19 DIAGNOSIS — R68.89 DECREASED STRENGTH, ENDURANCE, AND MOBILITY: Primary | ICD-10-CM

## 2024-09-19 DIAGNOSIS — R52 PAIN: ICD-10-CM

## 2024-09-19 DIAGNOSIS — Z74.09 DECREASED STRENGTH, ENDURANCE, AND MOBILITY: Primary | ICD-10-CM

## 2024-09-19 DIAGNOSIS — R53.1 DECREASED STRENGTH, ENDURANCE, AND MOBILITY: Primary | ICD-10-CM

## 2024-09-19 DIAGNOSIS — M24.80 JOINT HYPEREXTENSIBILITY OF MULTIPLE SITES: ICD-10-CM

## 2024-09-19 PROCEDURE — 97530 THERAPEUTIC ACTIVITIES: CPT

## 2024-09-19 PROCEDURE — 97140 MANUAL THERAPY 1/> REGIONS: CPT

## 2024-09-19 NOTE — PATIENT INSTRUCTIONS
"Home Exercise Program: 09/19/2024     ABDOMINAL WALL MOBILIZATION  - Gently massage your abdominal wall muscles in all directions both superficially just above your pubic bone.  - Apply as much pressure as you are able to tolerate without more than a slight increase in discomfort.    - While applying pressure you can perform circular motions or you can "scoop and pull" your tissue up for a sustained stretch.      Do for 5 minutes every day.         Try to focus on symmetrical movements for right now until we start working on a strengthening program.      "

## 2024-09-19 NOTE — PROGRESS NOTES
"[facility]   Physical Therapy Treatment Note      Name: Angela Chantig  Clinic Number: [id number]    Therapy Diagnosis:   Encounter Diagnoses   Name Primary?    Decreased strength, endurance, and mobility Yes    Joint hyperextensibility of multiple sites     Pain      Physician: Norma Castillo, *    Visit Date: 9/19/2024    Physician Orders: PT Eval and Treat   Medical Diagnosis from Referral: Encounter for supervision of normal first pregnancy in third trimester   Evaluation Date: 8/26/2024  Authorization Period Expiration: 12-31-24  Plan of Care Expiration: 11-12-24  Progress Note Due: 9-26-24  Visit # / Visits authorized: 1/ 20   FOTO: 1/3  Date last given: 8/26/2024    Time In: 1428  Time Out: 1509  Total Billable Time: 41 minutes    Precautions: Standard and pregnant and EDS    Subjective     Pt reports: "Definitely feeling better but not able to be as active as I would like to be."  She went for a 1/2 mile walk and experienced increased symptom(s) but did not ruin the rest of her day. The pain is worse at night to sleep. She likes to sleep on her left hip but is having pain on that side.  She has focused on resting her body recently which has helped.  Baby is head down now.  She reports that she has a left labral tear (has had physical therapy and an injection) which was diagnosed by MRI.    Has had some episodes over the past 2 days with flatus incontinence with walking.      She was compliant with home exercise program.  Response to previous treatment: The pain worsened for about a day and then got a little better  Functional change: no changes reported      Pain: left lateral hip     Constitutional Symptoms Review: The patient denies having any constitutional symptoms.       Objective      Objective Measures updated at progress report unless specified.   Left leg appears shorter in supine with posterior rotated ilium and backward sacral torsion   Increased tension " noted in suprapubic region with abdominal wall palpation.      Treatment   Angela received the treatments listed below:    Angela received the following manual therapy techniques: to develop flexibility and extensibility for 24 minutes including:   [x] Pubic clearing x 3 bouts. Left posterior rotated ilium correction x 3 bouts.   [x] soft tissue mobilization of suprapubic region region using gentle pressure      Angela participated in dynamic functional therapeutic activities to improve functional performance for 17  minutes, including:  [x] Bed mobility edu   [x] Edu on avoidance of asymmetrical activities to help decrease strain on the sacroiliac joint   [x] Plan of care review  [x] Anatomy review and discussion of the anatomy on current level of function   [x] Home exercise program issued and reviewed     Home Exercises Provided and Patient Education Provided     Education provided: See above  Discussed progression of plan of care with patient; educated pt in activity modification; reviewed HEP with pt. Pt demonstrated and verbalized understanding of all instruction and was provided with a handout of HEP (see Patient Instructions).    Written Home Exercises Provided: yes.  Exercises were reviewed and Angela was able to demonstrate them prior to the end of the session.  Angela demonstrated good  understanding of the education provided.     See EMR under Patient Instructions for exercises provided 9/19/2024.    Assessment     Angela appears to have a pelvic girdle imbalance today as her left leg appeared shorter with a posterior rotated ilium on the left.  With muscle energy technique her apparent leg length difference appears to resolve.  She also reports less pain with bridging and mobility after muscle energy technique was performed.  Also worked on her suprapubic abdominal region to help decrease strain towards pelvic structures.  Edu provided on sacroiliac joint precautions.  Pt will continue to benefit from  skilled outpatient physical therapy to address the deficits listed in the problem list box on initial evaluation, provide pt/family education and to maximize pt's level of independence in the home and community environment.       Angeal Is progressing well towards her goals.   Pt prognosis is Good.     Pt will continue to benefit from skilled outpatient physical therapy to address the deficits listed in the problem list box on initial evaluation, provide pt/family education and to maximize pt's level of independence in the home and community environment.     Pt's spiritual, cultural and educational needs considered and pt agreeable to plan of care and goals.     Anticipated barriers to physical therapy: none    Goals:   Short Term Goals: 4 weeks   Patient to demonstrate good body mechanics with sit <--> stand and side lying <--> sitting transfers to demonstrate improved functional mobility.    Pt to demonstrate being able to correctly and consistently relax and lengthen her pelvic floor muscle(s) in preparation for labor and delivery.    Pt to demonstrate proper body mechanics with lifting, bending and squatting to decrease strain on lumbopelvic structures during pregnancy.    Pt to report being able to complete a half day at work without significant increase in pain to demonstrate a return towards prior level of function.   Pt to demonstrate an improved score in the FOTO PFDI Pain survey  to at least 85 to demonstrate improving ability to participate in activities of daily living and work duties.        Long Term Goals: 12 weeks ,   Pt to be discharged with home plan for carry over after discharge.    Pt will be trained and compliant with postural strategies in sitting and standing to improve alignment and decrease pain and muscle fatigue.  Pt to report being able to complete a full day at work without significant increase in pain to demonstrate a return towards prior level of function.   Pt to demonstrate an  improved score in the FOTO PFDI Pain survey  to at least 79 to demonstrate improving ability to participate in activities of daily living and work duties.     Patient to be able to consistently roll over in bed with minimal discomfort to demonstrate improved mobility.      PLAN      Plan of care Certification: 8/26/2024 to 11-12-24.     Outpatient Physical Therapy 1-2 times weekly for 12 weeks to include the following interventions: therapeutic exercises, therapeutic activity, neuromuscular re-education, gait training, manual therapy, modalities PRN, patient/family education, and self care/home management.        Continue with established Plan of Care, working toward established PT goals.      At next visit: start abdominal wall and hip strengthening, consider assessing pelvic floor muscle(s)     Allison Mcintosh, PT

## 2024-09-20 ENCOUNTER — PATIENT MESSAGE (OUTPATIENT)
Dept: GASTROENTEROLOGY | Facility: CLINIC | Age: 34
End: 2024-09-20
Payer: COMMERCIAL

## 2024-09-25 ENCOUNTER — PATIENT MESSAGE (OUTPATIENT)
Dept: OBSTETRICS AND GYNECOLOGY | Facility: CLINIC | Age: 34
End: 2024-09-25
Payer: COMMERCIAL

## 2024-09-26 ENCOUNTER — TELEPHONE (OUTPATIENT)
Dept: REHABILITATION | Facility: HOSPITAL | Age: 34
End: 2024-09-26
Payer: COMMERCIAL

## 2024-09-26 DIAGNOSIS — Z3A.33 33 WEEKS GESTATION OF PREGNANCY: ICD-10-CM

## 2024-09-26 DIAGNOSIS — Z34.03 ENCOUNTER FOR SUPERVISION OF NORMAL FIRST PREGNANCY IN THIRD TRIMESTER: Primary | ICD-10-CM

## 2024-09-26 NOTE — TELEPHONE ENCOUNTER
----- Message from Norma Castillo CNM sent at 9/19/2024  6:05 PM CDT -----  Regarding: RE: Internal pelvic muscle assessment during pregnancy  Hi Zuleima,  I will be honest with you.  Angela is very aware of her limitations and which treatments she can tolerate.  I don't think this is a good plan but you can talk to her about it and also her MD who takes care of her illness. If you're talking about perineal massage, I don't recommend that treatment for any of our pregnant pts.  Hope this helps!  Norma Castillo CNM, MS  ----- Message -----  From: Allison Mcintosh, PT  Sent: 9/19/2024   4:37 PM CDT  To: Norma Castillo CNM  Subject: Internal pelvic muscle assessment during pre#    Hi Norma,    I hope this message finds you well.  Anglea is on my schedule today for  pain during pregnancy.  I wanted to reach out to see if there were any medical contraindications that I needed to know about that would prevent her from being appropriate for an intravaginal pelvic floor muscle assessment and treatments during her pregnacy.        Thank you!    Allison Melara  Pelvic Health

## 2024-09-27 ENCOUNTER — ROUTINE PRENATAL (OUTPATIENT)
Dept: OBSTETRICS AND GYNECOLOGY | Facility: CLINIC | Age: 34
End: 2024-09-27
Payer: COMMERCIAL

## 2024-09-27 VITALS
HEART RATE: 65 BPM | BODY MASS INDEX: 19.9 KG/M2 | WEIGHT: 115.94 LBS | DIASTOLIC BLOOD PRESSURE: 64 MMHG | SYSTOLIC BLOOD PRESSURE: 100 MMHG

## 2024-09-27 DIAGNOSIS — O09.523 MULTIGRAVIDA OF ADVANCED MATERNAL AGE IN THIRD TRIMESTER: ICD-10-CM

## 2024-09-27 DIAGNOSIS — Z3A.34 34 WEEKS GESTATION OF PREGNANCY: Primary | ICD-10-CM

## 2024-09-27 PROCEDURE — 99999 PR PBB SHADOW E&M-EST. PATIENT-LVL III: CPT | Mod: PBBFAC,,,

## 2024-09-27 NOTE — PROGRESS NOTES
34 y.o.,  at 34w0d, presents for routine OB appt.  Denies LOF, VB, or UCs. Reports good FM.  Doing well today, without concerns.   TW lbs     PHYSICAL EXAM  GENERAL: No acute distress  HEENT: Normocephalic, atraumatic  NEURO: Alert and oriented x3  PULMONARY: Non-labored respiration; no tachypnea  ABD: Soft, gravid, nontender.    ASSESSMENT AND PLAN  34 weeks gestation of pregnancy    Multigravida of advanced maternal age in third trimester      3rd T TPA/HIV neg. GBS next visit.   Growth ultrasound 24: EFW 25%, 1965 grams.   Reviewed patient does NOT have a history of genital HSV.   Infant feels transverse via [place]opold. Will assess at 36 weeks.   Will bring birth plan next visit- would like all providers in the delivery to wear masks.     Reviewed ABC risk assessment with the patient:  Birth Center Risk Assessment: 0- Meets birth center guidelines  CNM management in ABC  CNM management on L&D  Consultation with OB to develop  plan of care  Collaborative CNM/OB management with delivery on L&D  Permanent referral of care to MD  Patient understands is currently IS a candidate for the ABC. Reviewed potential risks which could arise, that could change this status.    Reviewed warning signs, normal FM,  labor precautions, and how/when to call. Confirmed pt has after-hours number.    Follow-up: 2 weeks, call or present sooner JACOB Bailon CNM, APRN

## 2024-09-30 ENCOUNTER — CLINICAL SUPPORT (OUTPATIENT)
Dept: OBSTETRICS AND GYNECOLOGY | Facility: CLINIC | Age: 34
End: 2024-09-30
Payer: COMMERCIAL

## 2024-09-30 DIAGNOSIS — Z29.11 NEED FOR RSV IMMUNIZATION: Primary | ICD-10-CM

## 2024-09-30 PROCEDURE — 99999 PR PBB SHADOW E&M-EST. PATIENT-LVL I: CPT | Mod: PBBFAC,,,

## 2024-09-30 NOTE — PROGRESS NOTES
Patient reports for  RSV injection. Patient without complaint of pain at this time, injection given. Tolerated well no pain noted post injection advised to wait in lobby 15 minutes and report any adverse reactions.      Site:LD

## 2024-10-04 ENCOUNTER — PATIENT MESSAGE (OUTPATIENT)
Dept: OTHER | Facility: OTHER | Age: 34
End: 2024-10-04
Payer: COMMERCIAL

## 2024-10-07 ENCOUNTER — CLINICAL SUPPORT (OUTPATIENT)
Dept: REHABILITATION | Facility: HOSPITAL | Age: 34
End: 2024-10-07
Payer: COMMERCIAL

## 2024-10-07 DIAGNOSIS — M62.838 MUSCLE SPASM: Primary | ICD-10-CM

## 2024-10-07 DIAGNOSIS — M62.89 PELVIC FLOOR DYSFUNCTION: ICD-10-CM

## 2024-10-07 PROCEDURE — 97112 NEUROMUSCULAR REEDUCATION: CPT

## 2024-10-07 PROCEDURE — 97140 MANUAL THERAPY 1/> REGIONS: CPT

## 2024-10-07 PROCEDURE — 97530 THERAPEUTIC ACTIVITIES: CPT

## 2024-10-07 NOTE — PATIENT INSTRUCTIONS
"Home Exercise Program: 10/07/2024    Abdominal Muscle Training:  Tighten your abdominal muscles without sucking in our pooching your belly.  Tighten and draw in your muscles.  Don't hold your breath!  It actually helps to exhale while your tighten.  "Blow out birthday candles."     Hold 5 seconds.  Repeat 10 times.  2 sets per day.                                                                                 "

## 2024-10-07 NOTE — PROGRESS NOTES
[facility]   Physical Therapy Treatment Note      Name: Angela Lyle  Clinic Number: [id number]    Therapy Diagnosis:   Encounter Diagnoses   Name Primary?    Muscle spasm Yes    Pelvic floor dysfunction      Physician: Norma Castillo, *    Visit Date: 10/7/2024    Physician Orders: PT Eval and Treat   Medical Diagnosis from Referral: Encounter for supervision of normal first pregnancy in third trimester   Evaluation Date: 8/26/2024  Authorization Period Expiration: 12-31-24  Plan of Care Expiration: 11-12-24  Progress Note Due: 9-26-24  Visit # / Visits authorized: 1/ 20   FOTO: 1/3  Date last given: 8/26/2024    Time In: 1509  Time Out: 1545  Total Billable Time: 36 minutes    Precautions: Standard and pregnant and EDS    Subjective     Pt reports:  She feels like her pain is doing better especially when using the SI belt.  Working 12 hours shift 2 days a week.        Prior visit: She reports that she has a left labral tear (has had physical therapy and an injection) which was diagnosed by MRI.    Has had some episodes over the past 2 days with flatus incontinence with walking.      She was compliant with home exercise program.  Response to previous treatment: The pain worsened for about a day and then got a little better  Functional change: no changes reported      Pain: left lateral hip     Constitutional Symptoms Review: The patient denies having any constitutional symptoms.       Objective      Objective Measures updated at progress report unless specified.   Left leg appears shorter in supine with posterior rotated ilium and backward sacral torsion   Increased tension noted in suprapubic region with abdominal wall palpation.      Treatment   Angela received the treatments listed below:    Angela received the following manual therapy techniques: to develop flexibility and extensibility for 8 minutes including:   [x] Pubic clearing x 3 bouts. Left posterior rotated  ilium correction x 3 bouts.   [] soft tissue mobilization of suprapubic region region using gentle pressure    Neuromuscular Re-education to develop Coordination and Control for 20 minutes including:   [x] Posterior pelvic tilt in reclined position 5 sec x 10 reps   [x] Pt struggles to isolate TrA initially but improves with practice. Tactile and verbal cues required for correct activation of TrA. Initially pt substitutes with rectus abdominus muscle but with mod verbal and tactile instruction is able to isolate out  lower abdominal muscles.    [x] Transverse abdominus muscle 5 sec x 10 reps  [x] Transverse abdominus muscle +Kegel+BKFO/Marching x10 reps.   Cues needed for correct TA activation and coordination with kegel.          Angela participated in dynamic functional therapeutic activities to improve functional performance for 8 minutes, including:  [x] Bed mobility review and standing posture review   [x] review on avoidance of asymmetrical activities to help decrease strain on the sacroiliac joint   [x] Plan of care review  [x] Anatomy review and discussion of the anatomy on current level of function   [x] Home exercise program issued and reviewed     Home Exercises Provided and Patient Education Provided     Education provided: See above  Discussed progression of plan of care with patient; educated pt in activity modification; reviewed HEP with pt. Pt demonstrated and verbalized understanding of all instruction and was provided with a handout of HEP (see Patient Instructions).    Written Home Exercises Provided: yes.  Exercises were reviewed and Angela was able to demonstrate them prior to the end of the session.  Angela demonstrated good  understanding of the education provided.     See EMR under Patient Instructions for exercises provided 9/19/2024.    Assessment     Reviewed education on sacroiliac joint precautions, standing posture and bed mobility.  Worked on sacroiliac joint today as well as initiated  core strengthening and coordination program.  Pt will continue to benefit from skilled outpatient physical therapy to address the deficits listed in the problem list box on initial evaluation, provide pt/family education and to maximize pt's level of independence in the home and community environment.       Angela Is progressing well towards her goals.   Pt prognosis is Good.     Pt will continue to benefit from skilled outpatient physical therapy to address the deficits listed in the problem list box on initial evaluation, provide pt/family education and to maximize pt's level of independence in the home and community environment.     Pt's spiritual, cultural and educational needs considered and pt agreeable to plan of care and goals.     Anticipated barriers to physical therapy: none    Goals:   Short Term Goals: 4 weeks   Patient to demonstrate good body mechanics with sit <--> stand and side lying <--> sitting transfers to demonstrate improved functional mobility.    Pt to demonstrate being able to correctly and consistently relax and lengthen her pelvic floor muscle(s) in preparation for labor and delivery.    Pt to demonstrate proper body mechanics with lifting, bending and squatting to decrease strain on lumbopelvic structures during pregnancy.    Pt to report being able to complete a half day at work without significant increase in pain to demonstrate a return towards prior level of function.   Pt to demonstrate an improved score in the FOTO PFDI Pain survey  to at least 85 to demonstrate improving ability to participate in activities of daily living and work duties.        Long Term Goals: 12 weeks ,   Pt to be discharged with home plan for carry over after discharge.    Pt will be trained and compliant with postural strategies in sitting and standing to improve alignment and decrease pain and muscle fatigue.  Pt to report being able to complete a full day at work without significant increase in pain to  demonstrate a return towards prior level of function.   Pt to demonstrate an improved score in the FOTO PFDI Pain survey  to at least 79 to demonstrate improving ability to participate in activities of daily living and work duties.     Patient to be able to consistently roll over in bed with minimal discomfort to demonstrate improved mobility.      PLAN      Plan of care Certification: 8/26/2024 to 11-12-24.     Outpatient Physical Therapy 1-2 times weekly for 12 weeks to include the following interventions: therapeutic exercises, therapeutic activity, neuromuscular re-education, gait training, manual therapy, modalities PRN, patient/family education, and self care/home management.        Continue with established Plan of Care, working toward established PT goals.      At next visit: start abdominal wall and hip strengthening, consider assessing pelvic floor muscle(s)     Allison Mcintosh, PT

## 2024-10-08 ENCOUNTER — PATIENT MESSAGE (OUTPATIENT)
Dept: ADMINISTRATIVE | Facility: OTHER | Age: 34
End: 2024-10-08
Payer: COMMERCIAL

## 2024-10-09 ENCOUNTER — ROUTINE PRENATAL (OUTPATIENT)
Dept: OBSTETRICS AND GYNECOLOGY | Facility: CLINIC | Age: 34
End: 2024-10-09
Payer: COMMERCIAL

## 2024-10-09 ENCOUNTER — PATIENT MESSAGE (OUTPATIENT)
Dept: OBSTETRICS AND GYNECOLOGY | Facility: CLINIC | Age: 34
End: 2024-10-09

## 2024-10-09 VITALS
HEART RATE: 80 BPM | BODY MASS INDEX: 20.17 KG/M2 | DIASTOLIC BLOOD PRESSURE: 60 MMHG | SYSTOLIC BLOOD PRESSURE: 100 MMHG | WEIGHT: 117.5 LBS

## 2024-10-09 DIAGNOSIS — F32.5 MAJOR DEPRESSIVE DISORDER, SINGLE EPISODE IN FULL REMISSION: Primary | ICD-10-CM

## 2024-10-09 DIAGNOSIS — G90.A POSTURAL ORTHOSTATIC TACHYCARDIA SYNDROME: ICD-10-CM

## 2024-10-09 DIAGNOSIS — O09.523 MULTIGRAVIDA OF ADVANCED MATERNAL AGE IN THIRD TRIMESTER: ICD-10-CM

## 2024-10-09 DIAGNOSIS — Q79.62 HYPERMOBILE EHLERS-DANLOS SYNDROME: ICD-10-CM

## 2024-10-09 PROCEDURE — 0502F SUBSEQUENT PRENATAL CARE: CPT | Mod: CPTII,S$GLB,, | Performed by: ADVANCED PRACTICE MIDWIFE

## 2024-10-09 PROCEDURE — 99999 PR PBB SHADOW E&M-EST. PATIENT-LVL III: CPT | Mod: PBBFAC,,, | Performed by: ADVANCED PRACTICE MIDWIFE

## 2024-10-09 NOTE — PROGRESS NOTES
34 y.o.,  at 35w5d, presents for routine OB appt.  Denies LOF, VB, or UCs. Reports good FM.  Doing well today, with no concerns.   TW lbs     PHYSICAL EXAM  GENERAL: No acute distress  HEENT: Normocephalic, atraumatic  NEURO: Alert and oriented x3  PULMONARY: Non-labored respiration; no tachypnea  ABD: Soft, gravid, nontender.    ASSESSMENT AND PLAN  There are no diagnoses linked to this encounter.  REFER TO ANESTHESIA   Reviewed upcoming 36wk labs.    Reviewed patient does not have a history of genital HSV.     Reviewed ABC risk assessment with the patient:  Birth Center Risk Assessment: 1-management on labor and Delivery  CNM management in ABC  CNM management on L&D  Consultation with OB to develop  plan of care  Collaborative CNM/OB management with delivery on L&D  Permanent referral of care to MD  Patient understands is currently NOT a candidate for the ABC. Reviewed potential risks which could arise, that could change this status.    Reviewed warning signs, normal FM,  labor precautions, and how/when to call. Confirmed pt has after-hours number.    Follow-up: 1 weeks, call or present sooner PRSERINA Major, MIRYAM, APRN

## 2024-10-10 ENCOUNTER — TELEPHONE (OUTPATIENT)
Dept: REHABILITATION | Facility: HOSPITAL | Age: 34
End: 2024-10-10
Payer: COMMERCIAL

## 2024-10-10 NOTE — TELEPHONE ENCOUNTER
----- Message from Laeh Monge sent at 10/8/2024  2:54 PM CDT -----  Regarding: RE: Pelvic floor PT internal assessment  Lynn Zuleima,     I don't see any contraindications to this - thank you for checking in!    Leah Monge CNM  ----- Message -----  From: Allison Mcintosh, PT  Sent: 10/7/2024   3:18 PM CDT  To: Leah Monge CNM  Subject: Pelvic floor PT internal assessment              Og Lynn,    I hope this message finds you well.  Angela is on my schedule today for pain during pregnancy.  I wanted to reach out to see if there were any medical contraindications that I needed to know about that would prevent her from being appropriate for an intravaginal pelvic floor muscle assessment and treatments during her pregnacy.        Thank you!    Allison Melara  Pelvic Health

## 2024-10-14 ENCOUNTER — TELEPHONE (OUTPATIENT)
Dept: OBSTETRICS AND GYNECOLOGY | Facility: CLINIC | Age: 34
End: 2024-10-14
Payer: COMMERCIAL

## 2024-10-14 NOTE — TELEPHONE ENCOUNTER
I called pt to schedule ob anesthesia consult. I scheduled pt for this Wednesday October 16, 2024 @ 9:30am. Pt agreed with date and time.

## 2024-10-16 ENCOUNTER — ROUTINE PRENATAL (OUTPATIENT)
Dept: OBSTETRICS AND GYNECOLOGY | Facility: CLINIC | Age: 34
End: 2024-10-16
Payer: COMMERCIAL

## 2024-10-16 ENCOUNTER — OFFICE VISIT (OUTPATIENT)
Dept: ANESTHESIOLOGY | Facility: OTHER | Age: 34
End: 2024-10-16
Payer: COMMERCIAL

## 2024-10-16 VITALS
WEIGHT: 116.5 LBS | DIASTOLIC BLOOD PRESSURE: 55 MMHG | BODY MASS INDEX: 20 KG/M2 | SYSTOLIC BLOOD PRESSURE: 102 MMHG | HEART RATE: 71 BPM

## 2024-10-16 DIAGNOSIS — Q79.62 HYPERMOBILE EHLERS-DANLOS SYNDROME: ICD-10-CM

## 2024-10-16 DIAGNOSIS — Z3A.36 36 WEEKS GESTATION OF PREGNANCY: Primary | ICD-10-CM

## 2024-10-16 DIAGNOSIS — G90.A POSTURAL ORTHOSTATIC TACHYCARDIA SYNDROME: ICD-10-CM

## 2024-10-16 DIAGNOSIS — F32.5 MAJOR DEPRESSIVE DISORDER, SINGLE EPISODE IN FULL REMISSION: ICD-10-CM

## 2024-10-16 DIAGNOSIS — O09.523 MULTIGRAVIDA OF ADVANCED MATERNAL AGE IN THIRD TRIMESTER: ICD-10-CM

## 2024-10-16 PROCEDURE — 87653 STREP B DNA AMP PROBE: CPT

## 2024-10-16 PROCEDURE — 99999 PR PBB SHADOW E&M-EST. PATIENT-LVL III: CPT | Mod: PBBFAC,,,

## 2024-10-16 NOTE — CONSULTS
Outpatient OB Anesthesia Consult      Date and Time: 10/16/2024 10:19 AM     Request from: OB    CC requesting physician or midwife:[facility]    Reason for Consult: Anesthetic recommendations for delivery    Initial Consult?: Yes    Chief Complaint: Jori-Danlos Syndrome, POTS    HPI: Patient is a 34 y.o. year old woman, G 1 P 0 presenting with history of Jori-Danlos (hypermobility type) and POTS. She is plans on delivering in the ABC without neuraxial anesthesia if possible but is open to neuraxial anesthesia if the need arises and she is on labor and delivery unit. She has received several IV fluid infusions during the pregnancy related to the POTS. She otherwise has been doing well with the pregnancy. She has some early satiety that is being evaluated by GI for possible gastroparesis related to the Jori-Danlos but that work-up will be completed post-partum. Only other pertinent medical history is dizziness with prolonged neck extension and mild scoliosis diagnosed as a child that was deemed stable and requiring no intervention, and sleep apnea.    OB History    Para Term  AB Living   1             SAB IAB Ectopic Multiple Live Births                  # Outcome Date GA Lbr Mark/2nd Weight Sex Type Anes PTL Lv   1 Current                Past medical history:    Past Medical History:   Diagnosis Date    Allergy     Anemia     hx of HOSEA on fergon in past    Anxiety     Arthritis     right shoulder    Cervical pain 2018    Decreased strength, endurance, and mobility 2023    Depression     Excessive daytime sleepiness     Fatigue     History of psychiatric care     Hypermobile Jori-Danlos syndrome 2019    Idiopathic hypersomnia     Keloid cicatrix     Left hip pain 2018    Neck pain 2020    Pain 2023    PCOS (polycystic ovarian syndrome)     POTS (postural orthostatic tachycardia syndrome) 2019    Psychiatric problem     Right shoulder pain 2016    Routine  health maintenance 12/26/2023    Shoulder instability 06/06/2017    Sleep stage dysfunction     Tendinitis of right rotator cuff 07/25/2016    Therapy        Past surgical history:    Past Surgical History:   Procedure Laterality Date    SHOULDER SURGERY Right 06/2017    TONSILLECTOMY      wisdom teeth removal  2010       Family history:    Family History   Problem Relation Name Age of Onset    Arthritis Mother Valentine         back surgery    Fibromyalgia Mother Valentine     Depression Mother Valentine     Hypertension Mother Valentine     Miscarriages / Stillbirths Mother Valentine     Hyperlipidemia Father Jean Marie     Hearing loss Father Jean Marie     Heart disease Father Jean Marie     Arthritis Maternal Grandmother Sammi     Hearing loss Maternal Grandmother Sammi     Hypertension Maternal Grandmother Sammi     Stroke Maternal Grandmother Sammi     Heart disease Paternal Grandfather Jose     Diabetes Paternal Grandfather Jose     Hearing loss Paternal Grandfather Jose     Cancer Maternal Aunt Breanna     Early death Maternal Grandfather Mark     Heart disease Maternal Grandfather Mark     Hearing loss Paternal Grandmother Petty     Stroke Paternal Grandmother Petty     Amblyopia Neg Hx      Blindness Neg Hx      Cataracts Neg Hx      Glaucoma Neg Hx      Macular degeneration Neg Hx      Retinal detachment Neg Hx      Strabismus Neg Hx      Melanoma Neg Hx         Social History:    Social History     Socioeconomic History    Marital status:    Occupational History    Occupation: Labor & Delivery RN   Tobacco Use    Smoking status: Never     Passive exposure: Never    Smokeless tobacco: Never   Substance and Sexual Activity    Alcohol use: Not Currently     Alcohol/week: 5.0 standard drinks of alcohol     Types: 5 Glasses of wine per week     Comment: ocassional    Drug use: No    Sexual activity: Yes     Partners: Female     Birth control/protection: None   Other Topics Concern    Are you pregnant or think you may be? No     Breast-feeding No   Social History Narrative         Social Drivers of Health     Financial Resource Strain: Low Risk  (12/18/2023)    Overall Financial Resource Strain (CARDIA)     Difficulty of Paying Living Expenses: Not hard at all   Food Insecurity: No Food Insecurity (12/18/2023)    Hunger Vital Sign     Worried About Running Out of Food in the Last Year: Never true     Ran Out of Food in the Last Year: Never true   Transportation Needs: No Transportation Needs (12/18/2023)    PRAPARE - Transportation     Lack of Transportation (Medical): No     Lack of Transportation (Non-Medical): No   Physical Activity: Insufficiently Active (12/18/2023)    Exercise Vital Sign     Days of Exercise per Week: 3 days     Minutes of Exercise per Session: 40 min   Stress: Stress Concern Present (12/18/2023)    [employer] - Occupational Stress Questionnaire     Feeling of Stress : To some extent   Housing Stability: Low Risk  (12/18/2023)    Housing Stability Vital Sign     Unable to Pay for Housing in the Last Year: No     Number of Places Lived in the Last Year: 1     Unstable Housing in the Last Year: No       Medication:    Current Outpatient Medications on File Prior to Visit   Medication Sig Dispense Refill    aspirin (ECOTRIN) 81 MG EC tablet Take 81 mg by mouth once daily.      betaxoloL (KERLONE) 10 mg Tab Take 5 mg by mouth once daily.      calcium carbonate (CALCIUM 500 ORAL)       ergocalciferol, vitamin D2, 2,000 unit Tab Take 2,000 Units by mouth once daily.      FISH OIL-DHA-EPA ORAL once daily.      inositoL-D chiro inositoL 2,000-50 mg PwPk Use as directed in the mouth or throat 2 (two) times a day.      Lactobac no.41/Bifidobact no.7 (PROBIOTIC-10 ORAL) once daily.      magnesium 250 mg Tab Take 350 mg by mouth once.      omega-3 fatty acids/fish oil (FISH OIL-OMEGA-3 FATTY ACIDS) 300-1,000 mg capsule Take by mouth once daily.      prenatal vit no.124/iron/folic (PRENATAL VITAMIN  ORAL) Take 1 Dose by mouth Daily.       No current facility-administered medications on file prior to visit.       Allergies:    Sulfa (sulfonamide antibiotics) and Sulfamethoxazole-trimethoprim    Family or personal history of anesthetic complications: No    Diagnostic Studies: I have reviewed the following relevant findings as noted below:  CBC:  Lab Results   Component Value Date    WBC 7.60 08/20/2024    HGB 10.9 (L) 08/20/2024    HCT 32.3 (L) 08/20/2024    MCV 93 08/20/2024     08/20/2024      CMP:  Sodium   Date Value Ref Range Status   04/11/2024 136 136 - 145 mmol/L Final     Potassium   Date Value Ref Range Status   04/11/2024 3.9 3.5 - 5.1 mmol/L Final     Chloride   Date Value Ref Range Status   04/11/2024 105 95 - 110 mmol/L Final     CO2   Date Value Ref Range Status   04/11/2024 23 23 - 29 mmol/L Final     Glucose   Date Value Ref Range Status   04/11/2024 82 70 - 110 mg/dL Final     BUN   Date Value Ref Range Status   04/11/2024 8 6 - 20 mg/dL Final     Creatinine   Date Value Ref Range Status   04/11/2024 0.7 0.5 - 1.4 mg/dL Final     Calcium   Date Value Ref Range Status   04/11/2024 9.6 8.7 - 10.5 mg/dL Final     Total Protein   Date Value Ref Range Status   04/11/2024 7.2 6.0 - 8.4 g/dL Final     Albumin   Date Value Ref Range Status   04/11/2024 3.9 3.5 - 5.2 g/dL Final     Total Bilirubin   Date Value Ref Range Status   04/11/2024 1.1 (H) 0.1 - 1.0 mg/dL Final     Comment:     For infants and newborns, interpretation of results should be based  on gestational age, weight and in agreement with clinical  observations.    Premature Infant recommended reference ranges:  Up to 24 hours.............<8.0 mg/dL  Up to 48 hours............<12.0 mg/dL  3-5 days..................<15.0 mg/dL  6-29 days.................<15.0 mg/dL       Alkaline Phosphatase   Date Value Ref Range Status   04/11/2024 37 (L) 55 - 135 U/L Final     AST   Date Value Ref Range Status   04/11/2024 20 10 - 40 U/L Final  "    ALT   Date Value Ref Range Status   04/11/2024 22 10 - 44 U/L Final     Anion Gap   Date Value Ref Range Status   04/11/2024 8 8 - 16 mmol/L Final     eGFR if    Date Value Ref Range Status   03/26/2019 >60 >60 mL/min/1.73 m^2 Final     eGFR if non    Date Value Ref Range Status   03/26/2019 >60 >60 mL/min/1.73 m^2 Final     Comment:     Calculation used to obtain the estimated glomerular filtration  rate (eGFR) is the CKD-EPI equation.        BMP:   Lab Results   Component Value Date     04/11/2024    K 3.9 04/11/2024     04/11/2024    CO2 23 04/11/2024    BUN 8 04/11/2024    CREATININE 0.7 04/11/2024    CALCIUM 9.6 04/11/2024    ANIONGAP 8 04/11/2024    ESTGFRAFRICA >60 03/26/2019    EGFRNONAA >60 03/26/2019     Coagulation studies: No results found for: "PT", "INR", "APTT"  EKG:   Results for orders placed or performed during the hospital encounter of 03/26/19   SCHEDULED EKG 12-LEAD (to Muse)    Collection Time: 03/26/19 11:32 AM    Narrative    Test Reason : R07.9,    Vent. Rate : 057 BPM     Atrial Rate : 057 BPM     P-R Int : 132 ms          QRS Dur : 084 ms      QT Int : 398 ms       P-R-T Axes : 077 097 070 degrees     QTc Int : 387 ms    Sinus bradycardia  Rightward axis  Borderline Abnormal ECG    Confirmed by America Gilmore MD ([id number]) on 3/27/2019 4:35:03 PM    Referred By: ISSA WALKER           Confirmed By:America Gilmore MD      Echo:     Left Ventricle: The left ventricle is normal in size. Normal wall thickness. Normal wall motion. There is normal systolic function with a visually estimated ejection fraction of 60 - 65%. There is normal diastolic function.    Right Ventricle: Normal right ventricular cavity size. Wall thickness is normal. Systolic function is normal.    IVC/SVC: Normal venous pressure at 3 mmHg.  CT: No results found in the last 24 hours.  MRI: No results found in the last 24 hours.      Review of Systems:   Constitutional: Negative " for fever and chills; well appearing female  Eyes: no visual changes  Ear, nose, mouth and throat: no loose or broken teeth  Cardiovascular: no chest pain  Respiratory: no shortness of breath  Gastrointestinal: no nausea or vomiting, early satiety  Genitourinary: no dysuria  Musculoskeletal: no joint pain, dizziness/light-headed with prolonged neck extension  Skin: warm and dry, no rashes  Neurologic: no seizures  Psychiatric: no anxiety or depression  Endocrinology: no heat or cold intolerance  Hematologic: does not bruise or bleed easily      Physical Exam:  Vitals:      Wt Readings from Last 3 Encounters:   10/09/24 53.3 kg (117 lb 8.1 oz)   24 52.6 kg (115 lb 15.4 oz)   24 51.4 kg (113 lb 5.1 oz)     Temp Readings from Last 3 Encounters:   24 36.9 °C (98.4 °F)   10/05/23 36.8 °C (98.3 °F)   20 36.9 °C (98.4 °F) (Oral)     BP Readings from Last 3 Encounters:   10/09/24 100/60   24 100/64   24 115/73     Pulse Readings from Last 3 Encounters:   10/09/24 80   24 65   24 60      Constitutional: alert, no distress  Eyes: normal lids, pupils symmetric  Cardiovascular: normal rate, no murmurs  Respiratory: normal effort, no wheezes  Musculoskeletal: normal gait, normal range of motion  Psychiatric: oriented to person, place, time; normal affect    ASA Score: 2    Diagnosis: Jori-Danlos Syndrome, POTS    Assessment and Plan:  35 yo  at 36 5/7 weeks with POTS and Jori-Danlos syndrome. Discussed with patient that there is no contraindication to her receiving labor epidural analgesia with these conditions. Worsening of symptoms with an epidural would be mitigated by careful monitoring of blood pressure and vitals signs, generous IV fluid administration, left uterine displacement, and vasopressors if needed.With regard to the Jori-Danlos, the greatest concern being the neurologic symptoms with neck hyperextension. In the event of general anesthesia being necessary,   this would be addressed by using video laryngoscopy for endotracheal intubation and keeping head in the neural position to mitigate any side effects or complications of neck hyperextension.    Complexity: Moderate    Entertained and answered all questions to the patient's satisfaction.    Consults

## 2024-10-16 NOTE — PROGRESS NOTES
34 y.o.,  at 36w5d here today for routine ob appt.    Complaints today:  Doing well without concerns. Denies LOF, VB, and cramping or regular contractions. Denies dysuria and any other s/s UTI. Denies HA, visual disturbances, and upper abdominal pain. Reports good fetal movement.    Verified NO history of genital HSV.  TW lbs     PHYSICAL EXAM  GENERAL: No acute distress  HEENT: Normocephalic, atraumatic  NEURO: Alert and oriented x3  PULMONARY: Non-labored respiration; no tachypnea  ABD: Soft, gravid, nontender.     ASSESSMENT AND PLAN  36 weeks gestation of pregnancy    Hypermobile Jori-Danlos syndrome  -Discussed with Dr. King on 10/16/24. According to UTD, increased risk of PPH and uterine inversion. Per Dr. King, does NOT rule out ABC birth, however must have IV access and IM pitocin ready in case of PPH. Also, caution with delivery of the placenta, making sure there is good upward traction on the uterus to avoid uterine inversion.     -Met with Anesthesia today: Ok for epidural    Postural orthostatic tachycardia syndrome    Multigravida of advanced maternal age in third trimester    Major depressive disorder, single episode in full remission      SVE= closed, cervix feels thin, -2, VTX  GBS collected today. Reviewed Allergies: Sulfa (sulfonamide antibiotics) and Sulfamethoxazole-trimethoprim  HIV/ TPA negative.     Reviewed warning signs, normal FM,  labor precautions, and how/when to call.      Follow-up: 1 week, call or present sooner JACOB Bailon CNM/PIPO

## 2024-10-17 ENCOUNTER — PATIENT MESSAGE (OUTPATIENT)
Dept: OBSTETRICS AND GYNECOLOGY | Facility: CLINIC | Age: 34
End: 2024-10-17
Payer: COMMERCIAL

## 2024-10-17 ENCOUNTER — PATIENT MESSAGE (OUTPATIENT)
Dept: REHABILITATION | Facility: HOSPITAL | Age: 34
End: 2024-10-17
Payer: COMMERCIAL

## 2024-10-18 LAB — GROUP B STREPTOCOCCUS, PCR: NEGATIVE

## 2024-10-23 ENCOUNTER — ROUTINE PRENATAL (OUTPATIENT)
Dept: OBSTETRICS AND GYNECOLOGY | Facility: CLINIC | Age: 34
End: 2024-10-23
Payer: COMMERCIAL

## 2024-10-23 VITALS
WEIGHT: 118.5 LBS | HEART RATE: 68 BPM | BODY MASS INDEX: 20.34 KG/M2 | DIASTOLIC BLOOD PRESSURE: 59 MMHG | SYSTOLIC BLOOD PRESSURE: 96 MMHG

## 2024-10-23 DIAGNOSIS — Q79.62 HYPERMOBILE EHLERS-DANLOS SYNDROME: ICD-10-CM

## 2024-10-23 DIAGNOSIS — G90.A POSTURAL ORTHOSTATIC TACHYCARDIA SYNDROME: ICD-10-CM

## 2024-10-23 DIAGNOSIS — F32.5 MAJOR DEPRESSIVE DISORDER, SINGLE EPISODE IN FULL REMISSION: Primary | ICD-10-CM

## 2024-10-23 DIAGNOSIS — O09.523 MULTIGRAVIDA OF ADVANCED MATERNAL AGE IN THIRD TRIMESTER: ICD-10-CM

## 2024-10-23 PROCEDURE — 0502F SUBSEQUENT PRENATAL CARE: CPT | Mod: CPTII,S$GLB,, | Performed by: ADVANCED PRACTICE MIDWIFE

## 2024-10-23 PROCEDURE — 99999 PR PBB SHADOW E&M-EST. PATIENT-LVL III: CPT | Mod: PBBFAC,,, | Performed by: ADVANCED PRACTICE MIDWIFE

## 2024-10-23 NOTE — PROGRESS NOTES
34 y.o.,  at 37w5d presents today for routine OB appt.  Denies regular UCs, LOF or VB. Reports good FM. Doing well with no concerns.  TW lbs     PHYSICAL EXAM  GENERAL: No acute distress  HEENT: Normocephalic, atraumatic  NEURO: Alert and oriented x3  PULMONARY: Non-labored respiration; no tachypnea  ABD: Soft, gravid, nontender.    Birth Center Risk Assessment: 0- Meets birth center guidelines    CNM management in ABC  CNM management on L&D  Consultation with OB to develop  plan of care  Collaborative CNM/OB management with delivery on L&D  Permanent referral of care to MD   ASSESSMENT AND PLAN  Major depressive disorder, single episode in full remission    Postural orthostatic tachycardia syndrome    Hypermobile Jori-Danlos syndrome    Multigravida of advanced maternal age in third trimester    PT had Anesthesia consult   Reviewed GBS neg   Education regarding labor precautions.  Reviewed warning signs, normal FM, and how/when to call.    Labor precautions reviewed.   Follow-up: 1 week, call or present sooner PRSERINA Major CNM/APRN

## 2024-10-24 ENCOUNTER — CLINICAL SUPPORT (OUTPATIENT)
Dept: REHABILITATION | Facility: HOSPITAL | Age: 34
End: 2024-10-24
Payer: COMMERCIAL

## 2024-10-24 DIAGNOSIS — M62.89 PELVIC FLOOR DYSFUNCTION: ICD-10-CM

## 2024-10-24 DIAGNOSIS — M62.838 MUSCLE SPASM: Primary | ICD-10-CM

## 2024-10-24 NOTE — PATIENT INSTRUCTIONS
360 Breath - Inhale long, slow and deep. You should feel as if your lower ribs are expanding in all directions like the way an umbrella opens. You should feel the belly, back and sides gently expand and you may notice a relaxation in the pelvic floor.     Continue to breath like this for 2-5 minutes. Repeat 1-3 times/day.   (If you are short on time even just 3 breaths is better than none).        Pelvic Floor Relaxation Training Exercises:    1) Gently push out your pelvic floor.  Try to breathe out as you do so.  Perform 5 pushes 2-3x/day.

## 2024-10-24 NOTE — PROGRESS NOTES
[facility]   Physical Therapy Treatment Note      Name: Angela Lyle  Clinic Number: [id number]    Therapy Diagnosis:   Encounter Diagnoses   Name Primary?    Muscle spasm Yes    Pelvic floor dysfunction      Physician: Norma Castillo, *    Visit Date: 10/24/2024    Physician Orders: PT Eval and Treat   Medical Diagnosis from Referral: Encounter for supervision of normal first pregnancy in third trimester   Evaluation Date: 8/26/2024  Authorization Period Expiration: 12-31-24  Plan of Care Expiration: 11-12-24  Progress Note Due: 9-26-24  Visit # / Visits authorized: 3/ 20   FOTO: 1/3  Date last given: 8/26/2024    Time In: 1030 (late arrival)  Time Out: 1101   Total Billable Time: 31 minutes    Precautions: Standard and pregnant and EDS    Subjective     Pt reports:   Minimal pain reported since her last visit.  She feels the SI belt has been helpful.  Her baby's head is at minus 2 station as of yesterday.       Prior visit: She reports that she has a left labral tear (has had physical therapy and an injection) which was diagnosed by MRI.    Has had some episodes over the past 2 days with flatus incontinence with walking.      She was compliant with home exercise program.  Response to previous treatment: The pain worsened for about a day and then got a little better  Functional change: no changes reported      Pain: left lateral hip     Constitutional Symptoms Review: The patient denies having any constitutional symptoms.       Objective      Objective Measures updated at progress report unless specified.   Left leg appears shorter in supine with posterior rotated ilium and backward sacral torsion   Increased tension noted in suprapubic region with abdominal wall palpation.      See EMR under MEDIA for written consent provided 10/24/2024  Chaperone: declined      VAGINAL PELVIC FLOOR EXAM    EXTERNAL ASSESSMENT  Introitus: WNL  Skin condition: redness noted  Scarring:  none   Sensation: WNL   Pain:  none  Voluntary contraction: bulge  Voluntary relaxation: visible drop  Involuntary contraction: bulge  Bearing down: visible drop  Perineal descent: absent        INTERNAL ASSESSMENT  Pain: tender areas noted as follows: right levator ani    Sensation: able to localized pressure appropriately   Vaginal vault: WNL   Muscle Bulk: hypertonus   Muscle Power: 2/5     Quality of contraction: slow relaxation and incomplete relaxation   Specificity: WNL   Coordination: tends to hold breath during PFM contration     Comments: improved pushing with open glottis pushing technique       Treatment   Angela received the treatments listed below:    Angela received the following manual therapy techniques: to develop flexibility and extensibility for 00 minutes including:   [] Pubic clearing x 3 bouts. Left posterior rotated ilium correction x 3 bouts.   [] soft tissue mobilization of suprapubic region region using gentle pressure      Neuromuscular Re-education to develop Coordination and Control for 8 minutes including:   [] Posterior pelvic tilt in reclined position 5 sec x 10 reps   [] Pt struggles to isolate TrA initially but improves with practice. Tactile and verbal cues required for correct activation of TrA. Initially pt substitutes with rectus abdominus muscle but with mod verbal and tactile instruction is able to isolate out  lower abdominal muscles.    [] Transverse abdominus muscle 5 sec x 10 reps  [] Transverse abdominus muscle +Kegel+BKFO/Marching x10 reps.   Cues needed for correct TA activation and coordination with kegel.    [x]  Pelvic floor muscle(s) relaxation, downtraining and active lengthening         Angela participated in dynamic functional therapeutic activities to improve functional performance for 23 minutes, including:  [] Bed mobility review and standing posture review   [] review on avoidance of asymmetrical activities to help decrease strain on the sacroiliac joint    [x] Plan of care review  [x] Anatomy review and discussion of the anatomy on current level of function   [x] Home exercise program issued and reviewed   [x]  Pushing prep for labor and delivery edu   [x]  360 degree breathing edu and practice     Home Exercises Provided and Patient Education Provided     Education provided: See above  Discussed progression of plan of care with patient; educated pt in activity modification; reviewed HEP with pt. Pt demonstrated and verbalized understanding of all instruction and was provided with a handout of HEP (see Patient Instructions).    Written Home Exercises Provided: yes.  Exercises were reviewed and Angela was able to demonstrate them prior to the end of the session.  Angela demonstrated good  understanding of the education provided.     See EMR under Patient Instructions for exercises provided 9/19/2024.    Assessment     Angela consents to an intravaginal pelvic floor muscle(s) assessment today in preparation for labor and delivery.  We worked on pelvic floor muscle(s) downtraining, relaxation and pushing strategies.  She demonstrates increased resting tension but improves with cues to relax her pelvic floor muscle(s).  Practiced both open and closed glottis pushing techniques. Angela demonstrates improved ability to relax and lengthen her pelvic floor muscle(s) with open glottis pushing.  Pt will continue to benefit from skilled outpatient physical therapy to address the deficits listed in the problem list box on initial evaluation, provide pt/family education and to maximize pt's level of independence in the home and community environment.       Angela Is progressing well towards her goals.   Pt prognosis is Good.     Pt will continue to benefit from skilled outpatient physical therapy to address the deficits listed in the problem list box on initial evaluation, provide pt/family education and to maximize pt's level of independence in the home and community  environment.     Pt's spiritual, cultural and educational needs considered and pt agreeable to plan of care and goals.     Anticipated barriers to physical therapy: none    Goals:   Short Term Goals: 4 weeks   Patient to demonstrate good body mechanics with sit <--> stand and side lying <--> sitting transfers to demonstrate improved functional mobility.    Pt to demonstrate being able to correctly and consistently relax and lengthen her pelvic floor muscle(s) in preparation for labor and delivery.    Pt to demonstrate proper body mechanics with lifting, bending and squatting to decrease strain on lumbopelvic structures during pregnancy.    Pt to report being able to complete a half day at work without significant increase in pain to demonstrate a return towards prior level of function.   Pt to demonstrate an improved score in the FOTO PFDI Pain survey  to at least 85 to demonstrate improving ability to participate in activities of daily living and work duties.        Long Term Goals: 12 weeks ,   Pt to be discharged with home plan for carry over after discharge.    Pt will be trained and compliant with postural strategies in sitting and standing to improve alignment and decrease pain and muscle fatigue.  Pt to report being able to complete a full day at work without significant increase in pain to demonstrate a return towards prior level of function.   Pt to demonstrate an improved score in the FOTO PFDI Pain survey  to at least 79 to demonstrate improving ability to participate in activities of daily living and work duties.     Patient to be able to consistently roll over in bed with minimal discomfort to demonstrate improved mobility.      PLAN      Plan of care Certification: 8/26/2024 to 11-12-24.     Outpatient Physical Therapy 1-2 times weekly for 12 weeks to include the following interventions: therapeutic exercises, therapeutic activity, neuromuscular re-education, gait training, manual therapy, modalities  PRN, patient/family education, and self care/home management.        Continue with established Plan of Care, working toward established PT goals.      At next visit: start abdominal wall and hip strengthening, consider assessing pelvic floor muscle(s)     Allison Mcintosh, PT

## 2024-10-30 ENCOUNTER — ROUTINE PRENATAL (OUTPATIENT)
Dept: OBSTETRICS AND GYNECOLOGY | Facility: CLINIC | Age: 34
End: 2024-10-30
Payer: COMMERCIAL

## 2024-10-30 VITALS
DIASTOLIC BLOOD PRESSURE: 70 MMHG | SYSTOLIC BLOOD PRESSURE: 103 MMHG | HEART RATE: 99 BPM | BODY MASS INDEX: 20.11 KG/M2 | WEIGHT: 117.19 LBS

## 2024-10-30 DIAGNOSIS — O26.843 UTERINE SIZE DATE DISCREPANCY PREGNANCY, THIRD TRIMESTER: Primary | ICD-10-CM

## 2024-10-30 DIAGNOSIS — Q79.62 HYPERMOBILE EHLERS-DANLOS SYNDROME: ICD-10-CM

## 2024-10-30 DIAGNOSIS — O09.513 ELDERLY PRIMIGRAVIDA IN THIRD TRIMESTER: ICD-10-CM

## 2024-10-30 DIAGNOSIS — O09.523 MULTIGRAVIDA OF ADVANCED MATERNAL AGE IN THIRD TRIMESTER: ICD-10-CM

## 2024-10-30 DIAGNOSIS — F32.5 MAJOR DEPRESSIVE DISORDER, SINGLE EPISODE IN FULL REMISSION: ICD-10-CM

## 2024-10-30 DIAGNOSIS — G90.A POSTURAL ORTHOSTATIC TACHYCARDIA SYNDROME: ICD-10-CM

## 2024-10-30 PROCEDURE — 0502F SUBSEQUENT PRENATAL CARE: CPT | Mod: CPTII,S$GLB,, | Performed by: ADVANCED PRACTICE MIDWIFE

## 2024-10-30 PROCEDURE — 99999 PR PBB SHADOW E&M-EST. PATIENT-LVL III: CPT | Mod: PBBFAC,,, | Performed by: ADVANCED PRACTICE MIDWIFE

## 2024-10-31 ENCOUNTER — PATIENT MESSAGE (OUTPATIENT)
Dept: OBSTETRICS AND GYNECOLOGY | Facility: CLINIC | Age: 34
End: 2024-10-31
Payer: COMMERCIAL

## 2024-10-31 ENCOUNTER — CLINICAL SUPPORT (OUTPATIENT)
Dept: REHABILITATION | Facility: HOSPITAL | Age: 34
End: 2024-10-31
Payer: COMMERCIAL

## 2024-10-31 DIAGNOSIS — M62.838 MUSCLE SPASM: Primary | ICD-10-CM

## 2024-10-31 DIAGNOSIS — M62.89 PELVIC FLOOR DYSFUNCTION: ICD-10-CM

## 2024-10-31 PROCEDURE — 97530 THERAPEUTIC ACTIVITIES: CPT

## 2024-10-31 PROCEDURE — 97112 NEUROMUSCULAR REEDUCATION: CPT

## 2024-11-01 ENCOUNTER — PROCEDURE VISIT (OUTPATIENT)
Dept: MATERNAL FETAL MEDICINE | Facility: CLINIC | Age: 34
End: 2024-11-01
Payer: COMMERCIAL

## 2024-11-01 ENCOUNTER — OFFICE VISIT (OUTPATIENT)
Dept: MATERNAL FETAL MEDICINE | Facility: CLINIC | Age: 34
End: 2024-11-01
Payer: COMMERCIAL

## 2024-11-01 VITALS — SYSTOLIC BLOOD PRESSURE: 103 MMHG | DIASTOLIC BLOOD PRESSURE: 68 MMHG

## 2024-11-01 DIAGNOSIS — O26.843 UTERINE SIZE DATE DISCREPANCY PREGNANCY, THIRD TRIMESTER: ICD-10-CM

## 2024-11-01 DIAGNOSIS — O36.5990 FETAL GROWTH RESTRICTION ANTEPARTUM: Primary | ICD-10-CM

## 2024-11-01 PROCEDURE — 99999 PR PBB SHADOW E&M-EST. PATIENT-LVL II: CPT | Mod: PBBFAC,,, | Performed by: OBSTETRICS & GYNECOLOGY

## 2024-11-01 PROCEDURE — 76820 UMBILICAL ARTERY ECHO: CPT | Mod: S$GLB,,, | Performed by: OBSTETRICS & GYNECOLOGY

## 2024-11-01 PROCEDURE — 76816 OB US FOLLOW-UP PER FETUS: CPT | Mod: S$GLB,,, | Performed by: OBSTETRICS & GYNECOLOGY

## 2024-11-01 PROCEDURE — 76819 FETAL BIOPHYS PROFIL W/O NST: CPT | Mod: S$GLB,,, | Performed by: OBSTETRICS & GYNECOLOGY

## 2024-11-04 ENCOUNTER — HOSPITAL ENCOUNTER (OUTPATIENT)
Dept: PERINATAL CARE | Facility: OTHER | Age: 34
Discharge: HOME | End: 2024-11-04
Attending: ADVANCED PRACTICE MIDWIFE
Payer: COMMERCIAL

## 2024-11-04 ENCOUNTER — PATIENT MESSAGE (OUTPATIENT)
Dept: MATERNAL FETAL MEDICINE | Facility: CLINIC | Age: 34
End: 2024-11-04

## 2024-11-04 ENCOUNTER — ROUTINE PRENATAL (OUTPATIENT)
Dept: OBSTETRICS AND GYNECOLOGY | Facility: CLINIC | Age: 34
End: 2024-11-04
Payer: COMMERCIAL

## 2024-11-04 VITALS
WEIGHT: 117.88 LBS | HEART RATE: 85 BPM | BODY MASS INDEX: 20.24 KG/M2 | DIASTOLIC BLOOD PRESSURE: 69 MMHG | SYSTOLIC BLOOD PRESSURE: 107 MMHG

## 2024-11-04 DIAGNOSIS — Z3A.39 39 WEEKS GESTATION OF PREGNANCY: Primary | ICD-10-CM

## 2024-11-04 DIAGNOSIS — O36.5990 FETAL GROWTH RESTRICTION ANTEPARTUM: ICD-10-CM

## 2024-11-04 DIAGNOSIS — O26.843 UTERINE SIZE DATE DISCREPANCY PREGNANCY, THIRD TRIMESTER: ICD-10-CM

## 2024-11-04 DIAGNOSIS — Q79.62 HYPERMOBILE EHLERS-DANLOS SYNDROME: ICD-10-CM

## 2024-11-04 PROCEDURE — 59025 FETAL NON-STRESS TEST: CPT | Mod: 26,,, | Performed by: OBSTETRICS & GYNECOLOGY

## 2024-11-04 PROCEDURE — 59025 FETAL NON-STRESS TEST: CPT

## 2024-11-04 PROCEDURE — 76815 OB US LIMITED FETUS(S): CPT

## 2024-11-04 PROCEDURE — 76820 UMBILICAL ARTERY ECHO: CPT | Mod: 26,,, | Performed by: OBSTETRICS & GYNECOLOGY

## 2024-11-04 PROCEDURE — 99999 PR PBB SHADOW E&M-EST. PATIENT-LVL III: CPT | Mod: PBBFAC,,,

## 2024-11-04 PROCEDURE — 76820 UMBILICAL ARTERY ECHO: CPT

## 2024-11-04 PROCEDURE — 76815 OB US LIMITED FETUS(S): CPT | Mod: 26,,, | Performed by: OBSTETRICS & GYNECOLOGY

## 2024-11-04 PROCEDURE — 0502F SUBSEQUENT PRENATAL CARE: CPT | Mod: CPTII,S$GLB,,

## 2024-11-04 NOTE — PROGRESS NOTES
34 y.o.,  at 39w3d presents today for routine OB appt.  Denies HA, visual disturbances, or upper abdominal pain/GI complaints. No regular UCs, denies LOF or VB. Reports good FM  Doing well overall, desires membrane sweep    PHYSICAL EXAM  GENERAL: No acute distress  HEENT: Normocephalic, atraumatic  NEURO: Alert and oriented x3  PULMONARY: Non-labored respiration; no tachypnea  ABD: Soft, gravid, nontender.    ASSESSMENT AND PLAN  There are no diagnoses linked to this encounter.    Delivery consents already signed  Discussed plans for support people during labor - patient plans to have Haritha and kendall Mcgraw.  Reviewed warning signs, normal FM, labor precautions, and how/when to call.    Discussed MFM recommendation for IOL now, IOL scheduled for  at 0000  Desires membrane sweep today - 3/80/0     Follow-up: 1 week, call or present sooner JACOB Sanchez CNM, APRN

## 2024-11-05 ENCOUNTER — PATIENT MESSAGE (OUTPATIENT)
Dept: OBSTETRICS AND GYNECOLOGY | Facility: OTHER | Age: 34
End: 2024-11-05
Payer: COMMERCIAL

## 2024-11-06 ENCOUNTER — HOSPITAL ENCOUNTER (INPATIENT)
Facility: OTHER | Age: 34
LOS: 2 days | Discharge: HOME | End: 2024-11-08
Attending: OBSTETRICS & GYNECOLOGY | Admitting: OBSTETRICS & GYNECOLOGY
Payer: COMMERCIAL

## 2024-11-06 ENCOUNTER — ANESTHESIA EVENT (OUTPATIENT)
Dept: OBSTETRICS AND GYNECOLOGY | Facility: OTHER | Age: 34
End: 2024-11-06
Payer: COMMERCIAL

## 2024-11-06 ENCOUNTER — ANESTHESIA (OUTPATIENT)
Dept: OBSTETRICS AND GYNECOLOGY | Facility: OTHER | Age: 34
End: 2024-11-06
Payer: COMMERCIAL

## 2024-11-06 DIAGNOSIS — O36.5990 FETAL GROWTH RESTRICTION ANTEPARTUM: ICD-10-CM

## 2024-11-06 DIAGNOSIS — Z34.90 ENCOUNTER FOR PLANNED INDUCTION OF LABOR: ICD-10-CM

## 2024-11-06 LAB
ABO + RH BLD: NORMAL
BASOPHILS # BLD AUTO: 0.02 K/UL (ref 0–0.2)
BASOPHILS NFR BLD: 0.2 % (ref 0–1.9)
BLD GP AB SCN CELLS X3 SERPL QL: NORMAL
DIFFERENTIAL METHOD BLD: ABNORMAL
EOSINOPHIL # BLD AUTO: 0 K/UL (ref 0–0.5)
EOSINOPHIL NFR BLD: 0.4 % (ref 0–8)
ERYTHROCYTE [DISTWIDTH] IN BLOOD BY AUTOMATED COUNT: 12.3 % (ref 11.5–14.5)
HCT VFR BLD AUTO: 33.2 % (ref 37–48.5)
HGB BLD-MCNC: 11.2 G/DL (ref 12–16)
IMM GRANULOCYTES # BLD AUTO: 0.09 K/UL (ref 0–0.04)
IMM GRANULOCYTES NFR BLD AUTO: 0.9 % (ref 0–0.5)
LYMPHOCYTES # BLD AUTO: 1.9 K/UL (ref 1–4.8)
LYMPHOCYTES NFR BLD: 20.3 % (ref 18–48)
MCH RBC QN AUTO: 31.2 PG (ref 27–31)
MCHC RBC AUTO-ENTMCNC: 33.7 G/DL (ref 32–36)
MCV RBC AUTO: 93 FL (ref 82–98)
MONOCYTES # BLD AUTO: 0.8 K/UL (ref 0.3–1)
MONOCYTES NFR BLD: 7.9 % (ref 4–15)
NEUTROPHILS # BLD AUTO: 6.7 K/UL (ref 1.8–7.7)
NEUTROPHILS NFR BLD: 70.3 % (ref 38–73)
NRBC BLD-RTO: 0 /100 WBC
PLATELET # BLD AUTO: 156 K/UL (ref 150–450)
PMV BLD AUTO: 10.5 FL (ref 9.2–12.9)
RBC # BLD AUTO: 3.59 M/UL (ref 4–5.4)
TREPONEMA PALLIDUM IGG+IGM AB [PRESENCE] IN SERUM OR PLASMA BY IMMUNOASSAY: NONREACTIVE
WBC # BLD AUTO: 9.56 K/UL (ref 3.9–12.7)

## 2024-11-06 PROCEDURE — 86920 COMPATIBILITY TEST SPIN: CPT | Performed by: ADVANCED PRACTICE MIDWIFE

## 2024-11-06 PROCEDURE — 63600175 PHARM REV CODE 636 W HCPCS: Performed by: ADVANCED PRACTICE MIDWIFE

## 2024-11-06 PROCEDURE — 72100002 HC LABOR CARE, 1ST 8 HOURS

## 2024-11-06 PROCEDURE — 25000003 PHARM REV CODE 250: Performed by: ADVANCED PRACTICE MIDWIFE

## 2024-11-06 PROCEDURE — 10907ZC DRAINAGE OF AMNIOTIC FLUID, THERAPEUTIC FROM PRODUCTS OF CONCEPTION, VIA NATURAL OR ARTIFICIAL OPENING: ICD-10-PCS | Performed by: OBSTETRICS & GYNECOLOGY

## 2024-11-06 PROCEDURE — 85025 COMPLETE CBC W/AUTO DIFF WBC: CPT | Performed by: OBSTETRICS & GYNECOLOGY

## 2024-11-06 PROCEDURE — 11000001 HC ACUTE MED/SURG PRIVATE ROOM

## 2024-11-06 PROCEDURE — 86593 SYPHILIS TEST NON-TREP QUANT: CPT | Performed by: OBSTETRICS & GYNECOLOGY

## 2024-11-06 PROCEDURE — 0UQMXZZ REPAIR VULVA, EXTERNAL APPROACH: ICD-10-PCS | Performed by: OBSTETRICS & GYNECOLOGY

## 2024-11-06 PROCEDURE — 0HQ9XZZ REPAIR PERINEUM SKIN, EXTERNAL APPROACH: ICD-10-PCS | Performed by: OBSTETRICS & GYNECOLOGY

## 2024-11-06 PROCEDURE — 86901 BLOOD TYPING SEROLOGIC RH(D): CPT | Performed by: OBSTETRICS & GYNECOLOGY

## 2024-11-06 RX ORDER — DIPHENOXYLATE HYDROCHLORIDE AND ATROPINE SULFATE 2.5; .025 MG/1; MG/1
2 TABLET ORAL EVERY 6 HOURS PRN
Status: DISCONTINUED | OUTPATIENT
Start: 2024-11-06 | End: 2024-11-07

## 2024-11-06 RX ORDER — OXYTOCIN-SODIUM CHLORIDE 0.9% IV SOLN 30 UNIT/500ML 30-0.9/5 UT/ML-%
10 SOLUTION INTRAVENOUS ONCE AS NEEDED
Status: COMPLETED | OUTPATIENT
Start: 2024-11-06 | End: 2024-11-06

## 2024-11-06 RX ORDER — OXYTOCIN-SODIUM CHLORIDE 0.9% IV SOLN 30 UNIT/500ML 30-0.9/5 UT/ML-%
95 SOLUTION INTRAVENOUS CONTINUOUS PRN
Status: DISCONTINUED | OUTPATIENT
Start: 2024-11-06 | End: 2024-11-07

## 2024-11-06 RX ORDER — METHYLERGONOVINE MALEATE 0.2 MG/ML
200 INJECTION INTRAVENOUS ONCE AS NEEDED
Status: DISCONTINUED | OUTPATIENT
Start: 2024-11-06 | End: 2024-11-07

## 2024-11-06 RX ORDER — SODIUM CHLORIDE, SODIUM LACTATE, POTASSIUM CHLORIDE, CALCIUM CHLORIDE 600; 310; 30; 20 MG/100ML; MG/100ML; MG/100ML; MG/100ML
INJECTION, SOLUTION INTRAVENOUS CONTINUOUS
Status: DISCONTINUED | OUTPATIENT
Start: 2024-11-06 | End: 2024-11-07

## 2024-11-06 RX ORDER — OXYTOCIN 10 [USP'U]/ML
10 INJECTION, SOLUTION INTRAMUSCULAR; INTRAVENOUS ONCE AS NEEDED
Status: DISCONTINUED | OUTPATIENT
Start: 2024-11-06 | End: 2024-11-07

## 2024-11-06 RX ORDER — OXYTOCIN-SODIUM CHLORIDE 0.9% IV SOLN 30 UNIT/500ML 30-0.9/5 UT/ML-%
95 SOLUTION INTRAVENOUS ONCE AS NEEDED
Status: DISCONTINUED | OUTPATIENT
Start: 2024-11-06 | End: 2024-11-07

## 2024-11-06 RX ORDER — CALCIUM CARBONATE 200(500)MG
1000 TABLET,CHEWABLE ORAL ONCE
Status: COMPLETED | OUTPATIENT
Start: 2024-11-06 | End: 2024-11-06

## 2024-11-06 RX ORDER — ONDANSETRON 8 MG/1
8 TABLET, ORALLY DISINTEGRATING ORAL EVERY 8 HOURS PRN
Status: DISCONTINUED | OUTPATIENT
Start: 2024-11-06 | End: 2024-11-07

## 2024-11-06 RX ORDER — CARBOPROST TROMETHAMINE 250 UG/ML
250 INJECTION, SOLUTION INTRAMUSCULAR
Status: DISCONTINUED | OUTPATIENT
Start: 2024-11-06 | End: 2024-11-07

## 2024-11-06 RX ORDER — OXYTOCIN-SODIUM CHLORIDE 0.9% IV SOLN 30 UNIT/500ML 30-0.9/5 UT/ML-%
0-32 SOLUTION INTRAVENOUS CONTINUOUS
Status: DISCONTINUED | OUTPATIENT
Start: 2024-11-06 | End: 2024-11-07

## 2024-11-06 RX ORDER — SIMETHICONE 80 MG
1 TABLET,CHEWABLE ORAL 4 TIMES DAILY PRN
Status: DISCONTINUED | OUTPATIENT
Start: 2024-11-06 | End: 2024-11-07

## 2024-11-06 RX ORDER — TRANEXAMIC ACID 10 MG/ML
1000 INJECTION, SOLUTION INTRAVENOUS EVERY 30 MIN PRN
Status: DISCONTINUED | OUTPATIENT
Start: 2024-11-06 | End: 2024-11-07

## 2024-11-06 RX ORDER — SODIUM CHLORIDE 9 MG/ML
INJECTION, SOLUTION INTRAVENOUS
Status: DISCONTINUED | OUTPATIENT
Start: 2024-11-06 | End: 2024-11-07

## 2024-11-06 RX ORDER — CALCIUM CARBONATE 200(500)MG
500 TABLET,CHEWABLE ORAL 3 TIMES DAILY PRN
Status: DISCONTINUED | OUTPATIENT
Start: 2024-11-06 | End: 2024-11-07

## 2024-11-06 RX ORDER — OXYTOCIN-SODIUM CHLORIDE 0.9% IV SOLN 30 UNIT/500ML 30-0.9/5 UT/ML-%
30 SOLUTION INTRAVENOUS ONCE AS NEEDED
Status: DISCONTINUED | OUTPATIENT
Start: 2024-11-06 | End: 2024-11-07

## 2024-11-06 RX ORDER — HYDROCODONE BITARTRATE AND ACETAMINOPHEN 500; 5 MG/1; MG/1
TABLET ORAL
Status: DISCONTINUED | OUTPATIENT
Start: 2024-11-06 | End: 2024-11-07

## 2024-11-06 RX ORDER — LIDOCAINE HYDROCHLORIDE 10 MG/ML
10 INJECTION, SOLUTION INFILTRATION; PERINEURAL ONCE AS NEEDED
Status: COMPLETED | OUTPATIENT
Start: 2024-11-06 | End: 2024-11-06

## 2024-11-06 RX ADMIN — LIDOCAINE HYDROCHLORIDE 10 ML: 10 INJECTION, SOLUTION INFILTRATION; PERINEURAL at 11:11

## 2024-11-06 RX ADMIN — CALCIUM CARBONATE (ANTACID) CHEW TAB 500 MG 500 MG: 500 CHEW TAB at 06:11

## 2024-11-06 RX ADMIN — OXYTOCIN-SODIUM CHLORIDE 0.9% IV SOLN 30 UNIT/500ML 10 UNITS: 30-0.9/5 SOLUTION at 11:11

## 2024-11-06 RX ADMIN — Medication 2 MILLI-UNITS/MIN: at 03:11

## 2024-11-06 RX ADMIN — CALCIUM CARBONATE (ANTACID) CHEW TAB 500 MG 1000 MG: 500 CHEW TAB at 07:11

## 2024-11-06 NOTE — PROGRESS NOTES
"S:Doing well, reports ctx feel " the same as before".   O:  VS reviewed, afebrile   Vitals:    11/06/24 0630 11/06/24 0802 11/06/24 0804 11/06/24 1210   BP:  99/61  (!) 90/54   Pulse: 68 71 72 60   Resp:  18  16   Temp:  97.2 °F (36.2 °C)  97.4 °F (36.3 °C)   TempSrc:  Axillary  Axillary   SpO2: 99% 99% 97% 99%       FHTs: 130 cat 1 reassuring   UC:1-3  SVE: 5/80/0, attempted to AROM with pt's consent, minimal amount of fluid noted, + bloody show on exam.   Pitocin infusing at 6 mu/min   A: IUP @ 39w5d ;     Patient Active Problem List   Diagnosis    Adult BMI <19 kg/sq m    Major depressive disorder, single episode in full remission    Chronic pain    RODERICK (obstructive sleep apnea)    Hypermobile Jori-Danlos syndrome    Decreased strength, endurance, and mobility    Joint hyperextensibility of multiple sites    Pain    Elderly primigravida in third trimester    Postural orthostatic tachycardia syndrome    Encounter for planned induction of labor    AMA (advanced maternal age) multigravida 35+    Elevated vitamin B12 level    Muscle spasm    Pelvic floor dysfunction    Uterine size date discrepancy pregnancy, third trimester    Fetal growth restriction antepartum       P: Cont Pitocin IOL.   Cont EFM  CLOSE FETAL/MATERNAL OBSERVATION   "

## 2024-11-06 NOTE — ANESTHESIA PREPROCEDURE EVALUATION
[facility]  Anesthesia Pre-Operative Evaluation         Patient Name: Angela Lyle  YOB: 1990  MRN: [id number]    2024      Angela Lyle is a 34 y.o. female  @ 39w5d with history of Jori Danlos, RODERICK, depression, POTS, AMA, and FGR who presents for Induction of labor for: Elective at term. She denies history of HTN, DM, asthma, bleeding diathesis, or complications with anesthesia.       OB History    Para Term  AB Living   1             SAB IAB Ectopic Multiple Live Births                  # Outcome Date GA Lbr Mark/2nd Weight Sex Type Anes PTL Lv   1 Current                Review of patient's allergies indicates:   Allergen Reactions    Sulfa (sulfonamide antibiotics) Hives and Itching    Sulfamethoxazole-trimethoprim      Other reaction(s): Itching       Wt Readings from Last 1 Encounters:   24 1414 53.5 kg (117 lb 14.4 oz)       BP Readings from Last 3 Encounters:   24 107/69   24 103/68   10/30/24 103/70       Patient Active Problem List   Diagnosis    Adult BMI <19 kg/sq m    Major depressive disorder, single episode in full remission    Chronic pain    RODERICK (obstructive sleep apnea)    Hypermobile Jori-Danlos syndrome    Decreased strength, endurance, and mobility    Joint hyperextensibility of multiple sites    Pain    Elderly primigravida in third trimester    Postural orthostatic tachycardia syndrome    Pregnancy    AMA (advanced maternal age) multigravida 35+    Elevated vitamin B12 level    Muscle spasm    Pelvic floor dysfunction    Uterine size date discrepancy pregnancy, third trimester    Fetal growth restriction antepartum       Past Surgical History:   Procedure Laterality Date    SHOULDER SURGERY Right 2017    TONSILLECTOMY      wisdom teeth removal  2010       Social History     Socioeconomic History    Marital status:    Occupational History    Occupation: Labor & Delivery RN   Tobacco Use     Smoking status: Never     Passive exposure: Never    Smokeless tobacco: Never   Substance and Sexual Activity    Alcohol use: Not Currently     Alcohol/week: 5.0 standard drinks of alcohol     Types: 5 Glasses of wine per week     Comment: ocassional    Drug use: No    Sexual activity: Yes     Partners: Female     Birth control/protection: None   Other Topics Concern    Are you pregnant or think you may be? No    Breast-feeding No   Social History Narrative         Social Drivers of Health     Financial Resource Strain: Low Risk  (12/18/2023)    Overall Financial Resource Strain (CARDIA)     Difficulty of Paying Living Expenses: Not hard at all   Food Insecurity: No Food Insecurity (12/18/2023)    Hunger Vital Sign     Worried About Running Out of Food in the Last Year: Never true     Ran Out of Food in the Last Year: Never true   Transportation Needs: No Transportation Needs (12/18/2023)    PRAPARE - Transportation     Lack of Transportation (Medical): No     Lack of Transportation (Non-Medical): No   Physical Activity: Insufficiently Active (12/18/2023)    Exercise Vital Sign     Days of Exercise per Week: 3 days     Minutes of Exercise per Session: 40 min   Stress: Stress Concern Present (12/18/2023)    [employer] [place] [employer] - Occupational Stress Questionnaire     Feeling of Stress : To some extent   Housing Stability: Low Risk  (12/18/2023)    Housing Stability Vital Sign     Unable to Pay for Housing in the Last Year: No     Number of Places Lived in the Last Year: 1     Unstable Housing in the Last Year: No         Chemistry        Component Value Date/Time     04/11/2024 1105    K 3.9 04/11/2024 1105     04/11/2024 1105    CO2 23 04/11/2024 1105    BUN 8 04/11/2024 1105    CREATININE 0.7 04/11/2024 1105    GLU 82 04/11/2024 1105        Component Value Date/Time    CALCIUM 9.6 04/11/2024 1105    ALKPHOS 37 (L) 04/11/2024 1105    AST 20 04/11/2024 1105    ALT 22 04/11/2024 1105     "BILITOT 1.1 (H) 04/11/2024 1105    ESTGFRAFRICA >60 03/26/2019 1120    EGFRNONAA >60 03/26/2019 1120            Lab Results   Component Value Date    WBC 7.60 08/20/2024    HGB 10.9 (L) 08/20/2024    HCT 32.3 (L) 08/20/2024    MCV 93 08/20/2024     08/20/2024       No results for input(s): "PT", "INR", "PROTIME", "APTT" in the last 72 hours.          Pre-op Assessment    I have reviewed the Patient Summary Reports.     I have reviewed the Nursing Notes.    I have reviewed the Medications.     Review of Systems  Anesthesia Hx:  No problems with previous Anesthesia   History of prior surgery of interest to airway management or planning:  Previous anesthesia: General Tonsils as child with general anesthesia.        Denies Family Hx of Anesthesia complications.    Denies Personal Hx of Anesthesia complications.                    Social:  Non-Smoker       Hematology/Oncology:  Hematology Normal   Oncology Normal                                   EENT/Dental:  EENT/Dental Normal           Cardiovascular:  Exercise tolerance: good                                             Pulmonary:  Pulmonary Normal                       Renal/:  Renal/ Normal                 Hepatic/GI:  Hepatic/GI Normal                    Musculoskeletal:  Musculoskeletal Normal                Neurological:  Neurology Normal                                      Endocrine:  Endocrine Normal            Dermatological:  Skin Normal        Physical Exam  General: Well nourished, Cooperative, Alert and Oriented    Airway:  Mallampati: III / II  Mouth Opening: Normal  TM Distance: Normal  Tongue: Normal  Neck ROM: Normal ROM    Dental:  Intact        Anesthesia Plan  Type of Anesthesia, risks & benefits discussed:    Anesthesia Type: Gen ETT, Epidural, Spinal  Intra-op Monitoring Plan: Standard ASA Monitors  Post Op Pain Control Plan: epidural analgesia, intrathecal opioid and multimodal analgesia  Induction:  IV  Airway Plan: Direct and Video, " Post-Induction  Informed Consent: Informed consent signed with the Patient and all parties understand the risks and agree with anesthesia plan.  All questions answered.   ASA Score: 3  Day of Surgery Review of History & Physical: H&P Update referred to the surgeon/provider.    Ready For Surgery From Anesthesia Perspective.     .

## 2024-11-06 NOTE — HPI
Angela Lyle is a 34 y.o. female  at 39w5d with Estimated Date of Delivery: 24 based on US at 8 + 4/7 weeks, who presents to Labor and Delivery accompanied by partner, Haritha. Nancy Webber to be present as labor progresses. Expecting a boy, Sanju. Prenatal care in our CN[facility] clinic. Works as NICU RN.    Here for medically indicated induction of labor due to FGR. US 24 EFW 13% with AC 5% (# 6 lb 7 oz). Elevated doppler flow.  Reports irregular, mild uterine contractions for past several days. They are now q 1-4  minutes apart and increasing in intensity and duration.  mild contractions, active fetal movement, No vaginal bleeding , No loss of fluid. No S&S of pre eclampsias. Appears calm and relaxed.      Last ate soup at 2330, last drank water at now.     LABS: B pos, Rubella immune, TPA neg, HIV neg, Hep B neg, Hep C neg, MT21 neg, CBC wn, urine cx no growth, neg GC Chl, PAP  wnl. Neg GBS    This pregnancy has been complicated by   Patient Active Problem List   Diagnosis    Adult BMI <19 kg/sq m    Major depressive disorder, single episode in full remission    Chronic pain    RODERICK (obstructive sleep apnea)    Hypermobile Jori-Danlos syndrome    Decreased strength, endurance, and mobility    Joint hyperextensibility of multiple sites    Pain    Elderly primigravida in third trimester    Postural orthostatic tachycardia syndrome    Encounter for planned induction of labor    AMA (advanced maternal age) multigravida 35+    Elevated vitamin B12 level    Muscle spasm    Pelvic floor dysfunction    Uterine size date discrepancy pregnancy, third trimester    Fetal growth restriction antepartum        Presentation: Vertex by SVE and bedside US  Estimated Fetal Weight: 6.5 - 7. lbs    Birth Center Risk Assessment: 1-management on labor and Delivery    CNM management in ABC  CNM management on L&D  Consultation with OB to develop  plan of care  Collaborative CNM/OB management with delivery on  L&D   4-   Permanent referral of care to MD

## 2024-11-06 NOTE — HOSPITAL COURSE
0120, Notified by RN, patient her for scheduled induction at 39 + 5/7 weeks.  Cephalic by US, SVE 3/80/0. Bedside US cephalic. Pitocin per low dose protocol.  2100 - SVE /0, AROM clear fluid    2024 PPD #2   34 year old G1 now  s/p . First degree, left labial, and right periurethral laceration. QBL 145cc. Doing well, ambulating, voiding, and tolerating regular diet.   Lochia is steadily decreasing and moiderate. Mild postpartum anemia, asymptomatic. VSS, normotensive and afebrile. Breastfeeding infant w/some difficulty of sore nipples; states baby latches well and deeply but notes nipple pain; has seen lactation. PO pain meds managing postpartum discomforts. Has had BM since birth. Normal involution. Depression/anxiety: has hx of depression, no current s/s. Support at home: partner and family. Rh +, RI.  Desired method of contraception: NA (same sex partner). UTD on immunizations. Bonding well w/ baby, who is doing well; will FU w/peds provider. Plan D/C today.

## 2024-11-06 NOTE — PROGRESS NOTES
S:Doing well, Wife at bs, reports mild ctx with no pain   O:  VS reviewed, afebrile   Vitals:    11/06/24 0615 11/06/24 0630 11/06/24 0802 11/06/24 0804   BP:   99/61    Pulse: 83 68 71 72   Resp:   18    Temp:   97.2 °F (36.2 °C)    TempSrc:   Axillary    SpO2: 99% 99% 99% 97%       FHTs: 125, cat 1 reassuring   UC: 4   SVE: 3/80/0 SMALL AMOUNT OF DARK BLOODY SHOW ON EXAM. SWEEP DONE WITH PT CONSENT     A: IUP @ 39w5d ;     Patient Active Problem List   Diagnosis    Adult BMI <19 kg/sq m    Major depressive disorder, single episode in full remission    Chronic pain    RODERICK (obstructive sleep apnea)    Hypermobile Jori-Danlos syndrome    Decreased strength, endurance, and mobility    Joint hyperextensibility of multiple sites    Pain    Elderly primigravida in third trimester    Postural orthostatic tachycardia syndrome    Encounter for planned induction of labor    AMA (advanced maternal age) multigravida 35+    Elevated vitamin B12 level    Muscle spasm    Pelvic floor dysfunction    Uterine size date discrepancy pregnancy, third trimester    Fetal growth restriction antepartum       P: Cont CEFM  PITOCIN CURRENTLY AT 10 mu/min  Close fetal/maternal observation   Epidural PRN   STAFF UPDATED

## 2024-11-06 NOTE — H&P
[facility] - Labor & Delivery  Obstetrics  History & Physical    Patient Name: Angela Lyle  MRN: [id number]  Admission Date: 2024  Primary Care Provider: Sol, Primary Doctor    Subjective:     Principal Problem:Encounter for planned induction of labor    History of Present Illness:  Angela Lyle is a 34 y.o. female  at 39w5d with Estimated Date of Delivery: 24 based on US at 8 + 4/7 weeks, who presents to Labor and Delivery accompanied by partner, Haritha. Nancy Webber to be present as labor progresses. Expecting a boy, Sanju. Prenatal care in our [facility] clinic. Works as NICU RN.    Here for medically indicated induction of labor due to FGR. US 24 EFW 13% with AC 5% (# 6 lb 7 oz). Elevated doppler flow.  Reports irregular, mild uterine contractions for past several days. They are now q 1-4  minutes apart and increasing in intensity and duration.  mild contractions, active fetal movement, No vaginal bleeding , No loss of fluid. No S&S of pre eclampsias. Appears calm and relaxed.      Last ate soup at 2330, last drank water at now.     LABS: B pos, Rubella immune, TPA neg, HIV neg, Hep B neg, Hep C neg, MT21 neg, CBC wn, urine cx no growth, neg GC Chl, PAP  wnl. Neg GBS    This pregnancy has been complicated by   Patient Active Problem List   Diagnosis    Adult BMI <19 kg/sq m    Major depressive disorder, single episode in full remission    Chronic pain    RODERICK (obstructive sleep apnea)    Hypermobile Jori-Danlos syndrome    Decreased strength, endurance, and mobility    Joint hyperextensibility of multiple sites    Pain    Elderly primigravida in third trimester    Postural orthostatic tachycardia syndrome    Encounter for planned induction of labor    AMA (advanced maternal age) multigravida 35+    Elevated vitamin B12 level    Muscle spasm    Pelvic floor dysfunction    Uterine size date discrepancy pregnancy, third trimester    Fetal growth restriction antepartum         Presentation: Vertex by SVE and bedside US  Estimated Fetal Weight: 6.5 - 7. lbs    Birth Center Risk Assessment: 1-management on labor and Delivery    CNM management in ABC  CNM management on L&D  Consultation with OB to develop  plan of care  Collaborative CNM/OB management with delivery on L&D   4-   Permanent referral of care to MD     Obstetric HPI:    This pregnancy has been complicated by     OB History    Para Term  AB Living   1 0 0 0 0 0   SAB IAB Ectopic Multiple Live Births   0 0 0 0 0      # Outcome Date GA Lbr Mark/2nd Weight Sex Type Anes PTL Lv   1 Current              Past Medical History:   Diagnosis Date    Allergy     Anemia     hx of HOSEA on fergon in past    Anxiety     Arthritis     right shoulder    Cervical pain 2018    Decreased strength, endurance, and mobility 2023    Depression     Excessive daytime sleepiness     Fatigue     History of psychiatric care     Hypermobile Jori-Danlos syndrome 2019    Idiopathic hypersomnia     Keloid cicatrix     Left hip pain 2018    Neck pain 2020    Pain 2023    PCOS (polycystic ovarian syndrome)     POTS (postural orthostatic tachycardia syndrome) 2019    Psychiatric problem     Right shoulder pain 2016    Routine health maintenance 2023    Shoulder instability 2017    Sleep stage dysfunction     Tendinitis of right rotator cuff 2016    Therapy      Past Surgical History:   Procedure Laterality Date    SHOULDER SURGERY Right 2017    TONSILLECTOMY      wisdom teeth removal         PTA Medications   Medication Sig    aspirin (ECOTRIN) 81 MG EC tablet Take 81 mg by mouth once daily.    calcium carbonate (CALCIUM 500 ORAL)     ergocalciferol, vitamin D2, 2,000 unit Tab Take 2,000 Units by mouth once daily.    FISH OIL-DHA-EPA ORAL once daily.    inositoL-D chiro inositoL 2,000-50 mg PwPk Use as directed in the mouth or throat 2 (two) times a day.    Lactobac no.41/Bifidobact no.7  (PROBIOTIC-10 ORAL) once daily.    magnesium 250 mg Tab Take 350 mg by mouth once.    omega-3 fatty acids/fish oil (FISH OIL-OMEGA-3 FATTY ACIDS) 300-1,000 mg capsule Take by mouth once daily.    prenatal vit no.124/iron/folic (PRENATAL VITAMIN ORAL) Take 1 Dose by mouth Daily.       Review of patient's allergies indicates:   Allergen Reactions    Sulfa (sulfonamide antibiotics) Hives and Itching    Sulfamethoxazole-trimethoprim      Other reaction(s): Itching        Family History       Problem Relation (Age of Onset)    Arthritis Mother, Maternal Grandmother    Cancer Maternal Aunt    Depression Mother    Diabetes Paternal Grandfather    Early death Maternal Grandfather    Fibromyalgia Mother    Hearing loss Father, Maternal Grandmother, Paternal Grandfather, Paternal Grandmother    Heart disease Father, Paternal Grandfather, Maternal Grandfather    Hyperlipidemia Father    Hypertension Mother, Maternal Grandmother    Miscarriages / Stillbirths Mother    Stroke Maternal Grandmother, Paternal Grandmother          Tobacco Use    Smoking status: Never     Passive exposure: Never    Smokeless tobacco: Never   Substance and Sexual Activity    Alcohol use: Not Currently     Alcohol/week: 5.0 standard drinks of alcohol     Types: 5 Glasses of wine per week     Comment: ocassional    Drug use: No    Sexual activity: Yes     Partners: Female     Birth control/protection: None     Review of Systems   Objective:     Vital Signs (Most Recent):    Vital Signs (24h Range):           There is no height or weight on file to calculate BMI.    FHT: 120s, Cat 0 (reassuring)  TOCO:  Q 1-4 minutes     Physical Exam:   Constitutional: She is oriented to person, place, and time. She appears well-developed and well-nourished. She is cooperative. No distress.    HENT:   Head: Normocephalic and atraumatic.    Eyes: Pupils are equal, round, and reactive to light. Conjunctivae are normal. Right eye exhibits no discharge. Left eye exhibits no  discharge. No scleral icterus.     Cardiovascular:  Normal rate, regular rhythm, S1 normal, S2 normal and normal heart sounds.             Pulmonary/Chest: Effort normal and breath sounds normal. No respiratory distress. She has no decreased breath sounds. She has no wheezes.        Abdominal: Soft. She exhibits no mass. Distension: Gravid.There is no abdominal tenderness. There is no rebound and no guarding. No hernia.     Genitourinary:    Vagina and uterus normal.   No vaginal discharge in the vagina.           Musculoskeletal: Normal range of motion and moves all extremeties. No tenderness or edema.       Neurological: She is alert and oriented to person, place, and time.    Skin: Skin is warm and dry. No rash noted. She is not diaphoretic. No erythema.    Psychiatric: She has a normal mood and affect. Her behavior is normal. Judgment and thought content normal.        Cervix:  Dilation:  3  Effacement:  80  Station: 0  Presentation: Vertex     Significant Labs:  Lab Results   Component Value Date    GROUPTRH B POS 2024    HEPBSAG Non-reactive 2024       CBC:   Recent Labs   Lab 24  0114   WBC 9.56   RBC 3.59*   HGB 11.2*   HCT 33.2*      MCV 93   MCH 31.2*   MCHC 33.7     I have personallly reviewed all pertinent lab results from the last 24 hours.  Assessment/Plan:     34 y.o. female  at 39w5d for:    * Encounter for planned induction of labor  Admit to labor and delivery  Cephalic per SVE and US  IV access  EFM/TOCO, continuous  Type and Screen with 1 unit PRBCs on hold.  CBC  TPA  May have clear liquids  Anesthesia consult, open to epidural if needed.  Pitocin per low dose protocol. Risks and benefits dicussed. Opts for Pitocin versus AROM at this time. Aware that infant's with FGR likely to be less tolerant of labor than those without and we will be monitoring closely for signs of fetal distress that could necessitate interventions including but not limited to stopping of  Pitocin, maternal position changes, IV fluid bolus, episiotomy, and/or operative delivery. All questions answered and see agrees with plan.   SVE in 2-4 hours or prn with change in maternal -fetal status.  AROM PRN  OB Attending and Resident Team updated on status/plan.    Fetal growth restriction antepartum  Diagnosed at 39w with elevated SD ratio, FW 13%, AC 5%, # 6 lb 7 oz.   Induction of labor per [facility].    AMA (advanced maternal age) multigravida 35+  MT21 neg, normal anatomy US.    Postural orthostatic tachycardia syndrome  - Current Regimen (04/30/2024): Betaxolol, no longer taking.  -Followed by Cardiology, normal Echo.    Hypermobile Jori-Danlos syndrome  Increased risk PPH and uterine inversion at time of delivery.   1 unit PRBCs on hold.    Has seen Anesthesia outpatient. No contraindications to anesthesia if needed.    Major depressive disorder, single episode in full remission  No current meds. 2 week mood check after discharge.        Margaux Cowan CNM  Obstetrics  [facility] - Labor & Delivery

## 2024-11-06 NOTE — ASSESSMENT & PLAN NOTE
Increased risk PPH and uterine inversion at time of delivery.   1 unit PRBCs on hold.    Has seen Anesthesia outpatient. No contraindications to anesthesia if needed.

## 2024-11-06 NOTE — SUBJECTIVE & OBJECTIVE
Obstetric HPI:    This pregnancy has been complicated by     OB History    Para Term  AB Living   1 0 0 0 0 0   SAB IAB Ectopic Multiple Live Births   0 0 0 0 0      # Outcome Date GA Lbr Mark/2nd Weight Sex Type Anes PTL Lv   1 Current              Past Medical History:   Diagnosis Date    Allergy     Anemia     hx of HOSEA on fergon in past    Anxiety     Arthritis     right shoulder    Cervical pain 2018    Decreased strength, endurance, and mobility 2023    Depression     Excessive daytime sleepiness     Fatigue     History of psychiatric care     Hypermobile Jori-Danlos syndrome 2019    Idiopathic hypersomnia     Keloid cicatrix     Left hip pain 2018    Neck pain 2020    Pain 2023    PCOS (polycystic ovarian syndrome)     POTS (postural orthostatic tachycardia syndrome) 2019    Psychiatric problem     Right shoulder pain 2016    Routine health maintenance 2023    Shoulder instability 2017    Sleep stage dysfunction     Tendinitis of right rotator cuff 2016    Therapy      Past Surgical History:   Procedure Laterality Date    SHOULDER SURGERY Right 2017    TONSILLECTOMY      wisdom teeth removal         PTA Medications   Medication Sig    aspirin (ECOTRIN) 81 MG EC tablet Take 81 mg by mouth once daily.    calcium carbonate (CALCIUM 500 ORAL)     ergocalciferol, vitamin D2, 2,000 unit Tab Take 2,000 Units by mouth once daily.    FISH OIL-DHA-EPA ORAL once daily.    inositoL-D chiro inositoL 2,000-50 mg PwPk Use as directed in the mouth or throat 2 (two) times a day.    Lactobac no.41/Bifidobact no.7 (PROBIOTIC-10 ORAL) once daily.    magnesium 250 mg Tab Take 350 mg by mouth once.    omega-3 fatty acids/fish oil (FISH OIL-OMEGA-3 FATTY ACIDS) 300-1,000 mg capsule Take by mouth once daily.    prenatal vit no.124/iron/folic (PRENATAL VITAMIN ORAL) Take 1 Dose by mouth Daily.       Review of patient's allergies indicates:   Allergen  Reactions    Sulfa (sulfonamide antibiotics) Hives and Itching    Sulfamethoxazole-trimethoprim      Other reaction(s): Itching        Family History       Problem Relation (Age of Onset)    Arthritis Mother, Maternal Grandmother    Cancer Maternal Aunt    Depression Mother    Diabetes Paternal Grandfather    Early death Maternal Grandfather    Fibromyalgia Mother    Hearing loss Father, Maternal Grandmother, Paternal Grandfather, Paternal Grandmother    Heart disease Father, Paternal Grandfather, Maternal Grandfather    Hyperlipidemia Father    Hypertension Mother, Maternal Grandmother    Miscarriages / Stillbirths Mother    Stroke Maternal Grandmother, Paternal Grandmother          Tobacco Use    Smoking status: Never     Passive exposure: Never    Smokeless tobacco: Never   Substance and Sexual Activity    Alcohol use: Not Currently     Alcohol/week: 5.0 standard drinks of alcohol     Types: 5 Glasses of wine per week     Comment: ocassional    Drug use: No    Sexual activity: Yes     Partners: Female     Birth control/protection: None     Review of Systems   Objective:     Vital Signs (Most Recent):    Vital Signs (24h Range):           There is no height or weight on file to calculate BMI.    FHT: 120s, Cat 0 (reassuring)  TOCO:  Q 1-4 minutes     Physical Exam:   Constitutional: She is oriented to person, place, and time. She appears well-developed and well-nourished. She is cooperative. No distress.    HENT:   Head: Normocephalic and atraumatic.    Eyes: Pupils are equal, round, and reactive to light. Conjunctivae are normal. Right eye exhibits no discharge. Left eye exhibits no discharge. No scleral icterus.     Cardiovascular:  Normal rate, regular rhythm, S1 normal, S2 normal and normal heart sounds.             Pulmonary/Chest: Effort normal and breath sounds normal. No respiratory distress. She has no decreased breath sounds. She has no wheezes.        Abdominal: Soft. She exhibits no mass. Distension:  Gravid.There is no abdominal tenderness. There is no rebound and no guarding. No hernia.     Genitourinary:    Vagina and uterus normal.   No vaginal discharge in the vagina.           Musculoskeletal: Normal range of motion and moves all extremeties. No tenderness or edema.       Neurological: She is alert and oriented to person, place, and time.    Skin: Skin is warm and dry. No rash noted. She is not diaphoretic. No erythema.    Psychiatric: She has a normal mood and affect. Her behavior is normal. Judgment and thought content normal.        Cervix:  Dilation:  3  Effacement:  80  Station: 0  Presentation: Vertex     Significant Labs:  Lab Results   Component Value Date    [facility]/06[facility]    HEPBSAG Non-reactive 04/11/2024       CBC:   Recent Labs   Lab 11/06/24  0114   WBC 9.56   RBC 3.59*   HGB 11.2*   HCT 33.2*      MCV 93   MCH 31.2*   MCHC 33.7     I have personallly reviewed all pertinent lab results from the last 24 hours.

## 2024-11-06 NOTE — PLAN OF CARE
Problem: Adult Inpatient Plan of Care  Goal: Plan of Care Review  Outcome: Progressing  Goal: Patient-Specific Goal (Individualized)  Outcome: Progressing  Goal: Absence of Hospital-Acquired Illness or Injury  Outcome: Progressing  Goal: Optimal Comfort and Wellbeing  Outcome: Progressing  Goal: Readiness for Transition of Care  Outcome: Progressing     Problem:  Fall Injury Risk  Goal: Absence of Fall, Infant Drop and Related Injury  Outcome: Progressing     Problem: Infection  Goal: Absence of Infection Signs and Symptoms  Outcome: Progressing     Problem: Labor  Goal: Hemostasis  Outcome: Progressing  Goal: Stable Fetal Wellbeing  Outcome: Progressing  Goal: Effective Progression to Delivery  Outcome: Progressing  Goal: Absence of Infection Signs and Symptoms  Outcome: Progressing  Goal: Acceptable Pain Control  Outcome: Progressing  Goal: Normal Uterine Contraction Pattern  Outcome: Progressing     No acute events overnight. VSS. Pt denies pain, LOF, and vaginal bleeding. Pt confirms +FM. Plan of care reviewed with pt. All questions and concerns addressed. Bed in low position with wheels locked and call light within reach.

## 2024-11-06 NOTE — NURSING
CNM notified about prolonged deceleration. CNM in another delivery. Dr. Patino notified per midwife request. FHT improved with change in maternal positioning and pausing of pitocin.

## 2024-11-07 LAB
BASOPHILS # BLD AUTO: 0.02 K/UL (ref 0–0.2)
BASOPHILS NFR BLD: 0.1 % (ref 0–1.9)
DIFFERENTIAL METHOD BLD: ABNORMAL
EOSINOPHIL # BLD AUTO: 0 K/UL (ref 0–0.5)
EOSINOPHIL NFR BLD: 0.1 % (ref 0–8)
ERYTHROCYTE [DISTWIDTH] IN BLOOD BY AUTOMATED COUNT: 12.3 % (ref 11.5–14.5)
HCT VFR BLD AUTO: 31.3 % (ref 37–48.5)
HGB BLD-MCNC: 10.8 G/DL (ref 12–16)
IMM GRANULOCYTES # BLD AUTO: 0.1 K/UL (ref 0–0.04)
IMM GRANULOCYTES NFR BLD AUTO: 0.7 % (ref 0–0.5)
LYMPHOCYTES # BLD AUTO: 1.3 K/UL (ref 1–4.8)
LYMPHOCYTES NFR BLD: 9.2 % (ref 18–48)
MCH RBC QN AUTO: 31.4 PG (ref 27–31)
MCHC RBC AUTO-ENTMCNC: 34.5 G/DL (ref 32–36)
MCV RBC AUTO: 91 FL (ref 82–98)
MONOCYTES # BLD AUTO: 1 K/UL (ref 0.3–1)
MONOCYTES NFR BLD: 7.4 % (ref 4–15)
NEUTROPHILS # BLD AUTO: 11.3 K/UL (ref 1.8–7.7)
NEUTROPHILS NFR BLD: 82.5 % (ref 38–73)
NRBC BLD-RTO: 0 /100 WBC
PLATELET # BLD AUTO: 152 K/UL (ref 150–450)
PMV BLD AUTO: 10.1 FL (ref 9.2–12.9)
RBC # BLD AUTO: 3.44 M/UL (ref 4–5.4)
WBC # BLD AUTO: 13.73 K/UL (ref 3.9–12.7)

## 2024-11-07 PROCEDURE — 72100003 HC LABOR CARE, EA. ADDL. 8 HRS

## 2024-11-07 PROCEDURE — 72200005 HC VAGINAL DELIVERY LEVEL II

## 2024-11-07 PROCEDURE — 59400 OBSTETRICAL CARE: CPT | Mod: GB,,,

## 2024-11-07 PROCEDURE — 25000003 PHARM REV CODE 250

## 2024-11-07 PROCEDURE — 11000001 HC ACUTE MED/SURG PRIVATE ROOM

## 2024-11-07 PROCEDURE — 85025 COMPLETE CBC W/AUTO DIFF WBC: CPT | Performed by: OBSTETRICS & GYNECOLOGY

## 2024-11-07 PROCEDURE — 36415 COLL VENOUS BLD VENIPUNCTURE: CPT | Performed by: OBSTETRICS & GYNECOLOGY

## 2024-11-07 PROCEDURE — 88307 TISSUE EXAM BY PATHOLOGIST: CPT | Performed by: STUDENT IN AN ORGANIZED HEALTH CARE EDUCATION/TRAINING PROGRAM

## 2024-11-07 PROCEDURE — 72100002 HC LABOR CARE, 1ST 8 HOURS

## 2024-11-07 RX ORDER — OXYTOCIN-SODIUM CHLORIDE 0.9% IV SOLN 30 UNIT/500ML 30-0.9/5 UT/ML-%
95 SOLUTION INTRAVENOUS ONCE AS NEEDED
Status: DISCONTINUED | OUTPATIENT
Start: 2024-11-07 | End: 2024-11-08 | Stop reason: HOSPADM

## 2024-11-07 RX ORDER — MISOPROSTOL 200 UG/1
800 TABLET ORAL ONCE AS NEEDED
Status: DISCONTINUED | OUTPATIENT
Start: 2024-11-07 | End: 2024-11-07

## 2024-11-07 RX ORDER — MISOPROSTOL 200 UG/1
800 TABLET ORAL ONCE AS NEEDED
Status: DISCONTINUED | OUTPATIENT
Start: 2024-11-07 | End: 2024-11-08 | Stop reason: HOSPADM

## 2024-11-07 RX ORDER — METHYLERGONOVINE MALEATE 0.2 MG/ML
200 INJECTION INTRAVENOUS ONCE AS NEEDED
Status: DISCONTINUED | OUTPATIENT
Start: 2024-11-07 | End: 2024-11-08 | Stop reason: HOSPADM

## 2024-11-07 RX ORDER — HYDROCORTISONE 25 MG/G
CREAM TOPICAL 3 TIMES DAILY PRN
Status: DISCONTINUED | OUTPATIENT
Start: 2024-11-07 | End: 2024-11-08 | Stop reason: HOSPADM

## 2024-11-07 RX ORDER — SODIUM CHLORIDE 0.9 % (FLUSH) 0.9 %
10 SYRINGE (ML) INJECTION
Status: DISCONTINUED | OUTPATIENT
Start: 2024-11-07 | End: 2024-11-08 | Stop reason: HOSPADM

## 2024-11-07 RX ORDER — DIPHENOXYLATE HYDROCHLORIDE AND ATROPINE SULFATE 2.5; .025 MG/1; MG/1
2 TABLET ORAL EVERY 6 HOURS PRN
Status: DISCONTINUED | OUTPATIENT
Start: 2024-11-07 | End: 2024-11-08 | Stop reason: HOSPADM

## 2024-11-07 RX ORDER — DOCUSATE SODIUM 100 MG/1
200 CAPSULE, LIQUID FILLED ORAL 2 TIMES DAILY PRN
Status: DISCONTINUED | OUTPATIENT
Start: 2024-11-07 | End: 2024-11-08 | Stop reason: HOSPADM

## 2024-11-07 RX ORDER — TRANEXAMIC ACID 10 MG/ML
1000 INJECTION, SOLUTION INTRAVENOUS EVERY 30 MIN PRN
Status: DISCONTINUED | OUTPATIENT
Start: 2024-11-07 | End: 2024-11-08 | Stop reason: HOSPADM

## 2024-11-07 RX ORDER — OXYTOCIN-SODIUM CHLORIDE 0.9% IV SOLN 30 UNIT/500ML 30-0.9/5 UT/ML-%
95 SOLUTION INTRAVENOUS CONTINUOUS PRN
Status: DISCONTINUED | OUTPATIENT
Start: 2024-11-07 | End: 2024-11-07

## 2024-11-07 RX ORDER — SIMETHICONE 80 MG
1 TABLET,CHEWABLE ORAL EVERY 6 HOURS PRN
Status: DISCONTINUED | OUTPATIENT
Start: 2024-11-07 | End: 2024-11-08 | Stop reason: HOSPADM

## 2024-11-07 RX ORDER — ONDANSETRON 8 MG/1
8 TABLET, ORALLY DISINTEGRATING ORAL EVERY 8 HOURS PRN
Status: DISCONTINUED | OUTPATIENT
Start: 2024-11-07 | End: 2024-11-08 | Stop reason: HOSPADM

## 2024-11-07 RX ORDER — ACETAMINOPHEN 325 MG/1
650 TABLET ORAL EVERY 6 HOURS PRN
Status: DISCONTINUED | OUTPATIENT
Start: 2024-11-07 | End: 2024-11-08 | Stop reason: HOSPADM

## 2024-11-07 RX ORDER — CARBOPROST TROMETHAMINE 250 UG/ML
250 INJECTION, SOLUTION INTRAMUSCULAR
Status: DISCONTINUED | OUTPATIENT
Start: 2024-11-07 | End: 2024-11-07

## 2024-11-07 RX ORDER — OXYTOCIN 10 [USP'U]/ML
10 INJECTION, SOLUTION INTRAMUSCULAR; INTRAVENOUS ONCE AS NEEDED
Status: DISCONTINUED | OUTPATIENT
Start: 2024-11-07 | End: 2024-11-08 | Stop reason: HOSPADM

## 2024-11-07 RX ORDER — DIPHENHYDRAMINE HCL 25 MG
25 CAPSULE ORAL EVERY 4 HOURS PRN
Status: DISCONTINUED | OUTPATIENT
Start: 2024-11-07 | End: 2024-11-08 | Stop reason: HOSPADM

## 2024-11-07 RX ORDER — IBUPROFEN 600 MG/1
600 TABLET ORAL EVERY 6 HOURS
Status: DISCONTINUED | OUTPATIENT
Start: 2024-11-07 | End: 2024-11-08 | Stop reason: HOSPADM

## 2024-11-07 RX ORDER — OXYTOCIN-SODIUM CHLORIDE 0.9% IV SOLN 30 UNIT/500ML 30-0.9/5 UT/ML-%
10 SOLUTION INTRAVENOUS ONCE AS NEEDED
Status: DISCONTINUED | OUTPATIENT
Start: 2024-11-07 | End: 2024-11-08 | Stop reason: HOSPADM

## 2024-11-07 RX ORDER — PRENATAL WITH FERROUS FUM AND FOLIC ACID 3080; 920; 120; 400; 22; 1.84; 3; 20; 10; 1; 12; 200; 27; 25; 2 [IU]/1; [IU]/1; MG/1; [IU]/1; MG/1; MG/1; MG/1; MG/1; MG/1; MG/1; UG/1; MG/1; MG/1; MG/1; MG/1
1 TABLET ORAL DAILY
Status: DISCONTINUED | OUTPATIENT
Start: 2024-11-07 | End: 2024-11-08 | Stop reason: HOSPADM

## 2024-11-07 RX ORDER — DIPHENHYDRAMINE HYDROCHLORIDE 50 MG/ML
25 INJECTION INTRAMUSCULAR; INTRAVENOUS EVERY 4 HOURS PRN
Status: DISCONTINUED | OUTPATIENT
Start: 2024-11-07 | End: 2024-11-08 | Stop reason: HOSPADM

## 2024-11-07 RX ADMIN — IBUPROFEN 600 MG: 600 TABLET, FILM COATED ORAL at 05:11

## 2024-11-07 RX ADMIN — IBUPROFEN 600 MG: 600 TABLET, FILM COATED ORAL at 11:11

## 2024-11-07 RX ADMIN — PRENATAL VIT W/ FE FUMARATE-FA TAB 27-0.8 MG 1 TABLET: 27-0.8 TAB at 08:11

## 2024-11-07 RX ADMIN — DOCUSATE SODIUM 200 MG: 100 CAPSULE, LIQUID FILLED ORAL at 08:11

## 2024-11-07 NOTE — L&D DELIVERY NOTE
[facility] - Labor & Delivery  Vaginal Delivery   Operative Note    SUMMARY     Normal spontaneous vaginal delivery of live infant as patient was in hands and knees position; patient was helped to her back and infant was placed on mothers abdomen for skin to skin and bulb suctioning performed.  Infant delivered position ROBERTO over perineum.  Nuchal cord: No.    Upon cessation of pulsation, cord double clamped and cut by Haritha.     Gentle cord traction performed, Spontaneous delivery of placenta and IV pitocin given noting good uterine tone.  1st degree laceration noted along with a right periurethral laceration and left labial laceration. Periurethral hemostatic and left unrepaired. R/b/a of repair of first degree and labial; patient opted for repair. Laceration sites infiltrated with lidocaine and repaired with 3-0 vicryl.     Patient tolerated delivery well. Sponge needle and lap counted correctly x2. Upon leaving room, patient and infant were stable and doing well. .     Indications: Encounter for planned induction of labor  Pregnancy complicated by:   Patient Active Problem List   Diagnosis    Adult BMI <19 kg/sq m    Major depressive disorder, single episode in full remission    Chronic pain    RODERICK (obstructive sleep apnea)    Hypermobile Jori-Danlos syndrome    Decreased strength, endurance, and mobility    Joint hyperextensibility of multiple sites    Pain    Elderly primigravida in third trimester    Postural orthostatic tachycardia syndrome    Encounter for planned induction of labor    AMA (advanced maternal age) multigravida 35+    Elevated vitamin B12 level    Muscle spasm    Pelvic floor dysfunction    Uterine size date discrepancy pregnancy, third trimester    Fetal growth restriction antepartum     Admitting GA: 39w6d    Delivery Information for Carmine Lyle    Birth information:  YOB: 2025   Time of birth: 11:01 PM   Sex: male   Head Delivery Date/Time: 11/6/2024 11:01 PM    Delivery type: Vaginal, Spontaneous   Gestational Age: 39w6d       Delivery Providers    Delivering clinician: Reina Sanchez CNM           Measurements    Weight:   Length:          Apgars    Living status: Living  Apgar Component Scores:  1 min.:  5 min.:  10 min.:  15 min.:  20 min.:    Skin color:  0  1       Heart rate:  2  2       Reflex irritability:  2  2       Muscle tone:  2  2       Respiratory effort:  2  2       Total:  8  9       Apgars assigned by: DEO RAMSAY         Operative Delivery    Forceps attempted?: No  Vacuum extractor attempted?: No         Shoulder Dystocia    Shoulder dystocia present?: No           Presentation    Presentation: Vertex  Position: Left Occiput Anterior           Interventions/Resuscitation    Method: Tactile Stimulation, Bulb Suctioning       Cord    Vessels: 3 vessels  Complications: None  Delayed Cord Clamping?: Yes  Cord Clamped Date/Time: 2024 11:02 PM  Cord Blood Disposition: Sent with Baby  Gases Sent?: No  Stem Cell Collection (by MD): No       Placenta    Placenta delivery date/time: 2024 2316  Placenta removal: Spontaneous  Placenta appearance: Intact  Placenta disposition: Family           Labor Events:       labor: No     Labor Onset Date/Time:         Dilation Complete Date/Time:         Start Pushing Date/Time:         Start Pushing Date/Time:       Rupture Date/Time: 24        Rupture type: ARM (Artificial Rupture)        Fluid Amount:       Fluid Color: Clear              steroids: None     Antibiotics given for GBS: No     Induction: amniotomy     Indications for induction:        Augmentation:       Indications for augmentation: Ineffective Contraction Pattern     Labor complications: None     Additional complications:          Cervical ripening:                     Delivery:      Episiotomy: None     Indication for Episiotomy:       Perineal Lacerations: None Repaired:      Periurethral Laceration:   Repaired:      Labial Laceration:   Repaired:     Sulcus Laceration:   Repaired:     Vaginal Laceration:   Repaired:     Cervical Laceration:   Repaired:     Repair suture:       Repair # of packets:       Last Value - EBL - Nursing (mL):       Sum - EBL - Nursing (mL): 0     Last Value - EBL - Anesthesia (mL):      Calculated QBL (mL):       Running total QBL (mL):       Vaginal Sweep Performed:       Surgicount Correct:       Vaginal Packing:   Quantity:       Other providers:       Anesthesia    Method: None          Details (if applicable):  Trial of Labor      Categorization:      Priority:     Indications for :     Incision Type:       Additional  information:  Forceps:    Vacuum:    Breech:    Observed anomalies    Other (Comments):

## 2024-11-07 NOTE — ASSESSMENT & PLAN NOTE
SVE 5/70/0  Pitocin infusing at 14 mu/min  AROM clear fluid  Continue pitocin per protocol  Updated physician team on POC  SVE in 4 hours or PRN

## 2024-11-07 NOTE — LACTATION NOTE
11/07/24 0930   Maternal Assessment   Breast Shape Bilateral:;round   Breast Density Right:;soft   Areola Bilateral:;elastic   Nipples Bilateral:;everted   Right Nipple Symptoms tender   Maternal Infant Feeding   Maternal Emotional State assist needed;relaxed   Infant Positioning clutch/football   Signs of Milk Transfer audible swallow   Pain with Feeding yes   Pain Location nipple, right   Comfort Measures Before/During Feeding infant position adjusted;latch adjusted   Latch Assistance yes   Community Referrals   Community Referrals outpatient lactation program     Patient attempting to latch baby to R breast in football. States she is having difficulty with getting a deep latch. Assisted with repositioning in football skin to skin and to achieve deep latch with wide gape. Patient reported that pain decreased after initial latch on discomfort. Baby nursed actively and rhythmically with intermittent audible swallows. Reviewed basic breastfeeding education.  Discussed sore nipple treatment.  number written on board to call.

## 2024-11-07 NOTE — SUBJECTIVE & OBJECTIVE
Interval History:  Angela is a 34 y.o.  at 39w5d. She is doing well. Haritha at bedside and supportive. Feeling some mild contractions. Consents to SVE and AROM.     Objective:     Vital Signs (Most Recent):  Temp: 97.9 °F (36.6 °C) (24)  Pulse: 67 (24)  Resp: 16 (24)  BP: 121/74 (24)  SpO2: 99 % (24) Vital Signs (24h Range):  Temp:  [97 °F (36.1 °C)-97.9 °F (36.6 °C)] 97.9 °F (36.6 °C)  Pulse:  [60-91] 67  Resp:  [16-18] 16  SpO2:  [96 %-100 %] 99 %  BP: ()/(54-74) 121/74        There is no height or weight on file to calculate BMI.    FHT: 120 Cat 1 (reassuring) mod variability, +accels, occasional isolated decel  TOCO:  Q 2-3 minutes    Cervical Exam:  Dilation:  5  Effacement:  70%  Station: 0  Presentation: Vertex     Significant Labs:  Lab Results   Component Value Date    GROUPTRH B POS 2024    HEPBSAG Non-reactive 2024       I have personallly reviewed all pertinent lab results from the last 24 hours.    Physical Exam:   Constitutional: She is oriented to person, place, and time. She appears well-developed and well-nourished.    HENT:   Head: Normocephalic and atraumatic.    Eyes: Conjunctivae are normal.     Cardiovascular:  Normal rate.             Pulmonary/Chest: Effort normal. No respiratory distress.        Abdominal: Soft. She exhibits distension (gravid). There is no abdominal tenderness.     Genitourinary: There is vaginal discharge (some brown/bloody discharge on glove) in the vagina.           Musculoskeletal: Normal range of motion.       Neurological: She is alert and oriented to person, place, and time.    Skin: Skin is warm and dry.    Psychiatric: She has a normal mood and affect. Her behavior is normal. Judgment and thought content normal.       Review of Systems   Constitutional:  Negative for activity change, chills and fever.   HENT: Negative.     Eyes: Negative.    Respiratory: Negative.     Gastrointestinal:   Positive for abdominal pain (mild contractions). Negative for nausea and vomiting.   Genitourinary:  Positive for vaginal discharge (mucus/brown discharge). Negative for vaginal bleeding.   Musculoskeletal: Negative.    Integumentary:  Negative.   Neurological: Negative.    Hematological: Negative.    Psychiatric/Behavioral: Negative.     Breast: negative.

## 2024-11-07 NOTE — PLAN OF CARE
Problem: Adult Inpatient Plan of Care  Goal: Plan of Care Review  Outcome: Progressing  Goal: Patient-Specific Goal (Individualized)  Outcome: Progressing  Goal: Absence of Hospital-Acquired Illness or Injury  Outcome: Progressing  Goal: Optimal Comfort and Wellbeing  Outcome: Progressing  Goal: Readiness for Transition of Care  Outcome: Progressing     Problem:  Fall Injury Risk  Goal: Absence of Fall, Infant Drop and Related Injury  Outcome: Progressing     Problem: Infection  Goal: Absence of Infection Signs and Symptoms  Outcome: Progressing     Problem: Labor  Goal: Hemostasis  Outcome: Progressing  Goal: Stable Fetal Wellbeing  Outcome: Progressing  Goal: Effective Progression to Delivery  Outcome: Progressing  Goal: Absence of Infection Signs and Symptoms  Outcome: Progressing  Goal: Acceptable Pain Control  Outcome: Progressing  Goal: Normal Uterine Contraction Pattern  Outcome: Progressing     Problem: Breastfeeding  Goal: Effective Breastfeeding  Reactivated    Pt VS WNL Del via  to alive baby boy AS 8,9. Pt was not able to get into the tub due to water temperature that was currently on 94 F, CNM and RN was with the patient when pushing in the bed. Had a 1st degree laceration and periurethral repaired with total QBL of 145 ml. Pt moved to MBU with mid line uterus and firm. Instructed to increase water intake. Pt verbalized understanding. Pt safety maintained

## 2024-11-07 NOTE — PLAN OF CARE
VSS. Pain controlled with scheduled oral pain medication. Breastfeeding & utilizing hand expression. Fundus firm and midline with moderate lochia rubra. Voiding spontaneously with adequate output. Passing gas. No concerns at this time.

## 2024-11-07 NOTE — PROGRESS NOTES
[facility] - Labor & Delivery  Obstetrics  Labor Progress Note    Patient Name: Angela Lyle  MRN: [id number]  Admission Date: 2024  Hospital Length of Stay: 0 days  Attending Physician: Marisol Allred MD  Primary Care Provider: Sol, Primary Doctor    Subjective:     Principal Problem:Encounter for planned induction of labor    Hospital Course:  24- 0120, Notified by RN, patient her for scheduled induction at 39 + 5/7 weeks.  Cephalic by US, SVE 3/80/0. Bedside US cephalic. Pitocin per low dose protocol.  2100 - SVE , AROM clear fluid    Interval History:  Angela is a 34 y.o.  at 39w5d. She is doing well. Haritha at bedside and supportive. Feeling some mild contractions. Consents to SVE and AROM.     Objective:     Vital Signs (Most Recent):  Temp: 97.9 °F (36.6 °C) (24)  Pulse: 67 (24)  Resp: 16 (24)  BP: 121/74 (24)  SpO2: 99 % (24) Vital Signs (24h Range):  Temp:  [97 °F (36.1 °C)-97.9 °F (36.6 °C)] 97.9 °F (36.6 °C)  Pulse:  [60-91] 67  Resp:  [16-18] 16  SpO2:  [96 %-100 %] 99 %  BP: ()/(54-74) 121/74        There is no height or weight on file to calculate BMI.    FHT: 120 Cat 1 (reassuring) mod variability, +accels, occasional isolated decel  TOCO:  Q 2-3 minutes    Cervical Exam:  Dilation:  5  Effacement:  70%  Station: 0  Presentation: Vertex     Significant Labs:  Lab Results   Component Value Date    GROUPTRH B POS 2024    HEPBSAG Non-reactive 2024       I have personallly reviewed all pertinent lab results from the last 24 hours.    Physical Exam:   Constitutional: She is oriented to person, place, and time. She appears well-developed and well-nourished.    HENT:   Head: Normocephalic and atraumatic.    Eyes: Conjunctivae are normal.     Cardiovascular:  Normal rate.             Pulmonary/Chest: Effort normal. No respiratory distress.        Abdominal: Soft. She exhibits distension (gravid). There is no  abdominal tenderness.     Genitourinary: There is vaginal discharge (some brown/bloody discharge on glove) in the vagina.           Musculoskeletal: Normal range of motion.       Neurological: She is alert and oriented to person, place, and time.    Skin: Skin is warm and dry.    Psychiatric: She has a normal mood and affect. Her behavior is normal. Judgment and thought content normal.       Review of Systems   Constitutional:  Negative for activity change, chills and fever.   HENT: Negative.     Eyes: Negative.    Respiratory: Negative.     Gastrointestinal:  Positive for abdominal pain (mild contractions). Negative for nausea and vomiting.   Genitourinary:  Positive for vaginal discharge (mucus/brown discharge). Negative for vaginal bleeding.   Musculoskeletal: Negative.    Integumentary:  Negative.   Neurological: Negative.    Hematological: Negative.    Psychiatric/Behavioral: Negative.     Breast: negative.      Assessment/Plan:     34 y.o. female  at 39w5d for:    * Encounter for planned induction of labor  SVE 5/70/0  Pitocin infusing at 14 mu/min  AROM clear fluid  Continue pitocin per protocol  Updated physician team on POC  SVE in 4 hours or PRN    Fetal growth restriction antepartum  cEFM  IOL    Hypermobile Jori-Danlos syndrome  Increased risk PPH and uterine inversion at time of delivery.   1 unit PRBCs on hold.    Has seen Anesthesia outpatient. No contraindications to anesthesia if needed.    Major depressive disorder, single episode in full remission  2 week mood check          Reina Sanchez CNM  Obstetrics  [facility] - Labor & Delivery

## 2024-11-08 VITALS
HEART RATE: 93 BPM | RESPIRATION RATE: 20 BRPM | SYSTOLIC BLOOD PRESSURE: 107 MMHG | DIASTOLIC BLOOD PRESSURE: 68 MMHG | TEMPERATURE: 98 F | OXYGEN SATURATION: 96 %

## 2024-11-08 PROBLEM — D62 ANEMIA DUE TO BLOOD LOSS, ACUTE: Status: ACTIVE | Noted: 2024-11-08

## 2024-11-08 LAB
BLD PROD TYP BPU: NORMAL
BLOOD UNIT EXPIRATION DATE: NORMAL
BLOOD UNIT TYPE CODE: 7300
BLOOD UNIT TYPE: NORMAL
CODING SYSTEM: NORMAL
CROSSMATCH INTERPRETATION: NORMAL
DISPENSE STATUS: NORMAL
FINAL PATHOLOGIC DIAGNOSIS: NORMAL
GROSS: NORMAL
Lab: NORMAL
NUM UNITS TRANS PACKED RBC: NORMAL

## 2024-11-08 PROCEDURE — 25000003 PHARM REV CODE 250

## 2024-11-08 RX ORDER — FERROUS SULFATE 325(65) MG
325 TABLET ORAL DAILY
Qty: 30 TABLET | Refills: 1 | Status: SHIPPED | OUTPATIENT
Start: 2024-11-08

## 2024-11-08 RX ORDER — IBUPROFEN 600 MG/1
600 TABLET ORAL EVERY 6 HOURS PRN
Qty: 20 TABLET | Refills: 0 | Status: SHIPPED | OUTPATIENT
Start: 2024-11-08

## 2024-11-08 RX ORDER — DOCUSATE SODIUM 100 MG/1
200 CAPSULE, LIQUID FILLED ORAL 2 TIMES DAILY PRN
Qty: 20 CAPSULE | Refills: 0 | Status: SHIPPED | OUTPATIENT
Start: 2024-11-08

## 2024-11-08 RX ADMIN — IBUPROFEN 600 MG: 600 TABLET, FILM COATED ORAL at 08:11

## 2024-11-08 RX ADMIN — IBUPROFEN 600 MG: 600 TABLET, FILM COATED ORAL at 01:11

## 2024-11-08 RX ADMIN — PRENATAL VIT W/ FE FUMARATE-FA TAB 27-0.8 MG 1 TABLET: 27-0.8 TAB at 08:11

## 2024-11-08 RX ADMIN — DOCUSATE SODIUM 200 MG: 100 CAPSULE, LIQUID FILLED ORAL at 08:11

## 2024-11-08 NOTE — PROGRESS NOTES
Mother Baby Care Guide reviewed. Pt verbalized understanding that she will follow up with the midwives in two weeks for a mood check in two weeks and then again in six weeks.

## 2024-11-08 NOTE — PLAN OF CARE
Mother to breastfeed infant 8 or more times in 24hrs on infant's cue until content, frequent skin to skin, and will avoid pacifiers.  Mother is to keep infant actively feeding by keeping infant stimulated and using breast compression. Mother ensure effective nursing by hearing infant swallows and feeling nice tugs and pulls. Latch should not be painful while nursing.  Mother to record all breastfeedings, voids and stools in breastfeeding guide. Mother to call LC for breastfeeding assistance or questions .  Refer breastfeeding guide for lactation education.       Nurse, pump/hand express, supplement with EBM.

## 2024-11-08 NOTE — PLAN OF CARE
S/P vaginal delivery. AVSS.  Fundus is firm, midline and with moderate bleeding.  Ambulating and voiding independently without difficulty. Breastfeeding successfully independently. Appropriate bonding with baby. Pain well controlled with scheduled PO meds. Safety maintained.

## 2024-11-08 NOTE — DISCHARGE SUMMARY
[facility] Mother & Baby ([place])  Obstetrics  Discharge Summary      Patient Name: Angela Lyle  MRN: [id number]  Admission Date: 2024  Hospital Length of Stay: 2 days  Discharge Date and Time:  2024 8:07 AM  Attending Physician: Marisol Allred MD   Discharging Provider: Francisca Jean Baptiste [facility]   Primary Care Provider: Sol, Primary Doctor    HPI: Angela Lyle is a 34 y.o. female  at 39w5d with Estimated Date of Delivery: 24 based on US at 8 + 4/7 weeks, who presents to Labor and Delivery accompanied by partner, Haritha. Nancy Webber to be present as labor progresses. Expecting a boy, Sanju. Prenatal care in our [facility] clinic. Works as NICU RN.    Here for medically indicated induction of labor due to FGR. US 24 EFW 13% with AC 5% (# 6 lb 7 oz). Elevated doppler flow.  Reports irregular, mild uterine contractions for past several days. They are now q 1-4  minutes apart and increasing in intensity and duration.  mild contractions, active fetal movement, No vaginal bleeding , No loss of fluid. No S&S of pre eclampsias. Appears calm and relaxed.      Last ate soup at 2330, last drank water at now.     LABS: B pos, Rubella immune, TPA neg, HIV neg, Hep B neg, Hep C neg, MT21 neg, CBC wn, urine cx no growth, neg GC Chl, PAP  wnl. Neg GBS    This pregnancy has been complicated by   Patient Active Problem List   Diagnosis    Adult BMI <19 kg/sq m    Major depressive disorder, single episode in full remission    Chronic pain    RODERICK (obstructive sleep apnea)    Hypermobile Jori-Danlos syndrome    Decreased strength, endurance, and mobility    Joint hyperextensibility of multiple sites    Pain    Elderly primigravida in third trimester    Postural orthostatic tachycardia syndrome    Encounter for planned induction of labor    AMA (advanced maternal age) multigravida 35+    Elevated vitamin B12 level    Muscle spasm    Pelvic floor dysfunction    Uterine size date discrepancy  pregnancy, third trimester    Fetal growth restriction antepartum        Presentation: Vertex by SVE and bedside US  Estimated Fetal Weight: 6.5 - 7. lbs    Birth Center Risk Assessment: 1-management on labor and Delivery    CNM management in ABC  CNM management on L&D  Consultation with OB to develop  plan of care  Collaborative CNM/OB management with delivery on L&D   4-   Permanent referral of care to MD         * No surgery found *     Hospital Course:   24- 120, Notified by RN, patient her for scheduled induction at 39 + 5/7 weeks.  Cephalic by US, SVE 3/80/0. Bedside US cephalic. Pitocin per low dose protocol.  2100 - SVE /0, AROM clear fluid    2024 PPD #2   34 year old G1 now  s/p . First degree, left labial, and right periurethral laceration. QBL 145cc. Doing well, ambulating, voiding, and tolerating regular diet.   Lochia is steadily decreasing and moiderate. Mild postpartum anemia, asymptomatic. VSS, normotensive and afebrile. Breastfeeding infant w/some difficulty of sore nipples; states baby latches well and deeply but notes nipple pain; has seen lactation. PO pain meds managing postpartum discomforts. Has had BM since birth. Normal involution. Depression/anxiety: has hx of depression, no current s/s. Support at home: partner and family. Rh +, RI.  Desired method of contraception: NA (same sex partner). UTD on immunizations. Bonding well w/ baby, who is doing well; will FU w/peds provider. Plan D/C today.           Gen: A&O x 4, NAD  CV: normal HR  Lungs: normal resp effort  Breasts: bilaterally soft, non-tender, nipples intact with ecchymosis bilaterally  Abdomen: soft, non-tender, uterus firm at U - 2 fb  Diastasis Recti:  4 FB  Perineum: well approximated, slightly ecchymosed, no edema.  Lochia: minimal rubra  Ext: bilaterally no pedal edema without signs of DVT  Final Active Diagnoses:    Diagnosis Date Noted POA    PRINCIPAL PROBLEM:  Encounter for planned induction of labor  "[Z34.90] 2024 Not Applicable    Anemia due to blood loss, acute [D62] 2024 No    Obstetrical laceration [O71.9] 2024 No    Fetal growth restriction antepartum [O36.5990] 2024 Yes    AMA (advanced maternal age) multigravida 35+ [O09.529] 2024 Yes    Postural orthostatic tachycardia syndrome [G90.A] 2024 Yes    Hypermobile Jori-Danlos syndrome [Q79.62]  Not Applicable    Major depressive disorder, single episode in full remission [F32.5] 2014 Yes      Problems Resolved During this Admission:        Significant Diagnostic Studies: Labs: All labs within the past 24 hours have been reviewed      Feeding Method: breast    Immunizations       Date Immunization Status Dose Route/Site Given by    24 MMR Incomplete 0.5 mL Subcutaneous/     24 Tdap Incomplete 0.5 mL Intramuscular/             Delivery:    Episiotomy: None   Lacerations: 1st   Repair suture: None   Repair # of packets:     Blood loss (ml):       Birth information:  YOB: 2025   Time of birth: 11:01 PM   Sex: male   Delivery type: Vaginal, Spontaneous   Gestational Age: 39w5d     Measurements    Weight: 2693 g  Weight (lbs): 5 lb 15 oz  Length: 52.1 cm  Length (in): 20.5"  Head circumference: 34.5 cm  Chest circumference: 33.5 cm         Delivery Clinician: Delivery Providers    Delivering clinician: Reina Sanchez CNM   Provider Role    El Garcia RN Child Development Consults    Fabiola, Giuliana, RN Registered Nurse             Additional  information:  Forceps:    Vacuum:    Breech:    Observed anomalies      Living?:     Apgars    Living status: Living  Apgar Component Scores:  1 min.:  5 min.:  10 min.:  15 min.:  20 min.:    Skin color:  0  1       Heart rate:  2  2       Reflex irritability:  2  2       Muscle tone:  2  2       Respiratory effort:  2  2       Total:  8  9       Apgars assigned by: DEO RAMSAY         Placenta: Delivered:       appearance  Pending " Diagnostic Studies:       Procedure Component Value Units Date/Time    Specimen to Pathology, Surgery Gynecology and Obstetrics (fetal growth restriction) [[id number]] Collected: 11/07/24 0042    Order Status: Sent Lab Status: In process Updated: 11/07/24 0810    Specimen: Tissue             Discharged Condition: good    Disposition: Home or Self Care    Follow Up:    Patient Instructions:  Follow Up/Patient Instructions:   1. Reviewed postpartum recommendations and precautions ~ encouraged to call for fever, severe or increased pain in head, chest, abdomen, perineum or legs; heavy bleeding, foul smelling lochia, signs of depression/anxiety, or any other concern. Reviewed postpartum precautions r/t high blood pressure ~ encouraged to call for HA, vision changes, epigastric discomfort, or abnormal swelling.  2. Rx provided: ferrous sulfate, ibuprofen, colace  3. Contraception: NA, same sex partner. Encouraged abstinence and pelvic rest for healing until after postpartum check-up.   4. Encouraged to continue PNV while breastfeeding or at least for 6 weeks postpartum. Rx for Fe.  5. Car seat law will be reviewed with patient at discharge per protocol  6. RTO in 4-6 weeks or sooner prn.  7. Discharge home today with written and verbal postpartum instructions and precautions.        Diet Adult Regular     Notify your health care provider if you experience any of the following:  temperature >100.4     Notify your health care provider if you experience any of the following:  persistent nausea and vomiting or diarrhea     Notify your health care provider if you experience any of the following:  severe uncontrolled pain     Notify your health care provider if you experience any of the following:  redness, tenderness, or signs of infection (pain, swelling, redness, odor or green/yellow discharge around incision site)     Notify your health care provider if you experience any of the following:  difficulty breathing or  increased cough     Notify your health care provider if you experience any of the following:  severe persistent headache     Notify your health care provider if you experience any of the following:  worsening rash     Notify your health care provider if you experience any of the following:  persistent dizziness, light-headedness, or visual disturbances     Notify your health care provider if you experience any of the following:  increased confusion or weakness     Activity as tolerated     Medications:  Current Discharge Medication List        START taking these medications    Details   docusate sodium (COLACE) 100 MG capsule Take 2 capsules (200 mg total) by mouth 2 (two) times daily as needed for Constipation.  Qty: 20 capsule, Refills: 0      ferrous sulfate (FEOSOL) 325 mg (65 mg iron) Tab tablet Take 1 tablet (325 mg total) by mouth once daily.  Qty: 30 tablet, Refills: 1      ibuprofen (ADVIL,MOTRIN) 600 MG tablet Take 1 tablet (600 mg total) by mouth every 6 (six) hours as needed for Pain.  Qty: 20 tablet, Refills: 0           CONTINUE these medications which have NOT CHANGED    Details   calcium carbonate (CALCIUM 500 ORAL)       ergocalciferol, vitamin D2, 2,000 unit Tab Take 2,000 Units by mouth once daily.      Lactobac no.41/Bifidobact no.7 (PROBIOTIC-10 ORAL) once daily.      omega-3 fatty acids/fish oil (FISH OIL-OMEGA-3 FATTY ACIDS) 300-1,000 mg capsule Take by mouth once daily.      prenatal vit no.124/iron/folic (PRENATAL VITAMIN ORAL) Take 1 Dose by mouth Daily.      FISH OIL-DHA-EPA ORAL once daily.      inositoL-D chiro inositoL 2,000-50 mg PwPk Use as directed in the mouth or throat 2 (two) times a day.      magnesium 250 mg Tab Take 350 mg by mouth once.           A/P:   2024 PPD #2   34 year old G1 now  s/p . First degree, left labial, and right periurethral laceration. QBL 145cc. Stable. Mild postpartum anemia, asymptomatic. Breastfeeding infant w/some difficulty of sore  nipples. Depression/anxiety: has hx of depression, no current s/s. Rh +, RI.  Desired method of contraception: NA (same sex partner). UTD on immunizations.     Plan D/C today w/need for 2 week mood check appt and 6 week PP appt.     Francisca Jean Baptiste CNM  Obstetrics  [facility] - Mother & Baby ([place])

## 2024-11-08 NOTE — LACTATION NOTE
Patient requested to start using breast pump now as her  is cluster feeding and she has sore nipples.    [employer] Symphony pump, tubing, collections containers and labels brought to bedside.  Discussed proper pump setting of initiation phase.  Instructed on proper usage of pump and to adjust suction according to maximum comfort level.  Verified appropriate flange fit.  Educated on the frequency and duration of pumping in order to promote and maintain a full milk supply.  Hands on pumping technique reviewed.  Encouraged hand expression after pumping.  Instructed on cleaning of breast pump parts.  Written instructions also given.  Pt verbalized understanding and appropriate recall for proper milk handling, collection, labeling, storage and transportation.

## 2024-11-08 NOTE — LACTATION NOTE
11/08/24 0850   Maternal Assessment   Breast Shape Bilateral:;round   Breast Density Bilateral:;soft   Areola Bilateral:;elastic   Nipples Bilateral:;everted   Left Nipple Symptoms painful;redness;tender   Right Nipple Symptoms painful;redness;tender   Maternal Infant Feeding   Maternal Emotional State assist needed   Infant Positioning cross-cradle;clutch/football   Signs of Milk Transfer infant jaw motion present   Pain with Feeding yes   Pain Location nipples, bilateral   Pain Description sharp  (baby biting with lower gums)   Comfort Measures Before/During Feeding infant position adjusted;latch adjusted;maternal position adjusted;suction broken using finger   Nipple Shape After Feeding, Left compressed   Nipple Shape After Feeding, Right blister to tip   Equipment Type   Breast Pump Type double electric, hospital grade   Breast Pump Flange Type hard   Breast Pump Flange Size 21 mm   Breast Pumping   Breast Pumping Interventions post-feed pumping encouraged;frequent pumping encouraged;early pumping promoted   Breast Pumping bilateral breasts pumped until soft;double electric breast pump utilized   Community Referrals   Community Referrals support group;pediatric care provider;outpatient lactation program     Discharge lactation education reviewed. Patient and partner concerned about baby's latch, very painful, nipple pain. Baby retracts tongue back behind lower gum line often and bites down, able to get baby to extend and cup gloved finger. Sucking exercises reviewed with patient to perform before latching. Discussed seeking full oral function evaluation, to assess for oral restrictions given baby's latch appears deep and nutritive, but baby continues to bite often and become non nutritive, community resources provided. Hydrogels provided for nipple healing. Patient had prenatal with an IBCLC and plans to make follow up appointment for Monday/Tuesday once milk is in over weekend to re-evaluate feeding  assessment and milk transfer.     Discharge plan to nurse both breasts, pump/hand express, supplement with EBM.

## 2024-11-08 NOTE — DISCHARGE INSTRUCTIONS
Community Resources for Breastfeeding Mothers:   Hospital Breastfeeding Centers/ Lactation Consultants:   [facility]........................................................................................[phone]  Outpatient Lactation Consults               [phone]   [facility]....................................................................................[phone]   [facility]..........................................................................................[phone]   [facility].................................................[phone]   [facility].......................................................[phone]   [facility].................................[phone]   [facility].......................................[phone]   [facility]..................................................[phone]   [facility].....................................................................[phone]      [facility] (Poison Control)...........[phone]  PoisonHelp.org   Free medical advice 24/7 through the Poison Help Line and the online tool      Online Resources:   International Breastfeeding [place] ...............................................................................ibconline.ca   Dr Jean Marie Campbell online resource provides videos, articles, and information sheets.     Coeffective...............................................................................................................coeffective.com   Download the free mobile yulia to help get off to a great start with breastfeeding.   Droplet.....................................................................................................................AssetAvenue.com   Global  Health Media...........................................................................................Mowbly.org   Videos that teach and empower mothers and caregivers   Infant [facility].............................................................................[phone]      RocketHub   Provides up to date information for medication use by moms during pregnancy and while breastfeeding.   Lissette Boyce....................................................................................................................kellymom.com   Provides online information on breastfeeding and parenting      La Leche League........................................................................................ lllalmsla.org   /   llli.org   Mother-to-mother support groups with education, information support, and encouragement    Work and Pump........................................................................................... WiWide.com   Information about breastfeeding for working moms     [place] Resources:   [place] Breastfeeding Coalition............................... [phone]    [facility]   Find local breastfeeding support   [place] Breastfeeding Support............................................................ LaBreastfeedingSport.org   Zip code search of breastfeeding resources in your area   Partners for Healthy Babies............................................................[phone]   8032253wtbc.org   Connects [place] moms and their families to health and pregnancy resources.  24/7   WIC (Women, Infant, Children)......................................................... [phone]   ldh.la.gov/WIC   Download the n[employer]ulse [employer] yulia, get code from WIC office    [facility] Resources:     Baby Cafe............................................................................................................. babycafeusa.org   Free, drop in, informal  breastfeeding support groups offering professional lactation care and intervention.    [facility] [place] [facility]....................................... birthmarkBandwidth.com   Infant feeding drop in clinics, Lactation services, support groups, education programs   [facility]...............................................[phone]   CellAegis Devices.com/groups/[facility]   Free breastfeeding support group for families of color   Mothers Milk Bank [facility]....................................................[phone]                                                             Personal Development BureausVideolicious.[place]/services/mothers-milk-bank[facility]   CAMILLE Nesting..................................................................................[phone] nolanesting.com   In person and virtual support for families through pregnancy, birth, and early parenthood.       [facility] [place]- Dr. Megan Bueno.......................[phone]   [address] [place] [address]                                  [email or web address]   [email or web address]   [facility], [facility] (April Hong RN, IBCLC) ............................[phone] [email or web address].Thrive [email or web address]    Healthy Start [place].....................................[phone] ([place])  [phone] ([place])   [facility].gov/health-department/healthy-start   Serves women of childbearing age and addresses issues for pregnant women and their children from birth  to age two.          La Leche League- [place]............................. beqom.Thrive Metrics/ CellAegis Devices.Thrive Metrics/ avVentaursula   In person and virtual mother to mother support groups with education, information support and   encouragement to women who want to breastfeed      [place] Resources:   Breastfeeding Resources- [facility].....msdh.ms.gov (under womens services)   Find resources and info about planning for breastfeeding, its benefits, and help with breastfeeding  s uccessfully.    [facility] For [facility]regnancy Choices- [place]....................................... MarketTools   [phone]   [address] [place], [place]. Call or text 24/7   [facility].......................................................Mealnutastbreastfeedingcenter.Diffinity Genomics   [facility] Lactation Consultants sere [facility], including [facility],   [facility], and surrounding areas.    [place] Breastfeeding Coalition...............................................................................msbfc.org   Promotes and supports breastfeeding with families, health providers, and communities.   [facility] breastfeeding Coalition.....................................................................smbfc.org   Find breastfeeding resources and support groups in your area.    WIC Nutrition Program- [facility] of [facility].................................... [place].gov

## 2024-11-09 ENCOUNTER — TELEPHONE (OUTPATIENT)
Dept: OBSTETRICS AND GYNECOLOGY | Facility: OTHER | Age: 34
End: 2024-11-09
Payer: COMMERCIAL

## 2024-11-12 ENCOUNTER — PATIENT MESSAGE (OUTPATIENT)
Dept: OBSTETRICS AND GYNECOLOGY | Facility: OTHER | Age: 34
End: 2024-11-12
Payer: COMMERCIAL

## 2024-11-13 ENCOUNTER — OFFICE VISIT (OUTPATIENT)
Dept: OBSTETRICS AND GYNECOLOGY | Facility: CLINIC | Age: 34
End: 2024-11-13
Payer: COMMERCIAL

## 2024-11-13 ENCOUNTER — PATIENT MESSAGE (OUTPATIENT)
Dept: REHABILITATION | Facility: HOSPITAL | Age: 34
End: 2024-11-13
Payer: COMMERCIAL

## 2024-11-13 ENCOUNTER — PATIENT MESSAGE (OUTPATIENT)
Dept: OBSTETRICS AND GYNECOLOGY | Facility: CLINIC | Age: 34
End: 2024-11-13

## 2024-11-13 ENCOUNTER — TELEPHONE (OUTPATIENT)
Dept: OBSTETRICS AND GYNECOLOGY | Facility: HOSPITAL | Age: 34
End: 2024-11-13
Payer: COMMERCIAL

## 2024-11-13 VITALS
SYSTOLIC BLOOD PRESSURE: 109 MMHG | HEIGHT: 64 IN | HEART RATE: 85 BPM | DIASTOLIC BLOOD PRESSURE: 74 MMHG | BODY MASS INDEX: 18.5 KG/M2 | WEIGHT: 108.38 LBS

## 2024-11-13 DIAGNOSIS — Q79.62 HYPERMOBILE EHLERS-DANLOS SYNDROME: ICD-10-CM

## 2024-11-13 DIAGNOSIS — N81.10 PELVIC ORGAN PROLAPSE QUANTIFICATION STAGE 1 CYSTOCELE: Primary | ICD-10-CM

## 2024-11-13 PROCEDURE — 3074F SYST BP LT 130 MM HG: CPT | Mod: CPTII,S$GLB,,

## 2024-11-13 PROCEDURE — 3078F DIAST BP <80 MM HG: CPT | Mod: CPTII,S$GLB,,

## 2024-11-13 PROCEDURE — 3008F BODY MASS INDEX DOCD: CPT | Mod: CPTII,S$GLB,,

## 2024-11-13 PROCEDURE — 1159F MED LIST DOCD IN RCRD: CPT | Mod: CPTII,S$GLB,,

## 2024-11-13 PROCEDURE — 99999 PR PBB SHADOW E&M-EST. PATIENT-LVL III: CPT | Mod: PBBFAC,,,

## 2024-11-13 PROCEDURE — 0503F POSTPARTUM CARE VISIT: CPT | Mod: S$GLB,,,

## 2024-11-13 PROCEDURE — 1111F DSCHRG MED/CURRENT MED MERGE: CPT | Mod: CPTII,S$GLB,,

## 2024-11-13 NOTE — PROGRESS NOTES
Postpartum Visit  Angela Lyle is a 34 y.o. female  is here for a postpartum visit. She is 1 weeks postpartum following a  @ 39w5d.   Left labial and 1st degree repaired.     Reports seeing and feeling prolapse coming from vagina over the past few days. No pain associated with it. No issues voiding or with BM. Denies constipation or straining.      Pregnancy was complicated by: Jori Danlos Syndrome  Patient Active Problem List   Diagnosis    Adult BMI <19 kg/sq m    Major depressive disorder, single episode in full remission    Chronic pain    RODERICK (obstructive sleep apnea)    Hypermobile Jori-Danlos syndrome    Decreased strength, endurance, and mobility    Joint hyperextensibility of multiple sites    Pain    Elderly primigravida in third trimester    Postural orthostatic tachycardia syndrome    Encounter for planned induction of labor    AMA (advanced maternal age) multigravida 35+    Elevated vitamin B12 level    Muscle spasm    Pelvic floor dysfunction    Uterine size date discrepancy pregnancy, third trimester    Fetal growth restriction antepartum    Anemia due to blood loss, acute    Obstetrical laceration     (normal spontaneous vaginal delivery)         Bleeding thin lochia. Bowel/ bladder function is normal.       ROS:  GENERAL: No fever, chills, fatigability.  VULVAR: No pain, no lesions and no itching.  VAGINAL: No relaxation, no itching, no discharge, no abnormal bleeding and no lesions.  ABDOMEN: No abdominal pain. Denies nausea. Denies vomiting. No diarrhea. No constipation  BREAST: Denies pain. No lumps. No discharge.  URINARY: No incontinence, no nocturia, no frequency and no dysuria.  CARDIOVASCULAR: No chest pain. No shortness of breath. No leg cramps.  NEUROLOGICAL: No headaches. No vision changes.    Past Medical History:   Diagnosis Date    Allergy     Anemia     hx of HOSEA on fergon in past    Anxiety     Arthritis     right shoulder    Cervical pain 2018     Decreased strength, endurance, and mobility 11/12/2023    Depression     Excessive daytime sleepiness     Fatigue     History of psychiatric care     Hypermobile Jori-Danlos syndrome 2019    Idiopathic hypersomnia     Keloid cicatrix     Left hip pain 02/05/2018    Neck pain 05/26/2020    Pain 11/12/2023    PCOS (polycystic ovarian syndrome)     POTS (postural orthostatic tachycardia syndrome) 2019    Psychiatric problem     Right shoulder pain 07/25/2016    Routine health maintenance 12/26/2023    Shoulder instability 06/06/2017    Sleep stage dysfunction     Tendinitis of right rotator cuff 07/25/2016    Therapy            Vitals:    11/13/24 1527   BP: 109/74   Pulse: 85       General appearance - alert, well appearing, and in no distress and oriented to person, place, and time  Mental status - alert, oriented to person, place, and time, normal mood, behavior, speech, dress, motor activity, and thought processes  Skin - coloration normal for race, good turgor, warm to touch, no rashes  Abdomen - soft, nontender, nondistended, no masses or organomegaly  Pelvic - External genitalia postpartum: normal, suture material intact on left labia and 1st degree lac, without lesions or masses.  Normal female hair distribution. Adequate perineal body. Pelvic organ prolapse noted, appears to be cystocele. Bulging noted in anterior vaginal vault with valsalva. Not currently visible at introitus.   Vaginal mucosa moist and pink, normal rugae, without lesions, abnormal discharge, or foul odor.  Cervix pink, no lesions, no cervical motion tenderness.  Uterus: midline, non tender, smooth, not enlarged, not prolapsed  No adnexal masses or tenderness.  Extremities - no edema, redness or tenderness in the calves or thighs      Angela was seen today for vaginal prolapse.    Diagnoses and all orders for this visit:    Pelvic organ prolapse quantification stage 1 cystocele  Comments:  Recommend evaluation and treatment from pelvic  floor PT.    Postpartum care and examination    First degree perineal laceration  Comments:  Suture material intact, healing well    Hypermobile Jori-Danlos syndrome    Lactating mother      Follow up appointment x 6 weeks PP, sooner PRN.    Ruth Bailon CNM

## 2024-11-13 NOTE — TELEPHONE ENCOUNTER
"PT called and reports she " sees something protruding in her vaginal if she opens her labia. Size of a small lemon." No bleeding or urinary incontinence. PT does have a hx of a connective tissue disorder, uncomplicated. Denies constipation and is S/P  X 1 week.   P: Cont to monitor closely. If any changes noticed report to the ED or call practice for an appointment. Offered appt today, pt declines at the present.   "

## 2024-11-19 ENCOUNTER — CLINICAL SUPPORT (OUTPATIENT)
Dept: REHABILITATION | Facility: HOSPITAL | Age: 34
End: 2024-11-19
Payer: COMMERCIAL

## 2024-11-19 DIAGNOSIS — M62.838 MUSCLE SPASM: Primary | ICD-10-CM

## 2024-11-19 DIAGNOSIS — M62.89 PELVIC FLOOR DYSFUNCTION: ICD-10-CM

## 2024-11-19 PROCEDURE — 97530 THERAPEUTIC ACTIVITIES: CPT

## 2024-11-19 PROCEDURE — 97112 NEUROMUSCULAR REEDUCATION: CPT

## 2024-11-19 NOTE — PATIENT INSTRUCTIONS
""Squeeze Before you Sneeze!"    Perform a kegel before coughing, laughing, sneezing, lifting, bending, squatting, pushing, pulling, getting out of bed, standing, etc.  Try not to hold your breath!         Abdominal Muscle Training:  Tighten your abdominal muscles without sucking in our pooching your belly.  Tighten and draw in your muscles.  Don't hold your breath!  It actually helps to exhale while your tighten.  "Blow out birthday candles."     Hold 5 seconds.  Repeat 10 times.  2 sets per day.                                        "

## 2024-11-19 NOTE — PROGRESS NOTES
[facility]   Physical Therapy Treatment and Recertification Note      Name: Angela Lyle  Clinic Number: [id number]    Therapy Diagnosis:   Encounter Diagnoses   Name Primary?    Muscle spasm Yes    Pelvic floor dysfunction        Physician: Norma Castillo, *    Visit Date: 11/19/2024    Physician Orders: PT Eval and Treat   Medical Diagnosis from Referral: Encounter for supervision of normal first pregnancy in third trimester   Evaluation Date: 8/26/2024  Authorization Period Expiration: 12-31-24  Plan of Care Expiration: 2-11-25  Progress Note Due: 12-19-24  Visit # / Visits authorized: 5/ 20   FOTO: 1/3  Date last given: 8/26/2024    Time In: 850  Time Out: 930  Total Billable Time: 40 minutes    Precautions: Standard and pregnant and EDS    Subjective     Pt reports:   Had an induction 11-6-24 due to an intrauterine growth restriction (baby was measuring small for gestational age).  She had a small periurethral tear and a 1st degree laceration of the perineum.  No epidural used.      Has noticed increased pelvic pressure since delivering her baby.  Diagnosed with possible cystocele.      Denies urinary incontinence.  Reports issues with constipation from taking iron supplements.        Prior visit: She reports that she has a left labral tear (has had physical therapy and an injection) which was diagnosed by MRI.      She was compliant with home exercise program.  Response to previous treatment: The pain worsened for about a day and then got a little better  Functional change: no changes reported      Pain: left lateral hip     Constitutional Symptoms Review: The patient denies having any constitutional symptoms.       Objective      Objective Measures updated at progress report unless specified.   Left leg appears shorter in supine with posterior rotated ilium and backward sacral torsion   Increased tension noted in suprapubic region with abdominal wall palpation.       See EMR under MEDIA for written consent provided 11/19/2024  Chaperone: declined      VAGINAL PELVIC FLOOR EXAM    EXTERNAL ASSESSMENT 11-19-24  Introitus: left labial laceration with stitching noted   Skin condition: redness noted  Scarring: perineal laceration (healing)  Sensation: WNL   Pain:  none  Voluntary contraction: bulge  Voluntary relaxation: visible drop  Involuntary contraction: bulge  Bearing down: visible drop  Perineal descent: absent  Not able to visualize cystocele today.         INTERNAL ASSESSMENT 10-31-24  Pain: tender areas noted as follows: right levator ani    Sensation: able to localized pressure appropriately   Vaginal vault: WNL   Muscle Bulk: hypertonus   Muscle Power: 2/5     Quality of contraction: slow relaxation and incomplete relaxation   Specificity: WNL   Coordination: tends to hold breath during PFM contration     Comments: improved pushing with open glottis pushing technique       Linea alba at rest: Wrinkling  Curl-up test for linea alba integrity:   Without transverse abdominus: Widens and not able to generate tension    Distortion with transverse abdominus: Mild increase in tension generation   Curl-up test for Inter-rectus distance (IRD)    Without transverse abdominus: 2 fingers width at the umbilicus, 2 fingers width 2 inches above umbilicus, 2 fingers width 2 inches below umbilicus   With transverse abdominus contraction: 1.5 fingers width at the umbilicus, 1.5 fingers width 2 inches above umbilicus, 1.5 fingers width 2 inches below umbilicus   Abdominal wall musculature dominance: Internal oblique dominant: rib cage flairs, sternal angle increases, diaphragm lifts and linea alba pulls in      Treatment   Angela received the treatments listed below:    Angela received the following manual therapy techniques: to develop flexibility and extensibility for 00 minutes including:   [] Pubic clearing x 3 bouts. Left posterior rotated ilium correction x 3 bouts.   [] soft  tissue mobilization of suprapubic region region using gentle pressure      Neuromuscular Re-education to develop Coordination and Control for 16 minutes including:   [] Posterior pelvic tilt in reclined position 5 sec x 10 reps   [] Pt struggles to isolate TrA initially but improves with practice. Tactile and verbal cues required for correct activation of TrA. Initially pt substitutes with rectus abdominus muscle but with mod verbal and tactile instruction is able to isolate out  lower abdominal muscles.    [] Transverse abdominus muscle 5 sec x 10 reps  [] Transverse abdominus muscle +Kegel+BKFO/Marching x10 reps.   Cues needed for correct TA activation and coordination with kegel.    [x]  Pelvic floor muscle(s) relaxation, downtraining and active lengthening   [x]  Lower abdominal wall training -isometrics 5 sec x 10 reps   [x]  Kegel edu and practice.  Increased time spent on edu on proper technique.  Pt improves with practice and verbal cues. (External assessment and instruction)        Angela participated in dynamic functional therapeutic activities to improve functional performance for 24 minutes, including:  [] review on avoidance of asymmetrical activities to help decrease strain on the sacroiliac joint   [x] Plan of care review  [x] Anatomy review and discussion of the anatomy on current level of function   [x] Home exercise program issued and reviewed   [x]  360 degree breathing edu and practice   [] Edu on self sacroiliac joint muscle energy technique techniques   [x]  Edu on positioning on commode with breathing technique to improve ability to relax pelvic floor and have BM with less need to strain.  Practiced technique with patient and made modifications in technique as needed.  Christian picture of impact on positioning and breathing on colorectal angle for visual education.  Further edu patient by showing squatty potty video of impact of positioning on ability to have BM.   [x]  Body mechanics with lifting  to decrease strain to pelvic structures  [x]   Pelvic muscle bracing (the knack) with activities of daily living   [x]  Edu on positioning in a gravity lessoned position for exercises to decrease strain to pelvic structures     Home Exercises Provided and Patient Education Provided     Education provided: See above  Discussed progression of plan of care with patient; educated pt in activity modification; reviewed HEP with pt. Pt demonstrated and verbalized understanding of all instruction and was provided with a handout of HEP (see Patient Instructions).    Written Home Exercises Provided: yes.  Exercises were reviewed and Angela was able to demonstrate them prior to the end of the session.  Angela demonstrated good  understanding of the education provided.     See EMR under Patient Instructions for exercises provided 9/19/2024.    Assessment     Angela returns to physical therapy after having a vaginal delivery 2 weeks ago.  She reports she sustained a grade 1 perineal laceration and also has a cystocele.  An external pelvic floor muscle(s) assessment was performed today. She is able to perform a kegel but with minimal excursion. No obvious pelvic organ prolapse noted externally today.  2 finger width diastasis recti noted.  Initiated edu on positioning on commode for BM, body mechanics for lifting and activities of daily living to help decrease strain towards pelvic structures.  Initiated gentle core activation training today.   Pt will continue to benefit from skilled outpatient physical therapy to address the deficits listed in the problem list box on initial evaluation, provide pt/family education and to maximize pt's level of independence in the home and community environment.       Angela Is progressing well towards her goals.   Pt prognosis is Good.     Pt will continue to benefit from skilled outpatient physical therapy to address the deficits listed in the problem list box on initial evaluation, provide  pt/family education and to maximize pt's level of independence in the home and community environment.     Pt's spiritual, cultural and educational needs considered and pt agreeable to plan of care and goals.     Anticipated barriers to physical therapy: none    Goals:   Short Term Goals: 4 weeks   Patient to demonstrate good body mechanics with sit <--> stand and side lying <--> sitting transfers to demonstrate improved functional mobility.    Pt to demonstrate being able to correctly and consistently relax and lengthen her pelvic floor muscle(s) in preparation for labor and delivery.    Pt to demonstrate proper body mechanics with lifting, bending and squatting to decrease strain on lumbopelvic structures during pregnancy.    Pt to report being able to complete a half day at work without significant increase in pain to demonstrate a return towards prior level of function.   Pt to demonstrate an improved score in the FOTO PFDI Pain survey  to at least 85 to demonstrate improving ability to participate in activities of daily living and work duties.        Long Term Goals: 12 weeks ,   Pt to be discharged with home plan for carry over after discharge.    Pt will be trained and compliant with postural strategies in sitting and standing to improve alignment and decrease pain and muscle fatigue.  Pt to report being able to complete a full day at work without significant increase in pain to demonstrate a return towards prior level of function.   Pt to demonstrate an improved score in the FOTO PFDI Pain survey  to at least 79 to demonstrate improving ability to participate in activities of daily living and work duties.     Patient to be able to consistently roll over in bed with minimal discomfort to demonstrate improved mobility.      PLAN      Plan of care Certification: 11-19-24 to 2-11-25     Outpatient Physical Therapy 1-2 times weekly for 12 weeks to include the following interventions: therapeutic exercises,  therapeutic activity, neuromuscular re-education, gait training, manual therapy, modalities PRN, patient/family education, and self care/home management, dry needling.        Continue with established Plan of Care, working toward established PT goals.      At next visit: start abdominal wall and hip strengthening, consider assessing pelvic floor muscle(s)     Allison Mcintosh, PT

## 2024-11-27 ENCOUNTER — PATIENT MESSAGE (OUTPATIENT)
Dept: OBSTETRICS AND GYNECOLOGY | Facility: CLINIC | Age: 34
End: 2024-11-27
Payer: COMMERCIAL

## 2024-12-02 ENCOUNTER — CLINICAL SUPPORT (OUTPATIENT)
Dept: REHABILITATION | Facility: HOSPITAL | Age: 34
End: 2024-12-02
Payer: COMMERCIAL

## 2024-12-02 DIAGNOSIS — M62.838 MUSCLE SPASM: Primary | ICD-10-CM

## 2024-12-02 DIAGNOSIS — M62.89 PELVIC FLOOR DYSFUNCTION: ICD-10-CM

## 2024-12-02 PROCEDURE — 97112 NEUROMUSCULAR REEDUCATION: CPT

## 2024-12-02 PROCEDURE — 97530 THERAPEUTIC ACTIVITIES: CPT

## 2024-12-02 NOTE — PROGRESS NOTES
[facility]   Physical Therapy Treatment and Recertification Note      Name: Angela Lyle  Clinic Number: [id number]    Therapy Diagnosis:   Encounter Diagnoses   Name Primary?    Muscle spasm Yes    Pelvic floor dysfunction        Physician: Norma Castillo, *    Visit Date: 12/2/2024    Physician Orders: PT Eval and Treat   Medical Diagnosis from Referral: Encounter for supervision of normal first pregnancy in third trimester   Evaluation Date: 8/26/2024  Authorization Period Expiration: 12-31-24  Plan of Care Expiration: 2-11-25  Progress Note Due: 12-19-24  Visit # / Visits authorized: 6/ 20   FOTO: 1/3  Date last given: 8/26/2024    Time In: 1431  Time Out: 1507   Total Billable Time: 36  minutes    Precautions: Standard and pregnant and EDS    Subjective     Pt reports:   decreasing pelvic pressure noted and not able to observe the vaginal bulge as much.  Feels like she is doing better.      Prior visit:   Had an induction 11-6-24 due to an intrauterine growth restriction (baby was measuring small for gestational age).  She had a small periurethral tear and a 1st degree laceration of the perineum.  No epidural used.      Has noticed increased pelvic pressure since delivering her baby.  Diagnosed with possible cystocele.      Denies urinary incontinence.  Reports issues with constipation from taking iron supplements.        Prior visit: She reports that she has a left labral tear (has had physical therapy and an injection) which was diagnosed by MRI.      She was compliant with home exercise program.  Response to previous treatment: The pain worsened for about a day and then got a little better  Functional change: no changes reported      Pain: left lateral hip     Constitutional Symptoms Review: The patient denies having any constitutional symptoms.       Objective      Objective Measures updated at progress report unless specified.   Left leg appears shorter in  supine with posterior rotated ilium and backward sacral torsion   Increased tension noted in suprapubic region with abdominal wall palpation.      See EMR under MEDIA for written consent provided 12/2/2024  Chaperone: declined      VAGINAL PELVIC FLOOR EXAM    EXTERNAL ASSESSMENT 11-19-24  Introitus: left labial laceration with stitching noted   Skin condition: redness noted  Scarring: perineal laceration (healing)  Sensation: WNL   Pain:  none  Voluntary contraction: bulge  Voluntary relaxation: visible drop  Involuntary contraction: bulge  Bearing down: visible drop  Perineal descent: absent  Not able to visualize cystocele today.         INTERNAL ASSESSMENT 10-31-24  Pain: tender areas noted as follows: right levator ani    Sensation: able to localized pressure appropriately   Vaginal vault: WNL   Muscle Bulk: hypertonus   Muscle Power: 2/5     Quality of contraction: slow relaxation and incomplete relaxation   Specificity: WNL   Coordination: tends to hold breath during PFM contration     Comments: improved pushing with open glottis pushing technique       Linea alba at rest: Wrinkling  Curl-up test for linea alba integrity:   Without transverse abdominus: Widens and not able to generate tension    Distortion with transverse abdominus: Mild increase in tension generation   Curl-up test for Inter-rectus distance (IRD)    Without transverse abdominus: 2 fingers width at the umbilicus, 2 fingers width 2 inches above umbilicus, 2 fingers width 2 inches below umbilicus   With transverse abdominus contraction: 1.5 fingers width at the umbilicus, 1.5 fingers width 2 inches above umbilicus, 1.5 fingers width 2 inches below umbilicus   Abdominal wall musculature dominance: Internal oblique dominant: rib cage flairs, sternal angle increases, diaphragm lifts and linea alba pulls in      Treatment   Angela received the treatments listed below:    Angela received the following manual therapy techniques: to develop  flexibility and extensibility for 00 minutes including:   [] Pubic clearing x 3 bouts. Left posterior rotated ilium correction x 3 bouts.   [] soft tissue mobilization of suprapubic region region using gentle pressure      Neuromuscular Re-education to develop Coordination and Control for 28 minutes including:   [x] Posterior pelvic tilt with pelvis elevated 5 sec x 10 reps   [x] Posterior pelvic tilt and bridge with pelvis elevated 5 sec x 10 reps   [] Pt struggles to isolate TrA initially but improves with practice. Tactile and verbal cues required for correct activation of TrA. Initially pt substitutes with rectus abdominus muscle but with mod verbal and tactile instruction is able to isolate out  lower abdominal muscles.    [x] Transverse abdominus muscle 5 sec x 10 reps  [x] Transverse abdominus muscle +Kegel+BKFO/Marching x10 reps.   Cues needed for correct TA activation and coordination with kegel.    [x]  Pelvic floor muscle(s) relaxation, downtraining and active lengthening   []  Lower abdominal wall training -isometrics 5 sec x 10 reps   []  Kegel edu and practice.  Increased time spent on edu on proper technique.  Pt improves with practice and verbal cues. (External assessment and instruction)  [x] Pt instructed to perform kegels in a gravity lessoned position by placing her hips and pelvis up on a wedge.            Angela participated in dynamic functional therapeutic activities to improve functional performance for 8 minutes, including:  [] review on avoidance of asymmetrical activities to help decrease strain on the sacroiliac joint   [x] Plan of care review  [x] Anatomy review and discussion of the anatomy on current level of function   [x] Home exercise program issued and reviewed   []  360 degree breathing edu and practice   [] Edu on self sacroiliac joint muscle energy technique techniques   []  Edu on positioning on commode with breathing technique to improve ability to relax pelvic floor and have  BM with less need to strain.  Practiced technique with patient and made modifications in technique as needed.  Christian picture of impact on positioning and breathing on colorectal angle for visual education.  Further edu patient by showing squatty potty video of impact of positioning on ability to have BM.   []  Body mechanics with lifting to decrease strain to pelvic structures  []   Pelvic muscle bracing (the knack) with activities of daily living   []  Edu on positioning in a gravity lessoned position for exercises to decrease strain to pelvic structures     Home Exercises Provided and Patient Education Provided     Education provided: See above  Discussed progression of plan of care with patient; educated pt in activity modification; reviewed HEP with pt. Pt demonstrated and verbalized understanding of all instruction and was provided with a handout of HEP (see Patient Instructions).    Written Home Exercises Provided: yes.  Exercises were reviewed and Angela was able to demonstrate them prior to the end of the session.  Angela demonstrated good  understanding of the education provided.     See EMR under Patient Instructions for exercises provided 9/19/2024.    Assessment     Continued gentle core activation training today.  Used a wedge to help elevate the pelvis to assist with gravity assisted positioning of the pelvic organs.   Pt will continue to benefit from skilled outpatient physical therapy to address the deficits listed in the problem list box on initial evaluation, provide pt/family education and to maximize pt's level of independence in the home and community environment.       Angela Is progressing well towards her goals.   Pt prognosis is Good.     Pt will continue to benefit from skilled outpatient physical therapy to address the deficits listed in the problem list box on initial evaluation, provide pt/family education and to maximize pt's level of independence in the home and community environment.      Pt's spiritual, cultural and educational needs considered and pt agreeable to plan of care and goals.     Anticipated barriers to physical therapy: none    Goals:   Short Term Goals: 4 weeks   Patient to demonstrate good body mechanics with sit <--> stand and side lying <--> sitting transfers to demonstrate improved functional mobility.    Pt to demonstrate being able to correctly and consistently relax and lengthen her pelvic floor muscle(s) in preparation for labor and delivery.    Pt to demonstrate proper body mechanics with lifting, bending and squatting to decrease strain on lumbopelvic structures during pregnancy.    Pt to report being able to complete a half day at work without significant increase in pain to demonstrate a return towards prior level of function.   Pt to demonstrate an improved score in the FOTO PFDI Pain survey  to at least 85 to demonstrate improving ability to participate in activities of daily living and work duties.        Long Term Goals: 12 weeks ,   Pt to be discharged with home plan for carry over after discharge.    Pt will be trained and compliant with postural strategies in sitting and standing to improve alignment and decrease pain and muscle fatigue.  Pt to report being able to complete a full day at work without significant increase in pain to demonstrate a return towards prior level of function.   Pt to demonstrate an improved score in the FOTO PFDI Pain survey  to at least 79 to demonstrate improving ability to participate in activities of daily living and work duties.     Patient to be able to consistently roll over in bed with minimal discomfort to demonstrate improved mobility.      PLAN      Plan of care Certification: 11-19-24 to 2-11-25     Outpatient Physical Therapy 1-2 times weekly for 12 weeks to include the following interventions: therapeutic exercises, therapeutic activity, neuromuscular re-education, gait training, manual therapy, modalities PRN,  patient/family education, and self care/home management, dry needling.        Continue with established Plan of Care, working toward established PT goals.      At next visit: start abdominal wall and hip strengthening, consider assessing pelvic floor muscle(s)     Allison Mcintosh, PT

## 2024-12-02 NOTE — PATIENT INSTRUCTIONS
"  Hip Strengthening                 Abdominal Muscle Training:  Tighten your abdominal muscles without sucking in our pooching your belly.  Tighten and draw in your muscles.  Don't hold your breath!  It actually helps to exhale while your tighten.  "Blow out birthday candles."     Hold 5 seconds.  Repeat 10 times.  2 sets per day.                                                                   "

## 2024-12-04 ENCOUNTER — OFFICE VISIT (OUTPATIENT)
Dept: OPTOMETRY | Facility: CLINIC | Age: 34
End: 2024-12-04
Payer: COMMERCIAL

## 2024-12-04 DIAGNOSIS — H52.13 MYOPIA, BILATERAL: Primary | ICD-10-CM

## 2024-12-04 DIAGNOSIS — H43.393 VISUAL FLOATERS, BILATERAL: ICD-10-CM

## 2024-12-04 DIAGNOSIS — Z46.0 FITTING AND ADJUSTMENT OF SPECTACLES AND CONTACT LENSES: Primary | ICD-10-CM

## 2024-12-04 PROCEDURE — 99999 PR PBB SHADOW E&M-EST. PATIENT-LVL II: CPT | Mod: PBBFAC,,, | Performed by: OPTOMETRIST

## 2024-12-04 PROCEDURE — 99999 PR PBB SHADOW E&M-EST. PATIENT-LVL III: CPT | Mod: PBBFAC,,, | Performed by: OPTOMETRIST

## 2024-12-04 PROCEDURE — 92015 DETERMINE REFRACTIVE STATE: CPT | Mod: S$GLB,,, | Performed by: OPTOMETRIST

## 2024-12-04 PROCEDURE — 92014 COMPRE OPH EXAM EST PT 1/>: CPT | Mod: S$GLB,,, | Performed by: OPTOMETRIST

## 2024-12-04 PROCEDURE — 92310 CONTACT LENS FITTING OU: CPT | Mod: CSM,,, | Performed by: OPTOMETRIST

## 2024-12-04 NOTE — PROGRESS NOTES
Contact lens exam done, see exam of same date for documentation.    [employer] discount card put in the mail for pt.

## 2024-12-04 NOTE — PROGRESS NOTES
HPI    Last DFE: 10/11/2023 with Dr. Curry  Chief complaint (CC): New patient eye exam + CL fitting (dailies for   astigmatism). Pt states that [facility] is good in both MRX glasses and   contacts, happy with brand of lenses. Pt states that she wears contacts   for special occasions. Pt states that she has rare floaters in [place] that   does not last long, denies any eye pain. Pt is currently breastfeeding ,   would like to know if dilation is necessary at visit today.      Glasses? Yes  Contacts? Yes  H/o eye surgery, injections or laser: No  H/o eye injury: No  Known eye conditions?   1. Myopia, bilateral  2. Hx of conjunctival abrasion, left, initial encounter  3. Presbyopia  4. Fitting and adjustment of spectacles and contact lenses     Family h/o eye conditions?    Eye gtts? (+)unknown eye condition - maternal grandmother      (-) Flashes (+)  Floaters (-) Mucous   (-)  Tearing (-) Itching (-) Burning   (-) Headaches (-) Eye Pain/discomfort (-) Irritation   (-)  Redness (-) Double vision (-) Blurry vision    Diabetic? No  A1c? Lab Results       Component                Value               Date                       HGBA1C                   5.2                 11/10/2023              Last edited by Chiquis Arroyo on 12/4/2024  1:36 PM.            Assessment /Plan     For exam results, see Encounter Report.    Myopia, bilateral    Visual floaters, bilateral      CLRx and SRx released to patient. Patient educated on lens options. Normal ocular health. RTC 1 year for routine exam.         Pt have 4 wk old baby boy named Sanju.

## 2024-12-05 ENCOUNTER — PATIENT MESSAGE (OUTPATIENT)
Facility: CLINIC | Age: 34
End: 2024-12-05
Payer: COMMERCIAL

## 2024-12-08 ENCOUNTER — OFFICE VISIT (OUTPATIENT)
Dept: URGENT CARE | Facility: CLINIC | Age: 34
End: 2024-12-08
Payer: COMMERCIAL

## 2024-12-08 VITALS
DIASTOLIC BLOOD PRESSURE: 79 MMHG | SYSTOLIC BLOOD PRESSURE: 106 MMHG | BODY MASS INDEX: 18.51 KG/M2 | TEMPERATURE: 98 F | WEIGHT: 108.44 LBS | OXYGEN SATURATION: 99 % | HEIGHT: 64 IN | HEART RATE: 105 BPM | RESPIRATION RATE: 20 BRPM

## 2024-12-08 DIAGNOSIS — J02.9 SORE THROAT: ICD-10-CM

## 2024-12-08 DIAGNOSIS — J06.9 VIRAL URI: Primary | ICD-10-CM

## 2024-12-08 LAB
CTP QC/QA: YES
CTP QC/QA: YES
POC MOLECULAR INFLUENZA A AGN: NEGATIVE
POC MOLECULAR INFLUENZA B AGN: NEGATIVE
POC RSV RAPID ANT MOLECULAR: NEGATIVE

## 2024-12-08 PROCEDURE — 99213 OFFICE O/P EST LOW 20 MIN: CPT | Mod: S$GLB,,,

## 2024-12-08 PROCEDURE — 87502 INFLUENZA DNA AMP PROBE: CPT | Mod: QW,S$GLB,,

## 2024-12-08 PROCEDURE — 87634 RSV DNA/RNA AMP PROBE: CPT | Mod: QW,S$GLB,,

## 2024-12-08 NOTE — PATIENT INSTRUCTIONS
URI   If your condition worsens or fails to improve we recommend that you receive another evaluation at the urgent care/ER immediately or contact your PCP to discuss your concerns. You must understand that you've received an urgent care treatment only and that you may be released before all your medical problems are known or treated. You the patient will arrange for follouwp care as instructed.      we discussed that I think your illness is viral, it will not respond to antibiotics and will last 10-14 days.     Retest for covid at home in 48 hours    Rest and fluids are also important.     Salt water gargles, warm tea with honey as needed for sore throat.   Tylenol or ibuprofen can also be used as directed for pain unless you have an allergy to them or medical condition such as stomach ulcers, kidney or liver disease or blood thinners etc for which you should not be taking these type of medications.

## 2024-12-08 NOTE — PROGRESS NOTES
"Subjective:      Patient ID: Angela Lyle is a 34 y.o. female.    Vitals:  height is 5' 4" (1.626 m) and weight is 49.2 kg (108 lb 7.5 oz). Her oral temperature is 98.3 °F (36.8 °C). Her blood pressure is 106/79 and her pulse is 105. Her respiration is 20 and oxygen saturation is 99%.     Chief Complaint: Sore Throat    35 yo female presents to clinic with complaints of receiving a positive FLU/RSV test at home. States has a machine at home and ran a test and it came back positive.Patient states she just had a baby a month ago an declined of being around anyone that was sick. Patient states she started to have a sore throat last night and is having some congestion. Denies any known fevers. Patient is currently breastfeeding.    Sore Throat   This is a new problem. The current episode started yesterday. The problem has been unchanged. There has been no fever. The pain is at a severity of 2/10. The pain is mild. Associated symptoms include congestion and headaches. She has had no exposure to strep or mono.       Constitution: Negative.   HENT:  Positive for congestion and sore throat.    Neck: neck negative.   Cardiovascular: Negative.    Eyes: Negative.    Respiratory: Negative.     Gastrointestinal: Negative.    Endocrine: negative.   Genitourinary: Negative.    Musculoskeletal: Negative.    Skin: Negative.    Allergic/Immunologic: Negative.    Neurological:  Positive for headaches.   Hematologic/Lymphatic: Negative.    Psychiatric/Behavioral: Negative.        Objective:     Physical Exam   Constitutional: She is oriented to person, place, and time. She appears well-developed. She is cooperative. No distress.   HENT:   Head: Normocephalic and atraumatic.   Ears:   Right Ear: Tympanic membrane, external ear and ear canal normal.   Left Ear: Tympanic membrane, external ear and ear canal normal.   Nose: Congestion present. No purulent discharge.   Mouth/Throat: No posterior oropharyngeal erythema.   Eyes: " Conjunctivae are normal. Pupils are equal, round, and reactive to light.   Cardiovascular: Normal rate, regular rhythm and normal heart sounds.   Pulmonary/Chest: Effort normal and breath sounds normal. No stridor. No respiratory distress. She has no wheezes. She has no rhonchi. She has no rales.   Abdominal: Normal appearance. Soft.   Musculoskeletal: Normal range of motion.         General: Normal range of motion.   Neurological: She is alert and oriented to person, place, and time.   Skin: Skin is warm and dry.   Psychiatric: Her behavior is normal. Mood normal.     Results for orders placed or performed in visit on 12/08/24   POCT Influenza A/B MOLECULAR    Collection Time: 12/08/24  5:32 PM   Result Value Ref Range    POC Molecular Influenza A Ag Negative Negative    POC Molecular Influenza B Ag Negative Negative     Acceptable Yes    POCT RSV by Molecular    Collection Time: 12/08/24  5:41 PM   Result Value Ref Range    POC RSV Rapid Ant Molecular Negative Negative     Acceptable Yes       Assessment:     1. Viral URI    2. Sore throat        Plan:       Viral URI    Sore throat  -     POCT Influenza A/B MOLECULAR  -     POCT RSV by Molecular      Patient Instructions                                                            URI   If your condition worsens or fails to improve we recommend that you receive another evaluation at the urgent care/ER immediately or contact your PCP to discuss your concerns. You must understand that you've received an urgent care treatment only and that you may be released before all your medical problems are known or treated. You the patient will arrange for follouwp care as instructed.      we discussed that I think your illness is viral, it will not respond to antibiotics and will last 10-14 days.     Retest for covid at home in 48 hours    Rest and fluids are also important.     Salt water gargles, warm tea with honey as needed for sore throat.    Tylenol or ibuprofen can also be used as directed for pain unless you have an allergy to them or medical condition such as stomach ulcers, kidney or liver disease or blood thinners etc for which you should not be taking these type of medications.

## 2024-12-18 ENCOUNTER — PATIENT MESSAGE (OUTPATIENT)
Dept: OPTOMETRY | Facility: CLINIC | Age: 34
End: 2024-12-18
Payer: COMMERCIAL

## 2024-12-19 ENCOUNTER — POSTPARTUM VISIT (OUTPATIENT)
Dept: OBSTETRICS AND GYNECOLOGY | Facility: CLINIC | Age: 34
End: 2024-12-19
Payer: COMMERCIAL

## 2024-12-19 ENCOUNTER — PATIENT MESSAGE (OUTPATIENT)
Dept: OBSTETRICS AND GYNECOLOGY | Facility: CLINIC | Age: 34
End: 2024-12-19

## 2024-12-19 ENCOUNTER — LAB VISIT (OUTPATIENT)
Dept: LAB | Facility: OTHER | Age: 34
End: 2024-12-19
Payer: COMMERCIAL

## 2024-12-19 VITALS
HEIGHT: 64 IN | SYSTOLIC BLOOD PRESSURE: 110 MMHG | DIASTOLIC BLOOD PRESSURE: 73 MMHG | WEIGHT: 106.38 LBS | BODY MASS INDEX: 18.16 KG/M2 | HEART RATE: 87 BPM

## 2024-12-19 DIAGNOSIS — R42 DIZZINESS: ICD-10-CM

## 2024-12-19 LAB
BASOPHILS # BLD AUTO: 0.02 K/UL (ref 0–0.2)
BASOPHILS NFR BLD: 0.3 % (ref 0–1.9)
DIFFERENTIAL METHOD BLD: NORMAL
EOSINOPHIL # BLD AUTO: 0.1 K/UL (ref 0–0.5)
EOSINOPHIL NFR BLD: 1.3 % (ref 0–8)
ERYTHROCYTE [DISTWIDTH] IN BLOOD BY AUTOMATED COUNT: 11.8 % (ref 11.5–14.5)
HCT VFR BLD AUTO: 40.4 % (ref 37–48.5)
HGB BLD-MCNC: 13.5 G/DL (ref 12–16)
IMM GRANULOCYTES # BLD AUTO: 0.02 K/UL (ref 0–0.04)
IMM GRANULOCYTES NFR BLD AUTO: 0.3 % (ref 0–0.5)
LYMPHOCYTES # BLD AUTO: 2.2 K/UL (ref 1–4.8)
LYMPHOCYTES NFR BLD: 32.3 % (ref 18–48)
MCH RBC QN AUTO: 30.8 PG (ref 27–31)
MCHC RBC AUTO-ENTMCNC: 33.4 G/DL (ref 32–36)
MCV RBC AUTO: 92 FL (ref 82–98)
MONOCYTES # BLD AUTO: 0.6 K/UL (ref 0.3–1)
MONOCYTES NFR BLD: 8.6 % (ref 4–15)
NEUTROPHILS # BLD AUTO: 3.9 K/UL (ref 1.8–7.7)
NEUTROPHILS NFR BLD: 57.2 % (ref 38–73)
NRBC BLD-RTO: 0 /100 WBC
PLATELET # BLD AUTO: 238 K/UL (ref 150–450)
PMV BLD AUTO: 9.8 FL (ref 9.2–12.9)
RBC # BLD AUTO: 4.39 M/UL (ref 4–5.4)
TSH SERPL DL<=0.005 MIU/L-ACNC: 1.19 UIU/ML (ref 0.4–4)
WBC # BLD AUTO: 6.85 K/UL (ref 3.9–12.7)

## 2024-12-19 PROCEDURE — 84443 ASSAY THYROID STIM HORMONE: CPT

## 2024-12-19 PROCEDURE — 36415 COLL VENOUS BLD VENIPUNCTURE: CPT

## 2024-12-19 PROCEDURE — 85025 COMPLETE CBC W/AUTO DIFF WBC: CPT

## 2024-12-19 PROCEDURE — 0503F POSTPARTUM CARE VISIT: CPT | Mod: CPTII,S$GLB,,

## 2024-12-19 PROCEDURE — 99999 PR PBB SHADOW E&M-EST. PATIENT-LVL III: CPT | Mod: PBBFAC,,,

## 2024-12-19 NOTE — PROGRESS NOTES
Angela Lyle is a 34 y.o.  is here for a postpartum visit.  Estimated Date of Delivery: 24     DELIVERY HISTORY   delivery on 24 at 39w5d gestation, male infant, weight 2693 g, first degree laceration and labial laceration with repair. Breastfeeding infant. Alone today.     C/C: Postpartum exam.     Pregnancy was c/b by:  Patient Active Problem List   Diagnosis    Adult BMI <19 kg/sq m    Major depressive disorder, single episode in full remission    Chronic pain    RODERICK (obstructive sleep apnea)    Hypermobile Jori-Danlos syndrome    Decreased strength, endurance, and mobility    Joint hyperextensibility of multiple sites    Pain    Elderly primigravida in third trimester    Postural orthostatic tachycardia syndrome    Encounter for planned induction of labor    AMA (advanced maternal age) multigravida 35+    Elevated vitamin B12 level    Muscle spasm    Pelvic floor dysfunction    Uterine size date discrepancy pregnancy, third trimester    Fetal growth restriction antepartum    Anemia due to blood loss, acute    Obstetrical laceration     (normal spontaneous vaginal delivery)        HPI:  Doing well; ambulating and tolerating regular diet  Lochia: resolved   Vaginal pain: none  Abdominal pain: none   Breasts/nipples: breastfeeding well without difficulty and infant is gaining appropriately per their pediatrician. Some pain with latch due to lip and tongue ties.  Bowel/bladder function: normal/normal  Depression/anxiety: patient reports increased anxiety ; EPDS score: 10    Support at home: Haritha Caraballo hx: , NILM    Past Medical History:   Diagnosis Date    Allergy     Anemia     hx of HOSEA on fergon in past    Anxiety     Arthritis     right shoulder    Cervical pain 2018    Decreased strength, endurance, and mobility 2023    Depression     Excessive daytime sleepiness     Fatigue     History of psychiatric care     Hypermobile Jori-Danlos syndrome      Idiopathic hypersomnia     Keloid cicatrix     Left hip pain 02/05/2018    Neck pain 05/26/2020    Pain 11/12/2023    PCOS (polycystic ovarian syndrome)     POTS (postural orthostatic tachycardia syndrome) 2019    Psychiatric problem     Right shoulder pain 07/25/2016    Routine health maintenance 12/26/2023    Shoulder instability 06/06/2017    Sleep stage dysfunction     Tendinitis of right rotator cuff 07/25/2016    Therapy      Past Surgical History:   Procedure Laterality Date    SHOULDER SURGERY Right 06/2017    TONSILLECTOMY      wisdom teeth removal  2010     Review of patient's allergies indicates:   Allergen Reactions    Sulfa (sulfonamide antibiotics) Hives and Itching    Sulfamethoxazole-trimethoprim      Other reaction(s): Itching       Current Outpatient Medications:     calcium carbonate (CALCIUM 500 ORAL), , Disp: , Rfl:     docusate sodium (COLACE) 100 MG capsule, Take 2 capsules (200 mg total) by mouth 2 (two) times daily as needed for Constipation. (Patient not taking: Reported on 12/19/2024), Disp: 20 capsule, Rfl: 0    ergocalciferol, vitamin D2, 2,000 unit Tab, Take 2,000 Units by mouth once daily., Disp: , Rfl:     ferrous sulfate (FEOSOL) 325 mg (65 mg iron) Tab tablet, Take 1 tablet (325 mg total) by mouth once daily., Disp: 30 tablet, Rfl: 1    FISH OIL-DHA-EPA ORAL, once daily., Disp: , Rfl:     ibuprofen (ADVIL,MOTRIN) 600 MG tablet, Take 1 tablet (600 mg total) by mouth every 6 (six) hours as needed for Pain., Disp: 20 tablet, Rfl: 0    inositoL-D chiro inositoL 2,000-50 mg PwPk, Use as directed in the mouth or throat 2 (two) times a day., Disp: , Rfl:     Lactobac no.41/Bifidobact no.7 (PROBIOTIC-10 ORAL), once daily., Disp: , Rfl:     magnesium 250 mg Tab, Take 350 mg by mouth once., Disp: , Rfl:     omega-3 fatty acids/fish oil (FISH OIL-OMEGA-3 FATTY ACIDS) 300-1,000 mg capsule, Take by mouth once daily., Disp: , Rfl:     prenatal vit no.124/iron/folic (PRENATAL VITAMIN ORAL), Take 1  "Dose by mouth Daily., Disp: , Rfl:     PE:  OB History    Para Term  AB Living   1 1 1     1   SAB IAB Ectopic Multiple Live Births         0 1      # Outcome Date GA Lbr Mark/2nd Weight Sex Type Anes PTL Lv   1 Term 24 39w5d  2.693 kg (5 lb 15 oz) M Vag-Spont None N SOY      Vitals:    24 1533   BP: 110/73   Pulse: 87     /73   Pulse 87   Ht 5' 4" (1.626 m)   Wt 48.2 kg (106 lb 6 oz)   LMP 2024   Breastfeeding Yes   BMI 18.26 kg/m²   Gen: A&O x 4, NAD  Neck: non-tender, not enlarged, no masses or nodules  CV: normal HR  Lungs: normal resp effort  Abdomen: soft, nontender, and nondistended  Vulva/Vagina: healed well, approximated, non-tender.   Pap Smear today? no    ASSESSMENT/PLAN:  Postpartum exam s/p vaginal delivery  -- Counseling regarding resuming normal activities of exercise and work.  -- Patient desires CBC and TSH, ordered  -- Postpartum precautions reviewed  -- Reviewed pap guidelines. Pap due no later than   Continue annual exams; return then or sooner if any symptoms of pp depression or any other problem.      UPDATED MED LIST:  Current Outpatient Medications   Medication Sig Dispense Refill    calcium carbonate (CALCIUM 500 ORAL)       docusate sodium (COLACE) 100 MG capsule Take 2 capsules (200 mg total) by mouth 2 (two) times daily as needed for Constipation. (Patient not taking: Reported on 2024) 20 capsule 0    ergocalciferol, vitamin D2, 2,000 unit Tab Take 2,000 Units by mouth once daily.      ferrous sulfate (FEOSOL) 325 mg (65 mg iron) Tab tablet Take 1 tablet (325 mg total) by mouth once daily. 30 tablet 1    FISH OIL-DHA-EPA ORAL once daily.      ibuprofen (ADVIL,MOTRIN) 600 MG tablet Take 1 tablet (600 mg total) by mouth every 6 (six) hours as needed for Pain. 20 tablet 0    inositoL-D chiro inositoL 2,000-50 mg PwPk Use as directed in the mouth or throat 2 (two) times a day.      Lactobac no.41/Bifidobact no.7 (PROBIOTIC-10 ORAL) once " daily.      magnesium 250 mg Tab Take 350 mg by mouth once.      omega-3 fatty acids/fish oil (FISH OIL-OMEGA-3 FATTY ACIDS) 300-1,000 mg capsule Take by mouth once daily.      prenatal vit no.124/iron/folic (PRENATAL VITAMIN ORAL) Take 1 Dose by mouth Daily.       No current facility-administered medications for this visit.       Patient was counseled today on Pap guidelines, recommendation for pelvic exams, mammograms starting at the age of 40, Colonoscopy starting at the age of 45, Dexa Bone Scan and calcium and vitamin D supplementation in menopause, and to see her PCP for other health maintenance.       Reina Sanchez CNM

## 2024-12-23 ENCOUNTER — CLINICAL SUPPORT (OUTPATIENT)
Dept: REHABILITATION | Facility: HOSPITAL | Age: 34
End: 2024-12-23
Payer: COMMERCIAL

## 2024-12-23 DIAGNOSIS — M62.89 PELVIC FLOOR DYSFUNCTION: ICD-10-CM

## 2024-12-23 DIAGNOSIS — M62.838 MUSCLE SPASM: Primary | ICD-10-CM

## 2024-12-23 PROCEDURE — 97140 MANUAL THERAPY 1/> REGIONS: CPT

## 2024-12-23 PROCEDURE — 97530 THERAPEUTIC ACTIVITIES: CPT

## 2024-12-23 NOTE — PROGRESS NOTES
[facility]   Physical Therapy Treatment Note      Name: Angela Lyle  Clinic Number: [id number]    Therapy Diagnosis:   Encounter Diagnoses   Name Primary?    Muscle spasm Yes    Pelvic floor dysfunction          Physician: Norma Castillo, *    Visit Date: 12/23/2024    Physician Orders: PT Eval and Treat   Medical Diagnosis from Referral: Encounter for supervision of normal first pregnancy in third trimester   Evaluation Date: 8/26/2024  Authorization Period Expiration: 12-31-24  Plan of Care Expiration: 2-11-25  Progress Note Due: 12-19-24  Visit # / Visits authorized: 7/ 20   FOTO: 1/3  Date last given: 8/26/2024    Time In: 1226 (late arrival)  Time Out: 1502  Total Billable Time: 36  minutes    Precautions: Standard and pregnant and EDS    Subjective     Pt reports:   Has resumed more activity including walking.  Has felt some pelvic pressure with lifting her dog (12 pounds) but overall it's much improved.  Has been having some right hip and low back pain with walking.  Breast feeding currently.        Past visit:   Had an induction 11-6-24 due to an intrauterine growth restriction (baby was measuring small for gestational age).  She had a small periurethral tear and a 1st degree laceration of the perineum.  No epidural used.      Has noticed increased pelvic pressure since delivering her baby.  Diagnosed with possible cystocele.      Denies urinary incontinence.  Reports issues with constipation from taking iron supplements.        Prior visit: She reports that she has a left labral tear (has had physical therapy and an injection) which was diagnosed by MRI.      She was compliant with home exercise program.  Response to previous treatment: The pain worsened for about a day and then got a little better  Functional change: no changes reported      Pain: left lateral hip     Constitutional Symptoms Review: The patient denies having any constitutional symptoms.  Dear staff: Please kindly let patient know that both polyps removed during colonoscopy were benign but precancerous it is good news that we found and removed them.  Repeat colonoscopy will be due in 3 years.  Also place in recall for colonoscopy in 3 years.  Thank you.       Objective      Objective Measures updated at progress report unless specified.   Left leg appears shorter in supine with posterior rotated ilium and backward sacral torsion   Increased tension noted in suprapubic region with abdominal wall palpation.      See EMR under MEDIA for written consent provided 12/23/2024  Chaperone: declined      VAGINAL PELVIC FLOOR EXAM  Rechecked on 12/23/24    EXTERNAL ASSESSMENT   Introitus: left labial laceration healed  Skin condition: within normal limits   Scarring: perineal laceration (healed  Sensation: WNL   Pain:  none  Voluntary contraction: bulge  Voluntary relaxation: visible drop  Involuntary contraction: bulge  Bearing down: visible drop  Perineal descent: absent  Not able to visualize cystocele today.         INTERNAL ASSESSMENT   Pain: tender areas noted at introitus near scar tissue   Sensation: able to localized pressure appropriately   Vaginal vault: WNL   Muscle Bulk: within normal limits   Muscle Power: 2/5     Quality of contraction: slow relaxation (needs 5 seconds to relax)  Specificity: WNL   Coordination: tends to hold breath during PFM contration     Comments: improved pushing with open glottis pushing technique       Linea alba at rest: Wrinkling  Curl-up test for linea alba integrity:   Without transverse abdominus: Widens and not able to generate tension    Distortion with transverse abdominus: Mild increase in tension generation   Curl-up test for Inter-rectus distance (IRD)    Without transverse abdominus: 2 fingers width at the umbilicus, 2 fingers width 2 inches above umbilicus, 2 fingers width 2 inches below umbilicus   With transverse abdominus contraction: 1.5 fingers width at the umbilicus, 1.5 fingers width 2 inches above umbilicus, 1.5 fingers width 2 inches below umbilicus   Abdominal wall musculature dominance: Internal oblique dominant: rib cage flairs, sternal angle increases, diaphragm lifts and linea alba pulls in      Treatment    Angela received the treatments listed below:    Angela received the following manual therapy techniques: to develop flexibility and extensibility for 8 minutes including:   [] Pubic clearing x 3 bouts. Left posterior rotated ilium correction x 3 bouts.   [] soft tissue mobilization of suprapubic region region using gentle pressure    [x] Scar tissue mobilization at introitus     Neuromuscular Re-education to develop Coordination and Control for 20 minutes including:   [] Posterior pelvic tilt with pelvis elevated 5 sec x 10 reps   [] Posterior pelvic tilt and bridge with pelvis elevated 5 sec x 10 reps   [] Pt struggles to isolate TrA initially but improves with practice. Tactile and verbal cues required for correct activation of TrA. Initially pt substitutes with rectus abdominus muscle but with mod verbal and tactile instruction is able to isolate out  lower abdominal muscles.    [x] Transverse abdominus muscle 5 sec x 10 reps  [x] Transverse abdominus muscle +Kegel+BKFO/Marching (past 90 degrees knee flexion) x10 reps.   Cues needed for correct TA activation and coordination with kegel.    [x]  Pelvic floor muscle(s) relaxation, downtraining and active lengthening   []  Lower abdominal wall training -isometrics 5 sec x 10 reps   [x]  Kegel edu and practice.  Increased time spent on edu on proper technique.  Pt improves with practice and verbal cues. (Internal assessment and instruction)  [x] Pt instructed to perform kegels in a gravity lessoned position by placing her hips and pelvis up on a wedge.            Angela participated in dynamic functional therapeutic activities to improve functional performance for 8 minutes, including:  [] review on avoidance of asymmetrical activities to help decrease strain on the sacroiliac joint   [x] Plan of care review  [x] Anatomy review and discussion of the anatomy on current level of function   [x] Home exercise program issued and reviewed   []  360 degree breathing edu  and practice   [] Edu on self sacroiliac joint muscle energy technique techniques   []  Edu on positioning on commode with breathing technique to improve ability to relax pelvic floor and have BM with less need to strain.  Practiced technique with patient and made modifications in technique as needed.  Christian picture of impact on positioning and breathing on colorectal angle for visual education.  Further edu patient by showing squatty potty video of impact of positioning on ability to have BM.   []  Body mechanics with lifting to decrease strain to pelvic structures  []   Pelvic muscle bracing (the knack) with activities of daily living   []  Edu on positioning in a gravity lessoned position for exercises to decrease strain to pelvic structures     Home Exercises Provided and Patient Education Provided     Education provided: See above  Discussed progression of plan of care with patient; educated pt in activity modification; reviewed HEP with pt. Pt demonstrated and verbalized understanding of all instruction and was provided with a handout of HEP (see Patient Instructions).    Written Home Exercises Provided: yes.  Exercises were reviewed and Angela was able to demonstrate them prior to the end of the session.  Angela demonstrated good  understanding of the education provided.     See EMR under Patient Instructions for exercises provided 9/19/2024.    Assessment     Angela consents to intravaginal pelvic floor muscle(s) assessment today.  Noted to have pelvic floor muscle(s) weakness and coordination deficits.  Poor pelvic floor muscle(s) contraction noted today but improves with coordination of kegel on the exhalation.  Continued gentle core activation training today.  Pt will continue to benefit from skilled outpatient physical therapy to address the deficits listed in the problem list box on initial evaluation, provide pt/family education and to maximize pt's level of independence in the home and community  environment.       Angela Is progressing well towards her goals.   Pt prognosis is Good.     Pt will continue to benefit from skilled outpatient physical therapy to address the deficits listed in the problem list box on initial evaluation, provide pt/family education and to maximize pt's level of independence in the home and community environment.     Pt's spiritual, cultural and educational needs considered and pt agreeable to plan of care and goals.     Anticipated barriers to physical therapy: none    Goals:   Short Term Goals: 4 weeks   Patient to demonstrate good body mechanics with sit <--> stand and side lying <--> sitting transfers to demonstrate improved functional mobility.    Pt to demonstrate being able to correctly and consistently relax and lengthen her pelvic floor muscle(s) in preparation for labor and delivery.    Pt to demonstrate proper body mechanics with lifting, bending and squatting to decrease strain on lumbopelvic structures during pregnancy.    Pt to report being able to complete a half day at work without significant increase in pain to demonstrate a return towards prior level of function.   Pt to demonstrate an improved score in the FOTO PFDI Pain survey  to at least 85 to demonstrate improving ability to participate in activities of daily living and work duties.        Long Term Goals: 12 weeks ,   Pt to be discharged with home plan for carry over after discharge.    Pt will be trained and compliant with postural strategies in sitting and standing to improve alignment and decrease pain and muscle fatigue.  Pt to report being able to complete a full day at work without significant increase in pain to demonstrate a return towards prior level of function.   Pt to demonstrate an improved score in the FOTO PFDI Pain survey  to at least 79 to demonstrate improving ability to participate in activities of daily living and work duties.     Patient to be able to consistently roll over in bed with  minimal discomfort to demonstrate improved mobility.      PLAN      Plan of care Certification: 11-19-24 to 2-11-25     Outpatient Physical Therapy 1-2 times weekly for 12 weeks to include the following interventions: therapeutic exercises, therapeutic activity, neuromuscular re-education, gait training, manual therapy, modalities PRN, patient/family education, and self care/home management, dry needling.        Continue with established Plan of Care, working toward established PT goals.      At next visit: start abdominal wall and hip strengthening, consider assessing pelvic floor muscle(s)     Allison Melara, PT

## 2024-12-23 NOTE — PATIENT INSTRUCTIONS
Home Exercise Program: 12/23/2024    PERINEAL MASSAGE    1. Wash hands thoroughly with antibacterial soap.  2. Lay down comfortably with your back and head well supported by several pillows.  3. Apply water-based lubricant (ie. Slippery stuff, coconut oil, olive oil) on your thumbs and perineum.  4. Place one or both thumbs just inside the vagina.  5. Gently press downward, then slowly continue the stretch up both sides of the vaginal opening.   6. The amount of pressure for the stretch may cause discomfort, but not pain (ie. You can replicate the stretching sensation by bending your finger backward). Don't go above 4-5/10 pain during or after the exericse.  7. Focus on relaxed breathing and keeping the pelvic floor muscles dropped.   8. Continue for 5-10 minutes, 3-4 times per week.    STOP if there is increased bleeding, an infection, or extreme pain.         Some may prefer their partner to assist them or to perform the massage for them. Your partner needs to use clean hands and gently insert one or two index fingers inside the lower part of the vagina and follow the steps listed above. It is important to tell your partner how much pressure to apply without causing pain.

## 2024-12-27 ENCOUNTER — PATIENT MESSAGE (OUTPATIENT)
Dept: REHABILITATION | Facility: HOSPITAL | Age: 34
End: 2024-12-27
Payer: COMMERCIAL

## 2025-02-19 ENCOUNTER — DOCUMENTATION ONLY (OUTPATIENT)
Dept: REHABILITATION | Facility: HOSPITAL | Age: 35
End: 2025-02-19
Payer: COMMERCIAL

## 2025-02-19 PROBLEM — M62.89 PELVIC FLOOR DYSFUNCTION: Status: RESOLVED | Noted: 2024-10-07 | Resolved: 2025-02-19

## 2025-02-19 PROBLEM — M62.838 MUSCLE SPASM: Status: RESOLVED | Noted: 2024-10-07 | Resolved: 2025-02-19

## 2025-02-19 NOTE — PROGRESS NOTES
[facility]  Physical Therapy Discharge Note    Name: Angela Lyle  Clinic Number: [id number]    Therapy Diagnosis: No diagnosis found.  Physician: No ref. provider found    Physician Orders: PT Eval and Treat   Medical Diagnosis from Referral: Encounter for supervision of normal first pregnancy in third trimester   Evaluation Date: 8/26/2024      Date of Last visit: 12-23-25  Total Visits Received: 7    ASSESSMENT     Discharge reason: Patient has not attended therapy since 12-23-25    Discharge FOTO Score:       Goals:   Short Term Goals: 4 weeks   Patient to demonstrate good body mechanics with sit <--> stand and side lying <--> sitting transfers to demonstrate improved functional mobility.    Pt to demonstrate being able to correctly and consistently relax and lengthen her pelvic floor muscle(s) in preparation for labor and delivery.    Pt to demonstrate proper body mechanics with lifting, bending and squatting to decrease strain on lumbopelvic structures during pregnancy.    Pt to report being able to complete a half day at work without significant increase in pain to demonstrate a return towards prior level of function.   Pt to demonstrate an improved score in the FOTO PFDI Pain survey  to at least 85 to demonstrate improving ability to participate in activities of daily living and work duties.        Long Term Goals: 12 weeks ,   Pt to be discharged with home plan for carry over after discharge.    Pt will be trained and compliant with postural strategies in sitting and standing to improve alignment and decrease pain and muscle fatigue.  Pt to report being able to complete a full day at work without significant increase in pain to demonstrate a return towards prior level of function.   Pt to demonstrate an improved score in the FOTO PFDI Pain survey  to at least 79 to demonstrate improving ability to participate in activities of daily living and work duties.     Patient to  be able to consistently roll over in bed with minimal discomfort to demonstrate improved mobility.     Goals Not achieved and why:   Pt has not scheduled any additional visits and has not returned to therapy.         PLAN   This patient is discharged from physical therapy.       Allison Melara, PT

## 2025-08-04 PROBLEM — O09.513 ELDERLY PRIMIGRAVIDA IN THIRD TRIMESTER: Status: RESOLVED | Noted: 2024-04-02 | Resolved: 2025-08-04

## 2025-08-22 ENCOUNTER — OFFICE VISIT (OUTPATIENT)
Dept: OBSTETRICS AND GYNECOLOGY | Facility: CLINIC | Age: 35
End: 2025-08-22
Payer: COMMERCIAL

## 2025-08-22 VITALS
SYSTOLIC BLOOD PRESSURE: 124 MMHG | HEART RATE: 74 BPM | HEIGHT: 64 IN | WEIGHT: 99.88 LBS | DIASTOLIC BLOOD PRESSURE: 74 MMHG | BODY MASS INDEX: 17.05 KG/M2

## 2025-08-22 DIAGNOSIS — F41.8 POSTPARTUM ANXIETY: Primary | ICD-10-CM

## 2025-08-22 PROCEDURE — 99999 PR PBB SHADOW E&M-EST. PATIENT-LVL III: CPT | Mod: PBBFAC,,,

## 2025-08-22 RX ORDER — SERTRALINE HYDROCHLORIDE 25 MG/1
25 TABLET, FILM COATED ORAL DAILY
Qty: 30 TABLET | Refills: 11 | Status: SHIPPED | OUTPATIENT
Start: 2025-08-22

## (undated) DEVICE — SEE MEDLINE ITEM 152622

## (undated) DEVICE — PENCIL ELECTROSURG HOLST W/BLD

## (undated) DEVICE — PAD SHOULDER CARE POLAR

## (undated) DEVICE — SUT MCRYL PLUS 4-0 PS2 27IN

## (undated) DEVICE — TAPE SURG MEDIPORE 6X72IN

## (undated) DEVICE — SUPPORT SLING SHOT II MEDIUM

## (undated) DEVICE — REPAIR KIT SMALL JOINT BIO TEN

## (undated) DEVICE — SOL IRR NACL .9% 3000ML

## (undated) DEVICE — SOL 9P NACL IRR PIC IL

## (undated) DEVICE — Device

## (undated) DEVICE — GLOVE SURGEON SYN PF SZ 9

## (undated) DEVICE — GAUZE SPONGE 4X4 12PLY

## (undated) DEVICE — APPLICATOR CHLORAPREP ORN 26ML

## (undated) DEVICE — SEE MEDLINE ITEM 153151

## (undated) DEVICE — PAD ABD 8X10 STERILE

## (undated) DEVICE — PROBE MULTI PORT RF 90 DEGREE

## (undated) DEVICE — PAD ELECTRODE STER 1.5X3

## (undated) DEVICE — SEE MEDLINE ITEM 152530

## (undated) DEVICE — SHAVER ULTRAFFR 4.2MM

## (undated) DEVICE — ELECTRODE REM PLYHSV RETURN 9

## (undated) DEVICE — ADHESIVE MASTISOL VIAL 48/BX

## (undated) DEVICE — SUT VICRYL PLUS 3-0 SH 18IN

## (undated) DEVICE — KIT SHOULDER POSITIONER SPIDER

## (undated) DEVICE — GLOVE ORTHO PF SZ 8.5

## (undated) DEVICE — NDL 18GA X1 1/2 REG BEVEL

## (undated) DEVICE — DRG DOC 8.0 X 85MM

## (undated) DEVICE — POSITIONER IV ARMBOARD FOAM

## (undated) DEVICE — CANNULA DRY DOC 7 X 85

## (undated) DEVICE — ADHESIVE DERMABOND ADVANCED

## (undated) DEVICE — DRAPE STERI INSTRUMENT 1018

## (undated) DEVICE — BLADE SHAVER 4.5 6/BX

## (undated) DEVICE — DRESSING XEROFORM FOIL PK 1X8

## (undated) DEVICE — SEE MEDLINE ITEM 152529

## (undated) DEVICE — DRESSING LEUKOPLAST FLEX 1X3IN

## (undated) DEVICE — TUBE SET INFLOW/OUTFLOW

## (undated) DEVICE — UNDERGLOVES BIOGEL PI SIZE 7.5

## (undated) DEVICE — GOWN SMART IMP BREATHABLE XXLG

## (undated) DEVICE — CLOSURE SKIN STERI STRIP 1/2X4

## (undated) DEVICE — DRAPE STERI U-SHAPED 47X51IN

## (undated) DEVICE — GLOVE BIOGEL SKINSENSE PI 7.0

## (undated) DEVICE — SEE MEDLINE ITEM 157117

## (undated) DEVICE — SEE MEDLINE ITEM 146420